# Patient Record
Sex: FEMALE | Race: WHITE | NOT HISPANIC OR LATINO | Employment: UNEMPLOYED | URBAN - NONMETROPOLITAN AREA
[De-identification: names, ages, dates, MRNs, and addresses within clinical notes are randomized per-mention and may not be internally consistent; named-entity substitution may affect disease eponyms.]

---

## 2017-01-16 ENCOUNTER — TRANSCRIBE ORDERS (OUTPATIENT)
Dept: ADMINISTRATIVE | Facility: HOSPITAL | Age: 57
End: 2017-01-16

## 2017-01-16 ENCOUNTER — LAB (OUTPATIENT)
Dept: LAB | Facility: HOSPITAL | Age: 57
End: 2017-01-16
Attending: INTERNAL MEDICINE

## 2017-01-16 DIAGNOSIS — R00.0 TACHYCARDIA, UNSPECIFIED: ICD-10-CM

## 2017-01-16 DIAGNOSIS — E11.9 DIABETES MELLITUS, STABLE (HCC): ICD-10-CM

## 2017-01-16 DIAGNOSIS — I10 ESSENTIAL HYPERTENSION, BENIGN: Primary | ICD-10-CM

## 2017-01-16 DIAGNOSIS — E78.5 HYPERLIPIDEMIA, UNSPECIFIED HYPERLIPIDEMIA TYPE: ICD-10-CM

## 2017-01-16 DIAGNOSIS — I10 ESSENTIAL HYPERTENSION, BENIGN: ICD-10-CM

## 2017-01-16 LAB
ALBUMIN SERPL-MCNC: 4.7 G/DL (ref 3.5–5)
ALBUMIN/GLOB SERPL: 1.3 G/DL (ref 1.5–2.5)
ALP SERPL-CCNC: 88 U/L (ref 46–116)
ALT SERPL W P-5'-P-CCNC: 14 U/L (ref 10–36)
ANION GAP SERPL CALCULATED.3IONS-SCNC: 7.7 MMOL/L (ref 3.6–11.2)
AST SERPL-CCNC: 23 U/L (ref 10–30)
BASOPHILS # BLD AUTO: 0.05 10*3/MM3 (ref 0–0.3)
BASOPHILS NFR BLD AUTO: 0.9 % (ref 0–2)
BILIRUB SERPL-MCNC: 1.2 MG/DL (ref 0.2–1.8)
BUN BLD-MCNC: 13 MG/DL (ref 7–21)
BUN/CREAT SERPL: 16.7 (ref 7–25)
CALCIUM SPEC-SCNC: 10.3 MG/DL (ref 7.7–10)
CHLORIDE SERPL-SCNC: 107 MMOL/L (ref 99–112)
CHOLEST SERPL-MCNC: 131 MG/DL (ref 0–200)
CO2 SERPL-SCNC: 29.3 MMOL/L (ref 24.3–31.9)
CREAT BLD-MCNC: 0.78 MG/DL (ref 0.43–1.29)
DEPRECATED RDW RBC AUTO: 45.5 FL (ref 37–54)
EOSINOPHIL # BLD AUTO: 0.18 10*3/MM3 (ref 0–0.7)
EOSINOPHIL NFR BLD AUTO: 3.1 % (ref 0–5)
ERYTHROCYTE [DISTWIDTH] IN BLOOD BY AUTOMATED COUNT: 14.3 % (ref 11.5–14.5)
GFR SERPL CREATININE-BSD FRML MDRD: 76 ML/MIN/1.73
GLOBULIN UR ELPH-MCNC: 3.6 GM/DL
GLUCOSE BLD-MCNC: 108 MG/DL (ref 70–110)
HBA1C MFR BLD: 6.2 % (ref 4.5–5.7)
HCT VFR BLD AUTO: 42.7 % (ref 37–47)
HDLC SERPL-MCNC: 46 MG/DL (ref 60–100)
HGB BLD-MCNC: 13.9 G/DL (ref 12–16)
IMM GRANULOCYTES # BLD: 0 10*3/MM3 (ref 0–0.03)
IMM GRANULOCYTES NFR BLD: 0 % (ref 0–0.5)
LDLC SERPL CALC-MCNC: 68 MG/DL (ref 0–100)
LDLC/HDLC SERPL: 1.48 {RATIO}
LYMPHOCYTES # BLD AUTO: 1.29 10*3/MM3 (ref 1–3)
LYMPHOCYTES NFR BLD AUTO: 22.3 % (ref 21–51)
MCH RBC QN AUTO: 28.5 PG (ref 27–33)
MCHC RBC AUTO-ENTMCNC: 32.6 G/DL (ref 33–37)
MCV RBC AUTO: 87.7 FL (ref 80–94)
MONOCYTES # BLD AUTO: 0.42 10*3/MM3 (ref 0.1–0.9)
MONOCYTES NFR BLD AUTO: 7.3 % (ref 0–10)
NEUTROPHILS # BLD AUTO: 3.85 10*3/MM3 (ref 1.4–6.5)
NEUTROPHILS NFR BLD AUTO: 66.4 % (ref 30–70)
OSMOLALITY SERPL CALC.SUM OF ELEC: 287.5 MOSM/KG (ref 273–305)
PLATELET # BLD AUTO: 182 10*3/MM3 (ref 130–400)
PMV BLD AUTO: 10.8 FL (ref 6–10)
POTASSIUM BLD-SCNC: 3.6 MMOL/L (ref 3.5–5.3)
PROT SERPL-MCNC: 8.3 G/DL (ref 6–8)
RBC # BLD AUTO: 4.87 10*6/MM3 (ref 4.2–5.4)
SODIUM BLD-SCNC: 144 MMOL/L (ref 135–153)
TRIGL SERPL-MCNC: 85 MG/DL (ref 0–150)
TSH SERPL DL<=0.05 MIU/L-ACNC: 2.31 MIU/ML (ref 0.55–4.78)
VLDLC SERPL-MCNC: 17 MG/DL
WBC NRBC COR # BLD: 5.79 10*3/MM3 (ref 4.5–12.5)

## 2017-01-16 PROCEDURE — 83036 HEMOGLOBIN GLYCOSYLATED A1C: CPT | Performed by: INTERNAL MEDICINE

## 2017-01-16 PROCEDURE — 85025 COMPLETE CBC W/AUTO DIFF WBC: CPT | Performed by: INTERNAL MEDICINE

## 2017-01-16 PROCEDURE — 36415 COLL VENOUS BLD VENIPUNCTURE: CPT

## 2017-01-16 PROCEDURE — 80061 LIPID PANEL: CPT | Performed by: INTERNAL MEDICINE

## 2017-01-16 PROCEDURE — 80053 COMPREHEN METABOLIC PANEL: CPT | Performed by: INTERNAL MEDICINE

## 2017-01-16 PROCEDURE — 84443 ASSAY THYROID STIM HORMONE: CPT | Performed by: INTERNAL MEDICINE

## 2022-08-04 ENCOUNTER — APPOINTMENT (OUTPATIENT)
Dept: CT IMAGING | Facility: HOSPITAL | Age: 62
End: 2022-08-04

## 2022-08-04 ENCOUNTER — HOSPITAL ENCOUNTER (INPATIENT)
Facility: HOSPITAL | Age: 62
LOS: 5 days | Discharge: HOME | End: 2022-08-09
Attending: EMERGENCY MEDICINE | Admitting: HOSPITALIST

## 2022-08-04 ENCOUNTER — APPOINTMENT (OUTPATIENT)
Dept: GENERAL RADIOLOGY | Facility: HOSPITAL | Age: 62
End: 2022-08-04

## 2022-08-04 ENCOUNTER — APPOINTMENT (OUTPATIENT)
Dept: ULTRASOUND IMAGING | Facility: HOSPITAL | Age: 62
End: 2022-08-04

## 2022-08-04 DIAGNOSIS — D64.9 ANEMIA, UNSPECIFIED TYPE: ICD-10-CM

## 2022-08-04 DIAGNOSIS — R60.1 ANASARCA: ICD-10-CM

## 2022-08-04 DIAGNOSIS — I50.23 ACUTE ON CHRONIC SYSTOLIC HEART FAILURE: ICD-10-CM

## 2022-08-04 DIAGNOSIS — I50.9 ACUTE ON CHRONIC CONGESTIVE HEART FAILURE, UNSPECIFIED HEART FAILURE TYPE: Primary | ICD-10-CM

## 2022-08-04 LAB
A-A DO2: 139.9 MMHG (ref 0–300)
A-A DO2: 69.3 MMHG (ref 0–300)
ALBUMIN SERPL-MCNC: 3.34 G/DL (ref 3.5–5.2)
ALBUMIN/GLOB SERPL: 0.9 G/DL
ALP SERPL-CCNC: 92 U/L (ref 39–117)
ALT SERPL W P-5'-P-CCNC: 7 U/L (ref 1–33)
ANION GAP SERPL CALCULATED.3IONS-SCNC: 13.9 MMOL/L (ref 5–15)
APPEARANCE FLD: ABNORMAL
APTT PPP: 41.4 SECONDS (ref 26.5–34.5)
ARTERIAL PATENCY WRIST A: ABNORMAL
ARTERIAL PATENCY WRIST A: POSITIVE
AST SERPL-CCNC: 30 U/L (ref 1–32)
ATMOSPHERIC PRESS: 730 MMHG
ATMOSPHERIC PRESS: 730 MMHG
BASE EXCESS BLDA CALC-SCNC: 2.3 MMOL/L (ref 0–2)
BASE EXCESS BLDA CALC-SCNC: 3.3 MMOL/L (ref 0–2)
BASOPHILS # BLD AUTO: 0.06 10*3/MM3 (ref 0–0.2)
BASOPHILS NFR BLD AUTO: 1.2 % (ref 0–1.5)
BDY SITE: ABNORMAL
BDY SITE: ABNORMAL
BILIRUB SERPL-MCNC: 1.3 MG/DL (ref 0–1.2)
BODY TEMPERATURE: 0 C
BODY TEMPERATURE: 0 C
BUN SERPL-MCNC: 19 MG/DL (ref 8–23)
BUN/CREAT SERPL: 12.7 (ref 7–25)
CALCIUM SPEC-SCNC: 9.5 MG/DL (ref 8.6–10.5)
CHLORIDE SERPL-SCNC: 98 MMOL/L (ref 98–107)
CO2 BLDA-SCNC: 27.9 MMOL/L (ref 22–33)
CO2 BLDA-SCNC: 29.9 MMOL/L (ref 22–33)
CO2 SERPL-SCNC: 21.1 MMOL/L (ref 22–29)
COHGB MFR BLD: 1.3 % (ref 0–5)
COHGB MFR BLD: 1.6 % (ref 0–5)
COLOR FLD: YELLOW
CREAT SERPL-MCNC: 1.5 MG/DL (ref 0.57–1)
DEPRECATED RDW RBC AUTO: 56.8 FL (ref 37–54)
EGFRCR SERPLBLD CKD-EPI 2021: 39.5 ML/MIN/1.73
EOSINOPHIL # BLD AUTO: 0.12 10*3/MM3 (ref 0–0.4)
EOSINOPHIL NFR BLD AUTO: 2.4 % (ref 0.3–6.2)
ERYTHROCYTE [DISTWIDTH] IN BLOOD BY AUTOMATED COUNT: 16.1 % (ref 12.3–15.4)
FLUAV RNA RESP QL NAA+PROBE: NOT DETECTED
FLUBV RNA RESP QL NAA+PROBE: NOT DETECTED
GAS FLOW AIRWAY: 4 LPM
GLOBULIN UR ELPH-MCNC: 3.8 GM/DL
GLUCOSE SERPL-MCNC: 101 MG/DL (ref 65–99)
HBA1C MFR BLD: 4.8 % (ref 4.8–5.6)
HCO3 BLDA-SCNC: 26.6 MMOL/L (ref 20–26)
HCO3 BLDA-SCNC: 28.5 MMOL/L (ref 20–26)
HCT VFR BLD AUTO: 34.1 % (ref 34–46.6)
HCT VFR BLD CALC: 33.9 % (ref 38–51)
HCT VFR BLD CALC: 34.8 % (ref 38–51)
HGB BLD-MCNC: 10.9 G/DL (ref 12–15.9)
HGB BLDA-MCNC: 11.1 G/DL (ref 13.5–17.5)
HGB BLDA-MCNC: 11.4 G/DL (ref 13.5–17.5)
HOLD SPECIMEN: NORMAL
HOLD SPECIMEN: NORMAL
IMM GRANULOCYTES # BLD AUTO: 0.01 10*3/MM3 (ref 0–0.05)
IMM GRANULOCYTES NFR BLD AUTO: 0.2 % (ref 0–0.5)
INHALED O2 CONCENTRATION: 21 %
INHALED O2 CONCENTRATION: 36 %
INR PPP: 1.41 (ref 0.9–1.1)
LIPASE SERPL-CCNC: 26 U/L (ref 13–60)
LYMPHOCYTES # BLD AUTO: 0.6 10*3/MM3 (ref 0.7–3.1)
LYMPHOCYTES NFR BLD AUTO: 12 % (ref 19.6–45.3)
LYMPHOCYTES NFR FLD MANUAL: 6 %
Lab: ABNORMAL
MAGNESIUM SERPL-MCNC: 2.1 MG/DL (ref 1.6–2.4)
MCH RBC QN AUTO: 31.3 PG (ref 26.6–33)
MCHC RBC AUTO-ENTMCNC: 32 G/DL (ref 31.5–35.7)
MCV RBC AUTO: 98 FL (ref 79–97)
METHGB BLD QL: 0.3 % (ref 0–3)
METHGB BLD QL: <-0.1 % (ref 0–3)
MODALITY: ABNORMAL
MODALITY: ABNORMAL
MONOCYTES # BLD AUTO: 0.48 10*3/MM3 (ref 0.1–0.9)
MONOCYTES NFR BLD AUTO: 9.6 % (ref 5–12)
MONOS+MACROS NFR FLD: 77 %
NEUTROPHILS NFR BLD AUTO: 3.72 10*3/MM3 (ref 1.7–7)
NEUTROPHILS NFR BLD AUTO: 74.6 % (ref 42.7–76)
NEUTROPHILS NFR FLD MANUAL: 17 %
NOTE: ABNORMAL
NOTE: ABNORMAL
NOTIFIED BY: ABNORMAL
NOTIFIED WHO: ABNORMAL
NRBC BLD AUTO-RTO: 0 /100 WBC (ref 0–0.2)
NT-PROBNP SERPL-MCNC: 8224 PG/ML (ref 0–900)
NUC CELL # FLD: 63 /MM3
OXYHGB MFR BLDV: 31.3 % (ref 94–99)
OXYHGB MFR BLDV: 90.2 % (ref 94–99)
PCO2 BLDA: 39.6 MM HG (ref 35–45)
PCO2 BLDA: 45.1 MM HG (ref 35–45)
PCO2 TEMP ADJ BLD: ABNORMAL MM[HG]
PCO2 TEMP ADJ BLD: ABNORMAL MM[HG]
PH BLDA: 7.41 PH UNITS (ref 7.35–7.45)
PH BLDA: 7.44 PH UNITS (ref 7.35–7.45)
PH, TEMP CORRECTED: ABNORMAL
PH, TEMP CORRECTED: ABNORMAL
PLATELET # BLD AUTO: 115 10*3/MM3 (ref 140–450)
PMV BLD AUTO: 9.5 FL (ref 6–12)
PO2 BLDA: 23.3 MM HG (ref 83–108)
PO2 BLDA: 62.3 MM HG (ref 83–108)
PO2 TEMP ADJ BLD: ABNORMAL MM[HG]
PO2 TEMP ADJ BLD: ABNORMAL MM[HG]
POTASSIUM SERPL-SCNC: 4.1 MMOL/L (ref 3.5–5.2)
PROT SERPL-MCNC: 7.1 G/DL (ref 6–8.5)
PROTHROMBIN TIME: 17.6 SECONDS (ref 12.1–14.7)
QT INTERVAL: 122 MS
QTC INTERVAL: 131 MS
RBC # BLD AUTO: 3.48 10*6/MM3 (ref 3.77–5.28)
RBC # FLD AUTO: ABNORMAL 10*3/UL
SAO2 % BLDCOA: 31.8 % (ref 94–99)
SAO2 % BLDCOA: 91.5 % (ref 94–99)
SARS-COV-2 RNA RESP QL NAA+PROBE: NOT DETECTED
SODIUM SERPL-SCNC: 133 MMOL/L (ref 136–145)
TROPONIN T SERPL-MCNC: <0.01 NG/ML (ref 0–0.03)
VENTILATOR MODE: ABNORMAL
VENTILATOR MODE: ABNORMAL
WBC NRBC COR # BLD: 4.99 10*3/MM3 (ref 3.4–10.8)
WHOLE BLOOD HOLD COAG: NORMAL
WHOLE BLOOD HOLD SPECIMEN: NORMAL

## 2022-08-04 PROCEDURE — 74176 CT ABD & PELVIS W/O CONTRAST: CPT

## 2022-08-04 PROCEDURE — 99285 EMERGENCY DEPT VISIT HI MDM: CPT

## 2022-08-04 PROCEDURE — 81001 URINALYSIS AUTO W/SCOPE: CPT | Performed by: EMERGENCY MEDICINE

## 2022-08-04 PROCEDURE — 83735 ASSAY OF MAGNESIUM: CPT | Performed by: EMERGENCY MEDICINE

## 2022-08-04 PROCEDURE — 85025 COMPLETE CBC W/AUTO DIFF WBC: CPT | Performed by: EMERGENCY MEDICINE

## 2022-08-04 PROCEDURE — 82375 ASSAY CARBOXYHB QUANT: CPT

## 2022-08-04 PROCEDURE — 36415 COLL VENOUS BLD VENIPUNCTURE: CPT

## 2022-08-04 PROCEDURE — 87070 CULTURE OTHR SPECIMN AEROBIC: CPT | Performed by: EMERGENCY MEDICINE

## 2022-08-04 PROCEDURE — 83880 ASSAY OF NATRIURETIC PEPTIDE: CPT | Performed by: EMERGENCY MEDICINE

## 2022-08-04 PROCEDURE — 83050 HGB METHEMOGLOBIN QUAN: CPT

## 2022-08-04 PROCEDURE — 82945 GLUCOSE OTHER FLUID: CPT | Performed by: EMERGENCY MEDICINE

## 2022-08-04 PROCEDURE — 85730 THROMBOPLASTIN TIME PARTIAL: CPT | Performed by: EMERGENCY MEDICINE

## 2022-08-04 PROCEDURE — 82805 BLOOD GASES W/O2 SATURATION: CPT

## 2022-08-04 PROCEDURE — 89051 BODY FLUID CELL COUNT: CPT | Performed by: EMERGENCY MEDICINE

## 2022-08-04 PROCEDURE — 99284 EMERGENCY DEPT VISIT MOD MDM: CPT

## 2022-08-04 PROCEDURE — 87636 SARSCOV2 & INF A&B AMP PRB: CPT | Performed by: STUDENT IN AN ORGANIZED HEALTH CARE EDUCATION/TRAINING PROGRAM

## 2022-08-04 PROCEDURE — 84484 ASSAY OF TROPONIN QUANT: CPT | Performed by: EMERGENCY MEDICINE

## 2022-08-04 PROCEDURE — 25010000002 ALBUMIN HUMAN 25% PER 50 ML: Performed by: EMERGENCY MEDICINE

## 2022-08-04 PROCEDURE — 76942 ECHO GUIDE FOR BIOPSY: CPT

## 2022-08-04 PROCEDURE — 84157 ASSAY OF PROTEIN OTHER: CPT | Performed by: EMERGENCY MEDICINE

## 2022-08-04 PROCEDURE — 87015 SPECIMEN INFECT AGNT CONCNTJ: CPT | Performed by: EMERGENCY MEDICINE

## 2022-08-04 PROCEDURE — 49083 ABD PARACENTESIS W/IMAGING: CPT | Performed by: RADIOLOGY

## 2022-08-04 PROCEDURE — 94799 UNLISTED PULMONARY SVC/PX: CPT

## 2022-08-04 PROCEDURE — 83690 ASSAY OF LIPASE: CPT | Performed by: HOSPITALIST

## 2022-08-04 PROCEDURE — 83036 HEMOGLOBIN GLYCOSYLATED A1C: CPT | Performed by: HOSPITALIST

## 2022-08-04 PROCEDURE — 85610 PROTHROMBIN TIME: CPT | Performed by: EMERGENCY MEDICINE

## 2022-08-04 PROCEDURE — 93005 ELECTROCARDIOGRAM TRACING: CPT | Performed by: EMERGENCY MEDICINE

## 2022-08-04 PROCEDURE — 80053 COMPREHEN METABOLIC PANEL: CPT | Performed by: EMERGENCY MEDICINE

## 2022-08-04 PROCEDURE — 71250 CT THORAX DX C-: CPT

## 2022-08-04 PROCEDURE — 71045 X-RAY EXAM CHEST 1 VIEW: CPT

## 2022-08-04 PROCEDURE — 0W9G3ZZ DRAINAGE OF PERITONEAL CAVITY, PERCUTANEOUS APPROACH: ICD-10-PCS | Performed by: RADIOLOGY

## 2022-08-04 PROCEDURE — 36600 WITHDRAWAL OF ARTERIAL BLOOD: CPT

## 2022-08-04 PROCEDURE — P9047 ALBUMIN (HUMAN), 25%, 50ML: HCPCS | Performed by: EMERGENCY MEDICINE

## 2022-08-04 PROCEDURE — 87205 SMEAR GRAM STAIN: CPT | Performed by: EMERGENCY MEDICINE

## 2022-08-04 PROCEDURE — 82042 OTHER SOURCE ALBUMIN QUAN EA: CPT | Performed by: EMERGENCY MEDICINE

## 2022-08-04 RX ORDER — ONDANSETRON 4 MG/1
4 TABLET, FILM COATED ORAL EVERY 8 HOURS PRN
COMMUNITY

## 2022-08-04 RX ORDER — ALBUMIN (HUMAN) 12.5 G/50ML
25 SOLUTION INTRAVENOUS
Status: COMPLETED | OUTPATIENT
Start: 2022-08-04 | End: 2022-08-04

## 2022-08-04 RX ORDER — ASPIRIN 81 MG/1
81 TABLET ORAL DAILY
COMMUNITY
End: 2023-03-06 | Stop reason: SDUPTHER

## 2022-08-04 RX ORDER — ALPRAZOLAM 0.25 MG/1
0.25 TABLET ORAL NIGHTLY PRN
COMMUNITY

## 2022-08-04 RX ORDER — CARVEDILOL 12.5 MG/1
6.25 TABLET ORAL 2 TIMES DAILY WITH MEALS
COMMUNITY
End: 2022-09-15 | Stop reason: HOSPADM

## 2022-08-04 RX ORDER — BUMETANIDE 0.25 MG/ML
2 INJECTION INTRAMUSCULAR; INTRAVENOUS ONCE
Status: COMPLETED | OUTPATIENT
Start: 2022-08-04 | End: 2022-08-04

## 2022-08-04 RX ORDER — SODIUM CHLORIDE 0.9 % (FLUSH) 0.9 %
10 SYRINGE (ML) INJECTION AS NEEDED
Status: DISCONTINUED | OUTPATIENT
Start: 2022-08-04 | End: 2022-08-09 | Stop reason: HOSPADM

## 2022-08-04 RX ORDER — BUMETANIDE 1 MG/1
1 TABLET ORAL DAILY
COMMUNITY
End: 2022-09-26 | Stop reason: SDUPTHER

## 2022-08-04 RX ORDER — TRAMADOL HYDROCHLORIDE 50 MG/1
50 TABLET ORAL EVERY 8 HOURS PRN
COMMUNITY

## 2022-08-04 RX ADMIN — BUMETANIDE 2 MG: 0.25 INJECTION, SOLUTION INTRAMUSCULAR; INTRAVENOUS at 19:06

## 2022-08-04 RX ADMIN — ALBUMIN HUMAN 25 G: 0.25 SOLUTION INTRAVENOUS at 19:30

## 2022-08-04 RX ADMIN — ALBUMIN HUMAN 25 G: 0.25 SOLUTION INTRAVENOUS at 17:30

## 2022-08-04 RX ADMIN — ALBUMIN HUMAN 25 G: 0.25 SOLUTION INTRAVENOUS at 19:00

## 2022-08-04 NOTE — ED PROVIDER NOTES
"Subjective   61-year-old white female complains of \"swelling \".  She states that she has had gradually increasing edema over the past 2 months.  She has a previous history of paracentesis for ascites number of months ago.  She is currently taking Bumex, but does not feel this is helping her symptoms.  She feels that her abdomen is distended from the swelling and she cannot take a deep breath.  She does not really have dyspnea at rest.  She complains of swelling throughout her legs, arms, and abdomen.  She denied any chest pain, fever, chills, cough.  She denied any previous history of cirrhosis and is unsure if she was diagnosed in the past with congestive heart failure.          Review of Systems   All other systems reviewed and are negative.      No past medical history on file.    Allergies   Allergen Reactions   • Codeine Hallucinations       No past surgical history on file.    No family history on file.    Social History     Socioeconomic History   • Marital status:            Objective   Physical Exam  Vitals and nursing note reviewed. Exam conducted with a chaperone present.   Constitutional:       Appearance: Normal appearance. She is normal weight.   HENT:      Head: Normocephalic and atraumatic.      Mouth/Throat:      Mouth: Mucous membranes are moist.      Pharynx: Oropharynx is clear.   Neck:      Vascular: No JVD.   Cardiovascular:      Rate and Rhythm: Normal rate and regular rhythm.      Heart sounds: Normal heart sounds. No murmur heard.    No friction rub. No gallop.   Pulmonary:      Breath sounds: Examination of the right-lower field reveals rales. Rales present. No wheezing or rhonchi.   Abdominal:      General: Bowel sounds are decreased. There is distension.      Comments: Unable to appreciate fluid wave.  Edema of the entire abdominal wall.   Musculoskeletal:      Right lower leg: Edema present.      Left lower leg: Edema present.      Comments: Anasarca with 4+ edema bilateral lower " extremities extending proximally to the abdomen and in the back to the tips of the scapulae.  She also has 4+ edema in the bilateral upper extremities.   Skin:     General: Skin is warm and dry.   Neurological:      General: No focal deficit present.      Mental Status: She is alert and oriented to person, place, and time.   Psychiatric:         Mood and Affect: Mood normal.         Behavior: Behavior normal.         Procedures           ED Course  ED Course as of 08/04/22 2328   u Aug 04, 2022   1638 Patient in radiology for her paracentesis at this time. [BC]   1843 Discussed with Dr. Draper.  Further testing ordered as requested. [BC]   1905 Patient care transferred to Dr. Cordova at change of shift.    Darryl Saavedra MD  7:06 PM EDT   [BC]   1931 EKG notes what appears to be sinus rhythm.  70 bpm.  .  QRS 64.  QTc 131.  No acute ST elevation. [SF]   2024 I assumed care of this patient at shift change.  CTs of the chest and abdomen were requested by the hospitalist on call prior to the admission of this patient.  Please note, it has been 2 hours since these orders were placed.   [SF]   2327 Imaging returned with CT imaging of the chest and abdomen with findings consistent with cirrhosis.  Vitals remained stable.  CBC unremarkable.  CMP notes slight acute kidney injury.  ABG noted improvement in oxygenation after the application of oxygen.  COVID testing negative.  Paracentesis completed during the day shift.  Recommend admission for further work up and treatment.  Hospitalist team consulted and made aware of the patient.  Consults and orders placed per hospitalist request.  Patient was agreeable to admission plan.  Vitals stable on admission. [SF]      ED Course User Index  [BC] Darryl Saavedra MD  [SF] Benoit Cordova, DO                                           [facility]    Final diagnoses:   Acute on chronic congestive heart failure, unspecified heart failure type (HCC)   Anasarca   Anemia, unspecified type        ED Disposition  ED Disposition     ED Disposition   Decision to Admit    Condition   --    Comment   --             No follow-up provider specified.       Medication List      No changes were made to your prescriptions during this visit.          Benoit Cordova,   08/04/22 0198

## 2022-08-04 NOTE — NURSING NOTE
Procedure completed, patient tolerated well, denies needs or complaints at this time. Will give bedside report to primary RN, approx 10,300ml drained.

## 2022-08-05 ENCOUNTER — APPOINTMENT (OUTPATIENT)
Dept: GENERAL RADIOLOGY | Facility: HOSPITAL | Age: 62
End: 2022-08-05

## 2022-08-05 ENCOUNTER — APPOINTMENT (OUTPATIENT)
Dept: CARDIOLOGY | Facility: HOSPITAL | Age: 62
End: 2022-08-05

## 2022-08-05 LAB
ABSOLUTE LUNG FLUID CONTENT: 39 % (ref 20–35)
ALBUMIN FLD-MCNC: 2.1 G/DL
ALBUMIN SERPL-MCNC: 3.85 G/DL (ref 3.5–5.2)
ALBUMIN/GLOB SERPL: 1.6 G/DL
ALP SERPL-CCNC: 71 U/L (ref 39–117)
ALT SERPL W P-5'-P-CCNC: <5 U/L (ref 1–33)
ANION GAP SERPL CALCULATED.3IONS-SCNC: 13.8 MMOL/L (ref 5–15)
AST SERPL-CCNC: 20 U/L (ref 1–32)
BACTERIA UR QL AUTO: ABNORMAL /HPF
BASOPHILS # BLD AUTO: 0.06 10*3/MM3 (ref 0–0.2)
BASOPHILS NFR BLD AUTO: 1 % (ref 0–1.5)
BH CV ECHO MEAS - AO ROOT DIAM: 2.5 CM
BH CV ECHO MEAS - EDV(CUBED): 91.1 ML
BH CV ECHO MEAS - EDV(MOD-SP4): 63.1 ML
BH CV ECHO MEAS - EF(MOD-SP4): 48 %
BH CV ECHO MEAS - ESV(CUBED): 68.9 ML
BH CV ECHO MEAS - ESV(MOD-SP4): 32.8 ML
BH CV ECHO MEAS - FS: 8.9 %
BH CV ECHO MEAS - IVS/LVPW: 0.9 CM
BH CV ECHO MEAS - IVSD: 0.9 CM
BH CV ECHO MEAS - LA DIMENSION: 5.6 CM
BH CV ECHO MEAS - LAT PEAK E' VEL: 12.6 CM/SEC
BH CV ECHO MEAS - LV DIASTOLIC VOL/BSA (35-75): 38 CM2
BH CV ECHO MEAS - LV MASS(C)D: 142.9 GRAMS
BH CV ECHO MEAS - LV SYSTOLIC VOL/BSA (12-30): 19.8 CM2
BH CV ECHO MEAS - LVIDD: 4.5 CM
BH CV ECHO MEAS - LVIDS: 4.1 CM
BH CV ECHO MEAS - LVOT AREA: 2.5 CM2
BH CV ECHO MEAS - LVOT DIAM: 1.8 CM
BH CV ECHO MEAS - LVPWD: 1 CM
BH CV ECHO MEAS - MED PEAK E' VEL: 4.2 CM/SEC
BH CV ECHO MEAS - MV A MAX VEL: 71 CM/SEC
BH CV ECHO MEAS - MV E MAX VEL: 110 CM/SEC
BH CV ECHO MEAS - MV E/A: 1.55
BH CV ECHO MEAS - PA ACC TIME: 0.1 SEC
BH CV ECHO MEAS - PA PR(ACCEL): 36.3 MMHG
BH CV ECHO MEAS - RAP SYSTOLE: 10 MMHG
BH CV ECHO MEAS - RVSP: 33 MMHG
BH CV ECHO MEAS - SI(MOD-SP4): 18.3 ML/M2
BH CV ECHO MEAS - SV(MOD-SP4): 30.3 ML
BH CV ECHO MEAS - TAPSE (>1.6): 0.79 CM
BH CV ECHO MEAS - TR MAX PG: 23 MMHG
BH CV ECHO MEAS - TR MAX VEL: 240 CM/SEC
BH CV ECHO MEASUREMENTS AVERAGE E/E' RATIO: 13.1
BILIRUB SERPL-MCNC: 2 MG/DL (ref 0–1.2)
BILIRUB UR QL STRIP: NEGATIVE
BUN SERPL-MCNC: 17 MG/DL (ref 8–23)
BUN/CREAT SERPL: 14.2 (ref 7–25)
CALCIUM SPEC-SCNC: 9.3 MG/DL (ref 8.6–10.5)
CHLORIDE SERPL-SCNC: 99 MMOL/L (ref 98–107)
CHOLEST SERPL-MCNC: 60 MG/DL (ref 0–200)
CLARITY UR: ABNORMAL
CO2 SERPL-SCNC: 23.2 MMOL/L (ref 22–29)
COLOR UR: YELLOW
CREAT SERPL-MCNC: 1.2 MG/DL (ref 0.57–1)
DEPRECATED RDW RBC AUTO: 55.7 FL (ref 37–54)
EGFRCR SERPLBLD CKD-EPI 2021: 51.6 ML/MIN/1.73
EOSINOPHIL # BLD AUTO: 0.1 10*3/MM3 (ref 0–0.4)
EOSINOPHIL NFR BLD AUTO: 1.6 % (ref 0.3–6.2)
ERYTHROCYTE [DISTWIDTH] IN BLOOD BY AUTOMATED COUNT: 15.9 % (ref 12.3–15.4)
GLOBULIN UR ELPH-MCNC: 2.5 GM/DL
GLUCOSE FLD-MCNC: 103 MG/DL
GLUCOSE SERPL-MCNC: 81 MG/DL (ref 65–99)
GLUCOSE UR STRIP-MCNC: NEGATIVE MG/DL
HCT VFR BLD AUTO: 33.6 % (ref 34–46.6)
HDLC SERPL-MCNC: 27 MG/DL (ref 40–60)
HGB BLD-MCNC: 10.8 G/DL (ref 12–15.9)
HGB UR QL STRIP.AUTO: ABNORMAL
HYALINE CASTS UR QL AUTO: ABNORMAL /LPF
IMM GRANULOCYTES # BLD AUTO: 0.03 10*3/MM3 (ref 0–0.05)
IMM GRANULOCYTES NFR BLD AUTO: 0.5 % (ref 0–0.5)
KETONES UR QL STRIP: NEGATIVE
LDLC SERPL CALC-MCNC: 19 MG/DL (ref 0–100)
LDLC/HDLC SERPL: 0.84 {RATIO}
LEFT ATRIUM VOLUME INDEX: 46.8 ML/M2
LEUKOCYTE ESTERASE UR QL STRIP.AUTO: NEGATIVE
LYMPHOCYTES # BLD AUTO: 0.61 10*3/MM3 (ref 0.7–3.1)
LYMPHOCYTES NFR BLD AUTO: 10 % (ref 19.6–45.3)
MAGNESIUM SERPL-MCNC: 2 MG/DL (ref 1.6–2.4)
MAXIMAL PREDICTED HEART RATE: 159 BPM
MCH RBC QN AUTO: 30.9 PG (ref 26.6–33)
MCHC RBC AUTO-ENTMCNC: 32.1 G/DL (ref 31.5–35.7)
MCV RBC AUTO: 96.3 FL (ref 79–97)
MONOCYTES # BLD AUTO: 0.5 10*3/MM3 (ref 0.1–0.9)
MONOCYTES NFR BLD AUTO: 8.2 % (ref 5–12)
NEUTROPHILS NFR BLD AUTO: 4.78 10*3/MM3 (ref 1.7–7)
NEUTROPHILS NFR BLD AUTO: 78.7 % (ref 42.7–76)
NITRITE UR QL STRIP: NEGATIVE
NRBC BLD AUTO-RTO: 0 /100 WBC (ref 0–0.2)
PH UR STRIP.AUTO: 5.5 [PH] (ref 5–8)
PLATELET # BLD AUTO: 107 10*3/MM3 (ref 140–450)
PMV BLD AUTO: 9.6 FL (ref 6–12)
POTASSIUM SERPL-SCNC: 3.6 MMOL/L (ref 3.5–5.2)
PROT FLD-MCNC: 3.7 G/DL
PROT SERPL-MCNC: 6.3 G/DL (ref 6–8.5)
PROT UR QL STRIP: NEGATIVE
QT INTERVAL: 118 MS
QT INTERVAL: 132 MS
QTC INTERVAL: 125 MS
QTC INTERVAL: 143 MS
RBC # BLD AUTO: 3.49 10*6/MM3 (ref 3.77–5.28)
RBC # UR STRIP: ABNORMAL /HPF
REF LAB TEST METHOD: ABNORMAL
SODIUM SERPL-SCNC: 136 MMOL/L (ref 136–145)
SP GR UR STRIP: 1.01 (ref 1–1.03)
SQUAMOUS #/AREA URNS HPF: ABNORMAL /HPF
STRESS TARGET HR: 135 BPM
T4 FREE SERPL-MCNC: 1.29 NG/DL (ref 0.93–1.7)
TRIGL SERPL-MCNC: 52 MG/DL (ref 0–150)
TROPONIN T SERPL-MCNC: 0.01 NG/ML (ref 0–0.03)
TROPONIN T SERPL-MCNC: <0.01 NG/ML (ref 0–0.03)
TSH SERPL DL<=0.05 MIU/L-ACNC: 6.25 UIU/ML (ref 0.27–4.2)
UROBILINOGEN UR QL STRIP: ABNORMAL
VLDLC SERPL-MCNC: 14 MG/DL (ref 5–40)
WBC # UR STRIP: ABNORMAL /HPF
WBC NRBC COR # BLD: 6.08 10*3/MM3 (ref 3.4–10.8)

## 2022-08-05 PROCEDURE — 84439 ASSAY OF FREE THYROXINE: CPT | Performed by: INTERNAL MEDICINE

## 2022-08-05 PROCEDURE — 84484 ASSAY OF TROPONIN QUANT: CPT | Performed by: EMERGENCY MEDICINE

## 2022-08-05 PROCEDURE — 94761 N-INVAS EAR/PLS OXIMETRY MLT: CPT

## 2022-08-05 PROCEDURE — 99223 1ST HOSP IP/OBS HIGH 75: CPT | Performed by: HOSPITALIST

## 2022-08-05 PROCEDURE — 71045 X-RAY EXAM CHEST 1 VIEW: CPT

## 2022-08-05 PROCEDURE — 99222 1ST HOSP IP/OBS MODERATE 55: CPT | Performed by: SPECIALIST

## 2022-08-05 PROCEDURE — 94799 UNLISTED PULMONARY SVC/PX: CPT

## 2022-08-05 PROCEDURE — 93306 TTE W/DOPPLER COMPLETE: CPT | Performed by: SPECIALIST

## 2022-08-05 PROCEDURE — 84443 ASSAY THYROID STIM HORMONE: CPT | Performed by: INTERNAL MEDICINE

## 2022-08-05 PROCEDURE — 63710000001 ONDANSETRON PER 8 MG: Performed by: HOSPITALIST

## 2022-08-05 PROCEDURE — 93005 ELECTROCARDIOGRAM TRACING: CPT | Performed by: EMERGENCY MEDICINE

## 2022-08-05 PROCEDURE — 85025 COMPLETE CBC W/AUTO DIFF WBC: CPT | Performed by: EMERGENCY MEDICINE

## 2022-08-05 PROCEDURE — 25010000002 HEPARIN (PORCINE) PER 1000 UNITS: Performed by: HOSPITALIST

## 2022-08-05 PROCEDURE — 93306 TTE W/DOPPLER COMPLETE: CPT

## 2022-08-05 PROCEDURE — 80061 LIPID PANEL: CPT | Performed by: HOSPITALIST

## 2022-08-05 PROCEDURE — 94726 PLETHYSMOGRAPHY LUNG VOLUMES: CPT

## 2022-08-05 PROCEDURE — 93005 ELECTROCARDIOGRAM TRACING: CPT | Performed by: HOSPITALIST

## 2022-08-05 PROCEDURE — 80053 COMPREHEN METABOLIC PANEL: CPT | Performed by: EMERGENCY MEDICINE

## 2022-08-05 PROCEDURE — 83735 ASSAY OF MAGNESIUM: CPT | Performed by: EMERGENCY MEDICINE

## 2022-08-05 RX ORDER — ASPIRIN 81 MG/1
81 TABLET ORAL DAILY
Status: DISCONTINUED | OUTPATIENT
Start: 2022-08-05 | End: 2022-08-09 | Stop reason: HOSPADM

## 2022-08-05 RX ORDER — HEPARIN SODIUM 5000 [USP'U]/ML
5000 INJECTION, SOLUTION INTRAVENOUS; SUBCUTANEOUS EVERY 12 HOURS SCHEDULED
Status: DISCONTINUED | OUTPATIENT
Start: 2022-08-05 | End: 2022-08-09 | Stop reason: HOSPADM

## 2022-08-05 RX ORDER — BUMETANIDE 0.25 MG/ML
1 INJECTION INTRAMUSCULAR; INTRAVENOUS EVERY 12 HOURS
Status: DISCONTINUED | OUTPATIENT
Start: 2022-08-05 | End: 2022-08-05

## 2022-08-05 RX ORDER — TRAMADOL HYDROCHLORIDE 50 MG/1
25 TABLET ORAL EVERY 8 HOURS PRN
Status: DISCONTINUED | OUTPATIENT
Start: 2022-08-05 | End: 2022-08-09 | Stop reason: HOSPADM

## 2022-08-05 RX ORDER — BUMETANIDE 0.25 MG/ML
1 INJECTION INTRAMUSCULAR; INTRAVENOUS EVERY 12 HOURS
Status: COMPLETED | OUTPATIENT
Start: 2022-08-05 | End: 2022-08-06

## 2022-08-05 RX ORDER — BUMETANIDE 1 MG/1
1 TABLET ORAL DAILY
Status: CANCELLED | OUTPATIENT
Start: 2022-08-05

## 2022-08-05 RX ORDER — NITROGLYCERIN 0.4 MG/1
0.4 TABLET SUBLINGUAL
Status: DISCONTINUED | OUTPATIENT
Start: 2022-08-05 | End: 2022-08-09 | Stop reason: HOSPADM

## 2022-08-05 RX ORDER — SODIUM CHLORIDE 0.9 % (FLUSH) 0.9 %
10 SYRINGE (ML) INJECTION AS NEEDED
Status: DISCONTINUED | OUTPATIENT
Start: 2022-08-05 | End: 2022-08-09 | Stop reason: HOSPADM

## 2022-08-05 RX ORDER — CARVEDILOL 6.25 MG/1
12.5 TABLET ORAL 2 TIMES DAILY WITH MEALS
Status: DISCONTINUED | OUTPATIENT
Start: 2022-08-05 | End: 2022-08-09 | Stop reason: HOSPADM

## 2022-08-05 RX ORDER — ONDANSETRON 4 MG/1
4 TABLET, FILM COATED ORAL EVERY 8 HOURS PRN
Status: DISCONTINUED | OUTPATIENT
Start: 2022-08-05 | End: 2022-08-09 | Stop reason: HOSPADM

## 2022-08-05 RX ORDER — ALPRAZOLAM 0.25 MG/1
0.25 TABLET ORAL NIGHTLY PRN
Status: DISCONTINUED | OUTPATIENT
Start: 2022-08-05 | End: 2022-08-09 | Stop reason: HOSPADM

## 2022-08-05 RX ORDER — BUMETANIDE 0.25 MG/ML
2 INJECTION INTRAMUSCULAR; INTRAVENOUS ONCE
Status: COMPLETED | OUTPATIENT
Start: 2022-08-05 | End: 2022-08-05

## 2022-08-05 RX ORDER — SPIRONOLACTONE 25 MG/1
25 TABLET ORAL DAILY
Status: DISCONTINUED | OUTPATIENT
Start: 2022-08-05 | End: 2022-08-09 | Stop reason: HOSPADM

## 2022-08-05 RX ORDER — SODIUM CHLORIDE 0.9 % (FLUSH) 0.9 %
10 SYRINGE (ML) INJECTION EVERY 12 HOURS SCHEDULED
Status: DISCONTINUED | OUTPATIENT
Start: 2022-08-05 | End: 2022-08-09 | Stop reason: HOSPADM

## 2022-08-05 RX ADMIN — HEPARIN SODIUM 5000 UNITS: 5000 INJECTION INTRAVENOUS; SUBCUTANEOUS at 20:05

## 2022-08-05 RX ADMIN — BUMETANIDE 1 MG: 0.25 INJECTION INTRAMUSCULAR; INTRAVENOUS at 13:23

## 2022-08-05 RX ADMIN — Medication 10 ML: at 20:05

## 2022-08-05 RX ADMIN — TRAMADOL HYDROCHLORIDE 25 MG: 50 TABLET, COATED ORAL at 08:27

## 2022-08-05 RX ADMIN — HEPARIN SODIUM 5000 UNITS: 5000 INJECTION INTRAVENOUS; SUBCUTANEOUS at 08:28

## 2022-08-05 RX ADMIN — TRAMADOL HYDROCHLORIDE 25 MG: 50 TABLET, COATED ORAL at 20:05

## 2022-08-05 RX ADMIN — ONDANSETRON HYDROCHLORIDE 4 MG: 4 TABLET, FILM COATED ORAL at 13:23

## 2022-08-05 RX ADMIN — Medication 10 ML: at 08:31

## 2022-08-05 RX ADMIN — Medication 10 ML: at 01:40

## 2022-08-05 RX ADMIN — HEPARIN SODIUM 5000 UNITS: 5000 INJECTION INTRAVENOUS; SUBCUTANEOUS at 01:40

## 2022-08-05 RX ADMIN — BUMETANIDE 2 MG: 0.25 INJECTION INTRAMUSCULAR; INTRAVENOUS at 01:40

## 2022-08-05 RX ADMIN — ALPRAZOLAM 0.25 MG: 0.25 TABLET ORAL at 22:23

## 2022-08-05 RX ADMIN — SPIRONOLACTONE 25 MG: 25 TABLET ORAL at 13:23

## 2022-08-05 RX ADMIN — ASPIRIN 81 MG: 81 TABLET, COATED ORAL at 08:21

## 2022-08-05 NOTE — PROGRESS NOTES
"Follow-up on history and physical done by Dr. Dinh.  Patient was seen with Winston RN, patient's  also present.  Patient is lying in bed, she is sitting up but appears comfortable.  She says that she has been having recurrent episodes with edema for 2 to 3 months.  She always sleeps propped up but nothing new for her.  Echo was reviewed, also discussed with cardiology.  She does have evidence of cirrhosis, this could be KOHLI but more likely she has some element of pulmonary hypertension.  Echo results noted but I think with her severe tricuspid regurg that probably the pulmonary pressures were underestimated.  I drew a picture and tried to explain to the patient and  our findings and that I think she would benefit from a right heart cath to directly measure these pressures however per cardiology note earlier patient is still refusing any procedures at this time.  In any event whether this is all cardiac or cardiac mixed with some element of cirrhosis diuresis is recommended at this time.  Will monitor renal function closely.  Patient had paracentesis with 10 L yesterday, SAAG would suggest portal hypertension.  There was no evidence of SBP.  She still has significant anasarca, abdominal exam is difficult due to the abdominal wall edema presents.  PT consult has been placed, patient states that up until several weeks ago she was independent of ambulation.  Patient did have in the past about 12 years ago a paracentesis due to \"heart failure\" but details I do not have.  I am going to check her TSH and free T4 to certain that she has not become hypothyroid contributing to the symptoms.  Creatinine at this point is stable even with diuretics and the paracentesis.  Patient did receive albumin post paracentesis.  On heparin for DVT prophylaxis.  Currently not an ACE or ARB candidate as blood pressures are marginal.  Patient's INR is mildly elevated, bilirubin is normal, albumin is essentially normal as well.  " Platelets are decreased, most likely due to the mild splenomegaly.  CT abdomen noted, clinically patient does not have pancreatitis and lipase normal.  We will diurese on a daily basis and monitor her creatinine.  Creatinine is actually improved over yesterday despite diuresis.  She does have evidence of microscopic hematuria, UA will need to be repeated, if persist would need further evaluation.

## 2022-08-05 NOTE — PLAN OF CARE
Goal Outcome Evaluation:  Plan of Care Reviewed With: patient           Outcome Evaluation: Pt resting in bed this shift. AOx4, VSS, no complaints or s/s of acute distress noted. Pt utilizing external catheter and reports tolerating well w/ good output, see I/O flowsheets for more information. Pt tolerating diuretics. IV access and telemetry monitoring patent and maintained, will continue to follow plan of care.

## 2022-08-05 NOTE — PLAN OF CARE
Goal Outcome Evaluation:  Plan of Care Reviewed With: patient        Progress: no change  Outcome Evaluation: Pt admitted from ER during shift. Pt has rested since admission. VSS, Oxygen has been titrated down to 2L, NC, oxygen sat is ranging between 94-95%. Will continue with current plan of care

## 2022-08-05 NOTE — CASE MANAGEMENT/SOCIAL WORK
Discharge Planning Assessment  [facility]     Patient Name: Della Jorge  MRN: [id number]  Today's Date: 8/5/2022    Admit Date: 8/4/2022     Discharge Needs Assessment     Row Name 08/05/22 1127       Living Environment    People in Home spouse    Name(s) of People in Home Triston Jorge-spouse    Current Living Arrangements home    Family Caregiver if Needed spouse    Family Caregiver Names Triston-spouse    Quality of Family Relationships supportive    Able to Return to Prior Arrangements yes       Transition Planning    Patient/Family Anticipates Transition to home with family    Transportation Anticipated family or friend will provide       Discharge Needs Assessment    Readmission Within the Last 30 Days no previous admission in last 30 days    Equipment Currently Used at Home none    Concerns to be Addressed no discharge needs identified;denies needs/concerns at this time    Equipment Needed After Discharge none               Discharge Plan     Row Name 08/05/22 1128       Plan    Plan CM visited with pt & spouse at bedside, both are very pleasant to speak with. Pt lives at home with spouse @ [facility] [place], [place]. Pt utilizes no DME or Home Health services. Pt has been indep with ADLs and spouse avail. to assist if needed. Pt follows with Dr. Dougherty -PCP. She has no current insurance coverage and pays for her meds out of pocket- Per [facility] nurse, [facility] is following. Pt has no POA/AD but reports her spouse knows her wishes. Pt plans to [place] at [place] with spouse providing transportation. CM will follow & assist as needed.    Row Name 08/05/22 0937       Plan    Plan Comments Presented to ED with c/o worsening abdominal & lower ext. Edema. Hypoxic-placed on 4lnc, underwent paracentesis with 9-10L of fluid removed with improved breathing. Pt admitted with Ac/ chr CHF, IV bumex, good uop, IV Albumin, mon. Renlas, obtain TTE, Cirrhosis-mon for fluid re-accumulation. Ac. Hypoxic Rsp fl  -titrate O2 down as matilda., ELEUTERIO-mon.response to diuresis & large vol. Paracentesis.  2000ml uop, sats currently 94% on 1lnc, bun 17/cr. 1.20(1.50),  pBNP 8224, wbc 4.9, plts 107(115)              Continued Care and Services - Admitted Since 8/4/2022    Coordination has not been started for this encounter.          Demographic Summary     Row Name 08/05/22 1126       General Information    Admission Type inpatient    General Information Comments Dr. Dougherty-PCP               Functional Status     Row Name 08/05/22 1126       Functional Status    Functional Status Comments indep at baseline- spouse assists if needed       Functional Status, IADL    IADL Comments indep at baseline, spouse assist if needed.               Psychosocial    No documentation.                Abuse/Neglect    No documentation.                Legal    No documentation.                Substance Abuse    No documentation.                Patient Forms    No documentation.                   Cari Ellison RN

## 2022-08-05 NOTE — CONSULTS
Date of Admit: 8/4/2022  Date of Consult: 08/05/22  Provider, No Known        Acute on chronic congestive heart failure, unspecified heart failure type ([facility])      Assessment    [Covering for Dr. Ventura]  Anasarca with heart failure mostly right heart failure  Mild LV systolic dysfunction per echo  Status post paracentesis of 9 to 10 L  Essential hypertension        Recommendations     Patient with mild LV systolic dysfunction and significantly enlarged right ventricle we will continue diuresing for now we will try to keep her at least 1 L negative per day  Subsequently patient will need work-up including ischemia work-up plus or minus heart catheterization right and left, this is discussed with the patient at the moment the patient is declining that  Blood pressures controlled continue current management  At the moment the patient is not a good candidate for adding ARB or ACE because of relatively low blood pressure        Reason for consultation: Congestive heart failure    Subjective     Subjective     History of Present Illness     Della Batista is a 61-year-old female with a past medical history significant for congestive heart failure and essential hypertension. Patient presented to the ER with complaints of abdominal distention and lower extremity edema.  Patient states over the last 6 to 8 weeks she has had gradually worsening lower extremity edema and abdominal distention.  Denies any chest pain or shortness of breath.  Reports has been taking p.o. Lasix for last 10 years for her history of heart failure.  She was seen by her PCP recently and her Lasix was changed to Bumex which initially helped with her fluid retention however it continued to persist.  In the ED she underwent paracentesis with 9 to 10 L of fluid being removed.  Breathing has improved since admission.  She also had good urine output with IV diuresis.  Noted to have acute kidney injury on admission with creatinine of 1.5.  Echocardiogram  showed decreased LVEF with an EF of 46 to 50% with severe tricuspid valve regurgitation and severely dilated right atrial cavity.    Cardiac risk factors:hypertension, Sedentary life style and Obesity    Past Medical History:   Diagnosis Date   • Congestive heart failure (CHF) (HCC)    • Hypertension      Past Surgical History:   Procedure Laterality Date   • CHOLECYSTECTOMY     • HYSTERECTOMY      partial, still has ovaries     Family History   Problem Relation Age of Onset   • Heart disease Mother    • Diabetes Sister      Social History     Tobacco Use   • Smoking status: Never Smoker   • Smokeless tobacco: Never Used   Vaping Use   • Vaping Use: Never used   Substance Use Topics   • Alcohol use: Never   • Drug use: Never     Medications Prior to Admission   Medication Sig Dispense Refill Last Dose   • ALPRAZolam (XANAX) 0.25 MG tablet Take 0.25 mg by mouth At Night As Needed for Anxiety.   8/3/2022 at Unknown time   • aspirin 81 MG EC tablet Take 81 mg by mouth Daily.   8/3/2022 at Unknown time   • bumetanide (BUMEX) 1 MG tablet Take 1 mg by mouth Daily.   8/3/2022 at Unknown time   • carvedilol (COREG) 12.5 MG tablet Take 12.5 mg by mouth 2 (Two) Times a Day With Meals.   8/4/2022 at 0800   • ondansetron (ZOFRAN) 4 MG tablet Take 4 mg by mouth Every 8 (Eight) Hours As Needed for Nausea or Vomiting.   8/3/2022 at Unknown time   • traMADol (ULTRAM) 50 MG tablet Take 50 mg by mouth Every 8 (Eight) Hours As Needed for Moderate Pain .   8/4/2022 at 0800     Allergies:  Codeine    Review of Systems   Constitutional: Negative for fever.   HENT: Negative for congestion and trouble swallowing.    Eyes: Negative for photophobia and visual disturbance.   Respiratory: Negative for chest tightness and shortness of breath.    Cardiovascular: Positive for leg swelling. Negative for chest pain and palpitations.   Gastrointestinal: Positive for abdominal distention. Negative for nausea and vomiting.   Endocrine: Negative for  polyphagia.   Genitourinary: Negative for dysuria and hematuria.   Musculoskeletal: Negative for back pain and neck pain.   Skin: Negative for rash and wound.   Allergic/Immunologic: Negative for food allergies and immunocompromised state.   Neurological: Negative for dizziness, syncope and weakness.   Hematological: Negative for adenopathy. Does not bruise/bleed easily.   Psychiatric/Behavioral: Negative for confusion and suicidal ideas.       Objective       Objective      Vital Signs  Temp:  [97.7 °F (36.5 °C)-98 °F (36.7 °C)] 97.7 °F (36.5 °C)  Heart Rate:  [64-78] 78  Resp:  [16-25] 18  BP: ()/(51-82) 108/68  Vital Signs (last 72 hrs)       08/02 0700  08/03 0659 08/03 0700  08/04 0659 08/04 0700 08/05 0659 08/05 0700 08/05 0910   Most Recent      Temp (°F)     97.7 -  98       97.7 (36.5) 08/05 0619    Heart Rate     64 -  75      78     78 08/05 0800    Resp     16 - 25      18     18 08/05 0800    BP     90/56 -  132/80      108/68     108/68 08/05 0800    SpO2 (%)     85 -  100      94     94 08/05 0800        Body mass index is 43.26 kg/m².  Documented weights    08/04/22 1121 08/05/22 0004 08/05/22 0510   Weight: 108 kg (238 lb) 98.3 kg (216 lb 12.8 oz) 97.2 kg (214 lb 3.2 oz)            Intake/Output Summary (Last 24 hours) at 8/5/2022 0910  Last data filed at 8/5/2022 0510  Gross per 24 hour   Intake 100 ml   Output 2000 ml   Net -1900 ml     Physical Exam  Constitutional:       General: She is not in acute distress.     Appearance: Normal appearance. She is well-developed and normal weight.   HENT:      Head: Normocephalic and atraumatic.   Eyes:      General: Lids are normal.      Conjunctiva/sclera: Conjunctivae normal.      Pupils: Pupils are equal, round, and reactive to light.   Neck:      Vascular: JVD present. No carotid bruit.   Cardiovascular:      Rate and Rhythm: Normal rate and regular rhythm.      Pulses: Normal pulses.      Heart sounds: Normal heart sounds, S1 normal and S2  normal. No murmur heard.  Pulmonary:      Effort: Pulmonary effort is normal. No respiratory distress.      Breath sounds: Normal breath sounds. No wheezing.   Abdominal:      General: Bowel sounds are normal. There is no distension.      Palpations: Abdomen is soft. There is no hepatomegaly or splenomegaly.      Tenderness: There is no abdominal tenderness.   Musculoskeletal:         General: No swelling. Normal range of motion.      Cervical back: Normal range of motion and neck supple.      Right lower leg: Edema present.      Left lower leg: Edema present.   Skin:     General: Skin is warm and dry.      Coloration: Skin is not jaundiced.      Findings: No rash.   Neurological:      General: No focal deficit present.      Mental Status: She is alert and oriented to person, place, and time. Mental status is at baseline.   Psychiatric:         Mood and Affect: Mood normal.         Speech: Speech normal.         Behavior: Behavior normal.         Thought Content: Thought content normal.         Judgment: Judgment normal.         Results review     Results Review:    I reviewed the patient's new clinical results.  Results from last 7 days   Lab Units 08/05/22  0311 08/05/22  0042 08/04/22  1232   TROPONIN T ng/mL 0.012 <0.010 <0.010     Results from last 7 days   Lab Units 08/05/22  0042 08/04/22  1153   WBC 10*3/mm3 6.08 4.99   HEMOGLOBIN g/dL 10.8* 10.9*   PLATELETS 10*3/mm3 107* 115*     Results from last 7 days   Lab Units 08/05/22  0042 08/04/22  1232   SODIUM mmol/L 136 133*   POTASSIUM mmol/L 3.6 4.1   CHLORIDE mmol/L 99 98   CO2 mmol/L 23.2 21.1*   BUN mg/dL 17 19   CREATININE mg/dL 1.20* 1.50*   CALCIUM mg/dL 9.3 9.5   GLUCOSE mg/dL 81 101*   ALT (SGPT) U/L <5 7   AST (SGOT) U/L 20 30     Lab Results   Component Value Date    INR 1.41 (H) 08/04/2022     Lab Results   Component Value Date    MG 2.0 08/05/2022    MG 2.1 08/04/2022     Lab Results   Component Value Date    TSH 2.314 01/16/2017    TRIG 52  08/05/2022    HDL 27 (L) 08/05/2022    LDL 19 08/05/2022      Lab Results   Component Value Date    PROBNP 8,224.0 (H) 08/04/2022       ECG         ECG/EMG Results (last 24 hours)     Procedure Component Value Units Date/Time    ECG 12 Lead [[id number]] Collected: 08/04/22 1915     Updated: 08/04/22 2258     QT Interval 122 ms      QTC Interval 131 ms     Narrative:      Test Reason : sob  Blood Pressure :   */*   mmHG  Vent. Rate :  70 BPM     Atrial Rate :  78 BPM     P-R Int : 190 ms          QRS Dur :  64 ms      QT Int : 122 ms       P-R-T Axes :   *  80   0 degrees     QTc Int : 131 ms    Sinus rhythm with 1st degree AV block  Low voltage QRS  Anteroseptal infarct , age undetermined  Abnormal ECG  No previous ECGs available  Confirmed by Marlin Allen ([id number]) on 8/4/2022 10:57:59 PM    Referred By: CURD           Confirmed By: Marlin Allen    ECG 12 Lead [[id number]] Collected: 08/05/22 0014     Updated: 08/05/22 0016     QT Interval 132 ms      QTC Interval 143 ms     Narrative:      Test Reason : chf  Blood Pressure :   */*   mmHG  Vent. Rate :  71 BPM     Atrial Rate :  72 BPM     P-R Int :   * ms          QRS Dur :  68 ms      QT Int : 132 ms       P-R-T Axes :   *  71   0 degrees     QTc Int : 143 ms    Atrial fibrillation with premature ventricular or aberrantly conducted complexes  Low voltage QRS  Possible Anterolateral infarct (cited on or before 04-AUG-2022)  Abnormal ECG  When compared with ECG of 04-AUG-2022 19:15,  Previous ECG has undetermined rhythm, needs review  Questionable change in initial forces of Lateral leads    Referred By:            Confirmed By:     ECG 12 Lead [[id number]] Collected: 08/05/22 0256     Updated: 08/05/22 0259     QT Interval 118 ms      QTC Interval 125 ms     Narrative:      Test Reason : reassess rhythm, first EKG reported afib though looked like she had consistent ~  Blood Pressure :   */*   mmHG  Vent. Rate :  68 BPM     Atrial Rate :  68 BPM     P-R Int :   *  "ms          QRS Dur :  58 ms      QT Int : 118 ms       P-R-T Axes :   *  48   0 degrees     QTc Int : 125 ms    Accelerated Junctional rhythm with occasional premature ventricular complexes  Low voltage QRS  Cannot rule out Anteroseptal infarct (cited on or before 04-AUG-2022)  Abnormal ECG  When compared with ECG of 05-AUG-2022 00:14, (Unconfirmed)  Junctional rhythm has replaced Atrial fibrillation    Referred By:            Confirmed By:        Imaging Results (Last 72 Hours)     Procedure Component Value Units Date/Time    US Paracentesis [[id number]] Collected: 08/05/22 0829    Specimen: Body Fluid Updated: 08/05/22 0831    Narrative:      Ultrasound-guided paracentesis     CLINICAL INDICATION:     Large ascites     COMPARISON: None.     PROCEDURE: Written and verbal consent was obtained for ultrasound  paracentesis.  \" Time out\" was observed to verify the patient's identity  and the correct procedure. The location of the ascites confirmed  ultrasound and a anterior lateral approach was chosen. The anterior  abdomen was prepped and draped in the usual sterile fashion and 1%  lidocaine with and without epinephrine was utilized for local  anesthesia. A small skin incision was made with a scalpel and a 8 Yi  catheter with internal stylet was introduced into the ascites.  A total  of 10 Lfluid was obtained.     Upon completion of the procedure, manual compression was applied to the  paracentesis incision site until all appreciable bleeding subsided and a  sterile dressing was applied.  The patient tolerated the procedure well  and no immediate complications occurred.              SUMMARY: Ultrasound guided paracentesis.        This report was finalized on 8/5/2022 8:29 AM by Dr. Moe Jonas MD.       XR Chest 1 View [[id number]] Collected: 08/05/22 0137     Updated: 08/05/22 0139    Narrative:      CR Chest 1 Vw    INDICATION:   Short of breath. Heart failure.     COMPARISON:    8/4/2022    FINDINGS:  Portable " AP view(s) of the chest.    External support equipment artifact    The heart is enlarged. There is bronchovascular crowding. There is no pneumothorax. Probable bibasilar atelectasis. Decrease in vascular congestion.       Impression:        1. Cardiomegaly and interval improvement in volume overload. Probable bibasilar atelectasis. No pneumothorax.    Signer Name: Junior He MD   Signed: 8/5/2022 1:37 AM   Workstation Name: [email or web address]    [facility]    CT Chest Without Contrast Diagnostic [[id number]] Collected: 08/04/22 2245     Updated: 08/04/22 2247    Narrative:      CT Chest WO, CT Abdomen Pelvis WO    INDICATION:   Congestive heart failure. Anemia.    TECHNIQUE:   CT of the chest, abdomen and pelvis without IV contrast. Coronal and sagittal reconstructions were obtained.  Radiation dose reduction techniques included automated exposure control or exposure modulation based on body size. Count of known CT and cardiac  nuc med studies performed in previous 12 months: 0.     COMPARISON:   None available.    FINDINGS:  Chest: The heart is enlarged. There are trace bilateral pleural effusions. There is mild septal thickening bilaterally more confluent in the perihilar and lower lobes, most likely reflecting sequela of volume overload and pulmonary edema. There is a 6 mm  noncalcified subpleural nodular density in the left upper lobe on image 15. Fleischner recommendations follow. Central airways are patent. There is severe anasarca. Normal caliber aorta and atherosclerotic change. Coronary artery calcifications. 1.4 cm  lower pole thyroid lesion on the left likely a cyst. This can be confirmed with nonemergent thyroid ultrasound. There is no threshold adenopathy. Probable loculated pleural fluid or partially loculated pericardial fluid near the cardiac apex medially on  the right. No suspicious bone lesion. Probable sebaceous cyst in the left of midline at the mid thoracic level posteriorly  measures 1.7 cm.    Abdomen: Aorta shows advanced atherosclerotic change. The spleen is borderline enlarged and the liver is cirrhotic. Negative adrenal glands. Fatty replacement and atrophy of the pancreas. Surgical absence of the gallbladder. Evaluation of the solid  abdominal organs is severely limited by noncontrast technique and nonstandard patient positioning causing extensive beam hardening artifact. Moderate volume of perihepatic ascites. The kidneys are nonobstructed. No radiopaque stone. There is some  stranding around the head and uncinate process of the pancreas that is likely related to ascites rather than acute pancreatitis but should be correlated with laboratory data. Tiny punctate calcification in the uncinate process may reflect sequela of  prior bouts of pancreatitis. There is no threshold adenopathy.    Pelvis: Negative bladder. No drainable fluid collection. There is ascites tracking into the pelvis. The bowel is nonobstructed. There are scattered diverticula. Appendix not clearly identified and presence or absence of appendicitis cannot be  ascertained. There is no inguinal adenopathy or fluid collection. There is no suspicious bone lesion.  negative.      Impression:        1. Limited study secondary to technical factors.  2. Cardiomegaly and probable volume overload with trace pleural effusions.  3. 6 mm noncalcified nodule in the left upper lobe. Fleischner recommendations follow.  4. Cirrhosis with borderline splenomegaly and at least moderate to large volume ascites. Severe anasarca.  5. Surgical absence of the gallbladder.  6. Probable fluid tracking into the peripancreatic soft tissues simulating acute pancreatitis. Correlate with laboratory data.  7. Appendix not visualized or assessed.    Management recommendation: Current published guidelines recommend initial follow-up CT in 6-12 months, and again in 18-24 months for uncomplicated pulmonary nodules measuring 6 to 8 mm in  high-risk patients (Fleischner Society guidelines, 2017).          Signer Name: Junior He MD   Signed: 8/4/2022 10:45 PM   Workstation Name: [email or web address]    [facility]    CT Abdomen Pelvis Without Contrast [[id number]] Collected: 08/04/22 2245     Updated: 08/04/22 2247    Narrative:      CT Chest WO, CT Abdomen Pelvis WO    INDICATION:   Congestive heart failure. Anemia.    TECHNIQUE:   CT of the chest, abdomen and pelvis without IV contrast. Coronal and sagittal reconstructions were obtained.  Radiation dose reduction techniques included automated exposure control or exposure modulation based on body size. Count of known CT and cardiac  nuc med studies performed in previous 12 months: 0.     COMPARISON:   None available.    FINDINGS:  Chest: The heart is enlarged. There are trace bilateral pleural effusions. There is mild septal thickening bilaterally more confluent in the perihilar and lower lobes, most likely reflecting sequela of volume overload and pulmonary edema. There is a 6 mm  noncalcified subpleural nodular density in the left upper lobe on image 15. Fleischner recommendations follow. Central airways are patent. There is severe anasarca. Normal caliber aorta and atherosclerotic change. Coronary artery calcifications. 1.4 cm  lower pole thyroid lesion on the left likely a cyst. This can be confirmed with nonemergent thyroid ultrasound. There is no threshold adenopathy. Probable loculated pleural fluid or partially loculated pericardial fluid near the cardiac apex medially on  the right. No suspicious bone lesion. Probable sebaceous cyst in the left of midline at the mid thoracic level posteriorly measures 1.7 cm.    Abdomen: Aorta shows advanced atherosclerotic change. The spleen is borderline enlarged and the liver is cirrhotic. Negative adrenal glands. Fatty replacement and atrophy of the pancreas. Surgical absence of the gallbladder. Evaluation of the solid  abdominal organs  is severely limited by noncontrast technique and nonstandard patient positioning causing extensive beam hardening artifact. Moderate volume of perihepatic ascites. The kidneys are nonobstructed. No radiopaque stone. There is some  stranding around the head and uncinate process of the pancreas that is likely related to ascites rather than acute pancreatitis but should be correlated with laboratory data. Tiny punctate calcification in the uncinate process may reflect sequela of  prior bouts of pancreatitis. There is no threshold adenopathy.    Pelvis: Negative bladder. No drainable fluid collection. There is ascites tracking into the pelvis. The bowel is nonobstructed. There are scattered diverticula. Appendix not clearly identified and presence or absence of appendicitis cannot be  ascertained. There is no inguinal adenopathy or fluid collection. There is no suspicious bone lesion.  negative.      Impression:        1. Limited study secondary to technical factors.  2. Cardiomegaly and probable volume overload with trace pleural effusions.  3. 6 mm noncalcified nodule in the left upper lobe. Fleischner recommendations follow.  4. Cirrhosis with borderline splenomegaly and at least moderate to large volume ascites. Severe anasarca.  5. Surgical absence of the gallbladder.  6. Probable fluid tracking into the peripancreatic soft tissues simulating acute pancreatitis. Correlate with laboratory data.  7. Appendix not visualized or assessed.    Management recommendation: Current published guidelines recommend initial follow-up CT in 6-12 months, and again in 18-24 months for uncomplicated pulmonary nodules measuring 6 to 8 mm in high-risk patients (Fleischner Society guidelines, 2017).          Signer Name: Junior He MD   Signed: 8/4/2022 10:45 PM   Workstation Name: [email or web address]    [facility]    XR Chest 1 View [[id number]] Collected: 08/04/22 1817     Updated: 08/04/22 1819    Narrative:       PROCEDURE: CR Chest 1 Vw    COMPARISON:  No relevant comparison or correlation studies available at time of dictation.     INDICATIONS: CHF    TECHNIQUE: Single AP  view of the chest    FINDINGS: Patient positioning limits the exam and there is poor inspiratory effort. Moderate cardiac enlargement could be due to the low lung volumes or cardiomegaly. Pulmonary vascular congestion with mild left perihilar peribronchial cuffing. This  could be early interstitial edema. No focal consolidation noted with low lung volumes. Osseous structures are intact.      Impression:      Low lung volumes hinders exam. Findings may represent early congestive failure. Correlate clinically.         Signer Name: Spring Dee MD   Signed: 8/4/2022 6:17 PM   Workstation Name: [facility]    [facility] [place]          I have discussed my impression and recommendations with the patient and family.    Thank you very much for asking us to be involved in this patient's care.  We will follow along with you.    Electronically signed by CHARY Agudelo, 08/05/22, 9:10 AM EDT.  Electronically signed by Gerardo Shoemaker MD, 08/05/22, 1:27 PM EDT.    Please note that portions of this note were completed with a voice recognition program.

## 2022-08-05 NOTE — H&P
Hospitalist History and Physical        Patient Identification  Name: Della Jorge  Age/Sex: 61 y.o. female  :  1960        MRN: [id number]  Visit Number: [id number]  Admit Date: 2022   PCP: Sebastián Dougherty MD          Chief complaint abdominal swelling, leg swelling    History of Present Illness:  Patient is a 61 y.o. female with history of congestive heart failure diagnosed 10 years ago at which time she had significant abdominal ascites that required paracentesis resulting in 4-5 L of fluid being removed. She also has a history of HTN. She takes coreg for HTN and had been on lasix for the last 10 years for her heart failure. However, 2 months ago she started to retain more fluid, so her PCP changed her from lasix to bumex. This initially helped with her fluid retention, but ultimately she started to swell more once again. She reports significant swelling in her abdomen and lower extremities. She does not necessarily report shortness of breath, but does comment that it feels harder for her to get a full breath because of her abdominal distention. She denies chest pain. She denies significant change in her urine output until the last week or so, during which time it has been decreased. She presented to the ED on  with complaints of worsening abdominal and lower extremity edema. In the ED, she was found to be hypoxic and placed on 4L NC. Paracentesis was performed, resulting in around 9-10 L of fluid being removed. She reports it is much easier to take a breath now. Labs showed elevated BNP, elevated creatinine 1.50 (0.78 in 2017); total bilirubin elevated 1.3, albumin low 3.34, INR 1.41, PTT 41, mildly low hemoglobin 10.9 and low platelets of 115K. Patient has been admitted to the telemetry unit for further management.      Review of Systems  Review of Systems   Constitutional: Positive for activity change, fatigue and unexpected weight change. Negative for chills and diaphoresis.   HENT:  Negative for congestion, postnasal drip, rhinorrhea, sinus pressure, sinus pain and sore throat.    Eyes: Negative for photophobia, pain, discharge, redness, itching and visual disturbance.   Respiratory: Negative for cough, choking, shortness of breath and wheezing.    Cardiovascular: Positive for leg swelling. Negative for chest pain and palpitations.   Gastrointestinal: Positive for abdominal distention. Negative for abdominal pain, constipation, diarrhea, nausea and vomiting.   Endocrine: Negative for cold intolerance, heat intolerance, polydipsia, polyphagia and polyuria.   Genitourinary: Positive for decreased urine volume. Negative for difficulty urinating, dysuria, flank pain and frequency.   Musculoskeletal: Negative for arthralgias, back pain, joint swelling, myalgias, neck pain and neck stiffness.   Skin: Negative for color change, pallor, rash and wound.   Neurological: Positive for weakness (generalized). Negative for dizziness, tremors, seizures, syncope, light-headedness, numbness and headaches.   Hematological: Negative for adenopathy. Does not bruise/bleed easily.   Psychiatric/Behavioral: Negative for agitation, behavioral problems and confusion.       History  Past Medical History:   Diagnosis Date   • Congestive heart failure (CHF) (HCC)    • Hypertension      Past Surgical History:   Procedure Laterality Date   • CHOLECYSTECTOMY     • HYSTERECTOMY      partial, still has ovaries     Family History   Problem Relation Age of Onset   • Heart disease Mother    • Diabetes Sister      Social History     Tobacco Use   • Smoking status: Never Smoker   • Smokeless tobacco: Never Used   Vaping Use   • Vaping Use: Never used   Substance Use Topics   • Alcohol use: Never   • Drug use: Never     Medications Prior to Admission   Medication Sig Dispense Refill Last Dose   • ALPRAZolam (XANAX) 0.25 MG tablet Take 0.25 mg by mouth At Night As Needed for Anxiety.   8/3/2022 at Unknown time   • aspirin 81 MG EC  tablet Take 81 mg by mouth Daily.   8/3/2022 at Unknown time   • bumetanide (BUMEX) 1 MG tablet Take 1 mg by mouth Daily.   8/3/2022 at Unknown time   • carvedilol (COREG) 12.5 MG tablet Take 12.5 mg by mouth 2 (Two) Times a Day With Meals.   8/4/2022 at 0800   • ondansetron (ZOFRAN) 4 MG tablet Take 4 mg by mouth Every 8 (Eight) Hours As Needed for Nausea or Vomiting.   8/3/2022 at Unknown time   • traMADol (ULTRAM) 50 MG tablet Take 50 mg by mouth Every 8 (Eight) Hours As Needed for Moderate Pain .   8/4/2022 at 0800     Allergies:  Codeine    Objective     Vital Signs  Temp:  [97.7 °F (36.5 °C)-98 °F (36.7 °C)] 98 °F (36.7 °C)  Heart Rate:  [64-74] 74  Resp:  [16-25] 18  BP: ()/(51-82) 119/51  Body mass index is 43.79 kg/m².    Physical Exam:  Physical Exam  Constitutional:       General: She is not in acute distress.     Appearance: She is ill-appearing (chronically so).   HENT:      Head: Normocephalic and atraumatic.      Right Ear: External ear normal.      Left Ear: External ear normal.      Mouth/Throat:      Mouth: Mucous membranes are moist.      Pharynx: Oropharynx is clear.   Eyes:      Extraocular Movements: Extraocular movements intact.      Conjunctiva/sclera: Conjunctivae normal.      Pupils: Pupils are equal, round, and reactive to light.   Cardiovascular:      Rate and Rhythm: Normal rate and regular rhythm.      Pulses: Normal pulses.      Heart sounds: Normal heart sounds. No murmur heard.  Pulmonary:      Effort: Pulmonary effort is normal. No respiratory distress.      Breath sounds: Normal breath sounds. No wheezing or rales.   Abdominal:      General: There is distension (still distended following large volume paracentesis, but patient reports much improved).      Tenderness: There is no abdominal tenderness. There is no guarding.   Musculoskeletal:         General: Normal range of motion.      Cervical back: Normal range of motion and neck supple. No tenderness.      Right lower leg:  Edema (2+ pitting) present.      Left lower leg: Edema (2+ pitting) present.   Lymphadenopathy:      Cervical: No cervical adenopathy.   Skin:     General: Skin is warm and dry.      Capillary Refill: Capillary refill takes less than 2 seconds.      Coloration: Skin is not jaundiced.      Findings: No bruising or lesion.   Neurological:      General: No focal deficit present.      Mental Status: She is alert and oriented to person, place, and time.   Psychiatric:         Mood and Affect: Mood normal.         Behavior: Behavior normal.         Thought Content: Thought content normal.         Judgment: Judgment normal.           Results Review:       Lab Results:  Results from last 7 days   Lab Units 08/05/22  0042 08/04/22  1153   WBC 10*3/mm3 6.08 4.99   HEMOGLOBIN g/dL 10.8* 10.9*   PLATELETS 10*3/mm3 107* 115*         Results from last 7 days   Lab Units 08/04/22  1232   SODIUM mmol/L 133*   POTASSIUM mmol/L 4.1   CHLORIDE mmol/L 98   CO2 mmol/L 21.1*   BUN mg/dL 19   CREATININE mg/dL 1.50*   CALCIUM mg/dL 9.5   GLUCOSE mg/dL 101*     Results from last 7 days   Lab Units 08/04/22  1232   MAGNESIUM mg/dL 2.1     Hemoglobin A1C   Date Value Ref Range Status   08/04/2022 4.80 4.80 - 5.60 % Final     Results from last 7 days   Lab Units 08/04/22  1232   BILIRUBIN mg/dL 1.3*   ALK PHOS U/L 92   AST (SGOT) U/L 30   ALT (SGPT) U/L 7     Results from last 7 days   Lab Units 08/04/22  1232   TROPONIN T ng/mL <0.010         Results from last 7 days   Lab Units 08/04/22  1153   INR  1.41*     Results from last 7 days   Lab Units 08/04/22  1306   PH, ARTERIAL pH units 7.436   PO2 ART mm Hg 62.3*   PCO2, ARTERIAL mm Hg 39.6   HCO3 ART mmol/L 26.6*       I have reviewed the patient's laboratory results.    Imaging:  Imaging Results (Last 72 Hours)     Procedure Component Value Units Date/Time    XR Chest 1 View [[id number]] Collected: 08/05/22 0137     Updated: 08/05/22 0139    Narrative:      CR Chest 1 Vw    INDICATION:    Short of breath. Heart failure.     COMPARISON:    8/4/2022    FINDINGS:  Portable AP view(s) of the chest.    External support equipment artifact    The heart is enlarged. There is bronchovascular crowding. There is no pneumothorax. Probable bibasilar atelectasis. Decrease in vascular congestion.       Impression:        1. Cardiomegaly and interval improvement in volume overload. Probable bibasilar atelectasis. No pneumothorax.    Signer Name: Junior He MD   Signed: 8/5/2022 1:37 AM   Workstation Name: [email or web address]    [facility]    CT Chest Without Contrast Diagnostic [[id number]] Collected: 08/04/22 2245     Updated: 08/04/22 2247    Narrative:      CT Chest WO, CT Abdomen Pelvis WO    INDICATION:   Congestive heart failure. Anemia.    TECHNIQUE:   CT of the chest, abdomen and pelvis without IV contrast. Coronal and sagittal reconstructions were obtained.  Radiation dose reduction techniques included automated exposure control or exposure modulation based on body size. Count of known CT and cardiac  nuc med studies performed in previous 12 months: 0.     COMPARISON:   None available.    FINDINGS:  Chest: The heart is enlarged. There are trace bilateral pleural effusions. There is mild septal thickening bilaterally more confluent in the perihilar and lower lobes, most likely reflecting sequela of volume overload and pulmonary edema. There is a 6 mm  noncalcified subpleural nodular density in the left upper lobe on image 15. Fleischner recommendations follow. Central airways are patent. There is severe anasarca. Normal caliber aorta and atherosclerotic change. Coronary artery calcifications. 1.4 cm  lower pole thyroid lesion on the left likely a cyst. This can be confirmed with nonemergent thyroid ultrasound. There is no threshold adenopathy. Probable loculated pleural fluid or partially loculated pericardial fluid near the cardiac apex medially on  the right. No suspicious bone lesion.  Probable sebaceous cyst in the left of midline at the mid thoracic level posteriorly measures 1.7 cm.    Abdomen: Aorta shows advanced atherosclerotic change. The spleen is borderline enlarged and the liver is cirrhotic. Negative adrenal glands. Fatty replacement and atrophy of the pancreas. Surgical absence of the gallbladder. Evaluation of the solid  abdominal organs is severely limited by noncontrast technique and nonstandard patient positioning causing extensive beam hardening artifact. Moderate volume of perihepatic ascites. The kidneys are nonobstructed. No radiopaque stone. There is some  stranding around the head and uncinate process of the pancreas that is likely related to ascites rather than acute pancreatitis but should be correlated with laboratory data. Tiny punctate calcification in the uncinate process may reflect sequela of  prior bouts of pancreatitis. There is no threshold adenopathy.    Pelvis: Negative bladder. No drainable fluid collection. There is ascites tracking into the pelvis. The bowel is nonobstructed. There are scattered diverticula. Appendix not clearly identified and presence or absence of appendicitis cannot be  ascertained. There is no inguinal adenopathy or fluid collection. There is no suspicious bone lesion.  negative.      Impression:        1. Limited study secondary to technical factors.  2. Cardiomegaly and probable volume overload with trace pleural effusions.  3. 6 mm noncalcified nodule in the left upper lobe. Fleischner recommendations follow.  4. Cirrhosis with borderline splenomegaly and at least moderate to large volume ascites. Severe anasarca.  5. Surgical absence of the gallbladder.  6. Probable fluid tracking into the peripancreatic soft tissues simulating acute pancreatitis. Correlate with laboratory data.  7. Appendix not visualized or assessed.    Management recommendation: Current published guidelines recommend initial follow-up CT in 6-12 months, and  again in 18-24 months for uncomplicated pulmonary nodules measuring 6 to 8 mm in high-risk patients (Fleischner Society guidelines, 2017).          Signer Name: Junior He MD   Signed: 8/4/2022 10:45 PM   Workstation Name: [email or web address]    [facility] [place]    CT Abdomen Pelvis Without Contrast [[id number]] Collected: 08/04/22 2245     Updated: 08/04/22 2247    Narrative:      CT Chest WO, CT Abdomen Pelvis WO    INDICATION:   Congestive heart failure. Anemia.    TECHNIQUE:   CT of the chest, abdomen and pelvis without IV contrast. Coronal and sagittal reconstructions were obtained.  Radiation dose reduction techniques included automated exposure control or exposure modulation based on body size. Count of known CT and cardiac  nuc med studies performed in previous 12 months: 0.     COMPARISON:   None available.    FINDINGS:  Chest: The heart is enlarged. There are trace bilateral pleural effusions. There is mild septal thickening bilaterally more confluent in the perihilar and lower lobes, most likely reflecting sequela of volume overload and pulmonary edema. There is a 6 mm  noncalcified subpleural nodular density in the left upper lobe on image 15. Fleischner recommendations follow. Central airways are patent. There is severe anasarca. Normal caliber aorta and atherosclerotic change. Coronary artery calcifications. 1.4 cm  lower pole thyroid lesion on the left likely a cyst. This can be confirmed with nonemergent thyroid ultrasound. There is no threshold adenopathy. Probable loculated pleural fluid or partially loculated pericardial fluid near the cardiac apex medially on  the right. No suspicious bone lesion. Probable sebaceous cyst in the left of midline at the mid thoracic level posteriorly measures 1.7 cm.    Abdomen: Aorta shows advanced atherosclerotic change. The spleen is borderline enlarged and the liver is cirrhotic. Negative adrenal glands. Fatty replacement and atrophy of the pancreas.  Surgical absence of the gallbladder. Evaluation of the solid  abdominal organs is severely limited by noncontrast technique and nonstandard patient positioning causing extensive beam hardening artifact. Moderate volume of perihepatic ascites. The kidneys are nonobstructed. No radiopaque stone. There is some  stranding around the head and uncinate process of the pancreas that is likely related to ascites rather than acute pancreatitis but should be correlated with laboratory data. Tiny punctate calcification in the uncinate process may reflect sequela of  prior bouts of pancreatitis. There is no threshold adenopathy.    Pelvis: Negative bladder. No drainable fluid collection. There is ascites tracking into the pelvis. The bowel is nonobstructed. There are scattered diverticula. Appendix not clearly identified and presence or absence of appendicitis cannot be  ascertained. There is no inguinal adenopathy or fluid collection. There is no suspicious bone lesion.  negative.      Impression:        1. Limited study secondary to technical factors.  2. Cardiomegaly and probable volume overload with trace pleural effusions.  3. 6 mm noncalcified nodule in the left upper lobe. Fleischner recommendations follow.  4. Cirrhosis with borderline splenomegaly and at least moderate to large volume ascites. Severe anasarca.  5. Surgical absence of the gallbladder.  6. Probable fluid tracking into the peripancreatic soft tissues simulating acute pancreatitis. Correlate with laboratory data.  7. Appendix not visualized or assessed.    Management recommendation: Current published guidelines recommend initial follow-up CT in 6-12 months, and again in 18-24 months for uncomplicated pulmonary nodules measuring 6 to 8 mm in high-risk patients (Fleischner Society guidelines, 2017).          Signer Name: Junior He MD   Signed: 8/4/2022 10:45 PM   Workstation Name: [email or web address]    [facility] [place]    XR Chest 1 View  [[id number]] Collected: 08/04/22 1817     Updated: 08/04/22 1819    Narrative:      PROCEDURE: CR Chest 1 Vw    COMPARISON:  No relevant comparison or correlation studies available at time of dictation.     INDICATIONS: CHF    TECHNIQUE: Single AP  view of the chest    FINDINGS: Patient positioning limits the exam and there is poor inspiratory effort. Moderate cardiac enlargement could be due to the low lung volumes or cardiomegaly. Pulmonary vascular congestion with mild left perihilar peribronchial cuffing. This  could be early interstitial edema. No focal consolidation noted with low lung volumes. Osseous structures are intact.      Impression:      Low lung volumes hinders exam. Findings may represent early congestive failure. Correlate clinically.         Signer Name: Spring Dee MD   Signed: 8/4/2022 6:17 PM   Workstation Name: [email or web address]-    [facility] Paracentesis [[id number]] Resulted: 08/04/22 1602    Specimen: Body Fluid Updated: 08/04/22 1708          I have personally reviewed the patient's radiologic imaging.        EKG:   Atrial fibrillation with premature ventricular or aberrantly conducted complexes, HR 71, QTc 143  Low voltage QRS  Possible Anterolateral infarct (cited on or before 04-AUG-2022)  Abnormal ECG  When compared with ECG of 04-AUG-2022 19:15,  Previous ECG has undetermined rhythm, needs review  Questionable change in initial forces of Lateral leads    I have personally reviewed the patient's EKG. I appreciate consistent P waves in most leads, however, with occasional PVCs, so disagree with afib interpretation.         Assessment & Plan     - Acute on chronic congestive heart failure, unspecified heart failure type (HCC): treated with IV bumex in the ED. Patient reports good urine output since that time. Closely monitor renal response as creatinine already up from baseline, though could be cardiorenal in nature and if so, would except renal function to  improve now that significant amounts of fluid were removed (via diuresis and paracentesis). Evaluate further with ECHO later this morning.   - Cirrhosis, possibly secondary to longstanding hepatic congestion from CHF above, combined with possible KOHLI though no prior diagnosis of such. Imaging commented on cirrhosis with borderline splenomegaly. Labs including mild bilirubin elevation, low albumin, elevated INR and PTT, and low platelets would be consistent with underlying cirrhosis as well. Marked ascites at time of presentation, s/p 10 L removed during paracentesis. Albumin given accordingly for amount of fluid removed. Abdomen still fairly distended on my exam. Monitor for re-accumulation.   - Acute hypoxic respiratory failure requiring 4L NC: previously on room air at home. Likely secondary to CHF and ascites from cirrhosis noted above. O2 saturation during my time with patient was upper 90s on 4L; turned down to 3L and instructed RN to titrate down as patient tolerates moving forward, anticipating oxygen requirements will go down now that she has undergone large volume paracentesis and is being diuresed.   - ELEUTERIO: creatinine 1.50, up from 0.78 when last checked in 2017. Suspect possibly cardiorenal (though hepatorenal syndrome also in differential if truly has cirrhosis). Again, monitor response to diuresis and large volume paracentesis. If renal function worsens, will consider consulting nephrology for further assistance.  - Hypertension: resume home coreg with hold parameters, which will help with CHF as well.  - Chronic anxiety/pain: resume home xanax with hold parameters. Decrease home tramadol in light of ELEUTERIO and hypoxia.     DVT Prophylaxis: SQ heparin    Estimated Length of Stay >2 midnights    I discussed the patient's findings, assessment and plan with the patient and nursing staff on [facility].    * patient is high risk due to acute on chronic CHF, cirrhosis with marked ascites, acute hypoxic respiratory  failure, ELEUTERIO    Fady Dinh MD  08/05/22  02:36 EDT

## 2022-08-06 LAB
ALBUMIN SERPL-MCNC: 3.39 G/DL (ref 3.5–5.2)
ALBUMIN/GLOB SERPL: 1.3 G/DL
ALP SERPL-CCNC: 70 U/L (ref 39–117)
ALT SERPL W P-5'-P-CCNC: 5 U/L (ref 1–33)
ANION GAP SERPL CALCULATED.3IONS-SCNC: 13.6 MMOL/L (ref 5–15)
AST SERPL-CCNC: 26 U/L (ref 1–32)
BASOPHILS # BLD AUTO: 0.05 10*3/MM3 (ref 0–0.2)
BASOPHILS NFR BLD AUTO: 0.8 % (ref 0–1.5)
BILIRUB SERPL-MCNC: 1.5 MG/DL (ref 0–1.2)
BUN SERPL-MCNC: 16 MG/DL (ref 8–23)
BUN/CREAT SERPL: 13.1 (ref 7–25)
CALCIUM SPEC-SCNC: 9.1 MG/DL (ref 8.6–10.5)
CHLORIDE SERPL-SCNC: 99 MMOL/L (ref 98–107)
CO2 SERPL-SCNC: 24.4 MMOL/L (ref 22–29)
CREAT SERPL-MCNC: 1.22 MG/DL (ref 0.57–1)
DEPRECATED RDW RBC AUTO: 55.8 FL (ref 37–54)
EGFRCR SERPLBLD CKD-EPI 2021: 50.6 ML/MIN/1.73
EOSINOPHIL # BLD AUTO: 0.13 10*3/MM3 (ref 0–0.4)
EOSINOPHIL NFR BLD AUTO: 2 % (ref 0.3–6.2)
ERYTHROCYTE [DISTWIDTH] IN BLOOD BY AUTOMATED COUNT: 15.9 % (ref 12.3–15.4)
GLOBULIN UR ELPH-MCNC: 2.7 GM/DL
GLUCOSE SERPL-MCNC: 115 MG/DL (ref 65–99)
HCT VFR BLD AUTO: 31.9 % (ref 34–46.6)
HGB BLD-MCNC: 10.4 G/DL (ref 12–15.9)
IMM GRANULOCYTES # BLD AUTO: 0.02 10*3/MM3 (ref 0–0.05)
IMM GRANULOCYTES NFR BLD AUTO: 0.3 % (ref 0–0.5)
LYMPHOCYTES # BLD AUTO: 0.89 10*3/MM3 (ref 0.7–3.1)
LYMPHOCYTES NFR BLD AUTO: 13.4 % (ref 19.6–45.3)
MCH RBC QN AUTO: 31.4 PG (ref 26.6–33)
MCHC RBC AUTO-ENTMCNC: 32.6 G/DL (ref 31.5–35.7)
MCV RBC AUTO: 96.4 FL (ref 79–97)
MONOCYTES # BLD AUTO: 0.67 10*3/MM3 (ref 0.1–0.9)
MONOCYTES NFR BLD AUTO: 10.1 % (ref 5–12)
NEUTROPHILS NFR BLD AUTO: 4.87 10*3/MM3 (ref 1.7–7)
NEUTROPHILS NFR BLD AUTO: 73.4 % (ref 42.7–76)
NRBC BLD AUTO-RTO: 0 /100 WBC (ref 0–0.2)
PLATELET # BLD AUTO: 124 10*3/MM3 (ref 140–450)
PMV BLD AUTO: 9.4 FL (ref 6–12)
POTASSIUM SERPL-SCNC: 3.2 MMOL/L (ref 3.5–5.2)
POTASSIUM SERPL-SCNC: 3.8 MMOL/L (ref 3.5–5.2)
PROT SERPL-MCNC: 6.1 G/DL (ref 6–8.5)
RBC # BLD AUTO: 3.31 10*6/MM3 (ref 3.77–5.28)
SODIUM SERPL-SCNC: 137 MMOL/L (ref 136–145)
WBC NRBC COR # BLD: 6.63 10*3/MM3 (ref 3.4–10.8)

## 2022-08-06 PROCEDURE — 84132 ASSAY OF SERUM POTASSIUM: CPT | Performed by: INTERNAL MEDICINE

## 2022-08-06 PROCEDURE — 80053 COMPREHEN METABOLIC PANEL: CPT | Performed by: INTERNAL MEDICINE

## 2022-08-06 PROCEDURE — 99232 SBSQ HOSP IP/OBS MODERATE 35: CPT | Performed by: NURSE PRACTITIONER

## 2022-08-06 PROCEDURE — 85025 COMPLETE CBC W/AUTO DIFF WBC: CPT | Performed by: INTERNAL MEDICINE

## 2022-08-06 PROCEDURE — 25010000002 HEPARIN (PORCINE) PER 1000 UNITS: Performed by: HOSPITALIST

## 2022-08-06 PROCEDURE — 99233 SBSQ HOSP IP/OBS HIGH 50: CPT | Performed by: INTERNAL MEDICINE

## 2022-08-06 RX ORDER — MAGNESIUM SULFATE HEPTAHYDRATE 40 MG/ML
2 INJECTION, SOLUTION INTRAVENOUS AS NEEDED
Status: DISCONTINUED | OUTPATIENT
Start: 2022-08-06 | End: 2022-08-09 | Stop reason: HOSPADM

## 2022-08-06 RX ORDER — POTASSIUM CHLORIDE 750 MG/1
40 CAPSULE, EXTENDED RELEASE ORAL AS NEEDED
Status: DISCONTINUED | OUTPATIENT
Start: 2022-08-06 | End: 2022-08-09 | Stop reason: HOSPADM

## 2022-08-06 RX ORDER — MAGNESIUM SULFATE HEPTAHYDRATE 40 MG/ML
4 INJECTION, SOLUTION INTRAVENOUS AS NEEDED
Status: DISCONTINUED | OUTPATIENT
Start: 2022-08-06 | End: 2022-08-09 | Stop reason: HOSPADM

## 2022-08-06 RX ORDER — POTASSIUM CHLORIDE 1.5 G/1.77G
40 POWDER, FOR SOLUTION ORAL AS NEEDED
Status: DISCONTINUED | OUTPATIENT
Start: 2022-08-06 | End: 2022-08-09 | Stop reason: HOSPADM

## 2022-08-06 RX ORDER — POTASSIUM CHLORIDE 7.45 MG/ML
10 INJECTION INTRAVENOUS
Status: DISCONTINUED | OUTPATIENT
Start: 2022-08-06 | End: 2022-08-09 | Stop reason: HOSPADM

## 2022-08-06 RX ORDER — POTASSIUM CHLORIDE 20 MEQ/1
40 TABLET, EXTENDED RELEASE ORAL EVERY 4 HOURS
Status: COMPLETED | OUTPATIENT
Start: 2022-08-06 | End: 2022-08-06

## 2022-08-06 RX ADMIN — BUMETANIDE 1 MG: 0.25 INJECTION INTRAMUSCULAR; INTRAVENOUS at 00:28

## 2022-08-06 RX ADMIN — POTASSIUM CHLORIDE 40 MEQ: 20 TABLET, EXTENDED RELEASE ORAL at 11:06

## 2022-08-06 RX ADMIN — TRAMADOL HYDROCHLORIDE 25 MG: 50 TABLET, COATED ORAL at 20:27

## 2022-08-06 RX ADMIN — ASPIRIN 81 MG: 81 TABLET, COATED ORAL at 09:16

## 2022-08-06 RX ADMIN — SPIRONOLACTONE 25 MG: 25 TABLET ORAL at 09:16

## 2022-08-06 RX ADMIN — HEPARIN SODIUM 5000 UNITS: 5000 INJECTION INTRAVENOUS; SUBCUTANEOUS at 09:16

## 2022-08-06 RX ADMIN — Medication 10 ML: at 09:21

## 2022-08-06 RX ADMIN — TRAMADOL HYDROCHLORIDE 25 MG: 50 TABLET, COATED ORAL at 11:07

## 2022-08-06 RX ADMIN — HEPARIN SODIUM 5000 UNITS: 5000 INJECTION INTRAVENOUS; SUBCUTANEOUS at 20:27

## 2022-08-06 RX ADMIN — ALPRAZOLAM 0.25 MG: 0.25 TABLET ORAL at 20:27

## 2022-08-06 RX ADMIN — POTASSIUM CHLORIDE 40 MEQ: 20 TABLET, EXTENDED RELEASE ORAL at 16:38

## 2022-08-06 RX ADMIN — BUMETANIDE 1 MG: 0.25 INJECTION INTRAMUSCULAR; INTRAVENOUS at 14:07

## 2022-08-06 NOTE — PROGRESS NOTES
"    [facility] HOSPITALIST PROGRESS NOTE    S: Patient states she feels significantly improved from yesterday.  No CP or abd pain    O: BP 94/44 (BP Location: Left arm, Patient Position: Lying)   Pulse 70   Temp 98.5 °F (36.9 °C) (Oral)   Resp 20   Ht 149.9 cm (59\")   Wt 96 kg (211 lb 11.2 oz)   SpO2 96%   BMI 42.76 kg/m²     GENERAL:  age appropriate, NAD  EARS,NOSE, MOUTH, THROAT:  MMM, hearing intact  EYES: PERRL, EOMI  CV:  NL S1/S2  RESPIRATORY:  CTAB no w/c/r, but diminished  GI:  distended, moderately firm, nontender  SKIN: No rash or lesions. No nodules.  INT: +2BLLE edema. No joint deformity.  PSYCH:  Normal affect, A+O x 3  NEURO: Alert and oriented, grossly nonfocal.      Scheduled Meds:aspirin, 81 mg, Oral, Daily  bumetanide, 1 mg, Intravenous, Q12H  carvedilol, 12.5 mg, Oral, BID With Meals  heparin (porcine), 5,000 Units, Subcutaneous, Q12H  sodium chloride, 10 mL, Intravenous, Q12H  spironolactone, 25 mg, Oral, Daily      Continuous Infusions:   PRN Meds:.•  ALPRAZolam  •  magnesium sulfate **OR** magnesium sulfate **OR** magnesium sulfate  •  nitroglycerin  •  ondansetron  •  potassium chloride **OR** potassium chloride **OR** potassium chloride  •  sodium chloride  •  [COMPLETED] Insert peripheral IV **AND** sodium chloride  •  sodium chloride  •  traMADol    Labs: Laboratory information reviewed and reconciled.  Results from last 7 days   Lab Units 08/06/22  0220   WBC 10*3/mm3 6.63       Results from last 7 days   Lab Units 08/06/22  0220   CO2 mmol/L 24.4   BUN mg/dL 16   CREATININE mg/dL 1.22*   GLUCOSE mg/dL 115*       Results from last 7 days   Lab Units 08/04/22  1153   INR  1.41*         Imaging (last 24 hours):    CT Abdomen Pelvis Without Contrast    Result Date: 8/4/2022  1. Limited study secondary to technical factors. 2. Cardiomegaly and probable volume overload with trace pleural effusions. 3. 6 mm noncalcified nodule in the left upper lobe. Fleischner recommendations " follow. 4. Cirrhosis with borderline splenomegaly and at least moderate to large volume ascites. Severe anasarca. 5. Surgical absence of the gallbladder. 6. Probable fluid tracking into the peripancreatic soft tissues simulating acute pancreatitis. Correlate with laboratory data. 7. Appendix not visualized or assessed. Management recommendation: Current published guidelines recommend initial follow-up CT in 6-12 months, and again in 18-24 months for uncomplicated pulmonary nodules measuring 6 to 8 mm in high-risk patients (Fleischner Society guidelines, 2017). Signer Name: Junior He MD  Signed: 8/4/2022 10:45 PM  Workstation Name: [facility] [employer]  [facility]    CT Chest Without Contrast Diagnostic    Result Date: 8/4/2022  1. Limited study secondary to technical factors. 2. Cardiomegaly and probable volume overload with trace pleural effusions. 3. 6 mm noncalcified nodule in the left upper lobe. Fleischner recommendations follow. 4. Cirrhosis with borderline splenomegaly and at least moderate to large volume ascites. Severe anasarca. 5. Surgical absence of the gallbladder. 6. Probable fluid tracking into the peripancreatic soft tissues simulating acute pancreatitis. Correlate with laboratory data. 7. Appendix not visualized or assessed. Management recommendation: Current published guidelines recommend initial follow-up CT in 6-12 months, and again in 18-24 months for uncomplicated pulmonary nodules measuring 6 to 8 mm in high-risk patients (Fleischner Society guidelines, 2017). Signer Name: Junior He MD  Signed: 8/4/2022 10:45 PM  Workstation Name: [email or web address]  [facility]    XR Chest 1 View    Result Date: 8/5/2022  1. Cardiomegaly and interval improvement in volume overload. Probable bibasilar atelectasis. No pneumothorax. Signer Name: Junior He MD  Signed: 8/5/2022 1:37 AM  Workstation Name: [employer] [employer]  [facility]    XR Chest 1  View    Result Date: 8/4/2022  Low lung volumes hinders exam. Findings may represent early congestive failure. Correlate clinically.  Signer Name: Spring Dee MD  Signed: 8/4/2022 6:17 PM  Workstation Name: [email or web address]  [facility] [place]     Assessment/Plan:  # Acute on chronic CHF unknown Ef  - TTE pending  - Cardio following  - continue to diurese  - monitor Is and Os  - supportive care  - monitor on tele    # Cirrhosis  - continue diuresis  - s/p paracentesis -10L  - serial labs  - serial labd exams    # Acute hypoxic resp failure 2/2 CHF  - treatment as above    # ELEUTERIO  - monitor   - serial labs    # HTN  - continue home regimen      DVT Prophylaxis:heparin  Disposition: continue to monitor  Discharge disposition: TBD  Prognosis: guarded    Migue Leavitt Jr, MD  [facility] Hospitalist  08/06/22  09:11 EDT

## 2022-08-06 NOTE — PLAN OF CARE
Goal Outcome Evaluation:  Plan of Care Reviewed With: patient           Outcome Evaluation: Pt resting in bed this shift. AOx4, VSS, no complaints or s/s of acute distress noted. IV access and telemetry monitoring patent and maintained, will continue to follow plan of care.

## 2022-08-06 NOTE — PLAN OF CARE
Goal Outcome Evaluation:  Pt resting in bed. Remains on 1 L/NC, sats above 90%. Pt c/o pain/anxiety/restlessness, PRN meds given, see MAR. VSS. No s/s of acute distress noted. Plan of care discussed with pt, no questions or concerns at this time.

## 2022-08-06 NOTE — PROGRESS NOTES
LOS: 2 days     Name: Della Jorge  Age/Sex: 61 y.o. female  :  1960        PCP: Sebastián Dougherty MD  REF: No Known Provider    Active Problems:    Acute on chronic congestive heart failure, unspecified heart failure type (HCC)      Reason for follow-up: HFrEF    Subjective       Subjective     Della Batista is a 61-year-old female with a past medical history significant for congestive heart failure and essential hypertension. Patient presented to the ER with complaints of abdominal distention and lower extremity edema.  Patient states over the last 6 to 8 weeks she has had gradually worsening lower extremity edema and abdominal distention.    Interval History: Patient reports her breathing is better today.  Kidney function stable.  Noted to be hypokalemic.  Reports good urine output with IV diuresis.    Vital Signs  Temp:  [97.5 °F (36.4 °C)-98.5 °F (36.9 °C)] 98.5 °F (36.9 °C)  Heart Rate:  [65-83] 70  Resp:  [18-20] 20  BP: ()/(44-59) 94/44     Vital Signs (last 72 hrs)       08/03 0700  08/04 0659 08/04 0700  08/05 0659 08/05 0700  /06 0659 08/ 0700  / 0854   Most Recent      Temp (°F)   97.7 -  98    97.5 -  98.5       98.5 (36.9) / 0634    Heart Rate   64 -  75    65 -  83       70 08/06 0634    Resp   16 -  25    18 -  20       20 / 0634    BP   90/56 -  132/80    94/44 -  119/59       94/44 / 0634    SpO2 (%)   85 -  100    92 -  96       96 / 0634        Documented weights    22 1121 22 0004 22 0510 22 0500   Weight: 108 kg (238 lb) 98.3 kg (216 lb 12.8 oz) 97.2 kg (214 lb 3.2 oz) 96 kg (211 lb 11.2 oz)      Body mass index is 42.76 kg/m².    Intake/Output Summary (Last 24 hours) at 2022 0854  Last data filed at 2022 0400  Gross per 24 hour   Intake 930 ml   Output 950 ml   Net -20 ml     Objective    Objective       Physical Exam:     General Appearance:    Alert, cooperative, in no acute distress   Head:    Normocephalic,  without obvious abnormality, atraumatic   Eyes:            Conjunctivae and sclerae normal, no   icterus, no pallor, corneas clear.   Neck:   No adenopathy, supple, trachea midline, no thyromegaly, no   carotid bruit, no JVD   Lungs:     Clear to auscultation,respirations regular, even and                  unlabored    Heart:    Regular rhythm and normal rate, normal S1 and S2, no            murmur, no gallop, no rub, no click   Chest Wall:    No abnormalities observed   Abdomen:     Normal bowel sounds, no masses, no organomegaly, soft        nontender, nondistended, no guarding, no rebound                tenderness   Extremities:   Moves all extremities well, 2+ BLE edema, no cyanosis, no             redness   Pulses:   Pulses palpable and equal bilaterally   Skin:   No bleeding, bruising or rash       Neurologic:  Alert and oriented     Results review       Results Review:   Results from last 7 days   Lab Units 08/06/22  0220 08/05/22  0042 08/04/22  1153   WBC 10*3/mm3 6.63 6.08 4.99   HEMOGLOBIN g/dL 10.4* 10.8* 10.9*   PLATELETS 10*3/mm3 124* 107* 115*     Results from last 7 days   Lab Units 08/06/22  0220 08/05/22  0042 08/04/22  1232   SODIUM mmol/L 137 136 133*   POTASSIUM mmol/L 3.2* 3.6 4.1   CHLORIDE mmol/L 99 99 98   CO2 mmol/L 24.4 23.2 21.1*   BUN mg/dL 16 17 19   CREATININE mg/dL 1.22* 1.20* 1.50*   CALCIUM mg/dL 9.1 9.3 9.5   GLUCOSE mg/dL 115* 81 101*   ALT (SGPT) U/L 5 <5 7   AST (SGOT) U/L 26 20 30     Results from last 7 days   Lab Units 08/05/22  0311 08/05/22  0042 08/04/22  1232   TROPONIN T ng/mL 0.012 <0.010 <0.010     Lab Results   Component Value Date    INR 1.41 (H) 08/04/2022     Lab Results   Component Value Date    MG 2.0 08/05/2022    MG 2.1 08/04/2022     Lab Results   Component Value Date    TSH 6.250 (H) 08/05/2022    TRIG 52 08/05/2022    HDL 27 (L) 08/05/2022    LDL 19 08/05/2022      Imaging Results (Last 48 Hours)     Procedure Component Value Units Date/Time    US  "Paracentesis [[id number]] Collected: 08/05/22 0829    Specimen: Body Fluid Updated: 08/05/22 0831    Narrative:      Ultrasound-guided paracentesis     CLINICAL INDICATION:     Large ascites     COMPARISON: None.     PROCEDURE: Written and verbal consent was obtained for ultrasound  paracentesis.  \" Time out\" was observed to verify the patient's identity  and the correct procedure. The location of the ascites confirmed  ultrasound and a anterior lateral approach was chosen. The anterior  abdomen was prepped and draped in the usual sterile fashion and 1%  lidocaine with and without epinephrine was utilized for local  anesthesia. A small skin incision was made with a scalpel and a 8 [place]inese  catheter with internal stylet was introduced into the ascites.  A total  of 10 Lfluid was obtained.     Upon completion of the procedure, manual compression was applied to the  paracentesis incision site until all appreciable bleeding subsided and a  sterile dressing was applied.  The patient tolerated the procedure well  and no immediate complications occurred.              SUMMARY: Ultrasound guided paracentesis.        This report was finalized on 8/5/2022 8:29 AM by Dr. Moe Jonas MD.       XR Chest 1 View [[id number]] Collected: 08/05/22 0137     Updated: 08/05/22 0139    Narrative:      CR Chest 1 Vw    INDICATION:   Short of breath. Heart failure.     COMPARISON:    8/4/2022    FINDINGS:  Portable AP view(s) of the chest.    External support equipment artifact    The heart is enlarged. There is bronchovascular crowding. There is no pneumothorax. Probable bibasilar atelectasis. Decrease in vascular congestion.       Impression:        1. Cardiomegaly and interval improvement in volume overload. Probable bibasilar atelectasis. No pneumothorax.    Signer Name: Junior He MD   Signed: 8/5/2022 1:37 AM   Workstation Name: [email or web address]    [facility]    CT Chest Without Contrast Diagnostic [[id number]] " Collected: 08/04/22 2245     Updated: 08/04/22 2247    Narrative:      CT Chest WO, CT Abdomen Pelvis WO    INDICATION:   Congestive heart failure. Anemia.    TECHNIQUE:   CT of the chest, abdomen and pelvis without IV contrast. Coronal and sagittal reconstructions were obtained.  Radiation dose reduction techniques included automated exposure control or exposure modulation based on body size. Count of known CT and cardiac  nuc med studies performed in previous 12 months: 0.     COMPARISON:   None available.    FINDINGS:  Chest: The heart is enlarged. There are trace bilateral pleural effusions. There is mild septal thickening bilaterally more confluent in the perihilar and lower lobes, most likely reflecting sequela of volume overload and pulmonary edema. There is a 6 mm  noncalcified subpleural nodular density in the left upper lobe on image 15. Fleischner recommendations follow. Central airways are patent. There is severe anasarca. Normal caliber aorta and atherosclerotic change. Coronary artery calcifications. 1.4 cm  lower pole thyroid lesion on the left likely a cyst. This can be confirmed with nonemergent thyroid ultrasound. There is no threshold adenopathy. Probable loculated pleural fluid or partially loculated pericardial fluid near the cardiac apex medially on  the right. No suspicious bone lesion. Probable sebaceous cyst in the left of midline at the mid thoracic level posteriorly measures 1.7 cm.    Abdomen: Aorta shows advanced atherosclerotic change. The spleen is borderline enlarged and the liver is cirrhotic. Negative adrenal glands. Fatty replacement and atrophy of the pancreas. Surgical absence of the gallbladder. Evaluation of the solid  abdominal organs is severely limited by noncontrast technique and nonstandard patient positioning causing extensive beam hardening artifact. Moderate volume of perihepatic ascites. The kidneys are nonobstructed. No radiopaque stone. There is some  stranding  around the head and uncinate process of the pancreas that is likely related to ascites rather than acute pancreatitis but should be correlated with laboratory data. Tiny punctate calcification in the uncinate process may reflect sequela of  prior bouts of pancreatitis. There is no threshold adenopathy.    Pelvis: Negative bladder. No drainable fluid collection. There is ascites tracking into the pelvis. The bowel is nonobstructed. There are scattered diverticula. Appendix not clearly identified and presence or absence of appendicitis cannot be  ascertained. There is no inguinal adenopathy or fluid collection. There is no suspicious bone lesion.  negative.      Impression:        1. Limited study secondary to technical factors.  2. Cardiomegaly and probable volume overload with trace pleural effusions.  3. 6 mm noncalcified nodule in the left upper lobe. Fleischner recommendations follow.  4. Cirrhosis with borderline splenomegaly and at least moderate to large volume ascites. Severe anasarca.  5. Surgical absence of the gallbladder.  6. Probable fluid tracking into the peripancreatic soft tissues simulating acute pancreatitis. Correlate with laboratory data.  7. Appendix not visualized or assessed.    Management recommendation: Current published guidelines recommend initial follow-up CT in 6-12 months, and again in 18-24 months for uncomplicated pulmonary nodules measuring 6 to 8 mm in high-risk patients (Fleischner Society guidelines, 2017).          Signer Name: Junior He MD   Signed: 8/4/2022 10:45 PM   Workstation Name: [email or web address]    [facility] [place]    CT Abdomen Pelvis Without Contrast [[id number]] Collected: 08/04/22 2245     Updated: 08/04/22 2247    Narrative:      CT Chest WO, CT Abdomen Pelvis WO    INDICATION:   Congestive heart failure. Anemia.    TECHNIQUE:   CT of the chest, abdomen and pelvis without IV contrast. Coronal and sagittal reconstructions were obtained.   Radiation dose reduction techniques included automated exposure control or exposure modulation based on body size. Count of known CT and cardiac  nuc med studies performed in previous 12 months: 0.     COMPARISON:   None available.    FINDINGS:  Chest: The heart is enlarged. There are trace bilateral pleural effusions. There is mild septal thickening bilaterally more confluent in the perihilar and lower lobes, most likely reflecting sequela of volume overload and pulmonary edema. There is a 6 mm  noncalcified subpleural nodular density in the left upper lobe on image 15. Fleischner recommendations follow. Central airways are patent. There is severe anasarca. Normal caliber aorta and atherosclerotic change. Coronary artery calcifications. 1.4 cm  lower pole thyroid lesion on the left likely a cyst. This can be confirmed with nonemergent thyroid ultrasound. There is no threshold adenopathy. Probable loculated pleural fluid or partially loculated pericardial fluid near the cardiac apex medially on  the right. No suspicious bone lesion. Probable sebaceous cyst in the left of midline at the mid thoracic level posteriorly measures 1.7 cm.    Abdomen: Aorta shows advanced atherosclerotic change. The spleen is borderline enlarged and the liver is cirrhotic. Negative adrenal glands. Fatty replacement and atrophy of the pancreas. Surgical absence of the gallbladder. Evaluation of the solid  abdominal organs is severely limited by noncontrast technique and nonstandard patient positioning causing extensive beam hardening artifact. Moderate volume of perihepatic ascites. The kidneys are nonobstructed. No radiopaque stone. There is some  stranding around the head and uncinate process of the pancreas that is likely related to ascites rather than acute pancreatitis but should be correlated with laboratory data. Tiny punctate calcification in the uncinate process may reflect sequela of  prior bouts of pancreatitis. There is no  threshold adenopathy.    Pelvis: Negative bladder. No drainable fluid collection. There is ascites tracking into the pelvis. The bowel is nonobstructed. There are scattered diverticula. Appendix not clearly identified and presence or absence of appendicitis cannot be  ascertained. There is no inguinal adenopathy or fluid collection. There is no suspicious bone lesion.  negative.      Impression:        1. Limited study secondary to technical factors.  2. Cardiomegaly and probable volume overload with trace pleural effusions.  3. 6 mm noncalcified nodule in the left upper lobe. Fleischner recommendations follow.  4. Cirrhosis with borderline splenomegaly and at least moderate to large volume ascites. Severe anasarca.  5. Surgical absence of the gallbladder.  6. Probable fluid tracking into the peripancreatic soft tissues simulating acute pancreatitis. Correlate with laboratory data.  7. Appendix not visualized or assessed.    Management recommendation: Current published guidelines recommend initial follow-up CT in 6-12 months, and again in 18-24 months for uncomplicated pulmonary nodules measuring 6 to 8 mm in high-risk patients (Fleischner Society guidelines, 2017).          Signer Name: Junior He MD   Signed: 8/4/2022 10:45 PM   Workstation Name: [email or web address]    [facility] [place]    XR Chest 1 View [[id number]] Collected: 08/04/22 1817     Updated: 08/04/22 1819    Narrative:      PROCEDURE: CR Chest 1 Vw    COMPARISON:  No relevant comparison or correlation studies available at time of dictation.     INDICATIONS: CHF    TECHNIQUE: Single AP  view of the chest    FINDINGS: Patient positioning limits the exam and there is poor inspiratory effort. Moderate cardiac enlargement could be due to the low lung volumes or cardiomegaly. Pulmonary vascular congestion with mild left perihilar peribronchial cuffing. This  could be early interstitial edema. No focal consolidation noted with low lung  volumes. Osseous structures are intact.      Impression:      Low lung volumes hinders exam. Findings may represent early congestive failure. Correlate clinically.         Signer Name: Spring Dee MD   Signed: 8/4/2022 6:17 PM   Workstation Name: [email or web address]-    [facility] [place]        No results found for: BNP    Lab Results   Component Value Date    ABSOLUTELUNG 39 (A) 08/05/2022       Echo   Results for orders placed during the hospital encounter of 08/04/22    Adult Transthoracic Echo Complete w/ Color, Spectral and Contrast if necessary per protocol    Interpretation Summary  · Left ventricular ejection fraction appears to be 46 - 50%. Left ventricular systolic function is mildly decreased.  · Left ventricular diastolic function is consistent with (grade Ia w/high LAP) impaired relaxation.  · The right ventricular cavity is moderately dilated.  · Mildly reduced right ventricular systolic function noted.  · The right atrial cavity is severely dilated.  · Severe tricuspid valve regurgitation is present.  · Estimated right ventricular systolic pressure from tricuspid regurgitation is normal (<35 mmHg).     I reviewed the patient's new clinical results.    Telemetry: Normal sinus rhythm 60 to 70 bpm     Medication Review:   aspirin, 81 mg, Oral, Daily  bumetanide, 1 mg, Intravenous, Q12H  carvedilol, 12.5 mg, Oral, BID With Meals  heparin (porcine), 5,000 Units, Subcutaneous, Q12H  sodium chloride, 10 mL, Intravenous, Q12H  spironolactone, 25 mg, Oral, Daily             Assessment      Assessment:  Acute HFrEF with right-sided heart failure  Cardiomyopathy, EF 46 to 50%  Cirrhosis status post paracentesis  Acute kidney injury, creatinine 1.22 today, stable  Hypokalemia, potassium 3.2  Severe tricuspid valve regurgitation  Suspected pulmonary hypertension     Plan     Recommendations:  HFrEF  Cardiomyopathy  Suspected pulmonary hypertension  Continue with IV diuresis with Bumex 1 mg.  Kidney  function stable.  Echocardiogram showed mildly decreased LV function at 46 to 50% with severe tricuspid valve regurgitation and severely dilated right atrial cavity.  Patient would likely benefit from left heart catheterization in the future given her drop in LV function along with right heart catheterization to evaluate pressures however patient does not want to proceed with any invasive procedures at this time. She states she would like to think about it for now.  Had a long discussion regarding her multiple cardiac issues.  Can consider adding ACE/ARB/Arni if kidney function remains stable and blood pressure tolerates.    I discussed the patient's findings and my recommendations with patient and family    Discussed case with Dr. Ventura    Electronically signed by CHARY Agudelo, 08/06/22, 8:54 AM EDT.    Please note that portions of this note were completed with a voice recognition program.

## 2022-08-07 PROCEDURE — 99232 SBSQ HOSP IP/OBS MODERATE 35: CPT | Performed by: NURSE PRACTITIONER

## 2022-08-07 PROCEDURE — 25010000002 HEPARIN (PORCINE) PER 1000 UNITS: Performed by: HOSPITALIST

## 2022-08-07 PROCEDURE — 99232 SBSQ HOSP IP/OBS MODERATE 35: CPT | Performed by: INTERNAL MEDICINE

## 2022-08-07 PROCEDURE — 63710000001 ONDANSETRON PER 8 MG: Performed by: HOSPITALIST

## 2022-08-07 RX ORDER — BUMETANIDE 0.25 MG/ML
1 INJECTION INTRAMUSCULAR; INTRAVENOUS EVERY 12 HOURS
Status: COMPLETED | OUTPATIENT
Start: 2022-08-07 | End: 2022-08-08

## 2022-08-07 RX ADMIN — TRAMADOL HYDROCHLORIDE 25 MG: 50 TABLET, COATED ORAL at 21:09

## 2022-08-07 RX ADMIN — ALPRAZOLAM 0.25 MG: 0.25 TABLET ORAL at 21:09

## 2022-08-07 RX ADMIN — ONDANSETRON HYDROCHLORIDE 4 MG: 4 TABLET, FILM COATED ORAL at 08:51

## 2022-08-07 RX ADMIN — CARVEDILOL 12.5 MG: 6.25 TABLET, FILM COATED ORAL at 17:56

## 2022-08-07 RX ADMIN — HEPARIN SODIUM 5000 UNITS: 5000 INJECTION INTRAVENOUS; SUBCUTANEOUS at 20:01

## 2022-08-07 RX ADMIN — CARVEDILOL 12.5 MG: 6.25 TABLET, FILM COATED ORAL at 08:44

## 2022-08-07 RX ADMIN — HEPARIN SODIUM 5000 UNITS: 5000 INJECTION INTRAVENOUS; SUBCUTANEOUS at 08:44

## 2022-08-07 RX ADMIN — BUMETANIDE 1 MG: 0.25 INJECTION INTRAMUSCULAR; INTRAVENOUS at 15:00

## 2022-08-07 RX ADMIN — SPIRONOLACTONE 25 MG: 25 TABLET ORAL at 08:44

## 2022-08-07 RX ADMIN — ASPIRIN 81 MG: 81 TABLET, COATED ORAL at 08:44

## 2022-08-07 RX ADMIN — Medication 10 ML: at 08:45

## 2022-08-07 NOTE — PLAN OF CARE
Pt rested in bed during shift.  Pt complained of pain tonight. PRN medication given. See MAR. No s/s of acute distress. VSS.

## 2022-08-07 NOTE — PROGRESS NOTES
"    [facility]    S: Patient continues to improve.  No CP or increased SOB    O: /64 (BP Location: Left arm, Patient Position: Lying)   Pulse 88   Temp 98.7 °F (37.1 °C) (Oral)   Resp 20   Ht 149.9 cm (59\")   Wt 96.1 kg (211 lb 12.8 oz)   SpO2 94%   BMI 42.78 kg/m²     GENERAL:  age appropriate, NAD  EARS,NOSE, MOUTH, THROAT:  MMM, hearing intact  EYES: PERRL, EOMI  CV:  NL S1/S2  RESPIRATORY:  CTAB no w/c/r, but diminished  GI:  distended, moderately firm, nontender  SKIN: No rash or lesions. No nodules.  INT: +2BLLE edema. No joint deformity.  PSYCH:  Normal affect, A+O x 3  NEURO: Alert and oriented, grossly nonfocal.      Scheduled Meds:aspirin, 81 mg, Oral, Daily  carvedilol, 12.5 mg, Oral, BID With Meals  heparin (porcine), 5,000 Units, Subcutaneous, Q12H  sodium chloride, 10 mL, Intravenous, Q12H  spironolactone, 25 mg, Oral, Daily      Continuous Infusions:   PRN Meds:.•  ALPRAZolam  •  magnesium sulfate **OR** magnesium sulfate **OR** magnesium sulfate  •  nitroglycerin  •  ondansetron  •  potassium chloride **OR** potassium chloride **OR** potassium chloride  •  sodium chloride  •  [COMPLETED] Insert peripheral IV **AND** sodium chloride  •  sodium chloride  •  traMADol    Labs: Laboratory information reviewed and reconciled.    Results from last 7 days   Lab Units 08/06/22  0220   WBC 10*3/mm3 6.63         Results from last 7 days   Lab Units 08/06/22  0220   CO2 mmol/L 24.4   BUN mg/dL 16   CREATININE mg/dL 1.22*   GLUCOSE mg/dL 115*         Results from last 7 days   Lab Units 08/04/22  1153   INR  1.41*         Imaging (last 24 hours):    No radiology results for the last day   Assessment/Plan:  # Acute on chronic CHF unknown Ef  - TTE EF 46-50%  - Cardio following  - continue to diurese  - monitor Is and Os  - supportive care  - monitor on tele  - cardio recommends LHC and RHC but patient undecided if she will proceed with further intervention    # " Cirrhosis  - continue diuresis  - s/p paracentesis -10L  - serial labs  - serial labd exams    # Acute hypoxic resp failure 2/2 CHF  - treatment as above    # ELEUTERIO  - monitor   - serial labs    # HTN  - continue home regimen      DVT Prophylaxis:heparin  Disposition: continue to monitor  Discharge disposition: TBD  Prognosis: guarded    Migue Leavitt Jr, MD  [facility]  08/07/22  09:52 EDT

## 2022-08-07 NOTE — PROGRESS NOTES
LOS: 3 days     Name: Della Jorge  Age/Sex: 61 y.o. female  :  1960        PCP: Sebastián Dougherty MD  REF: No Known Provider    Active Problems:    Acute on chronic congestive heart failure, unspecified heart failure type (HCC)      Reason for follow-up: HFrEF    Subjective       Subjective     Della Batista is a 61-year-old female with a past medical history significant for congestive heart failure and essential hypertension. Patient presented to the ER with complaints of abdominal distention and lower extremity edema.  Patient states over the last 6 to 8 weeks she has had gradually worsening lower extremity edema and abdominal distention.    Interval History: Patient reports her breathing is better today.  Kidney function stable.  Reports good urine output.    Vital Signs  Temp:  [97.9 °F (36.6 °C)-98.7 °F (37.1 °C)] 98.7 °F (37.1 °C)  Heart Rate:  [73-88] 88  Resp:  [18-20] 20  BP: (102-112)/(55-64) 112/64     Vital Signs (last 72 hrs)        0700  08/05 0659 08/05 0700  08/ 0659  0700  08/ 0659 08/ 0700  / 0824   Most Recent      Temp (°F) 97.7 -  98    97.5 -  98.5    97.9 -  98.6      98.7     98.7 (37.1) 08/07 0700    Heart Rate 64 -  75    65 -  83    73 -  84      88     88 08/07 0700    Resp 16 -  25    18 -  20    18 -  20      20     20 08/07 0700    BP 90/56 -  132/80    94/44 -  119/59    102/58 -  112/60      112/64     112/64 /07 0700    SpO2 (%) 85 -  100    92 -  96    92 -  98      94     94 08/ 0700        Documented weights    22 1121 22 0004 22 0510 22 0500   Weight: 108 kg (238 lb) 98.3 kg (216 lb 12.8 oz) 97.2 kg (214 lb 3.2 oz) 96 kg (211 lb 11.2 oz)    22 0500   Weight: 96.1 kg (211 lb 12.8 oz)      Body mass index is 42.78 kg/m².    Intake/Output Summary (Last 24 hours) at 2022 0824  Last data filed at 2022 0400  Gross per 24 hour   Intake 1380 ml   Output 650 ml   Net 730 ml     Objective    Objective        Physical Exam:     General Appearance:    Alert, cooperative, in no acute distress   Head:    Normocephalic, without obvious abnormality, atraumatic   Eyes:            Conjunctivae and sclerae normal, no   icterus, no pallor, corneas clear.   Neck:   No adenopathy, supple, trachea midline, no thyromegaly, no   carotid bruit, no JVD   Lungs:     Clear to auscultation,respirations regular, even and                  unlabored    Heart:    Regular rhythm and normal rate, normal S1 and S2, no            murmur, no gallop, no rub, no click   Chest Wall:    No abnormalities observed   Abdomen:     Normal bowel sounds, no masses, no organomegaly, soft        nontender, nondistended, no guarding, no rebound                tenderness   Extremities:   Moves all extremities well, no edema, no cyanosis, no             redness   Pulses:   Pulses palpable and equal bilaterally   Skin:   No bleeding, bruising or rash       Neurologic:  Alert and oriented     Results review       Results Review:   Results from last 7 days   Lab Units 08/06/22 0220 08/05/22  0042 08/04/22  1153   WBC 10*3/mm3 6.63 6.08 4.99   HEMOGLOBIN g/dL 10.4* 10.8* 10.9*   PLATELETS 10*3/mm3 124* 107* 115*     Results from last 7 days   Lab Units 08/06/22  2045 08/06/22 0220 08/05/22  0042 08/04/22  1232   SODIUM mmol/L  --  137 136 133*   POTASSIUM mmol/L 3.8 3.2* 3.6 4.1   CHLORIDE mmol/L  --  99 99 98   CO2 mmol/L  --  24.4 23.2 21.1*   BUN mg/dL  --  16 17 19   CREATININE mg/dL  --  1.22* 1.20* 1.50*   CALCIUM mg/dL  --  9.1 9.3 9.5   GLUCOSE mg/dL  --  115* 81 101*   ALT (SGPT) U/L  --  5 <5 7   AST (SGOT) U/L  --  26 20 30     Results from last 7 days   Lab Units 08/05/22  0311 08/05/22  0042 08/04/22  1232   TROPONIN T ng/mL 0.012 <0.010 <0.010     Lab Results   Component Value Date    INR 1.41 (H) 08/04/2022     Lab Results   Component Value Date    MG 2.0 08/05/2022    MG 2.1 08/04/2022     Lab Results   Component Value Date    TSH 6.250 (H)  "08/05/2022    TRIG 52 08/05/2022    HDL 27 (L) 08/05/2022    LDL 19 08/05/2022      Imaging Results (Last 48 Hours)     Procedure Component Value Units Date/Time    US Paracentesis [[id number]] Collected: 08/05/22 0829    Specimen: Body Fluid Updated: 08/05/22 0831    Narrative:      Ultrasound-guided paracentesis     CLINICAL INDICATION:     Large ascites     COMPARISON: None.     PROCEDURE: Written and verbal consent was obtained for ultrasound  paracentesis.  \" Time out\" was observed to verify the patient's identity  and the correct procedure. The location of the ascites confirmed  ultrasound and a anterior lateral approach was chosen. The anterior  abdomen was prepped and draped in the usual sterile fashion and 1%  lidocaine with and without epinephrine was utilized for local  anesthesia. A small skin incision was made with a scalpel and a 8 [place]  catheter with internal stylet was introduced into the ascites.  A total  of 10 Lfluid was obtained.     Upon completion of the procedure, manual compression was applied to the  paracentesis incision site until all appreciable bleeding subsided and a  sterile dressing was applied.  The patient tolerated the procedure well  and no immediate complications occurred.              SUMMARY: Ultrasound guided paracentesis.        This report was finalized on 8/5/2022 8:29 AM by Dr. Moe Jonas MD.           No results found for: BNP    Lab Results   Component Value Date    ABSOLUTELUNG 39 (A) 08/05/2022         Echo   Results for orders placed during the hospital encounter of 08/04/22    Adult Transthoracic Echo Complete w/ Color, Spectral and Contrast if necessary per protocol    Interpretation Summary  · Left ventricular ejection fraction appears to be 46 - 50%. Left ventricular systolic function is mildly decreased.  · Left ventricular diastolic function is consistent with (grade Ia w/high LAP) impaired relaxation.  · The right ventricular cavity is moderately " dilated.  · Mildly reduced right ventricular systolic function noted.  · The right atrial cavity is severely dilated.  · Severe tricuspid valve regurgitation is present.  · Estimated right ventricular systolic pressure from tricuspid regurgitation is normal (<35 mmHg).     I reviewed the patient's new clinical results.    Telemetry: NSR 70-80 BPM      Medication Review:   aspirin, 81 mg, Oral, Daily  carvedilol, 12.5 mg, Oral, BID With Meals  heparin (porcine), 5,000 Units, Subcutaneous, Q12H  sodium chloride, 10 mL, Intravenous, Q12H  spironolactone, 25 mg, Oral, Daily             Assessment      Assessment:  Acute HFrEF with right-sided heart failure  Cardiomyopathy, EF 46 to 50%  Cirrhosis status post paracentesis  Acute kidney injury, creatinine   Hypokalemia, resolved  Severe tricuspid valve regurgitation  Suspected pulmonary hypertension     Plan     Recommendations:  HFrEF  Cardiomyopathy  Continue with IV diuresis.  Kidney function stable.  Echocardiogram showed mildly decreased LV function of 46 to 50% with severe tricuspid valve regurgitation and severely dilated right atrial cavity.  Patient would benefit from left heart catheterization in the future given her drop in LV function along with right heart catheterization to evaluate pressures however patient does not want to proceed with any invasive procedures at this time.  I have also recommended repeat limited echocardiogram to evaluate pulmonary pressures and severe TR however she also refuses this at this time.  Had a long discussion with the patient however she would not like to proceed with any further interventions therefore we will continue with medical management with follow-up in the cardiology clinic on discharge.    I discussed the patient's findings and my recommendations with patient and family    Electronically signed by CHARY Agudelo, 08/07/22, 8:24 AM EDT.    Please note that portions of this note were completed with a voice  recognition program.

## 2022-08-08 LAB
ANION GAP SERPL CALCULATED.3IONS-SCNC: 11.2 MMOL/L (ref 5–15)
BASOPHILS # BLD AUTO: 0.05 10*3/MM3 (ref 0–0.2)
BASOPHILS NFR BLD AUTO: 1.1 % (ref 0–1.5)
BUN SERPL-MCNC: 15 MG/DL (ref 8–23)
BUN/CREAT SERPL: 12.9 (ref 7–25)
CALCIUM SPEC-SCNC: 8.9 MG/DL (ref 8.6–10.5)
CHLORIDE SERPL-SCNC: 102 MMOL/L (ref 98–107)
CO2 SERPL-SCNC: 25.8 MMOL/L (ref 22–29)
CREAT SERPL-MCNC: 1.16 MG/DL (ref 0.57–1)
DEPRECATED RDW RBC AUTO: 58.5 FL (ref 37–54)
EGFRCR SERPLBLD CKD-EPI 2021: 53.7 ML/MIN/1.73
EOSINOPHIL # BLD AUTO: 0.22 10*3/MM3 (ref 0–0.4)
EOSINOPHIL NFR BLD AUTO: 4.9 % (ref 0.3–6.2)
ERYTHROCYTE [DISTWIDTH] IN BLOOD BY AUTOMATED COUNT: 16.2 % (ref 12.3–15.4)
GLUCOSE SERPL-MCNC: 96 MG/DL (ref 65–99)
HCT VFR BLD AUTO: 31.7 % (ref 34–46.6)
HGB BLD-MCNC: 10 G/DL (ref 12–15.9)
IMM GRANULOCYTES # BLD AUTO: 0.01 10*3/MM3 (ref 0–0.05)
IMM GRANULOCYTES NFR BLD AUTO: 0.2 % (ref 0–0.5)
LYMPHOCYTES # BLD AUTO: 0.74 10*3/MM3 (ref 0.7–3.1)
LYMPHOCYTES NFR BLD AUTO: 16.5 % (ref 19.6–45.3)
MCH RBC QN AUTO: 31.3 PG (ref 26.6–33)
MCHC RBC AUTO-ENTMCNC: 31.5 G/DL (ref 31.5–35.7)
MCV RBC AUTO: 99.1 FL (ref 79–97)
MONOCYTES # BLD AUTO: 0.45 10*3/MM3 (ref 0.1–0.9)
MONOCYTES NFR BLD AUTO: 10 % (ref 5–12)
NEUTROPHILS NFR BLD AUTO: 3.01 10*3/MM3 (ref 1.7–7)
NEUTROPHILS NFR BLD AUTO: 67.3 % (ref 42.7–76)
NRBC BLD AUTO-RTO: 0 /100 WBC (ref 0–0.2)
PLATELET # BLD AUTO: 110 10*3/MM3 (ref 140–450)
PMV BLD AUTO: 9.5 FL (ref 6–12)
POTASSIUM SERPL-SCNC: 3.9 MMOL/L (ref 3.5–5.2)
RBC # BLD AUTO: 3.2 10*6/MM3 (ref 3.77–5.28)
SODIUM SERPL-SCNC: 139 MMOL/L (ref 136–145)
WBC NRBC COR # BLD: 4.48 10*3/MM3 (ref 3.4–10.8)

## 2022-08-08 PROCEDURE — 80048 BASIC METABOLIC PNL TOTAL CA: CPT | Performed by: INTERNAL MEDICINE

## 2022-08-08 PROCEDURE — 25010000002 HEPARIN (PORCINE) PER 1000 UNITS: Performed by: HOSPITALIST

## 2022-08-08 PROCEDURE — 97166 OT EVAL MOD COMPLEX 45 MIN: CPT

## 2022-08-08 PROCEDURE — 85025 COMPLETE CBC W/AUTO DIFF WBC: CPT | Performed by: INTERNAL MEDICINE

## 2022-08-08 PROCEDURE — 97162 PT EVAL MOD COMPLEX 30 MIN: CPT

## 2022-08-08 PROCEDURE — 99231 SBSQ HOSP IP/OBS SF/LOW 25: CPT | Performed by: INTERNAL MEDICINE

## 2022-08-08 RX ORDER — BUMETANIDE 0.25 MG/ML
1 INJECTION INTRAMUSCULAR; INTRAVENOUS EVERY 6 HOURS
Status: COMPLETED | OUTPATIENT
Start: 2022-08-08 | End: 2022-08-08

## 2022-08-08 RX ADMIN — SPIRONOLACTONE 25 MG: 25 TABLET ORAL at 09:03

## 2022-08-08 RX ADMIN — HEPARIN SODIUM 5000 UNITS: 5000 INJECTION INTRAVENOUS; SUBCUTANEOUS at 20:10

## 2022-08-08 RX ADMIN — CARVEDILOL 12.5 MG: 6.25 TABLET, FILM COATED ORAL at 09:03

## 2022-08-08 RX ADMIN — TRAMADOL HYDROCHLORIDE 25 MG: 50 TABLET, COATED ORAL at 23:59

## 2022-08-08 RX ADMIN — ALPRAZOLAM 0.25 MG: 0.25 TABLET ORAL at 23:59

## 2022-08-08 RX ADMIN — BUMETANIDE 1 MG: 0.25 INJECTION INTRAMUSCULAR; INTRAVENOUS at 17:58

## 2022-08-08 RX ADMIN — BUMETANIDE 1 MG: 0.25 INJECTION INTRAMUSCULAR; INTRAVENOUS at 12:59

## 2022-08-08 RX ADMIN — Medication 10 ML: at 09:03

## 2022-08-08 RX ADMIN — CARVEDILOL 12.5 MG: 6.25 TABLET, FILM COATED ORAL at 17:58

## 2022-08-08 RX ADMIN — BUMETANIDE 1 MG: 0.25 INJECTION INTRAMUSCULAR; INTRAVENOUS at 01:28

## 2022-08-08 RX ADMIN — ASPIRIN 81 MG: 81 TABLET, COATED ORAL at 09:03

## 2022-08-08 RX ADMIN — HEPARIN SODIUM 5000 UNITS: 5000 INJECTION INTRAVENOUS; SUBCUTANEOUS at 09:03

## 2022-08-08 RX ADMIN — TRAMADOL HYDROCHLORIDE 25 MG: 50 TABLET, COATED ORAL at 13:03

## 2022-08-08 RX ADMIN — Medication 10 ML: at 20:10

## 2022-08-08 NOTE — CONSULTS
"Heart Failure Education Consult    Patient wearing mask: No -  at bedside, not wearing mask    61 y.o. female PMHx: HTN, HFrecEF    Type of Heart Failure: Systolic     Length of diagnosis: Previous Diagnosis. Stated had EF of 20 - 25% 12 years ago     Current HF knowledge: fair     Have you had HF education/teaching in the past? Yes  Do you check your weight daily? Yes - states used to weight daily but quit recently    Current weight        08/07/22  0500 08/08/22  0500   Weight: 96.1 kg (211 lb 12.8 oz) 94.3 kg (207 lb 12.8 oz)          Most recent EF 46-50% Date 8/5/22       Most recent ProBNP 8224pg/ml Date 8/4/22  Most recent ReDS 39% Date 8/5/22      Edema Yes and Improving        Shortness of Breath: Improving     Number of pillows used to sleep at night: typically sleeps with 2 pillows and a \"my pillow neck pillow\" but over the past several weeks has been sleeping in a lift chair due to SOA and edema  Barriers to learning: Other - none identified at this time.      Materials Provided:Daily Weight Monitoring  and 2 Gm Na+ diet             Referral candidate for HF Clinic:Yes  - very interested in HF clinic. Referral placed     Thank you for this consult. Please let me know if I can be of any assistance with HF education for this patient.    >30 minutes was spent on this visit    Abimbola Baig, RN ,MSN, CHFN  08/08/22 12:13 EDT     "

## 2022-08-08 NOTE — PROGRESS NOTES
[facility] HOSPITALIST PROGRESS NOTE     Patient Identification:  Name:  Della Jorge  Age:  61 y.o.  Sex:  female  :  1960  MRN:  [id number]  Visit Number:  [id number]  ROOM: [address] [place] [address]     Primary Care Provider:  Sebastián Dougherty MD    Length of stay in inpatient status:  4    Subjective     Chief Compliant:    Chief Complaint   Patient presents with   • Edema       History of Presenting Illness:    Patient denies any new complaints. She is hopeful to go home soon. She still notes swelling worse than baseline. Breathing much improved.     ROS:  Otherwise 10 point ROS negative other than documented above in HPI.     Objective     Current Hospital Meds:aspirin, 81 mg, Oral, Daily  carvedilol, 12.5 mg, Oral, BID With Meals  heparin (porcine), 5,000 Units, Subcutaneous, Q12H  sodium chloride, 10 mL, Intravenous, Q12H  spironolactone, 25 mg, Oral, Daily         Current Antimicrobial Therapy:  Anti-Infectives (From admission, onward)    None        Current Diuretic Therapy:  Diuretics (From admission, onward)    Ordered     Dose/Rate Route Frequency Start Stop    22 1203  bumetanide (BUMEX) injection 1 mg        Ordering Provider: Jemal Restrepo MD    1 mg Intravenous Every 6 Hours 22 1300 22 1758    22 1121  bumetanide (BUMEX) injection 1 mg        Ordering Provider: Adri Marcano APRN    1 mg Intravenous Every 12 Hours 22 1230 22 0128    22 1127  spironolactone (ALDACTONE) tablet 25 mg        Ordering Provider: Adri Marcano APRN    25 mg Oral Daily 22 1300      22 1128  bumetanide (BUMEX) injection 1 mg        Ordering Provider: Adri Marcano APRN    1 mg Intravenous Every 12 Hours 22 1300 22 1407    22 0003  bumetanide (BUMEX) injection 2 mg        Ordering Provider: Darryl Saavedra MD    2 mg Intravenous Once 22 0100 22 0140    22 1836  bumetanide (BUMEX) injection  2 mg        Ordering Provider: Darryl Saavedra MD    2 mg Intravenous Once 08/04/22 1838 08/04/22 1906        ----------------------------------------------------------------------------------------------------------------------  Vital Signs:  Temp:  [97.8 °F (36.6 °C)-98.2 °F (36.8 °C)] 97.9 °F (36.6 °C)  Heart Rate:  [62-71] 71  Resp:  [18] 18  BP: (106-110)/(53-61) 108/61  SpO2:  [91 %-92 %] 92 %  on  Flow (L/min):  [1] 1;   Device (Oxygen Therapy): nasal cannula  Body mass index is 41.97 kg/m².    Wt Readings from Last 3 Encounters:   08/08/22 94.3 kg (207 lb 12.8 oz)     Intake & Output (last 3 days)       08/05 0701 08/06 0700 08/06 0701 08/07 0700 08/07 0701 08/08 0700 08/08 0701  08/09 0700    P.O. 930 1380 794 120    IV Piggyback        Total Intake(mL/kg) 930 (9.7) 1380 (14.4) 794 (8.4) 120 (1.3)    Urine (mL/kg/hr) 950 (0.4) 650 (0.3) 1600 (0.7) 50 (0)    Total Output  50    Net -20 +730 -806 +70            Urine Unmeasured Occurrence 1 x           Diet Regular; Cardiac, Consistent Carbohydrate, Renal  ----------------------------------------------------------------------------------------------------------------------  Physical exam:  Constitutional:  Well-developed and well-nourished.  No respiratory distress.      HENT:  Head:  Normocephalic and atraumatic.  Mouth:  Moist mucous membranes.    Eyes:  Conjunctivae and EOM are normal. No scleral icterus.    Neck:  Neck supple.  No JVD present.    Cardiovascular:  Normal rate, regular rhythm and normal heart sounds with no murmur.  Pulmonary/Chest:  No respiratory distress, no wheezes, no crackles, with normal breath sounds and good air movement.  Abdominal:  Soft.  Bowel sounds are normal.  No distension and no tenderness.   Musculoskeletal:  no tenderness, and no deformity.  No red or swollen joints anywhere.    Neurological:  Alert and oriented to person, place, and time.  No cranial nerve deficit.  No tongue deviation.  No facial droop.   No slurred speech.   Skin:  Skin is warm and dry. No rash noted. No pallor.   Peripheral vascular:  Pulses in all 4 extremities with no clubbing, no cyanosis, nonpitting edema   ----------------------------------------------------------------------------------------------------------------------  Tele:    ----------------------------------------------------------------------------------------------------------------------  Results from last 7 days   Lab Units 08/08/22 0408 08/06/22 0220 08/05/22 0042 08/04/22  1153   WBC 10*3/mm3 4.48 6.63 6.08 4.99   HEMOGLOBIN g/dL 10.0* 10.4* 10.8* 10.9*   HEMATOCRIT % 31.7* 31.9* 33.6* 34.1   MCV fL 99.1* 96.4 96.3 98.0*   MCHC g/dL 31.5 32.6 32.1 32.0   PLATELETS 10*3/mm3 110* 124* 107* 115*   INR   --   --   --  1.41*     Results from last 7 days   Lab Units 08/04/22  1306   PH, ARTERIAL pH units 7.436   PO2 ART mm Hg 62.3*   PCO2, ARTERIAL mm Hg 39.6   HCO3 ART mmol/L 26.6*     Results from last 7 days   Lab Units 08/08/22 0408 08/06/22 2045 08/06/22 0220 08/05/22 0042 08/04/22  1232   SODIUM mmol/L 139  --  137 136 133*   POTASSIUM mmol/L 3.9 3.8 3.2* 3.6 4.1   MAGNESIUM mg/dL  --   --   --  2.0 2.1   CHLORIDE mmol/L 102  --  99 99 98   CO2 mmol/L 25.8  --  24.4 23.2 21.1*   BUN mg/dL 15  --  16 17 19   CREATININE mg/dL 1.16*  --  1.22* 1.20* 1.50*   CALCIUM mg/dL 8.9  --  9.1 9.3 9.5   GLUCOSE mg/dL 96  --  115* 81 101*   ALBUMIN g/dL  --   --  3.39* 3.85 3.34*   BILIRUBIN mg/dL  --   --  1.5* 2.0* 1.3*   ALK PHOS U/L  --   --  70 71 92   AST (SGOT) U/L  --   --  26 20 30   ALT (SGPT) U/L  --   --  5 <5 7   Estimated Creatinine Clearance: 53.9 mL/min (A) (by C-G formula based on SCr of 1.16 mg/dL (H)).  No results found for: AMMONIA  Results from last 7 days   Lab Units 08/05/22 0311 08/05/22 0042 08/04/22  1232   TROPONIN T ng/mL 0.012 <0.010 <0.010     Results from last 7 days   Lab Units 08/04/22  1232   PROBNP pg/mL 8,224.0*     Results from last 7 days   Lab  Units 08/05/22  0311   CHOLESTEROL mg/dL 60   TRIGLYCERIDES mg/dL 52   HDL CHOL mg/dL 27*   LDL CHOL mg/dL 19     No results found for: HGBA1C, POCGLU  Lab Results   Component Value Date    TSH 6.250 (H) 08/05/2022    FREET4 1.29 08/05/2022     No results found for: PREGTESTUR, PREGSERUM, HCG, HCGQUANT  Pain Management Panel    There is no flowsheet data to display.       Brief Urine Lab Results  (Last result in the past 365 days)      Color   Clarity   Blood   Leuk Est   Nitrite   Protein   CREAT   Urine HCG        08/04/22 2348 Yellow   Cloudy   Large (3+)   Negative   Negative   Negative               No results found for: BLOODCX  No results found for: URINECX  No results found for: WOUNDCX  No results found for: STOOLCX  No results found for: RESPCX  No results found for: AFBCX        I have personally looked at the labs and they are summarized above.  ----------------------------------------------------------------------------------------------------------------------  Detailed radiology reports for the last 24 hours:    Imaging Results (Last 24 Hours)     ** No results found for the last 24 hours. **        Assessment & Plan      # Acute on chronic CHF unknown Ef  - TTE EF 46-50%  - Cardio following  - continue to diurese with BID bumex today  - monitor Is and Os  - supportive care  - monitor on tele  - Monitor renal function and electrolytes   - cardio recommends LHC and RHC but patient currently deferring.      # Cirrhosis  - continue diuresis  - s/p paracentesis -10L  - serial labs  - serial labd exams  - Outpatient hepatology. Possibly from hepatic congestion.      # Acute hypoxic resp failure 2/2 CHF  - treatment as above     # ELEUTERIO  - monitor   - serial labs    # HTN  - continue home regimen      DVT Prophylaxis:heparin  Disposition: continue to monitor  Discharge disposition: Likely home tomorrow w/ assist     VTE Prophylaxis:   Mechanical Order History:     None      Pharmalogical Order History:       Ordered     Dose Route Frequency Stop    08/05/22 0012  heparin (porcine) 5000 UNIT/ML injection 5,000 Units         5,000 Units SC Every 12 Hours Scheduled --                  Jemal Restrepo MD  [facility]  08/08/22  18:26 EDT

## 2022-08-08 NOTE — THERAPY EVALUATION
Patient Name: Della Jorge  : 1960    MRN: [id number]                              Today's Date: 2022       Admit Date: 2022    Visit Dx:     ICD-10-CM ICD-9-CM   1. Acute on chronic congestive heart failure, unspecified heart failure type (HCC)  I50.9 428.0   2. Anasarca  R60.1 782.3   3. Anemia, unspecified type  D64.9 285.9   4. Acute on chronic systolic heart failure (HCC)  I50.23 428.23     Patient Active Problem List   Diagnosis   • Acute on chronic congestive heart failure, unspecified heart failure type (HCC)     Past Medical History:   Diagnosis Date   • Congestive heart failure (CHF) (HCC)    • Hypertension      Past Surgical History:   Procedure Laterality Date   • CHOLECYSTECTOMY     • HYSTERECTOMY      partial, still has ovaries      General Information     Row Name 22 1511          OT Time and Intention    Document Type evaluation  -     Mode of Treatment individual therapy;occupational therapy  -     Row Name 22 1511          General Information    Patient Profile Reviewed yes  -     Prior Level of Function independent:;all household mobility;ADL's  recently required increased assistance with ADLs and sit to stands; no DME in place, lift chair  -     Barriers to Rehab none identified  -     Row Name 22 1511          Occupational Profile    Reason for Services/Referral (Occupational Profile) Patient admitted to [facility] on 2022. She was referred for OT evaluation due to change in functional performance with ADLs, functional mobility, and/or transfers.  -     Row Name 22 1511          Living Environment    People in Home spouse  -     Row Name 22 1511          Cognition    Orientation Status (Cognition) oriented x 4  -     Row Name 22 1511          Safety Issues, Functional Mobility    Impairments Affecting Function (Mobility) endurance/activity tolerance  -           User Key  (r) = Recorded By, (t) = Taken By,  (c) = Cosigned By    Initials Name Provider Type     Wendy Mack, OT Occupational Therapist                 Mobility/ADL's     Row Name 08/08/22 1515          Bed Mobility    Bed Mobility bed mobility (all) activities  -     All Activities, Cornell (Bed Mobility) minimum assist (75% patient effort);verbal cues;nonverbal cues (demo/gesture)  -     Assistive Device (Bed Mobility) bed rails;head of bed elevated  -[facility] Name 08/08/22 1515          Transfers    Transfers sit-stand transfer;stand-sit transfer  -     Sit-Stand Cornell (Transfers) minimum assist (75% patient effort)  -     Stand-Sit Cornell (Transfers) minimum assist (75% patient effort)  -[facility] Name 08/08/22 1515          Activities of Daily Living    BADL Assessment/Intervention bathing;upper body dressing;lower body dressing;grooming;feeding;toileting  -[facility] Name 08/08/22 1515          Bathing Assessment/Intervention    Cornell Level (Bathing) bathing skills;moderate assist (50% patient effort)  -KP     Row Name 08/08/22 1515          Upper Body Dressing Assessment/Training    Cornell Level (Upper Body Dressing) upper body dressing skills;minimum assist (75% patient effort)  -[facility] Name 08/08/22 1515          Lower Body Dressing Assessment/Training    Cornell Level (Lower Body Dressing) lower body dressing skills;maximum assist (25% patient effort);moderate assist (50% patient effort)  -[facility] Name 08/08/22 1515          Grooming Assessment/Training    Cornell Level (Grooming) grooming skills;minimum assist (75% patient effort)  -KP     Row Name 08/08/22 1515          Self-Feeding Assessment/Training    Cornell Level (Feeding) feeding skills;set up  -[facility] Name 08/08/22 1515          Toileting Assessment/Training    Cornell Level (Toileting) toileting skills;maximum assist (25% patient effort)  -           User Key  (r) = Recorded By, (t) = Taken By, (c) = Cosigned  By    Initials Name Provider Type     Wendy Mack OT Occupational Therapist               Obj/Interventions     Row Name 08/08/22 1516          Sensory Assessment (Somatosensory)    Sensory Assessment (Somatosensory) UE sensation intact  -     Row Name 08/08/22 1516          Vision Assessment/Intervention    Visual Impairment/Limitations WFL;corrective lenses for reading  -     Row Name 08/08/22 1516          Range of Motion Comprehensive    General Range of Motion bilateral upper extremity ROM WFL  -[facility] Name 08/08/22 1516          Strength Comprehensive (MMT)    Comment, General Manual Muscle Testing (MMT) Assessment 4/5 MMT in BUEs  -     Row Name 08/08/22 1516          Motor Skills    Motor Skills coordination;functional endurance  -     Coordination WFL;bilateral;upper extremity  -     Functional Endurance fair minus  -[facility] Name 08/08/22 1516          Balance    Balance Assessment sitting static balance;standing static balance  -     Static Sitting Balance standby assist  -     Position, Sitting Balance sitting edge of bed  -     Static Standing Balance contact guard  -           User Key  (r) = Recorded By, (t) = Taken By, (c) = Cosigned By    Initials Name Provider Type     Wendy Mack OT Occupational Therapist               Goals/Plan     Row Name 08/08/22 1519          Transfer Goal 1 (OT)    Activity/Assistive Device (Transfer Goal 1, OT) transfers, all  -KP     Edwards Level/Cues Needed (Transfer Goal 1, OT) standby assist  -KP     Time Frame (Transfer Goal 1, OT) by discharge  -[facility] Name 08/08/22 1519          Bathing Goal 1 (OT)    Activity/Device (Bathing Goal 1, OT) bathing skills, all  -KP     Edwards Level/Cues Needed (Bathing Goal 1, OT) standby assist  -     Time Frame (Bathing Goal 1, OT) by discharge  -[facility] Name 08/08/22 1519          Problem Specific Goal 1 (OT)    Problem Specific Goal 1 (OT) Patient will perform sustained  activity X12 minutes to promote functional endurance/activity tolerance needed for daily routine.  -     Time Frame (Problem Specific Goal 1, OT) by discharge  -     Row Name 08/08/22 1519          Therapy Assessment/Plan (OT)    Planned Therapy Interventions (OT) activity tolerance training;occupation/activity based interventions;patient/caregiver education/training;transfer/mobility retraining;strengthening exercise;ROM/therapeutic exercise  -           User Key  (r) = Recorded By, (t) = Taken By, (c) = Cosigned By    Initials Name Provider Type    Wendy Latif, OT Occupational Therapist               Clinical Impression     Row Name 08/08/22 1517          Pain Assessment    Pretreatment Pain Rating 0/10 - no pain  -     Posttreatment Pain Rating 0/10 - no pain  -     Row Name 08/08/22 1517          Plan of Care Review    Plan of Care Reviewed With patient;spouse  -     Progress no change  -     Outcome Evaluation Patient seen for OT evaluation. She presents with decreased functional endurance/activity tolerance impacting performance with ADLs and functional transfers. She would benefit from skilled OT services to return to [facility]. Recommend home with assist at time of discharge.  -     Row Name 08/08/22 1517          Therapy Assessment/Plan (OT)    Patient/Family Therapy Goal Statement (OT) to return home  -     Rehab Potential (OT) good, to achieve stated therapy goals  -     Criteria for Skilled Therapeutic Interventions Met (OT) yes;meets criteria;skilled treatment is necessary  -     Therapy Frequency (OT) 5 times/wk  3-5x/week as able or available for functional progress  -     Predicted Duration of Therapy Intervention (OT) discharge  -     Row Name 08/08/22 1517          Therapy Plan Review/Discharge Plan (OT)    Equipment Needs Upon Discharge (OT) other (see comments)  3-n-1 BSC, tub bench  -     Anticipated Discharge Disposition (OT) home with assist  -     Row Name  08/08/22 1517          Positioning and Restraints    Pre-Treatment Position in bed  -KP     Post Treatment Position bed  -KP     In Bed call light within reach;with family/caregiver;with PT  -KP           User Key  (r) = Recorded By, (t) = Taken By, (c) = Cosigned By    Initials Name Provider Type    Wendy Latif OT Occupational Therapist               Outcome Measures    No documentation.                 Occupational Therapy Education                 Title: PT OT SLP Therapies (In Progress)     Topic: Occupational Therapy (In Progress)     Point: ADL training (Done)     Description:   Instruct learner(s) on proper safety adaptation and remediation techniques during self care or transfers.   Instruct in proper use of assistive devices.              Learning Progress Summary           Patient Acceptance, E, VU by  at 8/8/2022 1520    Comment: OT role, plan of care, goals                   Point: Home exercise program (Not Started)     Description:   Instruct learner(s) on appropriate technique for monitoring, assisting and/or progressing therapeutic exercises/activities.              Learner Progress:  Not documented in this visit.          Point: Precautions (Not Started)     Description:   Instruct learner(s) on prescribed precautions during self-care and functional transfers.              Learner Progress:  Not documented in this visit.          Point: Body mechanics (Not Started)     Description:   Instruct learner(s) on proper positioning and spine alignment during self-care, functional mobility activities and/or exercises.              Learner Progress:  Not documented in this visit.                      User Key     Initials Effective Dates Name Provider Type [facility] 06/16/21 -  Wendy Mack OT Occupational Therapist OT              OT Recommendation and Plan  Planned Therapy Interventions (OT): activity tolerance training, occupation/activity based interventions, patient/caregiver  education/training, transfer/mobility retraining, strengthening exercise, ROM/therapeutic exercise  Therapy Frequency (OT): 5 times/wk (3-5x/week as able or available for functional progress)  Plan of Care Review  Plan of Care Reviewed With: patient, spouse  Progress: no change  Outcome Evaluation: Patient seen for OT evaluation. She presents with decreased functional endurance/activity tolerance impacting performance with ADLs and functional transfers. She would benefit from skilled OT services to return to [facility]. Recommend home with assist at time of discharge.     Time Calculation:    Time Calculation- OT     Row Name 08/08/22 1520             Time Calculation- OT    OT Received On 08/08/22  -            User Key  (r) = Recorded By, (t) = Taken By, (c) = Cosigned By    Initials Name Provider Type     Wendy Mack OT Occupational Therapist              Therapy Charges for Today     Code Description Service Date Service Provider Modifiers Qty    [id number]  OT EVAL MOD COMPLEXITY 4 8/8/2022 Wendy Mack OT GO 1               Wendy Mack OT  8/8/2022

## 2022-08-08 NOTE — THERAPY EVALUATION
Acute Care - Physical Therapy Initial Evaluation   [facility]     Patient Name: Della Jorge  : 1960  MRN: [id number]  Today's Date: 2022      Visit Dx:     ICD-10-CM ICD-9-CM   1. Acute on chronic congestive heart failure, unspecified heart failure type (HCC)  I50.9 428.0   2. Anasarca  R60.1 782.3   3. Anemia, unspecified type  D64.9 285.9   4. Acute on chronic systolic heart failure (HCC)  I50.23 428.23     Patient Active Problem List   Diagnosis   • Acute on chronic congestive heart failure, unspecified heart failure type (HCC)     Past Medical History:   Diagnosis Date   • Congestive heart failure (CHF) (HCC)    • Hypertension      Past Surgical History:   Procedure Laterality Date   • CHOLECYSTECTOMY     • HYSTERECTOMY      partial, still has ovaries     PT Assessment (last 12 hours)     PT Evaluation and Treatment     Row Name 22 1300          Physical Therapy Time and Intention    Subjective Information complains of;weakness;swelling  -KM     Document Type evaluation  -KM     Mode of Treatment individual therapy;physical therapy  -KM     Patient Effort good  -KM     Row Name 22 1300          General Information    Patient Profile Reviewed yes  -KM     Patient Observations alert;cooperative;agree to therapy  -KM     Prior Level of Function independent:;all household mobility;ADL's  -KM     Existing Precautions/Restrictions fall  -KM     Risks Reviewed patient:;spouse/S.O.:;LOB;nausea/vomiting;dizziness;increased discomfort  -KM     Benefits Reviewed patient:;spouse/S.O.:;improve function;increase independence;increase strength;increase balance;decrease pain  -KM     Row Name 22 1300          Living Environment    Current Living Arrangements home  -KM     People in Home spouse  -KM     Primary Care Provided by self  -KM     Row Name 22 1300          Home Use of Assistive/Adaptive Equipment    Equipment Currently Used at Home none  -KM     Row Name 22 1300           Cognition    Affect/Mental Status (Cognition) [facility]  -     Orientation Status (Cognition) oriented x 4  -KM     Follows Commands (Cognition) [facility]  -     Row Name 08/08/22 1300          Range of Motion (ROM)    Range of Motion bilateral lower extremities;ROM is [facility] Name 08/08/22 1300          Strength (Manual Muscle Testing)    Strength (Manual Muscle Testing) bilateral lower extremities;strength is [facility]     Row Name 08/08/22 1300          Bed Mobility    Bed Mobility bed mobility (all) activities  -KM     All Activities, Bruner (Bed Mobility) minimum assist (75% patient effort);verbal cues;nonverbal cues (demo/gesture)  -     Assistive Device (Bed Mobility) bed rails;head of bed elevated  -     Row Name 08/08/22 1300          Transfers    Transfers sit-stand transfer;stand-sit transfer  -     Sit-Stand Bruner (Transfers) contact guard  -     Stand-Sit Bruner (Transfers) contact guard  -[facility] Name 08/08/22 1300          Sit-Stand Transfer    Assistive Device (Sit-Stand Transfers) walker, front-wheeled  -KM     Row Name 08/08/22 1300          Stand-Sit Transfer    Assistive Device (Stand-Sit Transfers) walker, front-wheeled  -KM     Row Name 08/08/22 1300          Gait/Stairs (Locomotion)    Gait/Stairs Locomotion gait/ambulation independence;gait/ambulation assistive device;distance ambulated  -KM     Bruner Level (Gait) contact guard  -     Assistive Device (Gait) walker, front-wheeled  -KM     Distance in Feet (Gait) 120  w/ standing rest break to recover from fatigue  -KM     Pattern (Gait) step-through  -KM     Deviations/Abnormal Patterns (Gait) gait speed decreased;stride length decreased  -KM     Row Name 08/08/22 1300          Safety Issues, Functional Mobility    Impairments Affecting Function (Mobility) endurance/activity tolerance  -KM     Row Name 08/08/22 1300          Balance    Balance Assessment sitting static balance;standing dynamic balance   -KM     Static Sitting Balance standby assist  -KM     Position, Sitting Balance sitting edge of bed  -KM     Dynamic Standing Balance contact guard  -KM     Position/Device Used, Standing Balance walker, front-wheeled  -KM     Row Name 08/08/22 1300          Plan of Care Review    Plan of Care Reviewed With patient;spouse  -     Outcome Evaluation Pt. evaluation completed during session. Pt. demonstrates bed mobility w/ Sana and cueing. Pt. performs transfers and ambulation w/ CGA. Pt. would benefit from skilled PT services. Anticipated discharge home w/ assist.  -KM     Row Name 08/08/22 1300          Therapy Assessment/Plan (PT)    Patient/Family Therapy Goals Statement (PT) Return to PLOF  -KM     Functional Level at Time of Evaluation (PT) Sana  -KM     PT Diagnosis (PT) lower extremity weakness  -KM     Rehab Potential (PT) good, to achieve stated therapy goals  -     Criteria for Skilled Interventions Met (PT) yes;skilled treatment is necessary  -     Therapy Frequency (PT) 3 times/wk  -KM     Predicted Duration of Therapy Intervention (PT) until discharge  -KM     Problem List (PT) problems related to;balance;mobility  -KM     Activity Limitations Related to Problem List (PT) unable to transfer safely  -KM     Row Name 08/08/22 1300          Therapy Plan Review/Discharge Plan (PT)    Therapy Plan Review (PT) evaluation/treatment results reviewed;care plan/treatment goals reviewed;risks/benefits reviewed;patient;spouse/significant other  -     Row Name 08/08/22 1300          Physical Therapy Goals    Bed Mobility Goal Selection (PT) bed mobility, PT goal 1  -KM     Row Name 08/08/22 1300          Bed Mobility Goal 1 (PT)    Activity/Assistive Device (Bed Mobility Goal 1, PT) bed mobility activities, all  -KM     H[place]ricks Level/Cues Needed (Bed Mobility Goal 1, PT) independent  -KM           User Key  (r) = Recorded By, (t) = Taken By, (c) = Cosigned By    Initials Name Provider Type    CONCEPCION Espinoza  BROOK Otto Physical Therapist                Physical Therapy Education                 Title: PT OT SLP Therapies (Done)     Topic: Physical Therapy (Done)     Point: Mobility training (Done)     Learning Progress Summary           Patient Acceptance, E,TB, VU by  at 8/8/2022 1328                   Point: Home exercise program (Done)     Learning Progress Summary           Patient Acceptance, E,TB, VU by  at 8/8/2022 1328                   Point: Body mechanics (Done)     Learning Progress Summary           Patient Acceptance, E,TB, VU by  at 8/8/2022 1328                   Point: Precautions (Done)     Learning Progress Summary           Patient Acceptance, E,TB, VU by  at 8/8/2022 1328                               User Key     Initials Effective Dates Name Provider Type Discipline     05/24/22 -  Fam Espinoza PT Physical Therapist PT              PT Recommendation and Plan  Anticipated Discharge Disposition (PT): home with assist  Planned Therapy Interventions (PT): balance training, bed mobility training, gait training, home exercise program, patient/family education, strengthening, stretching, transfer training  Therapy Frequency (PT): 3 times/wk  Plan of Care Reviewed With: patient, spouse  Outcome Evaluation: Pt. evaluation completed during session. Pt. demonstrates bed mobility w/ Sana and cueing. Pt. performs transfers and ambulation w/ CGA. Pt. would benefit from skilled PT services. Anticipated discharge home w/ assist.       Time Calculation:    PT Charges     Row Name 08/08/22 1320             Time Calculation    PT Received On 08/08/22  -CONCEPCION      PT Goal Re-Cert Due Date 08/22/22  -            User Key  (r) = Recorded By, (t) = Taken By, (c) = Cosigned By    Initials Name Provider Type     Fam Espinoza PT Physical Therapist              Therapy Charges for Today     Code Description Service Date Service Provider Modifiers Qty    [id number]  PT EVAL MOD COMPLEXITY 4 8/8/2022 Olga  Fam, PT GP 1               Fam Espinoza, PT  8/8/2022

## 2022-08-08 NOTE — CASE MANAGEMENT/SOCIAL WORK
Discharge Planning Assessment  [facility]     Patient Name: Della Jorge  MRN: [id number]  Today's Date: 8/8/2022    Admit Date: 8/4/2022     Discharge Needs Assessment    No documentation.                Discharge Plan     Row Name 08/08/22 1227       Plan    Plan CM spoke with pt via phone, pt reports feeling improved since admission. She lives at home with spouse and plans to return home at dc. Spouse to provide transportation at dc. Pt currently denies any anticipated dc needs. CM will follow & assist as needed.    Row Name 08/08/22 1014       Plan    Plan Comments Cards on board, cont. Diuresis-bumex IV,  EF 46-50 % Cards recs LHC/RHC- pt undecided if  she would proceed w/intervention. Mon. ELEUTERIO. Cirrhosis-s/p paracentesis. Mon. Renals. 1600ml uop,  sats 92% on 1lnc, bun 15/cr. 1.16.              Continued Care and Services - Admitted Since 8/4/2022    Coordination has not been started for this encounter.          Demographic Summary    No documentation.                Functional Status    No documentation.                Psychosocial    No documentation.                Abuse/Neglect    No documentation.                Legal    No documentation.                Substance Abuse    No documentation.                Patient Forms    No documentation.                   Cari Ellison RN

## 2022-08-08 NOTE — PLAN OF CARE
Goal Outcome Evaluation: Pt has rested well throughout shift with minimal complaints. Pt does have some intermittent pain in her feet & lower extremities which she required 1 dose of PRN pain medication. Continue treatment of diuresing patient. Will continue to monitor.

## 2022-08-08 NOTE — PLAN OF CARE
Pt rested in bed this shift. Pt complained of pain and anxiety, see MAR. No s/s of acute distress. VSS.

## 2022-08-09 ENCOUNTER — READMISSION MANAGEMENT (OUTPATIENT)
Dept: CALL CENTER | Facility: HOSPITAL | Age: 62
End: 2022-08-09

## 2022-08-09 VITALS
TEMPERATURE: 98 F | OXYGEN SATURATION: 98 % | SYSTOLIC BLOOD PRESSURE: 105 MMHG | RESPIRATION RATE: 20 BRPM | WEIGHT: 211.7 LBS | BODY MASS INDEX: 42.68 KG/M2 | HEART RATE: 71 BPM | HEIGHT: 59 IN | DIASTOLIC BLOOD PRESSURE: 57 MMHG

## 2022-08-09 LAB
ANION GAP SERPL CALCULATED.3IONS-SCNC: 9.1 MMOL/L (ref 5–15)
BACTERIA FLD CULT: NORMAL
BASOPHILS # BLD AUTO: 0.04 10*3/MM3 (ref 0–0.2)
BASOPHILS NFR BLD AUTO: 0.8 % (ref 0–1.5)
BUN SERPL-MCNC: 14 MG/DL (ref 8–23)
BUN/CREAT SERPL: 10.9 (ref 7–25)
CALCIUM SPEC-SCNC: 8.9 MG/DL (ref 8.6–10.5)
CHLORIDE SERPL-SCNC: 99 MMOL/L (ref 98–107)
CO2 SERPL-SCNC: 26.9 MMOL/L (ref 22–29)
CREAT SERPL-MCNC: 1.28 MG/DL (ref 0.57–1)
DEPRECATED RDW RBC AUTO: 58.4 FL (ref 37–54)
EGFRCR SERPLBLD CKD-EPI 2021: 47.8 ML/MIN/1.73
EOSINOPHIL # BLD AUTO: 0.18 10*3/MM3 (ref 0–0.4)
EOSINOPHIL NFR BLD AUTO: 3.6 % (ref 0.3–6.2)
ERYTHROCYTE [DISTWIDTH] IN BLOOD BY AUTOMATED COUNT: 16.1 % (ref 12.3–15.4)
GLUCOSE SERPL-MCNC: 92 MG/DL (ref 65–99)
GRAM STN SPEC: NORMAL
GRAM STN SPEC: NORMAL
HCT VFR BLD AUTO: 32.3 % (ref 34–46.6)
HGB BLD-MCNC: 10.3 G/DL (ref 12–15.9)
IMM GRANULOCYTES # BLD AUTO: 0.02 10*3/MM3 (ref 0–0.05)
IMM GRANULOCYTES NFR BLD AUTO: 0.4 % (ref 0–0.5)
LYMPHOCYTES # BLD AUTO: 0.66 10*3/MM3 (ref 0.7–3.1)
LYMPHOCYTES NFR BLD AUTO: 13.4 % (ref 19.6–45.3)
MAGNESIUM SERPL-MCNC: 2.1 MG/DL (ref 1.6–2.4)
MCH RBC QN AUTO: 31.6 PG (ref 26.6–33)
MCHC RBC AUTO-ENTMCNC: 31.9 G/DL (ref 31.5–35.7)
MCV RBC AUTO: 99.1 FL (ref 79–97)
MONOCYTES # BLD AUTO: 0.54 10*3/MM3 (ref 0.1–0.9)
MONOCYTES NFR BLD AUTO: 10.9 % (ref 5–12)
NEUTROPHILS NFR BLD AUTO: 3.5 10*3/MM3 (ref 1.7–7)
NEUTROPHILS NFR BLD AUTO: 70.9 % (ref 42.7–76)
NRBC BLD AUTO-RTO: 0 /100 WBC (ref 0–0.2)
PLATELET # BLD AUTO: 104 10*3/MM3 (ref 140–450)
PMV BLD AUTO: 9.3 FL (ref 6–12)
POTASSIUM SERPL-SCNC: 3.8 MMOL/L (ref 3.5–5.2)
RBC # BLD AUTO: 3.26 10*6/MM3 (ref 3.77–5.28)
SODIUM SERPL-SCNC: 135 MMOL/L (ref 136–145)
WBC NRBC COR # BLD: 4.94 10*3/MM3 (ref 3.4–10.8)

## 2022-08-09 PROCEDURE — 85025 COMPLETE CBC W/AUTO DIFF WBC: CPT | Performed by: INTERNAL MEDICINE

## 2022-08-09 PROCEDURE — 83735 ASSAY OF MAGNESIUM: CPT | Performed by: INTERNAL MEDICINE

## 2022-08-09 PROCEDURE — 80048 BASIC METABOLIC PNL TOTAL CA: CPT | Performed by: INTERNAL MEDICINE

## 2022-08-09 PROCEDURE — 99239 HOSP IP/OBS DSCHRG MGMT >30: CPT | Performed by: INTERNAL MEDICINE

## 2022-08-09 PROCEDURE — 25010000002 HEPARIN (PORCINE) PER 1000 UNITS: Performed by: HOSPITALIST

## 2022-08-09 RX ORDER — SPIRONOLACTONE 25 MG/1
25 TABLET ORAL DAILY
Qty: 30 TABLET | Refills: 0 | Status: SHIPPED | OUTPATIENT
Start: 2022-08-10 | End: 2022-09-15

## 2022-08-09 RX ORDER — LISINOPRIL 2.5 MG/1
2.5 TABLET ORAL DAILY
Qty: 30 TABLET | Refills: 0 | Status: SHIPPED | OUTPATIENT
Start: 2022-08-09 | End: 2022-08-15

## 2022-08-09 RX ADMIN — Medication 10 ML: at 08:09

## 2022-08-09 RX ADMIN — HEPARIN SODIUM 5000 UNITS: 5000 INJECTION INTRAVENOUS; SUBCUTANEOUS at 08:09

## 2022-08-09 RX ADMIN — ASPIRIN 81 MG: 81 TABLET, COATED ORAL at 08:10

## 2022-08-09 RX ADMIN — SPIRONOLACTONE 25 MG: 25 TABLET ORAL at 08:10

## 2022-08-09 NOTE — PROGRESS NOTES
Received HF clinic referral on patient. Introduced myself at bedside and discussed goals of HF clinic. Patient reports she would like to establish care but is currently facing hurdles being addressed by staff; contacted SS & case management staff who are currently working to assist patient. Encouraged patient to attend appointment for further evaluation.      Hilary Smith PA-C  08/09/22  09:49 EDT

## 2022-08-09 NOTE — DISCHARGE SUMMARY
[facility] HOSPITALISTS DISCHARGE SUMMARY    Patient Identification:  Name:  Della Jorge  Age:  61 y.o.  Sex:  female  :  1960  MRN:  [id number]  Visit Number:  [id number]    Date of Admission: 2022  Date of Discharge:  2022    PCP: Sebastián Dougherty MD    DISCHARGE DIAGNOSIS  #Acute on chronic combined diastolic and systolic heart failure   #Severe TVR  #Acute hypoxemic respiratory failure   #Cirrhosis, suspect cardiac cirrhosis   #ELEUTERIO  #HTN    CONSULTS   Cardiology   Heart failure education     PROCEDURES PERFORMED  Echocardiogram :  · Left ventricular ejection fraction appears to be 46 - 50%. Left ventricular systolic function is mildly decreased.  · Left ventricular diastolic function is consistent with (grade Ia w/high LAP) impaired relaxation.  · The right ventricular cavity is moderately dilated.  · Mildly reduced right ventricular systolic function noted.  · The right atrial cavity is severely dilated.  · Severe tricuspid valve regurgitation is present.  · Estimated right ventricular systolic pressure from tricuspid regurgitation is normal (<35 mmHg).         HOSPITAL COURSE  Patient is a 61 y.o. female presented on  to [facility] complaining of abdominal and leg swelling.  Please see the admitting history and physical for further details.      Ms. Jorge is our 62 yo F with hx HTN, CHF who presented with anasarca and ascites. Patient has had required paracentesis in distant past in setting of CHF. Patient initially required 4L NC, not on home O2. Patient evaluated to be in decompensated heart failure. Suspect patient is developing cirrhosis, likely from congestive hepatopathy. Patient with 10L ascites removed that was consistent with portal hypertension. Patient has diuresed well with great improvement in her symptoms. Cardiology recommended RHC to further evaluate TVR and pulmonary pressures but patient has refused for the time being. Patient had ELEUTERIO  on presentation and Cr bumped up slightly today after initially improving with diuretics. Suspect she has developed some component of CKD. Patient reports feeling well and reports being ready to go home. PT evaluated patient and recommended home with assist. Patient noted she did not feel like she needed home health and that she had enough support at home. Patient to f/u with heart failure clinic, PCP, and hepatology.   Have maximized heart failure therapy by starting lisinopril and spironolactone in addition to home coreg and bumex. Patient has been on hypokalemic side, suspect she will tolerate the K sparing diuretic and low dose lisinopril. Recommend BMP in 1 week. Patient dropped to 83% on exertion on room air but quickly came up to 93% on 1L NC. Will order home O2 to use on exertion.     VITAL SIGNS:  Temp:  [97.8 °F (36.6 °C)-98.2 °F (36.8 °C)] 98.1 °F (36.7 °C)  Heart Rate:  [63-79] 79  Resp:  [18-20] 20  BP: ()/(47-61) 106/55  SpO2:  [92 %-98 %] 98 %  on  Flow (L/min):  [1] 1;   Device (Oxygen Therapy): nasal cannula    Body mass index is 42.76 kg/m².  Wt Readings from Last 3 Encounters:   08/09/22 96 kg (211 lb 11.2 oz)       PHYSICAL EXAM:  Constitutional:  Well-developed and well-nourished.  No respiratory distress.      HENT:  Head:  Normocephalic and atraumatic.  Mouth:  Moist mucous membranes.    Eyes:  Conjunctivae and EOM are normal.  Pupils are equal, round, and reactive to light.  No scleral icterus.    Cardiovascular:  Normal rate, regular rhythm and normal heart sounds with no murmur.  Pulmonary/Chest:  No respiratory distress, no wheezes, no crackles, with normal breath sounds and good air movement.  Abdominal:  Soft.  Bowel sounds are normal.  No distension and no tenderness.   Musculoskeletal:  No edema, no tenderness, and no deformity.  No red or swollen joints anywhere.    Neurological:  Alert and oriented to person, place, and time.  No gross neurological deficit.   Skin:  Skin is  warm and dry. No rash noted. No pallor.   Peripheral vascular:  Strong pulses in all 4 extremities with no clubbing, no cyanosis, no edema.    DISCHARGE DISPOSITION   Stable    DISCHARGE MEDICATIONS:     Discharge Medications      New Medications      Instructions Start Date   lisinopril 2.5 MG tablet  Commonly known as: PRINIVIL,ZESTRIL   2.5 mg, Oral, Daily      spironolactone 25 MG tablet  Commonly known as: ALDACTONE   25 mg, Oral, Daily   Start Date: August 10, 2022        Continue These Medications      Instructions Start Date   ALPRAZolam 0.25 MG tablet  Commonly known as: XANAX   0.25 mg, Oral, Nightly PRN      aspirin 81 MG EC tablet   81 mg, Oral, Daily      bumetanide 1 MG tablet  Commonly known as: BUMEX   1 mg, Oral, Daily      carvedilol 12.5 MG tablet  Commonly known as: COREG   12.5 mg, Oral, 2 Times Daily With Meals      ondansetron 4 MG tablet  Commonly known as: ZOFRAN   4 mg, Oral, Every 8 Hours PRN      traMADol 50 MG tablet  Commonly known as: ULTRAM   50 mg, Oral, Every 8 Hours PRN                Follow-up Information     [facility] .    Specialty: Cardiology  Contact information:  [address] [place] [id number] [phone]           Sebastián Dougherty MD Follow up in 1 week(s).    Specialty: Internal Medicine  Why: Please get BMP at visit.  Contact information:  [address] [facility] [address]  [phone]              CLINIC HEPATOLOGY Follow up.    Why: Cirrhosis  Contact information:  [address], [address], [facility] [address] [phone]                        TEST  RESULTS PENDING AT DISCHARGE  Pending Labs     Order Current Status    Body Fluid Culture - Body Fluid, Peritoneum Preliminary result           CODE STATUS  Code Status and Medical Interventions:   Ordered at: 08/04/22 2497     Level Of Support Discussed With:    Patient     Code Status (Patient has no pulse and is not breathing):    CPR  (Attempt to Resuscitate)     Medical Interventions (Patient has pulse or is breathing):    Full Support       The ASCVD Risk score (Clintonnabor PALM Jr., et al., 2013) failed to calculate for the following reasons:    The valid total cholesterol range is 130 to 320 mg/dL     Jemal Restrepo MD  [facility]  08/09/22  09:12 EDT    Please note that this discharge summary required more than 30 minutes to complete.

## 2022-08-09 NOTE — PLAN OF CARE
Pt ambulated in hallway this evening. Tolerated very well. Pt then rested in bed for remainder of night without complaints. No s/s of acute distress. VSS.

## 2022-08-09 NOTE — DISCHARGE INSTR - APPOINTMENTS
Pt  has  an  apointment  with  dr tinsley  for  auguest 16  at  10 :15  with a  bmp  and  a  referral  for [facility]  for  cirross  and heart  failure  for auguest 15  at  9  am

## 2022-08-09 NOTE — PLAN OF CARE
Goal Outcome Evaluation:         Pt is currently resting comfortably. No s/s of distress noted at this time. Pt is to be d/c home today. Will continue care for pt until d/c.

## 2022-08-09 NOTE — CASE MANAGEMENT/SOCIAL WORK
Discharge Planning Assessment   Yan     Patient Name: Della Jorge  MRN: [id number]  Today's Date: 8/9/2022    Admit Date: 8/4/2022     Discharge Needs Assessment    No documentation.                Discharge Plan     Row Name 08/09/22 1105       Plan    Plan Pt to be dc'd home today with MD referral for home O2@2lnc. CM faxed MD referral to [facility] per pt preferrence and spoke with Tatiana. Per Tatiana, portable O2 tank will be delivered to pts hosp. room on this date. Pt provided CM with contact number for her son Junior [phone], to provide to [facility] for delivery arrangments of home O2. CM updated [facility] with contact info.  Pt to be tranported home today with son providing transportation. No further needs identified.              Continued Care and Services - Admitted Since 8/4/2022    Coordination has not been started for this encounter.       Expected Discharge Date and Time     Expected Discharge Date Expected Discharge Time    Aug 9, 2022          Demographic Summary    No documentation.                Functional Status    No documentation.                Psychosocial    No documentation.                Abuse/Neglect    No documentation.                Legal    No documentation.                Substance Abuse    No documentation.                Patient Forms    No documentation.                   Cari Ellison RN

## 2022-08-09 NOTE — DISCHARGE INSTRUCTIONS
Please take medications as prescribed. Please go to follow-up appointments as recommended. Please seek medical attention if you have shortness of breath, chest pain, worsening swelling, or worsening of any symptom.     Please continue social distancing and isolation efforts as recommended by CDC and [facility] to help prevent spread of COVID-19.

## 2022-08-09 NOTE — DISCHARGE PLACEMENT REQUEST
"Della Chavira (61 y.o. Female)             Date of Birth   1960    Social Security Number       Address   [address] [id number]    Home Phone   [phone]    MRN   [id number]       [facility]    Marital Status                               Admission Date   22    Admission Type   Emergency    Admitting Provider   Fady Dinh MD    Attending Provider   Jemal Restrepo MD    Department, Room/Bed   [address], 3307/1S       Discharge Date       Discharge Disposition   Home or Self Care    Discharge Destination                               Attending Provider: Jemal Restrepo MD    Allergies: Codeine    Isolation: None   Infection: None   Code Status: CPR   Advance Care Planning Activity    Ht: 149.9 cm (59\")   Wt: 96 kg (211 lb 11.2 oz)    Admission Cmt: None   Principal Problem: None                Active Insurance as of 2022     Primary Coverage     Payor Plan Insurance Group Employer/Plan Group    [facility] COMMUNITY PLAN [facility] COMMUNITY PLAN [facility]      Payor Plan Address Payor Plan Phone Number Payor Plan Fax Number Effective Dates    [address]   2022 - None Entered    [facility] [address]       Subscriber Name Subscriber Birth Date Member ID       DELLA CHAVIRA 1960 [id number]                 Emergency Contacts      (Rel.) Home Phone Work Phone Mobile Phone    Triston Chavira (Spouse) [phone] -- --      Ambulated pt in the hallway on room air. The lowest the pt desat'd was 83%. The pt stayed around 85-87% when ambulating. Pt is currently on 1L NC and back up to 93%. MD made aware and home O2 ordered.    [address] [address]  Dept. Phone:  [phone]  Dept. Fax:  [phone] Date Ordered: Aug 9, 2022         Patient:  Della Chavira MRN:  [id number]   [address] [facility] [address] :  1960  SSN:  " "  Phone: [phone] Sex:  F     Weight: 96 kg (211 lb 11.2 oz)         Ht Readings from Last 1 Encounters:   22 149.9 cm (59\")         Home Oxygen Therapy          (Order ID: [id number])    Diagnosis:  Acute on chronic systolic heart failure (HCC) (I50.23 [ICD-10-CM] 428.23 [ICD-9-CM])   Quantity:  1     Delivery Modality: Nasal Cannula  Liters Per Minute: 2  Duration: With Exertion  Equipment:  Oxygen Concentrator &  &  Portable Gaseous Oxygen System & Portable Oxygen Contents Gaseous &  Conserving Regulator  Length of Need (99 Months = Lifetime): 99 Months = Lifetime        Authorizing Provider's Phone: [phone]  Authorizing Provider:Jemal Restrepo MD  Authorizing Provider's NPI: [id number]  Order Entered By: Jemal Restrepo MD 2022 10:21 AM     Electronically signed by: Jemal Restrepo MD 2022 10:21 AM              History & Physical      Fady Dinh MD at 22 0231          Hospitalist History and Physical        Patient Identification  Name: Della Jorge  Age/Sex: 61 y.o. female  :  1960        MRN: [id number]  Visit Number: [id number]  Admit Date: 2022   PCP: Sebastián Dougherty MD          Chief complaint abdominal swelling, leg swelling    History of Present Illness:  Patient is a 61 y.o. female with history of congestive heart failure diagnosed 10 years ago at which time she had significant abdominal ascites that required paracentesis resulting in 4-5 L of fluid being removed. She also has a history of HTN. She takes coreg for HTN and had been on lasix for the last 10 years for her heart failure. However, 2 months ago she started to retain more fluid, so her PCP changed her from lasix to bumex. This initially helped with her fluid retention, but ultimately she started to swell more once again. She reports significant swelling in her abdomen and lower extremities. She does not necessarily report shortness of breath, but does comment " that it feels harder for her to get a full breath because of her abdominal distention. She denies chest pain. She denies significant change in her urine output until the last week or so, during which time it has been decreased. She presented to the ED on 8/4 with complaints of worsening abdominal and lower extremity edema. In the ED, she was found to be hypoxic and placed on 4L NC. Paracentesis was performed, resulting in around 9-10 L of fluid being removed. She reports it is much easier to take a breath now. Labs showed elevated BNP, elevated creatinine 1.50 (0.78 in 1/2017); total bilirubin elevated 1.3, albumin low 3.34, INR 1.41, PTT 41, mildly low hemoglobin 10.9 and low platelets of 115K. Patient has been admitted to the telemetry unit for further management.      Review of Systems  Review of Systems   Constitutional: Positive for activity change, fatigue and unexpected weight change. Negative for chills and diaphoresis.   HENT: Negative for congestion, postnasal drip, rhinorrhea, sinus pressure, sinus pain and sore throat.    Eyes: Negative for photophobia, pain, discharge, redness, itching and visual disturbance.   Respiratory: Negative for cough, choking, shortness of breath and wheezing.    Cardiovascular: Positive for leg swelling. Negative for chest pain and palpitations.   Gastrointestinal: Positive for abdominal distention. Negative for abdominal pain, constipation, diarrhea, nausea and vomiting.   Endocrine: Negative for cold intolerance, heat intolerance, polydipsia, polyphagia and polyuria.   Genitourinary: Positive for decreased urine volume. Negative for difficulty urinating, dysuria, flank pain and frequency.   Musculoskeletal: Negative for arthralgias, back pain, joint swelling, myalgias, neck pain and neck stiffness.   Skin: Negative for color change, pallor, rash and wound.   Neurological: Positive for weakness (generalized). Negative for dizziness, tremors, seizures, syncope,  light-headedness, numbness and headaches.   Hematological: Negative for adenopathy. Does not bruise/bleed easily.   Psychiatric/Behavioral: Negative for agitation, behavioral problems and confusion.       History  Past Medical History:   Diagnosis Date   • Congestive heart failure (CHF) (HCC)    • Hypertension      Past Surgical History:   Procedure Laterality Date   • CHOLECYSTECTOMY     • HYSTERECTOMY      partial, still has ovaries     Family History   Problem Relation Age of Onset   • Heart disease Mother    • Diabetes Sister      Social History     Tobacco Use   • Smoking status: Never Smoker   • Smokeless tobacco: Never Used   Vaping Use   • Vaping Use: Never used   Substance Use Topics   • Alcohol use: Never   • Drug use: Never     Medications Prior to Admission   Medication Sig Dispense Refill Last Dose   • ALPRAZolam (XANAX) 0.25 MG tablet Take 0.25 mg by mouth At Night As Needed for Anxiety.   8/3/2022 at Unknown time   • aspirin 81 MG EC tablet Take 81 mg by mouth Daily.   8/3/2022 at Unknown time   • bumetanide (BUMEX) 1 MG tablet Take 1 mg by mouth Daily.   8/3/2022 at Unknown time   • carvedilol (COREG) 12.5 MG tablet Take 12.5 mg by mouth 2 (Two) Times a Day With Meals.   8/4/2022 at 0800   • ondansetron (ZOFRAN) 4 MG tablet Take 4 mg by mouth Every 8 (Eight) Hours As Needed for Nausea or Vomiting.   8/3/2022 at Unknown time   • traMADol (ULTRAM) 50 MG tablet Take 50 mg by mouth Every 8 (Eight) Hours As Needed for Moderate Pain .   8/4/2022 at 0800     Allergies:  Codeine    Objective     Vital Signs  Temp:  [97.7 °F (36.5 °C)-98 °F (36.7 °C)] 98 °F (36.7 °C)  Heart Rate:  [64-74] 74  Resp:  [16-25] 18  BP: ()/(51-82) 119/51  Body mass index is 43.79 kg/m².    Physical Exam:  Physical Exam  Constitutional:       General: She is not in acute distress.     Appearance: She is ill-appearing (chronically so).   HENT:      Head: Normocephalic and atraumatic.      Right Ear: External ear normal.       Left Ear: External ear normal.      Mouth/Throat:      Mouth: Mucous membranes are moist.      Pharynx: Oropharynx is clear.   Eyes:      Extraocular Movements: Extraocular movements intact.      Conjunctiva/sclera: Conjunctivae normal.      Pupils: Pupils are equal, round, and reactive to light.   Cardiovascular:      Rate and Rhythm: Normal rate and regular rhythm.      Pulses: Normal pulses.      Heart sounds: Normal heart sounds. No murmur heard.  Pulmonary:      Effort: Pulmonary effort is normal. No respiratory distress.      Breath sounds: Normal breath sounds. No wheezing or rales.   Abdominal:      General: There is distension (still distended following large volume paracentesis, but patient reports much improved).      Tenderness: There is no abdominal tenderness. There is no guarding.   Musculoskeletal:         General: Normal range of motion.      Cervical back: Normal range of motion and neck supple. No tenderness.      Right lower leg: Edema (2+ pitting) present.      Left lower leg: Edema (2+ pitting) present.   Lymphadenopathy:      Cervical: No cervical adenopathy.   Skin:     General: Skin is warm and dry.      Capillary Refill: Capillary refill takes less than 2 seconds.      Coloration: Skin is not jaundiced.      Findings: No bruising or lesion.   Neurological:      General: No focal deficit present.      Mental Status: She is alert and oriented to person, place, and time.   Psychiatric:         Mood and Affect: Mood normal.         Behavior: Behavior normal.         Thought Content: Thought content normal.         Judgment: Judgment normal.           Results Review:       Lab Results:  Results from last 7 days   Lab Units 08/05/22  0042 08/04/22  1153   WBC 10*3/mm3 6.08 4.99   HEMOGLOBIN g/dL 10.8* 10.9*   PLATELETS 10*3/mm3 107* 115*         Results from last 7 days   Lab Units 08/04/22  1232   SODIUM mmol/L 133*   POTASSIUM mmol/L 4.1   CHLORIDE mmol/L 98   CO2 mmol/L 21.1*   BUN mg/dL 19    CREATININE mg/dL 1.50*   CALCIUM mg/dL 9.5   GLUCOSE mg/dL 101*     Results from last 7 days   Lab Units 08/04/22  1232   MAGNESIUM mg/dL 2.1     Hemoglobin A1C   Date Value Ref Range Status   08/04/2022 4.80 4.80 - 5.60 % Final     Results from last 7 days   Lab Units 08/04/22  1232   BILIRUBIN mg/dL 1.3*   ALK PHOS U/L 92   AST (SGOT) U/L 30   ALT (SGPT) U/L 7     Results from last 7 days   Lab Units 08/04/22  1232   TROPONIN T ng/mL <0.010         Results from last 7 days   Lab Units 08/04/22  1153   INR  1.41*     Results from last 7 days   Lab Units 08/04/22  1306   PH, ARTERIAL pH units 7.436   PO2 ART mm Hg 62.3*   PCO2, ARTERIAL mm Hg 39.6   HCO3 ART mmol/L 26.6*       I have reviewed the patient's laboratory results.    Imaging:  Imaging Results (Last 72 Hours)     Procedure Component Value Units Date/Time    XR Chest 1 View [[id number]] Collected: 08/05/22 0137     Updated: 08/05/22 0139    Narrative:      CR Chest 1 Vw    INDICATION:   Short of breath. Heart failure.     COMPARISON:    8/4/2022    FINDINGS:  Portable AP view(s) of the chest.    External support equipment artifact    The heart is enlarged. There is bronchovascular crowding. There is no pneumothorax. Probable bibasilar atelectasis. Decrease in vascular congestion.       Impression:        1. Cardiomegaly and interval improvement in volume overload. Probable bibasilar atelectasis. No pneumothorax.    Signer Name: Junior He MD   Signed: 8/5/2022 1:37 AM   Workstation Name: [email or web address]    [facility]    CT Chest Without Contrast Diagnostic [[id number]] Collected: 08/04/22 2245     Updated: 08/04/22 2247    Narrative:      CT Chest WO, CT Abdomen Pelvis WO    INDICATION:   Congestive heart failure. Anemia.    TECHNIQUE:   CT of the chest, abdomen and pelvis without IV contrast. Coronal and sagittal reconstructions were obtained.  Radiation dose reduction techniques included automated exposure control or exposure  modulation based on body size. Count of known CT and cardiac  nuc med studies performed in previous 12 months: 0.     COMPARISON:   None available.    FINDINGS:  Chest: The heart is enlarged. There are trace bilateral pleural effusions. There is mild septal thickening bilaterally more confluent in the perihilar and lower lobes, most likely reflecting sequela of volume overload and pulmonary edema. There is a 6 mm  noncalcified subpleural nodular density in the left upper lobe on image 15. Fleischner recommendations follow. Central airways are patent. There is severe anasarca. Normal caliber aorta and atherosclerotic change. Coronary artery calcifications. 1.4 cm  lower pole thyroid lesion on the left likely a cyst. This can be confirmed with nonemergent thyroid ultrasound. There is no threshold adenopathy. Probable loculated pleural fluid or partially loculated pericardial fluid near the cardiac apex medially on  the right. No suspicious bone lesion. Probable sebaceous cyst in the left of midline at the mid thoracic level posteriorly measures 1.7 cm.    Abdomen: Aorta shows advanced atherosclerotic change. The spleen is borderline enlarged and the liver is cirrhotic. Negative adrenal glands. Fatty replacement and atrophy of the pancreas. Surgical absence of the gallbladder. Evaluation of the solid  abdominal organs is severely limited by noncontrast technique and nonstandard patient positioning causing extensive beam hardening artifact. Moderate volume of perihepatic ascites. The kidneys are nonobstructed. No radiopaque stone. There is some  stranding around the head and uncinate process of the pancreas that is likely related to ascites rather than acute pancreatitis but should be correlated with laboratory data. Tiny punctate calcification in the uncinate process may reflect sequela of  prior bouts of pancreatitis. There is no threshold adenopathy.    Pelvis: Negative bladder. No drainable fluid collection. There  is ascites tracking into the pelvis. The bowel is nonobstructed. There are scattered diverticula. Appendix not clearly identified and presence or absence of appendicitis cannot be  ascertained. There is no inguinal adenopathy or fluid collection. There is no suspicious bone lesion.  negative.      Impression:        1. Limited study secondary to technical factors.  2. Cardiomegaly and probable volume overload with trace pleural effusions.  3. 6 mm noncalcified nodule in the left upper lobe. Fleischner recommendations follow.  4. Cirrhosis with borderline splenomegaly and at least moderate to large volume ascites. Severe anasarca.  5. Surgical absence of the gallbladder.  6. Probable fluid tracking into the peripancreatic soft tissues simulating acute pancreatitis. Correlate with laboratory data.  7. Appendix not visualized or assessed.    Management recommendation: Current published guidelines recommend initial follow-up CT in 6-12 months, and again in 18-24 months for uncomplicated pulmonary nodules measuring 6 to 8 mm in high-risk patients (Fleischner Society guidelines, 2017).          Signer Name: Junior He MD   Signed: 8/4/2022 10:45 PM   Workstation Name: [email or web address]    [facility]    CT Abdomen Pelvis Without Contrast [[id number]] Collected: 08/04/22 2245     Updated: 08/04/22 2247    Narrative:      CT Chest WO, CT Abdomen Pelvis WO    INDICATION:   Congestive heart failure. Anemia.    TECHNIQUE:   CT of the chest, abdomen and pelvis without IV contrast. Coronal and sagittal reconstructions were obtained.  Radiation dose reduction techniques included automated exposure control or exposure modulation based on body size. Count of known CT and cardiac  nuc med studies performed in previous 12 months: 0.     COMPARISON:   None available.    FINDINGS:  Chest: The heart is enlarged. There are trace bilateral pleural effusions. There is mild septal thickening bilaterally more  confluent in the perihilar and lower lobes, most likely reflecting sequela of volume overload and pulmonary edema. There is a 6 mm  noncalcified subpleural nodular density in the left upper lobe on image 15. Fleischner recommendations follow. Central airways are patent. There is severe anasarca. Normal caliber aorta and atherosclerotic change. Coronary artery calcifications. 1.4 cm  lower pole thyroid lesion on the left likely a cyst. This can be confirmed with nonemergent thyroid ultrasound. There is no threshold adenopathy. Probable loculated pleural fluid or partially loculated pericardial fluid near the cardiac apex medially on  the right. No suspicious bone lesion. Probable sebaceous cyst in the left of midline at the mid thoracic level posteriorly measures 1.7 cm.    Abdomen: Aorta shows advanced atherosclerotic change. The spleen is borderline enlarged and the liver is cirrhotic. Negative adrenal glands. Fatty replacement and atrophy of the pancreas. Surgical absence of the gallbladder. Evaluation of the solid  abdominal organs is severely limited by noncontrast technique and nonstandard patient positioning causing extensive beam hardening artifact. Moderate volume of perihepatic ascites. The kidneys are nonobstructed. No radiopaque stone. There is some  stranding around the head and uncinate process of the pancreas that is likely related to ascites rather than acute pancreatitis but should be correlated with laboratory data. Tiny punctate calcification in the uncinate process may reflect sequela of  prior bouts of pancreatitis. There is no threshold adenopathy.    Pelvis: Negative bladder. No drainable fluid collection. There is ascites tracking into the pelvis. The bowel is nonobstructed. There are scattered diverticula. Appendix not clearly identified and presence or absence of appendicitis cannot be  ascertained. There is no inguinal adenopathy or fluid collection. There is no suspicious bone lesion.   negative.      Impression:        1. Limited study secondary to technical factors.  2. Cardiomegaly and probable volume overload with trace pleural effusions.  3. 6 mm noncalcified nodule in the left upper lobe. Fleischner recommendations follow.  4. Cirrhosis with borderline splenomegaly and at least moderate to large volume ascites. Severe anasarca.  5. Surgical absence of the gallbladder.  6. Probable fluid tracking into the peripancreatic soft tissues simulating acute pancreatitis. Correlate with laboratory data.  7. Appendix not visualized or assessed.    Management recommendation: Current published guidelines recommend initial follow-up CT in 6-12 months, and again in 18-24 months for uncomplicated pulmonary nodules measuring 6 to 8 mm in high-risk patients (Fleischner Society guidelines, 2017).          Signer Name: Junior He MD   Signed: 8/4/2022 10:45 PM   Workstation Name: [email or web address]    [facility]    XR Chest 1 View [[id number]] Collected: 08/04/22 1817     Updated: 08/04/22 1819    Narrative:      PROCEDURE: CR Chest 1 Vw    COMPARISON:  No relevant comparison or correlation studies available at time of dictation.     INDICATIONS: CHF    TECHNIQUE: Single AP  view of the chest    FINDINGS: Patient positioning limits the exam and there is poor inspiratory effort. Moderate cardiac enlargement could be due to the low lung volumes or cardiomegaly. Pulmonary vascular congestion with mild left perihilar peribronchial cuffing. This  could be early interstitial edema. No focal consolidation noted with low lung volumes. Osseous structures are intact.      Impression:      Low lung volumes hinders exam. Findings may represent early congestive failure. Correlate clinically.         Signer Name: Spring Dee MD   Signed: 8/4/2022 6:17 PM   Workstation Name: [email or web address]    [facility]    US Paracentesis [[id number]] Resulted: 08/04/22 1602    Specimen: Body  Fluid Updated: 08/04/22 5344          I have personally reviewed the patient's radiologic imaging.        EKG:   Atrial fibrillation with premature ventricular or aberrantly conducted complexes, HR 71, QTc 143  Low voltage QRS  Possible Anterolateral infarct (cited on or before 04-AUG-2022)  Abnormal ECG  When compared with ECG of 04-AUG-2022 19:15,  Previous ECG has undetermined rhythm, needs review  Questionable change in initial forces of Lateral leads    I have personally reviewed the patient's EKG. I appreciate consistent P waves in most leads, however, with occasional PVCs, so disagree with afib interpretation.         Assessment & Plan     - Acute on chronic congestive heart failure, unspecified heart failure type (HCC): treated with IV bumex in the ED. Patient reports good urine output since that time. Closely monitor renal response as creatinine already up from baseline, though could be cardiorenal in nature and if so, would except renal function to improve now that significant amounts of fluid were removed (via diuresis and paracentesis). Evaluate further with ECHO later this morning.   - Cirrhosis, possibly secondary to longstanding hepatic congestion from CHF above, combined with possible KOHLI though no prior diagnosis of such. Imaging commented on cirrhosis with borderline splenomegaly. Labs including mild bilirubin elevation, low albumin, elevated INR and PTT, and low platelets would be consistent with underlying cirrhosis as well. Marked ascites at time of presentation, s/p 10 L removed during paracentesis. Albumin given accordingly for amount of fluid removed. Abdomen still fairly distended on my exam. Monitor for re-accumulation.   - Acute hypoxic respiratory failure requiring 4L NC: previously on room air at home. Likely secondary to CHF and ascites from cirrhosis noted above. O2 saturation during my time with patient was upper 90s on 4L; turned down to 3L and instructed RN to titrate down as  patient tolerates moving forward, anticipating oxygen requirements will go down now that she has undergone large volume paracentesis and is being diuresed.   - ELEUTERIO: creatinine 1.50, up from 0.78 when last checked in 2017. Suspect possibly cardiorenal (though hepatorenal syndrome also in differential if truly has cirrhosis). Again, monitor response to diuresis and large volume paracentesis. If renal function worsens, will consider consulting nephrology for further assistance.  - Hypertension: resume home coreg with hold parameters, which will help with CHF as well.  - Chronic anxiety/pain: resume home xanax with hold parameters. Decrease home tramadol in light of ELEUTERIO and hypoxia.     DVT Prophylaxis: SQ heparin    Estimated Length of Stay >2 midnights    I discussed the patient's findings, assessment and plan with the patient and nursing staff on [facility].    * patient is high risk due to acute on chronic CHF, cirrhosis with marked ascites, acute hypoxic respiratory failure, ELEUTERIO    Fady Dinh MD  08/05/22  02:36 EDT      Electronically signed by Fady Dinh MD at 08/05/22 4135

## 2022-08-09 NOTE — NURSING NOTE
Ambulated pt in the hallway on room air. The lowest the pt desat'd was 83%. The pt stayed around 85-87% when ambulating. Pt is currently on 1L NC and back up to 93%. MD made aware and home O2 ordered.

## 2022-08-10 NOTE — OUTREACH NOTE
Prep Survey    Flowsheet Row Responses   [facility] facility patient discharged from? [place]   Is LACE score < 7 ? No   Emergency Room discharge w/ pulse ox? No   Eligibility Readm Mgmt   Discharge diagnosis Acute on chronic combined diastolic and systolic heart    Does the patient have one of the following disease processes/diagnoses(primary or secondary)? CHF   Does the patient have Home health ordered? No   Is there a DME ordered? Yes   What DME was ordered? O2 pr [employer] [facility]    Prep survey completed? Yes          EMERALD OJEDA - Registered Nurse

## 2022-08-12 ENCOUNTER — READMISSION MANAGEMENT (OUTPATIENT)
Dept: CALL CENTER | Facility: HOSPITAL | Age: 62
End: 2022-08-12

## 2022-08-12 NOTE — OUTREACH NOTE
CHF Week 1 Survey    Flowsheet Row Responses   [facility]regational facility patient discharged from? [facility]   Does the patient have one of the following disease processes/diagnoses(primary or secondary)? CHF   CHF Week 1 attempt successful? No   Unsuccessful attempts Attempt 1          THIEN POSADA - Registered Nurse

## 2022-08-15 ENCOUNTER — READMISSION MANAGEMENT (OUTPATIENT)
Dept: CALL CENTER | Facility: HOSPITAL | Age: 62
End: 2022-08-15

## 2022-08-15 ENCOUNTER — HOSPITAL ENCOUNTER (OUTPATIENT)
Dept: CARDIOLOGY | Facility: HOSPITAL | Age: 62
Discharge: HOME | End: 2022-08-15
Admitting: PHYSICIAN ASSISTANT

## 2022-08-15 VITALS
HEIGHT: 59 IN | SYSTOLIC BLOOD PRESSURE: 103 MMHG | WEIGHT: 206.6 LBS | BODY MASS INDEX: 41.65 KG/M2 | HEART RATE: 58 BPM | OXYGEN SATURATION: 95 % | DIASTOLIC BLOOD PRESSURE: 62 MMHG

## 2022-08-15 DIAGNOSIS — I50.23 ACUTE ON CHRONIC SYSTOLIC CHF (CONGESTIVE HEART FAILURE): Primary | ICD-10-CM

## 2022-08-15 DIAGNOSIS — E66.01 MORBID OBESITY WITH BMI OF 40.0-44.9, ADULT: ICD-10-CM

## 2022-08-15 DIAGNOSIS — K74.69 OTHER CIRRHOSIS OF LIVER: ICD-10-CM

## 2022-08-15 DIAGNOSIS — E87.1 ACUTE HYPONATREMIA: ICD-10-CM

## 2022-08-15 LAB
ABSOLUTE LUNG FLUID CONTENT: 35 % (ref 20–35)
ANION GAP SERPL CALCULATED.3IONS-SCNC: 10.8 MMOL/L (ref 5–15)
BUN SERPL-MCNC: 19 MG/DL (ref 8–23)
BUN/CREAT SERPL: 14.3 (ref 7–25)
CALCIUM SPEC-SCNC: 9.5 MG/DL (ref 8.6–10.5)
CHLORIDE SERPL-SCNC: 94 MMOL/L (ref 98–107)
CO2 SERPL-SCNC: 26.2 MMOL/L (ref 22–29)
CREAT SERPL-MCNC: 1.33 MG/DL (ref 0.57–1)
EGFRCR SERPLBLD CKD-EPI 2021: 45.6 ML/MIN/1.73
GLUCOSE SERPL-MCNC: 97 MG/DL (ref 65–99)
MAGNESIUM SERPL-MCNC: 2.2 MG/DL (ref 1.6–2.4)
NT-PROBNP SERPL-MCNC: 6577 PG/ML (ref 0–900)
POTASSIUM SERPL-SCNC: 4.4 MMOL/L (ref 3.5–5.2)
SODIUM SERPL-SCNC: 131 MMOL/L (ref 136–145)

## 2022-08-15 PROCEDURE — 83735 ASSAY OF MAGNESIUM: CPT | Performed by: PHYSICIAN ASSISTANT

## 2022-08-15 PROCEDURE — 36415 COLL VENOUS BLD VENIPUNCTURE: CPT | Performed by: PHYSICIAN ASSISTANT

## 2022-08-15 PROCEDURE — 99214 OFFICE O/P EST MOD 30 MIN: CPT | Performed by: PHYSICIAN ASSISTANT

## 2022-08-15 PROCEDURE — 80048 BASIC METABOLIC PNL TOTAL CA: CPT | Performed by: PHYSICIAN ASSISTANT

## 2022-08-15 PROCEDURE — 94726 PLETHYSMOGRAPHY LUNG VOLUMES: CPT | Performed by: PHYSICIAN ASSISTANT

## 2022-08-15 PROCEDURE — 83880 ASSAY OF NATRIURETIC PEPTIDE: CPT

## 2022-08-15 NOTE — PROGRESS NOTES
Heart Failure Clinic    Date: 08/15/22     Vitals:    08/15/22 0909   BP: 103/62   Pulse: 58   SpO2: 95%    weight 209.6    Method of arrival: wheelchair    Weighing self daily: Yes    Monitoring Heart Failure Zones: No    Today's HF Zone: Reviewed Zones    Taking medications as prescribed: Yes    Edema yes, improving    Shortness of Air: No    Number of pillows used at night:Recliner    Educational Materials given:  Avs, Living with heart failure booklet                                                                         ReDS Value: 35  25-35 Optimal Value Status      Nelly Michaels RN 08/15/22 09:18 EDT

## 2022-08-15 NOTE — PROGRESS NOTES
[facility]  RAOUL Worthington Daniel W, MD  [address] [facility] [place] [address]    Thank you for asking me to see Della Jorge for congestive heart failure.    HPI:     This is a 61 y.o. female with known past medical history of:    · Chronic combined diastolic & systolic HF  · Severe TVR  · Cirrhosis, suspected cardiac cirrhosis  · Essential HTN  · Generalized Anxiety Disorder  · Irritable Bowel Syndrome    Della Jorge presents for today to establish care in the HF clinic.  The patient is typically seen by Sebastián Dougherty MD.       • Last known EF 46-50%.      • Last known hospitalization and/or ED visit: Patient was hospitalized from August 4, 2022 through August 9, 2022 with acute on chronic combined diastolic and systolic heart failure and acute hypoxemic respiratory failure after presenting to the emergency department at [facility] with anasarca and ascites.  Initially required 4 L via nasal cannula and was felt to be in decompensated heart failure with concern she was also developing cirrhosis from congestive hepatopathy.  She did have 10 L of ascites removed consistent with portal hypertension and patient was diuresed with improvement in symptoms.  Cardiology did recommend right heart cath to further evaluate tricuspid valve regurgitation and pulmonary pressures but patient did refuse during hospitalization to undergo procedure for the time being.  She was maximized on heart failure therapy with the addition of lisinopril and spironolactone in addition to home Coreg and Bumex in addition to reportedly being hypokalemic.  She was discharged on 1 L via nasal cannula and outpatient heart failure clinic appointment was arranged.    • Accompanied by:           Initial visit data/details regarding:   • Dyspnea: Shortness of breath is improving since hospitalization (was on 1L via NC upon hospital discharge).   • Lower  extremity swelling: Present but improved.   • Zone: Yellow  • Abdominal swelling: Yes; has known Cirrhosis with ascites  • Home weight: Improving with diuresis  • Home BP: SBP running ~ 100  • Home heart rate:  High 50s-60s  • Daily activities of living:  Reports she is able to perform more daily activities of living at present then she has been able to in a few months.    • Pillows/lying flat:   Sleeping in recliner.    • Discussed prior recommendations for LHC & RHC with patient wishing to think about it during hospital visit. Patient reports she is hesitant for procedures.  She tells me that 12 years ago she was hospitalized and her EF was around 21-25%.  She reports the 45-50% is actually an improved  for her.   She remains hesitant about further ischemic work-up and would like to continue current diuresis regimen and continue thinking of it.    • She reports she feels more active since leaving the hospital and can actually feel her ankles.    • Mrs. Jorge reports she is very resistant to medication changes.  We discuss refill procedures and how well she is tolerating current regimen.   • We discuss possible SGLT2 to start in future visit pending pre-auth.    • Reports she is chest pain free and until recently had forgotten she even had ankles.       Specialists:   • Cardiology: Refer for LHC & RHC.   • Hepatology referral pending at present.         Review of Systems - Review of Systems   Constitutional: Positive for malaise/fatigue. Negative for chills, decreased appetite and fever.   HENT: Negative for congestion, ear discharge and ear pain.    Eyes: Negative for blurred vision and discharge.   Cardiovascular: Positive for dyspnea on exertion and leg swelling.   Respiratory: Negative for cough and hemoptysis.    Endocrine: Negative for heat intolerance.   Hematologic/Lymphatic: Negative for adenopathy and bleeding problem. Does not bruise/bleed easily.   Skin: Negative for color change, dry skin and nail  changes.   Musculoskeletal: Negative for arthritis and back pain.   Gastrointestinal: Negative for bloating and abdominal pain.   Genitourinary: Negative for bladder incontinence and decreased libido.   Psychiatric/Behavioral: Negative for altered mental status and depression.         All other systems were reviewed and were negative.    Patient Active Problem List   Diagnosis   • Acute on chronic congestive heart failure, unspecified heart failure type (HCC)       family history includes Diabetes in her sister; Heart disease in her mother.     reports that she has never smoked. She has never used smokeless tobacco. She reports that she does not drink alcohol and does not use drugs.    Allergies   Allergen Reactions   • Codeine Hallucinations         Current Outpatient Medications:   •  ALPRAZolam (XANAX) 0.25 MG tablet, Take 0.25 mg by mouth At Night As Needed for Anxiety., Disp: , Rfl:   •  aspirin 81 MG EC tablet, Take 81 mg by mouth Daily., Disp: , Rfl:   •  bumetanide (BUMEX) 1 MG tablet, Take 1 mg by mouth Daily., Disp: , Rfl:   •  carvedilol (COREG) 12.5 MG tablet, Take 12.5 mg by mouth 2 (Two) Times a Day With Meals., Disp: , Rfl:   •  ondansetron (ZOFRAN) 4 MG tablet, Take 4 mg by mouth Every 8 (Eight) Hours As Needed for Nausea or Vomiting., Disp: , Rfl:   •  spironolactone (ALDACTONE) 25 MG tablet, Take 1 tablet by mouth Daily for 30 days., Disp: 30 tablet, Rfl: 0  •  traMADol (ULTRAM) 50 MG tablet, Take 50 mg by mouth Every 8 (Eight) Hours As Needed for Moderate Pain ., Disp: , Rfl:       Physical Exam:  I have reviewed the patient's current vital signs as documented in the patient's EMR.   Vitals:    08/15/22 0909   BP: 103/62   Pulse: 58   SpO2: 95%     Body mass index is 41.73 kg/m².       08/15/22  0909   Weight: 93.7 kg (206 lb 9.6 oz)            Physical Exam  Constitutional:       General: She is awake.      Appearance: She is well-developed.      Interventions: Nasal cannula in place.       Comments: 1L via NC in place.    HENT:      Head: Normocephalic and atraumatic.   Eyes:      General: Lids are normal.      Conjunctiva/sclera:      Right eye: Right conjunctiva is not injected.      Left eye: Left conjunctiva is not injected.   Cardiovascular:      Rate and Rhythm: Normal rate and regular rhythm.      Heart sounds: S1 normal and S2 normal.   Pulmonary:      Effort: No tachypnea or bradypnea.      Breath sounds: No decreased breath sounds, wheezing, rhonchi or rales.   Abdominal:      General: Abdomen is protuberant. Bowel sounds are normal.      Palpations: Abdomen is soft. There is hepatomegaly. There is no fluid wave.   Musculoskeletal:      Right lower leg: No swelling. 1+ Pitting Edema present.      Left lower leg: No swelling. 1+ Pitting Edema present.   Skin:     General: Skin is warm and moist.   Neurological:      Mental Status: She is alert and oriented to person, place, and time.      Comments: Follows commands and answers questions appropriately.    Psychiatric:         Attention and Perception: Attention normal.         Mood and Affect: Mood normal.         Behavior: Behavior is cooperative.         JVP: Volume/Pulsation: Normal.        DATA REVIEWED:     EKG. I personally reviewed and interpreted the EKG.        ---------------------------------------------------  TTE/JOSE ANGEL:  Results for orders placed during the hospital encounter of 08/04/22    Adult Transthoracic Echo Complete w/ Color, Spectral and Contrast if necessary per protocol    Interpretation Summary  · Left ventricular ejection fraction appears to be 46 - 50%. Left ventricular systolic function is mildly decreased.  · Left ventricular diastolic function is consistent with (grade Ia w/high LAP) impaired relaxation.  · The right ventricular cavity is moderately dilated.  · Mildly reduced right ventricular systolic function noted.  · The right atrial cavity is severely dilated.  · Severe tricuspid valve regurgitation is  present.  · Estimated right ventricular systolic pressure from tricuspid regurgitation is normal (<35 mmHg).        -----------------------------------------------------  CXR/Imaging:   Imaging Results (Most Recent)     None          I personally reviewed and interpreted the CXR.      -----------------------------------------------------  CT:   CT Abdomen Pelvis Without Contrast    Result Date: 8/4/2022  1. Limited study secondary to technical factors. 2. Cardiomegaly and probable volume overload with trace pleural effusions. 3. 6 mm noncalcified nodule in the left upper lobe. Fleischner recommendations follow. 4. Cirrhosis with borderline splenomegaly and at least moderate to large volume ascites. Severe anasarca. 5. Surgical absence of the gallbladder. 6. Probable fluid tracking into the peripancreatic soft tissues simulating acute pancreatitis. Correlate with laboratory data. 7. Appendix not visualized or assessed. Management recommendation: Current published guidelines recommend initial follow-up CT in 6-12 months, and again in 18-24 months for uncomplicated pulmonary nodules measuring 6 to 8 mm in high-risk patients (Fleischner Society guidelines, 2017). Signer Name: Junior He MD  Signed: 8/4/2022 10:45 PM  Workstation Name: [email or web address]  [facility]    CT Chest Without Contrast Diagnostic    Result Date: 8/4/2022  1. Limited study secondary to technical factors. 2. Cardiomegaly and probable volume overload with trace pleural effusions. 3. 6 mm noncalcified nodule in the left upper lobe. Fleischner recommendations follow. 4. Cirrhosis with borderline splenomegaly and at least moderate to large volume ascites. Severe anasarca. 5. Surgical absence of the gallbladder. 6. Probable fluid tracking into the peripancreatic soft tissues simulating acute pancreatitis. Correlate with laboratory data. 7. Appendix not visualized or assessed. Management recommendation: Current published guidelines  recommend initial follow-up CT in 6-12 months, and again in 18-24 months for uncomplicated pulmonary nodules measuring 6 to 8 mm in high-risk patients (Fleischner Society guidelines, 2017). Signer Name: Junior He MD  Signed: 8/4/2022 10:45 PM  Workstation [facility]: [facility]  [facility]    XR Chest 1 View    Result Date: 8/5/2022  1. Cardiomegaly and interval improvement in volume overload. Probable bibasilar atelectasis. No pneumothorax. Signer Name: Junior He MD  Signed: 8/5/2022 1:37 AM  Workstation Name: [email or web address] [id number]  [facility]    XR Chest 1 View    Result Date: 8/4/2022  Low lung volumes hinders exam. Findings may represent early congestive failure. Correlate clinically.  Signer Name: Spring Dee MD  Signed: 8/4/2022 6:17 PM  Workstation Name: [facility]  [facility]    I personally reviewed the images of the CT scan.  My personal interpretation is below.      ----------------------------------------------------    PFTs:  None available--will consider further referral.   --------------------------------------------------------------------------------------------------    Laboratory evaluations:    Lab Results   Component Value Date    GLUCOSE 97 08/15/2022    BUN 19 08/15/2022    CREATININE 1.33 (H) 08/15/2022    EGFRIFNONA 76 01/16/2017    BCR 14.3 08/15/2022    K 4.4 08/15/2022    CO2 26.2 08/15/2022    CALCIUM 9.5 08/15/2022    ALBUMIN 3.39 (L) 08/06/2022    AST 26 08/06/2022    ALT 5 08/06/2022     Lab Results   Component Value Date    WBC 4.94 08/09/2022    HGB 10.3 (L) 08/09/2022    HCT 32.3 (L) 08/09/2022    MCV 99.1 (H) 08/09/2022     (L) 08/09/2022     Lab Results   Component Value Date    CHOL 60 08/05/2022    TRIG 52 08/05/2022    HDL 27 (L) 08/05/2022    LDL 19 08/05/2022     Lab Results   Component Value Date    TSH 6.250 (H) 08/05/2022     Lab Results   Component Value Date    HGBA1C 4.80 08/04/2022     Lab  Results   Component Value Date    ALT 5 08/06/2022     Lab Results   Component Value Date    HGBA1C 4.80 08/04/2022    HGBA1C 6.20 (H) 01/16/2017     Lab Results   Component Value Date    CREATININE 1.33 (H) 08/15/2022     No results found for: IRON, TIBC, FERRITIN  Lab Results   Component Value Date    INR 1.41 (H) 08/04/2022    PROTIME 17.6 (H) 08/04/2022        Lab Results   Component Value Date    ABSOLUTELUNG 35 08/15/2022    ABSOLUTELUNG 39 (A) 08/05/2022       PAH RISK ASSESSMENT:      1. Acute on chronic systolic CHF (congestive heart failure) (HCC)    2. Morbid obesity with BMI of 40.0-44.9, adult (HCC)    3. Other cirrhosis of liver (HCC)    4. Acute hyponatremia          ORDERS PLACED TODAY:  Orders Placed This Encounter   Procedures   • BNP   • Basic Metabolic Panel   • Magnesium   • ReDs Vest        Diagnoses and all orders for this visit:    1. Acute on chronic systolic CHF (congestive heart failure) (HCC) (Primary)  -     BNP  -     Basic Metabolic Panel; Standing  -     Magnesium; Standing  -     ReDs Vest  -     Basic Metabolic Panel  -     Magnesium    2. Morbid obesity with BMI of 40.0-44.9, adult (HCC)    3. Other cirrhosis of liver (HCC)    4. Acute hyponatremia             MEDS ORDERED TODAY:    No orders of the defined types were placed in this encounter.       ---------------------------------------------------------------------------------------------------------------------------          ASSESSMENT/PLAN:      Diagnosis Plan   1. Acute on chronic systolic CHF (congestive heart failure) (HCC)  BNP    Basic Metabolic Panel    Magnesium    ReDs Vest    Basic Metabolic Panel    Magnesium   2. Morbid obesity with BMI of 40.0-44.9, adult (HCC)     3. Other cirrhosis of liver (HCC)     4. Acute hyponatremia         first presentation of moderate systolic and diastolic heart failure. CHF. Etiology: Unclear with current refusal of ischemic work-up    NYHA stage C;FC-III. NYHA FC change: improved by  one grades.     Today, Patient is approaching euvolemiaand with  Moderate perfusion. The patient's hemodynamics are currently acceptable. HR is: normal and is at goal. BP/MAP was reviewed and there is notroom for medication up-titration.  Clinical trajectory was assessed and hasimproved.     CHF GOAL DIRECTED MEDICAL THERAPY FOR PATIENT ADDRESSED/ADJUSTED:       GDMT    Drug Class   Drug   Dose Last Dose Adjustment Additional Titration   Notes   ACEi/ARB/ARNI Lisinopril  2.5 mg qd      Beta Blocker Coreg 12.5mg BID      MRA Spironolactone 25mg qd      SGLT2i Consider starting at next visit   N/A        -CHF Specific BB:   •  Carvedilol 12.5mg BID on board; given HR in the high 50s and borderline blood pressures at home with patient reportedly stopping her own Lisinopril, did offer to go down on Coreg dosage in order to add ACE tolerability with blood pressures. Mrs. Jorge, however, was very hesitant to change her Coreg dose, as she reports this medication makes her feel great.  We will continue current dose, but she was educated on hold parameters.    • Discussed benefit of further ischemic evaluation.   • Hospital DC summary reviewed at length with echocardiogram report reviewed and Cardiology consultation notes reviewed.    • Patient received CHF education including zones, dietary intake, fluid restriction, and clinic strategies. Discussed coming back soon to further evaluation and make medication adjustments. Patient reports it is very difficult for her to leave her home and asks for her visits to be at least 4 weeks apart due to difficult getting up and out of home.    • Continue ASA 81mg.          -ACE/ARB/ARNi:   • Stop Lisinopril 2.5mg since patient has not been taking due to lower blood pressures with SBP in the 80s when she does take this medication.   • Continue monitoring and if future pressures improve, consider possibly low dose valsartan addition.      -MRA:   • Spironolactone 25 mg continued.     • Aldactone was explained to the patient.  However, not all possible side effects and adverse reactions are able to be fully discussed.  Handout was offered to the patient detailing the prescription.   • Patient was advised to not use potassium products.  • Quarterly monitoring of potassium and renal function is currently recommended    -SGLT2 inhibitor therapy:   • Discussed with pharmacy staff.  • Pre-authorization pending at present.     • Discussed possibly starting at next visit with patient recently started on new regimen in hospital and somewhat resistant to changes.  Benefits of pillars in HF management discussed.       -Diuretic regimen:   • ReDs Vest reading for 08/15/22 is 35, an improvement from prior hospital reading of 39. ReDs Vest reading reviewed with patient.    • Continue home Bumex at present.    • BMP, Mag, & ProBNP reviewed with patient.      -Fluid restriction/Sodium restriction:   • Requested 2000 ml restriction  • Patient has been asked to weigh daily and was provided with a printed diuretic strategy.  • 1,500 mg Na restriction was discussed.    -Acute vs. Chronic underlying conditions other than HF addressed during visit:   • Acute on likely chronic hyponatremia: Likely 2/2 underlying Cirrhosis.  Prior NA values reviewed within chart.      ------------------------------------------------------------------  -Iron/Anemia in CHF:  -Chronic appearing macrocytic anemia  -Thrombocytopenia likely 2/2 splenic sequestration in setting of Cirrhosis  · Reviewed last PCP note with TSH, vitamin b12 & Vitamin D WNL.       Anemia is common in heart failure.  Typically, this can come from anemia of chronic disease.  However, some patients may have iron deficiency anemia of another etiology and may merit work-up.  Those patients with an anemia should have their anemia investigated.      ----------------------------------------------------------------------    -Sleep/Apnea:   • Will benefit from sleep  study referral and PFTs in future visit; establishing care with medication adjustments today and further referrals planned.      --------------------------------------------------------------------------------      Class 3 Severe Obesity (BMI >=40). Obesity-related health conditions include the following: hypertension and osteoarthritis. Obesity is unchanged. BMI is is above average; BMI management plan is completed. We discussed portion control.              45 minutes out of 60 minutes face to face spent counseling patient extensively on dietary Na+ intake, importance of activity, weight monitoring, compliance with medications in addition to importance of titration with goal directed medical therapy and follow up appointments.            This document has been electronically signed by Hilary Smith PA-C  August 15, 2022 10:57 EDT      Dictated Utilizing Dragon Dictation: Part of this note may be an electronic transcription/translation of spoken language to printed text using the Dragon Dictation System.    Follow-up appointment and medication changes provided in hand delivered After Visit Summary as well as reviewed in the room.

## 2022-08-15 NOTE — PROGRESS NOTES
" [facility] Failure Clinic  Pharmacist Note     Della Jorge is a 61 y.o. female seen in the Heart Failure Clinic for combined HF.  Della Jorge reports a good understanding of medications.  Patient was recently hospitalized 8/4 - 8/9 for c/o abdominal and leg swelling due to decompensated HF.  Pt was diuresed with IV Bumex with improvement in symptoms; also paracentesis performed. Patient reports good adherence with medications and no issues with affordability of medications.      Medication Use:   Hx of med intolerances: None related to HF  Retail Rx Management: n/a    Past Medical History:   Diagnosis Date   • Cirrhosis (HCC)    • Congestive heart failure (CHF) (HCC)    • Hypertension      ALLERGIES: Codeine  Current Outpatient Medications   Medication Sig Dispense Refill   • ALPRAZolam (XANAX) 0.25 MG tablet Take 0.25 mg by mouth At Night As Needed for Anxiety.     • aspirin 81 MG EC tablet Take 81 mg by mouth Daily.     • bumetanide (BUMEX) 1 MG tablet Take 1 mg by mouth Daily.     • carvedilol (COREG) 12.5 MG tablet Take 12.5 mg by mouth 2 (Two) Times a Day With Meals.     • ondansetron (ZOFRAN) 4 MG tablet Take 4 mg by mouth Every 8 (Eight) Hours As Needed for Nausea or Vomiting.     • spironolactone (ALDACTONE) 25 MG tablet Take 1 tablet by mouth Daily for 30 days. 30 tablet 0   • traMADol (ULTRAM) 50 MG tablet Take 50 mg by mouth Every 8 (Eight) Hours As Needed for Moderate Pain .       No current facility-administered medications for this encounter.       Vaccination History:   Pneumonia: declines  Annual Influenza: assess next season      Objective  Vitals:    08/15/22 0909   BP: 103/62   BP Location: Left arm   Patient Position: Sitting   Cuff Size: Adult   Pulse: 58   SpO2: 95%   Weight: 93.7 kg (206 lb 9.6 oz)   Height: 149.9 cm (59\")     Wt Readings from Last 3 Encounters:   08/15/22 93.7 kg (206 lb 9.6 oz)   08/09/22 96 kg (211 lb 11.2 oz)         08/15/22  0909   Weight: 93.7 kg (206 lb 9.6 oz) "     Lab Results   Component Value Date    GLUCOSE 97 08/15/2022    BUN 19 08/15/2022    CREATININE 1.33 (H) 08/15/2022    EGFRIFNONA 76 01/16/2017    BCR 14.3 08/15/2022    K 4.4 08/15/2022    CO2 26.2 08/15/2022    CALCIUM 9.5 08/15/2022    ALBUMIN 3.39 (L) 08/06/2022    AST 26 08/06/2022    ALT 5 08/06/2022     Lab Results   Component Value Date    WBC 4.94 08/09/2022    HGB 10.3 (L) 08/09/2022    HCT 32.3 (L) 08/09/2022    MCV 99.1 (H) 08/09/2022     (L) 08/09/2022     Lab Results   Component Value Date    TROPONINT 0.012 08/05/2022     Lab Results   Component Value Date    PROBNP 6,577.0 (H) 08/15/2022     Results for orders placed during the hospital encounter of 08/04/22    Adult Transthoracic Echo Complete w/ Color, Spectral and Contrast if necessary per protocol    Interpretation Summary  · Left ventricular ejection fraction appears to be 46 - 50%. Left ventricular systolic function is mildly decreased.  · Left ventricular diastolic function is consistent with (grade Ia w/high LAP) impaired relaxation.  · The right ventricular cavity is moderately dilated.  · Mildly reduced right ventricular systolic function noted.  · The right atrial cavity is severely dilated.  · Severe tricuspid valve regurgitation is present.  · Estimated right ventricular systolic pressure from tricuspid regurgitation is normal (<35 mmHg).         GDMT    Drug Class   Drug   Dose Last Dose Adjustment Additional Titration   Notes   ACEi/ARB/ARNI    needed BP can't tolerate   Beta Blocker Coreg 12.5mg bid >1 year needed    MRA spironolactone 25mg 8/5 - new     SGLT2i    N/A        Drug Therapy Problems    1. Drug Interactions Screening: spironolactone + lisinopril increases hyperkalemia risk  2. Drug-Disease Interactions: none noted  3. Adherence: pt stopped lisinopril 2.5 mg after taking 1 dose due to it reportedly lowering her BP to 85/50s  4. GDMT: additional therapy needed  5. Renal function: eGFR=45.6    Recommendations:      1. K WNL today; also, pt stopped lisinopril.  2. n/a  3. Pt reports home BP 90s-low 100s over 50s-60s with HR 60-70. 103/62 today in clinic. Pt reports history of BP not being able to tolerate lisinopril in the past as well. Would recommend holding off on re-initiation at this time.  4. Consider addition of SGLT2i. Either one would need a prior authorization on insurance. PA for [employer] approved 8/15 for $0 copay after patient left visit.  5. Could consider decreasing dose of spironolactone to 12.5 mg for eGFR < 50 per HF guidelines; however, as pt also has ascites, there are no dose adjustment recommendations for altered kidney function for that indication.    Discharge medications have been reviewed and reconciled.    Patient was educated on heart failure medications and the importance of medication adherence. All questions were addressed and patient expressed understanding. Used teach-back method to assess understanding.     Thank you for allowing me to participate in the care of your patient,    Daysi Enriquez, PharmD  08/15/22  10:30 EDT

## 2022-08-15 NOTE — OUTREACH NOTE
CHF Week 1 Survey    Flowsheet Row Responses   [facility] facility patient discharged from? [facility]   Does the patient have one of the following disease processes/diagnoses(primary or secondary)? CHF   CHF Week 1 attempt successful? 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

## 2022-08-19 ENCOUNTER — TELEPHONE (OUTPATIENT)
Dept: CARDIOLOGY | Facility: CLINIC | Age: 62
End: 2022-08-19

## 2022-08-19 DIAGNOSIS — L29.9 PRURITUS: Primary | ICD-10-CM

## 2022-08-19 RX ORDER — FLUOCINONIDE 0.5 MG/G
1 CREAM TOPICAL EVERY 12 HOURS SCHEDULED
Qty: 28 BOTTLE | Refills: 0 | Status: SHIPPED | OUTPATIENT
Start: 2022-08-19 | End: 2022-08-19

## 2022-08-19 RX ORDER — BETAMETHASONE DIPROPIONATE 0.5 MG/G
LOTION TOPICAL 2 TIMES DAILY
Qty: 60 ML | Refills: 1 | Status: SHIPPED | OUTPATIENT
Start: 2022-08-19 | End: 2022-09-02

## 2022-08-19 NOTE — TELEPHONE ENCOUNTER
Received call from patient regarding cream for her legs due to itching. She reported they were itching without rash since her swelling began to go down.  She reports she had been scratching frequently and intensely.   Magic Barrier Cream sent to patient's pharmacy.

## 2022-09-15 ENCOUNTER — HOSPITAL ENCOUNTER (OUTPATIENT)
Dept: CARDIOLOGY | Facility: HOSPITAL | Age: 62
Discharge: HOME | End: 2022-09-15
Admitting: PHYSICIAN ASSISTANT

## 2022-09-15 VITALS
BODY MASS INDEX: 37.29 KG/M2 | SYSTOLIC BLOOD PRESSURE: 103 MMHG | DIASTOLIC BLOOD PRESSURE: 69 MMHG | OXYGEN SATURATION: 95 % | HEIGHT: 59 IN | HEART RATE: 67 BPM | WEIGHT: 185 LBS

## 2022-09-15 DIAGNOSIS — N17.9 AKI (ACUTE KIDNEY INJURY): ICD-10-CM

## 2022-09-15 DIAGNOSIS — I50.9 ACUTE ON CHRONIC CONGESTIVE HEART FAILURE, UNSPECIFIED HEART FAILURE TYPE: Primary | ICD-10-CM

## 2022-09-15 LAB
ABSOLUTE LUNG FLUID CONTENT: 35 % (ref 20–35)
ANION GAP SERPL CALCULATED.3IONS-SCNC: 12.1 MMOL/L (ref 5–15)
BUN SERPL-MCNC: 28 MG/DL (ref 8–23)
BUN/CREAT SERPL: 16.7 (ref 7–25)
CALCIUM SPEC-SCNC: 9.9 MG/DL (ref 8.6–10.5)
CHLORIDE SERPL-SCNC: 95 MMOL/L (ref 98–107)
CO2 SERPL-SCNC: 24.9 MMOL/L (ref 22–29)
CREAT SERPL-MCNC: 1.68 MG/DL (ref 0.57–1)
EGFRCR SERPLBLD CKD-EPI 2021: 34.5 ML/MIN/1.73
GLUCOSE SERPL-MCNC: 94 MG/DL (ref 65–99)
MAGNESIUM SERPL-MCNC: 2.2 MG/DL (ref 1.6–2.4)
NT-PROBNP SERPL-MCNC: 5749 PG/ML (ref 0–900)
POTASSIUM SERPL-SCNC: 4.3 MMOL/L (ref 3.5–5.2)
SODIUM SERPL-SCNC: 132 MMOL/L (ref 136–145)

## 2022-09-15 PROCEDURE — 83735 ASSAY OF MAGNESIUM: CPT | Performed by: PHYSICIAN ASSISTANT

## 2022-09-15 PROCEDURE — 83880 ASSAY OF NATRIURETIC PEPTIDE: CPT

## 2022-09-15 PROCEDURE — 94726 PLETHYSMOGRAPHY LUNG VOLUMES: CPT | Performed by: PHYSICIAN ASSISTANT

## 2022-09-15 PROCEDURE — 80048 BASIC METABOLIC PNL TOTAL CA: CPT | Performed by: PHYSICIAN ASSISTANT

## 2022-09-15 PROCEDURE — 99215 OFFICE O/P EST HI 40 MIN: CPT | Performed by: PHYSICIAN ASSISTANT

## 2022-09-15 PROCEDURE — 36415 COLL VENOUS BLD VENIPUNCTURE: CPT | Performed by: PHYSICIAN ASSISTANT

## 2022-09-15 RX ORDER — SPIRONOLACTONE 25 MG/1
12.5 TABLET ORAL DAILY
Qty: 15 TABLET | Refills: 0 | Status: SHIPPED | OUTPATIENT
Start: 2022-09-15 | End: 2022-09-26

## 2022-09-15 RX ORDER — CARVEDILOL 6.25 MG/1
6.25 TABLET ORAL 2 TIMES DAILY WITH MEALS
Start: 2022-09-15 | End: 2022-09-26 | Stop reason: HOSPADM

## 2022-09-15 NOTE — PROGRESS NOTES
Heart Failure Clinic    Date: 09/15/22     There were no vitals filed for this visit.     Method of arrival: Ambulatory    Weighing self daily: Yes    Monitoring Heart Failure Zones: Yes    Today's HF Zone: Yellow     Taking medications as prescribed: Yes    Edema Yes    Shortness of Air: Yes    Number of pillows used at night:Recliner    Educational Materials given:  avs                                                                         ReDS Value: 35  25-35 Optimal Value Status      Nelly Michaels RN 09/15/22 09:52 EDT

## 2022-09-15 NOTE — PROGRESS NOTES
Heart Failure Clinic  Pharmacist Note     Della Jorge is a 61 y.o. female seen in the Heart Failure Clinic for combined HF.  Della Jorge reports a good understanding of medications.  Patient was recently hospitalized 8/4 - 8/9 for c/o abdominal and leg swelling due to decompensated HF.  Pt was diuresed with IV Bumex with improvement in symptoms; also paracentesis performed. Patient reports good adherence with medications and no issues with affordability of medications. Pt reports some continued swelling in her gut and LE but reports that it is slowly coming off, as her weight continues to decrease each day. She denies any SOB and reports that she is now able to wash dishes and do some chores around the house and is feeling better. She reports that her BP has been running in 90's to low 100's/ 50-60's with her HR in the 60's. She reports occasional lightheadedness.       Medication Use:   Hx of med intolerances: None related to HF  Retail Rx Management: n/a    Past Medical History:   Diagnosis Date   • Cirrhosis (HCC)    • Congestive heart failure (CHF) (HCC)    • Hypertension      ALLERGIES: Codeine  Current Outpatient Medications   Medication Sig Dispense Refill   • ALPRAZolam (XANAX) 0.25 MG tablet Take 0.25 mg by mouth At Night As Needed for Anxiety.     • aspirin 81 MG EC tablet Take 81 mg by mouth Daily.     • bumetanide (BUMEX) 1 MG tablet Take 1 mg by mouth Daily.     • ondansetron (ZOFRAN) 4 MG tablet Take 4 mg by mouth Every 8 (Eight) Hours As Needed for Nausea or Vomiting.     • spironolactone (ALDACTONE) 25 MG tablet Take 0.5 tablets by mouth Daily for 30 days. 15 tablet 0   • traMADol (ULTRAM) 50 MG tablet Take 50 mg by mouth Every 8 (Eight) Hours As Needed for Moderate Pain .     • carvedilol (Coreg) 6.25 MG tablet Take 1 tablet by mouth 2 (Two) Times a Day With Meals.       Current Facility-Administered Medications   Medication Dose Route Frequency Provider Last Rate Last Admin   •  "hydrocortisone-bacitracin-zinc oxide-nystatin (MAGIC BARRIER) ointment 1 application  1 application Topical PRN Hilary Smith PA-C           Vaccination History:   Pneumonia: declines  Annual Influenza: assess next season      Objective  Vitals:    09/15/22 0950   BP: 103/69   BP Location: Left arm   Patient Position: Sitting   Cuff Size: Adult   Pulse: 67   SpO2: 95%   Weight: 83.9 kg (185 lb)   Height: 149.9 cm (59\")     Wt Readings from Last 3 Encounters:   09/15/22 83.9 kg (185 lb)   08/15/22 93.7 kg (206 lb 9.6 oz)   08/09/22 96 kg (211 lb 11.2 oz)         09/15/22  0950   Weight: 83.9 kg (185 lb)     Lab Results   Component Value Date    GLUCOSE 94 09/15/2022    BUN 28 (H) 09/15/2022    CREATININE 1.68 (H) 09/15/2022    EGFRIFNONA 76 01/16/2017    BCR 16.7 09/15/2022    K 4.3 09/15/2022    CO2 24.9 09/15/2022    CALCIUM 9.9 09/15/2022    ALBUMIN 3.39 (L) 08/06/2022    AST 26 08/06/2022    ALT 5 08/06/2022     Lab Results   Component Value Date    WBC 4.94 08/09/2022    HGB 10.3 (L) 08/09/2022    HCT 32.3 (L) 08/09/2022    MCV 99.1 (H) 08/09/2022     (L) 08/09/2022     Lab Results   Component Value Date    TROPONINT 0.012 08/05/2022     Lab Results   Component Value Date    PROBNP 5,749.0 (H) 09/15/2022     Results for orders placed during the hospital encounter of 08/04/22    Adult Transthoracic Echo Complete w/ Color, Spectral and Contrast if necessary per protocol    Interpretation Summary  · Left ventricular ejection fraction appears to be 46 - 50%. Left ventricular systolic function is mildly decreased.  · Left ventricular diastolic function is consistent with (grade Ia w/high LAP) impaired relaxation.  · The right ventricular cavity is moderately dilated.  · Mildly reduced right ventricular systolic function noted.  · The right atrial cavity is severely dilated.  · Severe tricuspid valve regurgitation is present.  · Estimated right ventricular systolic pressure from tricuspid regurgitation " is normal (<35 mmHg).         GDMT    Drug Class   Drug   Dose Last Dose Adjustment Additional Titration   Notes   ACEi/ARB/ARNI    needed BP can't tolerate   Beta Blocker Coreg 6.25mg bid 9/15/22 needed hypoTN   MRA spironolactone 12.5mg 9/15/22     SGLT2i    N/A        Drug Therapy Problems    1. Patient requested refill on Spironolactone  2. Hypotension  3. Renal function: eGFR=34.5  4. GDMT: additional therapy needed    Recommendations:     1. Hilary sent in new dose of Spironolactone  2. Would decrease Coreg dose to avoid further hypotension.   3. Would decrease dose of spironolactone to 12.5 mg for eGFR < 50 per HF guidelines.   4. Consider addition of SGLT2i for additional HF benefit. PA for [employer] approved 8/15 for $0 copay.      Patient was educated on heart failure medications and the importance of medication adherence. All questions were addressed and patient expressed understanding. Used teach-back method to assess understanding.     Thank you for allowing me to participate in the care of your patient,    Rosetta Baker [facility]  09/15/22  12:02 EDT

## 2022-09-15 NOTE — PROGRESS NOTES
[facility]  RAOUL Worthington Daniel W, MD  [address] [place] [address]    Thank you for asking me to see Della Jorge for congestive heart failure.    HPI:     This is a 61 y.o. female with known past medical history of:    · Chronic combined diastolic & systolic HF  · Severe TVR  · Cirrhosis, suspected cardiac cirrhosis  · Essential HTN  · Generalized Anxiety Disorder  · Irritable Bowel Syndrome    Della Jorge presents for today to establish care in the HF clinic.  The patient is typically seen by Sebastián Dougherty MD.       • Last known EF 46-50%.      • Last known hospitalization and/or ED visit: Patient was hospitalized from August 4, 2022 through August 9, 2022 with acute on chronic combined diastolic and systolic heart failure and acute hypoxemic respiratory failure after presenting to the emergency department at [facility] with anasarca and ascites.  Initially required 4 L via nasal cannula and was felt to be in decompensated heart failure with concern she was also developing cirrhosis from congestive hepatopathy.  She did have 10 L of ascites removed consistent with portal hypertension and patient was diuresed with improvement in symptoms.  Cardiology did recommend right heart cath to further evaluate tricuspid valve regurgitation and pulmonary pressures but patient did refuse during hospitalization to undergo procedure for the time being.  She was maximized on heart failure therapy with the addition of lisinopril and spironolactone in addition to home Coreg and Bumex in addition to reportedly being hypokalemic.  She was discharged on 1 L via nasal cannula and outpatient heart failure clinic appointment was arranged.    • Accompanied by:           Initial visit data/details regarding:   • Dyspnea: Shortness of breath is improving since hospitalization (was on 1L via NC upon hospital discharge).   • Lower  extremity swelling: Present but improved.   • Zone: Yellow  • Abdominal swelling: Yes; has known Cirrhosis with ascites  • Home weight: Improving with diuresis  • Home BP: SBP running ~ 90s-100s  • Home heart rate:  High 50s-60s  • Daily activities of living:  Improving well. Able to do her dishes now.   • Pillows/lying flat:   Sleeping in recliner.    • Patient uses cane to get to room today rather than wheelchair.  She reports weight is improving as well as swelling.   • Patient reports lower systolic blood pressures in the 90s at times with periodic dizziness.  She is attempting to become more ambulatory and perform daily activities of living.  She reports she has been having 1 to 2 pounds of weight loss daily and is making adequate urine.  • Chest pain free at the time of evaluation.   • Patient expresses extreme difficulty with leaving the house for extended period of time.  She reports it is very difficult.   • Malaise and fatigue have improved and she is open to the addition of Jardiance; however, this was not started at this visit due to rising creatinine  • Remains on 1 L of supplemental oxygen and inquires about when she can stop this.  This was started during her recent hospitalization with the saturation prior to discharge.  We discussed self monitoring with pulse oximetry at rest and with ambulation.  She reports she is mostly staying in the 90s when she checks her oxygenation on room air.        Specialists:   • Cardiology: Refer for [facility].   • Hepatology referral pending at present.         Review of Systems - Review of Systems   Constitutional: Negative for chills, decreased appetite, fever and malaise/fatigue.   HENT: Negative for congestion, ear discharge and ear pain.    Eyes: Negative for blurred vision and discharge.   Cardiovascular: Positive for dyspnea on exertion and leg swelling.   Respiratory: Negative for cough and hemoptysis.    Endocrine: Negative for heat intolerance.    Hematologic/Lymphatic: Negative for adenopathy and bleeding problem. Does not bruise/bleed easily.   Skin: Negative for color change, dry skin and nail changes.   Musculoskeletal: Negative for arthritis and back pain.   Gastrointestinal: Negative for bloating and abdominal pain.   Genitourinary: Negative for bladder incontinence and decreased libido.   Psychiatric/Behavioral: Negative for altered mental status and depression.         All other systems were reviewed and were negative.    Patient Active Problem List   Diagnosis   • Acute on chronic congestive heart failure, unspecified heart failure type (HCC)       family history includes Diabetes in her sister; Heart disease in her mother.     reports that she has never smoked. She has never used smokeless tobacco. She reports that she does not drink alcohol and does not use drugs.    Allergies   Allergen Reactions   • Codeine Hallucinations         Current Outpatient Medications:   •  ALPRAZolam (XANAX) 0.25 MG tablet, Take 0.25 mg by mouth At Night As Needed for Anxiety., Disp: , Rfl:   •  aspirin 81 MG EC tablet, Take 81 mg by mouth Daily., Disp: , Rfl:   •  bumetanide (BUMEX) 1 MG tablet, Take 1 mg by mouth Daily., Disp: , Rfl:   •  ondansetron (ZOFRAN) 4 MG tablet, Take 4 mg by mouth Every 8 (Eight) Hours As Needed for Nausea or Vomiting., Disp: , Rfl:   •  spironolactone (ALDACTONE) 25 MG tablet, Take 0.5 tablets by mouth Daily for 30 days., Disp: 15 tablet, Rfl: 0  •  traMADol (ULTRAM) 50 MG tablet, Take 50 mg by mouth Every 8 (Eight) Hours As Needed for Moderate Pain ., Disp: , Rfl:   •  carvedilol (Coreg) 6.25 MG tablet, Take 1 tablet by mouth 2 (Two) Times a Day With Meals., Disp: , Rfl:     Current Facility-Administered Medications:   •  hydrocortisone-bacitracin-zinc oxide-nystatin (MAGIC BARRIER) ointment 1 application, 1 application, Topical, PRN, Hilary Smith PA-C      Physical Exam:  I have reviewed the patient's current vital signs as  documented in the patient's EMR.   Vitals:    09/15/22 0950   BP: 103/69   Pulse: 67   SpO2: 95%     Body mass index is 37.37 kg/m².       09/15/22  0950   Weight: 83.9 kg (185 lb)            Physical Exam  Constitutional:       General: She is awake.      Appearance: She is well-developed.      Interventions: Nasal cannula in place.      Comments: 1L via NC in place.  Ambulated independently to evaluation room with cane and required wheelchair during last visit.    HENT:      Head: Normocephalic and atraumatic.   Eyes:      General: Lids are normal.      Conjunctiva/sclera:      Right eye: Right conjunctiva is not injected.      Left eye: Left conjunctiva is not injected.   Cardiovascular:      Rate and Rhythm: Normal rate and regular rhythm.      Heart sounds: S1 normal and S2 normal.   Pulmonary:      Effort: No tachypnea or bradypnea.      Breath sounds: No decreased breath sounds, wheezing, rhonchi or rales.   Abdominal:      General: Abdomen is protuberant. Bowel sounds are normal.      Palpations: Abdomen is soft. There is hepatomegaly. There is no fluid wave.   Musculoskeletal:      Right lower leg: No swelling. 1+ Edema present.      Left lower leg: No swelling. 1+ Edema present.      Comments: Edema is non pitting with some concerns for lymphedema   Skin:     General: Skin is warm and moist.   Neurological:      Mental Status: She is alert and oriented to person, place, and time.      Comments: Follows commands and answers questions appropriately.    Psychiatric:         Attention and Perception: Attention normal.         Mood and Affect: Mood normal.         Behavior: Behavior is cooperative.         JVP: Volume/Pulsation: Normal.        DATA REVIEWED:     EKG. I personally reviewed and interpreted the EKG.        ---------------------------------------------------  TTE/JOSE ANGEL:  Results for orders placed during the hospital encounter of 08/04/22    Adult Transthoracic Echo Complete w/ Color, Spectral and  Contrast if necessary per protocol    Interpretation Summary  · Left ventricular ejection fraction appears to be 46 - 50%. Left ventricular systolic function is mildly decreased.  · Left ventricular diastolic function is consistent with (grade Ia w/high LAP) impaired relaxation.  · The right ventricular cavity is moderately dilated.  · Mildly reduced right ventricular systolic function noted.  · The right atrial cavity is severely dilated.  · Severe tricuspid valve regurgitation is present.  · Estimated right ventricular systolic pressure from tricuspid regurgitation is normal (<35 mmHg).        -----------------------------------------------------  CXR/Imaging:   Imaging Results (Most Recent)     None          I personally reviewed and interpreted the CXR.      -----------------------------------------------------  CT:   No radiology results for the last 30 days.  I personally reviewed the images of the CT scan.  My personal interpretation is below.      ----------------------------------------------------    PFTs:  None available--will consider further referral.   --------------------------------------------------------------------------------------------------    Laboratory evaluations:    Lab Results   Component Value Date    GLUCOSE 94 09/15/2022    BUN 28 (H) 09/15/2022    CREATININE 1.68 (H) 09/15/2022    EGFRIFNONA 76 01/16/2017    BCR 16.7 09/15/2022    K 4.3 09/15/2022    CO2 24.9 09/15/2022    CALCIUM 9.9 09/15/2022    ALBUMIN 3.39 (L) 08/06/2022    AST 26 08/06/2022    ALT 5 08/06/2022     Lab Results   Component Value Date    WBC 4.94 08/09/2022    HGB 10.3 (L) 08/09/2022    HCT 32.3 (L) 08/09/2022    MCV 99.1 (H) 08/09/2022     (L) 08/09/2022     Lab Results   Component Value Date    CHOL 60 08/05/2022    TRIG 52 08/05/2022    HDL 27 (L) 08/05/2022    LDL 19 08/05/2022     Lab Results   Component Value Date    TSH 6.250 (H) 08/05/2022     Lab Results   Component Value Date    HGBA1C 4.80  08/04/2022     Lab Results   Component Value Date    ALT 5 08/06/2022     Lab Results   Component Value Date    HGBA1C 4.80 08/04/2022    HGBA1C 6.20 (H) 01/16/2017     Lab Results   Component Value Date    CREATININE 1.68 (H) 09/15/2022     No results found for: IRON, TIBC, FERRITIN  Lab Results   Component Value Date    INR 1.41 (H) 08/04/2022    PROTIME 17.6 (H) 08/04/2022        Lab Results   Component Value Date    ABSOLUTELUNG 35 09/15/2022    ABSOLUTELUNG 35 08/15/2022    ABSOLUTELUNG 39 (A) 08/05/2022       PAH RISK ASSESSMENT:      1. Acute on chronic congestive heart failure, unspecified heart failure type ([facility])    2. ELEUTERIO (acute kidney injury) (Formerly [facility])          ORDERS PLACED TODAY:  Orders Placed This Encounter   Procedures   • BNP   • Basic Metabolic Panel   • Magnesium   • Ambulatory Referral to Cardiology   • ReDs Vest        Diagnoses and all orders for this visit:    1. Acute on chronic congestive heart failure, unspecified heart failure type (HCC) (Primary)  -     BNP  -     Basic Metabolic Panel; Standing  -     Magnesium; Standing  -     ReDs Vest  -     Basic Metabolic Panel  -     Magnesium  -     Ambulatory Referral to Cardiology    2. ELEUTERIO (acute kidney injury) (Formerly [facility])    Other orders  -     spironolactone (ALDACTONE) 25 MG tablet; Take 0.5 tablets by mouth Daily for 30 days.  Dispense: 15 tablet; Refill: 0  -     carvedilol (Coreg) 6.25 MG tablet; Take 1 tablet by mouth 2 (Two) Times a Day With Meals.             MEDS ORDERED TODAY:    New Medications Ordered This Visit   Medications   • spironolactone (ALDACTONE) 25 MG tablet     Sig: Take 0.5 tablets by mouth Daily for 30 days.     Dispense:  15 tablet     Refill:  0   • carvedilol (Coreg) 6.25 MG tablet     Sig: Take 1 tablet by mouth 2 (Two) Times a Day With Meals.        ---------------------------------------------------------------------------------------------------------------------------          ASSESSMENT/PLAN:      Diagnosis Plan   1.  Acute on chronic congestive heart failure, unspecified heart failure type (HCC)  BNP    Basic Metabolic Panel    Magnesium    ReDs Vest    Basic Metabolic Panel    Magnesium    Ambulatory Referral to Cardiology   2. ELEUTERIO (acute kidney injury) (HCC)         first presentation of moderate systolic and diastolic heart failure. CHF. Etiology: Unclear with current refusal of ischemic work-up    NYHA stage C;FC-III. NYHA FC change: improved by one grades.     Today, Patient is approaching euvolemiaand with  Moderate perfusion. The patient's hemodynamics are currently acceptable. HR is: normal and is at goal. BP/MAP was reviewed and there is notroom for medication up-titration.  Clinical trajectory was assessed and hasimproved.     CHF GOAL DIRECTED MEDICAL THERAPY FOR PATIENT ADDRESSED/ADJUSTED:       GDMT    Drug Class   Drug   Dose Last Dose Adjustment Additional Titration   Notes   ACEi/ARB/ARNI Lisinopril  2.5 mg qd      Beta Blocker Coreg 12.5mg BID Decreased to 6.26mg BID      MRA Spironolactone 25mg qd Decreased to 12.5mg      SGLT2i Consider starting at next visit   N/A        -CHF Specific BB:   • Decrease Coreg to 6.25mg BID given lower blood pressures at home.   • Discussed benefit of further ischemic evaluation.   • Hospital DC summary reviewed at length with echocardiogram report reviewed and Cardiology consultation notes reviewed.    • Patient received CHF education including zones, dietary intake, fluid restriction, and clinic strategies. Discussed coming back soon to further evaluation and make medication adjustments. Patient reports it is very difficult for her to leave her home and asks for her visits to be at least 4 weeks apart due to difficult getting up and out of home.    • Continue ASA 81mg.    • Interventional Cardiology referral made for further evaluation for [facility].   • Patient was discharged from hospital previously with supplemental oxygen inquires regarding if she Continue to hold if she  should continue to wear the 1 L/min via nasal cannula.  We discussed monitoring with pulse oximetry and both room air and ambulatory evaluations.      -ACE/ARB/ARNi:   • Stop Lisinopril 2.5mg since patient has not been taking due to lower blood pressures with SBP in the 80s when she does take this medication.   • Continue monitoring and if future pressures improve, consider possibly low dose valsartan addition.      -MRA:   • Spironolactone 25 mg decreased to 12.5mg qd given FEDERICA on CKD.    • Aldactone was explained to the patient.  However, not all possible side effects and adverse reactions are able to be fully discussed.  Handout was offered to the patient detailing the prescription.   • Patient was advised to not use potassium products.  • Quarterly monitoring of potassium and renal function is currently recommended    -SGLT2 inhibitor therapy:   • Discussed with pharmacy staff.  • Prior to return of labs, planned to start Jardiance on 09/15/22; however, given Federica, will hold off for now.     -Diuretic regimen:   • ReDs Vest reading for 09/15/22 is 35, stable in comparison to prior value.   • Bumex 1mg daily at present.   • MRA dosing reduced to 12.5mg qd.    • BMP, Mag, & ProBNP reviewed with patient.      -Fluid restriction/Sodium restriction:   • Requested 2000 ml restriction  • Patient has been asked to weigh daily and was provided with a printed diuretic strategy.  • 1,500 mg Na restriction was discussed.    -Acute vs. Chronic underlying conditions other than HF addressed during visit:   • FEDERICA on CKD:   o Decrease Coreg to 6.25 mg twice daily given suspicion that poor renal perfusion from lower blood pressures is contributing to FEDERICA.  o Decreased spironolactone dose to 12.5 mg.  Have asked patient to return to see in 10 days as she was hesitant to return in 5-7 given difficulties leaving her home.  Counseled regarding signs and symptoms for which she should seek emergency department treatment.  • Tricuspid  valve regurgitation and elevated pulmonary pressures:  o Refer to interventional cardiology for further evaluation for left and right heart cath.    • Acute on likely chronic hyponatremia:  o Likely 2/2 underlying Cirrhosis.    o Prior sodium values reviewed within chart.      ------------------------------------------------------------------  -Iron/Anemia in CHF:  -Chronic appearing macrocytic anemia  -Thrombocytopenia likely 2/2 splenic sequestration in setting of Cirrhosis  · Reviewed last PCP note with TSH, vitamin b12 & Vitamin D WNL.       Anemia is common in heart failure.  Typically, this can come from anemia of chronic disease.  However, some patients may have iron deficiency anemia of another etiology and may merit work-up.  Those patients with an anemia should have their anemia investigated.      ----------------------------------------------------------------------    -Sleep/Apnea:   • Will benefit from sleep study referral and PFTs in future visit; establishing care with medication adjustments today and further referrals planned.      --------------------------------------------------------------------------------      Class 3 Severe Obesity (BMI >=40). Obesity-related health conditions include the following: hypertension and osteoarthritis. Obesity is unchanged. BMI is is above average; BMI management plan is completed. We discussed portion control.              45 minutes out of 60 minutes face to face spent counseling patient extensively on dietary Na+ intake, importance of activity, weight monitoring, compliance with medications in addition to importance of titration with goal directed medical therapy and follow up appointments.            This document has been electronically signed by Hilary Smith PA-C  September 15, 2022 12:26 EDT      Dictated Utilizing Dragon Dictation: Part of this note may be an electronic transcription/translation of spoken language to printed text using the Dragon  Dictation System.    Follow-up appointment and medication changes provided in hand delivered After Visit Summary as well as reviewed in the room.

## 2022-09-26 ENCOUNTER — HOSPITAL ENCOUNTER (OUTPATIENT)
Dept: CARDIOLOGY | Facility: HOSPITAL | Age: 62
Discharge: HOME | End: 2022-09-26
Admitting: PHYSICIAN ASSISTANT

## 2022-09-26 VITALS
SYSTOLIC BLOOD PRESSURE: 114 MMHG | WEIGHT: 184.4 LBS | BODY MASS INDEX: 37.24 KG/M2 | OXYGEN SATURATION: 97 % | DIASTOLIC BLOOD PRESSURE: 67 MMHG | HEART RATE: 63 BPM

## 2022-09-26 DIAGNOSIS — K74.60 HEPATIC CIRRHOSIS, UNSPECIFIED HEPATIC CIRRHOSIS TYPE, UNSPECIFIED WHETHER ASCITES PRESENT: ICD-10-CM

## 2022-09-26 DIAGNOSIS — I50.41 ACUTE COMBINED SYSTOLIC AND DIASTOLIC CHF, NYHA CLASS 3: Primary | ICD-10-CM

## 2022-09-26 DIAGNOSIS — I07.1 SEVERE TRICUSPID REGURGITATION: ICD-10-CM

## 2022-09-26 LAB
ABSOLUTE LUNG FLUID CONTENT: 30 % (ref 20–35)
ANION GAP SERPL CALCULATED.3IONS-SCNC: 12.3 MMOL/L (ref 5–15)
BUN SERPL-MCNC: 23 MG/DL (ref 8–23)
BUN/CREAT SERPL: 16.7 (ref 7–25)
CALCIUM SPEC-SCNC: 9.7 MG/DL (ref 8.6–10.5)
CHLORIDE SERPL-SCNC: 96 MMOL/L (ref 98–107)
CO2 SERPL-SCNC: 24.7 MMOL/L (ref 22–29)
CREAT SERPL-MCNC: 1.38 MG/DL (ref 0.57–1)
EGFRCR SERPLBLD CKD-EPI 2021: 43.6 ML/MIN/1.73
GLUCOSE SERPL-MCNC: 90 MG/DL (ref 65–99)
MAGNESIUM SERPL-MCNC: 2.3 MG/DL (ref 1.6–2.4)
NT-PROBNP SERPL-MCNC: 5851 PG/ML (ref 0–900)
POTASSIUM SERPL-SCNC: 3.9 MMOL/L (ref 3.5–5.2)
SODIUM SERPL-SCNC: 133 MMOL/L (ref 136–145)

## 2022-09-26 PROCEDURE — 99215 OFFICE O/P EST HI 40 MIN: CPT | Performed by: PHYSICIAN ASSISTANT

## 2022-09-26 PROCEDURE — 83880 ASSAY OF NATRIURETIC PEPTIDE: CPT

## 2022-09-26 PROCEDURE — 94726 PLETHYSMOGRAPHY LUNG VOLUMES: CPT | Performed by: PHYSICIAN ASSISTANT

## 2022-09-26 PROCEDURE — 80048 BASIC METABOLIC PNL TOTAL CA: CPT | Performed by: PHYSICIAN ASSISTANT

## 2022-09-26 PROCEDURE — 83735 ASSAY OF MAGNESIUM: CPT | Performed by: PHYSICIAN ASSISTANT

## 2022-09-26 PROCEDURE — 36415 COLL VENOUS BLD VENIPUNCTURE: CPT | Performed by: PHYSICIAN ASSISTANT

## 2022-09-26 RX ORDER — CARVEDILOL 3.12 MG/1
3.12 TABLET ORAL 2 TIMES DAILY WITH MEALS
Qty: 60 TABLET | Refills: 0 | Status: SHIPPED | OUTPATIENT
Start: 2022-09-26 | End: 2022-10-24 | Stop reason: SDUPTHER

## 2022-09-26 RX ORDER — BUMETANIDE 1 MG/1
1 TABLET ORAL DAILY
Qty: 45 TABLET | Refills: 0 | Status: SHIPPED | OUTPATIENT
Start: 2022-09-26 | End: 2022-10-13

## 2022-09-26 RX ORDER — SPIRONOLACTONE 25 MG/1
25 TABLET ORAL DAILY
Qty: 30 TABLET | Refills: 0 | Status: SHIPPED | OUTPATIENT
Start: 2022-09-26 | End: 2022-10-13

## 2022-09-26 NOTE — PROGRESS NOTES
Heart Failure Clinic  Pharmacist Note     Della Jorge is a 61 y.o. female seen in the Heart Failure Clinic for combined HF.  Della Jorge reports a good understanding of medications.  Patient was recently hospitalized 8/4 - 8/9 for c/o abdominal and leg swelling due to decompensated HF.  Pt was diuresed with IV Bumex with improvement in symptoms; also paracentesis performed. Patient reports good adherence with medications and no issues with affordability of medications. Pt reports feeling worst after last appointment where her Spironolactone dose was cut in half due to kidney function. She reports that she has started to swell more in her legs and gut and that her weights started to trend upward and she could not make any progress in decreasing her weight or getting the swelling to come off. She reported that she increased her Spironolactone back to the 25mg dose on this past Friday and has been taking that dose ever since. She denies any SOB. She reports that her BP has continued to run in 90's to low 100's/ 50-60's even with the recent decrease in Coreg to 6.25mg, but pt reports that she is tolerating it better. Her HR in the 50-60's. She reports occasional lightheadedness.       Medication Use:   Hx of med intolerances: None related to HF  Retail Rx Management: n/a    Past Medical History:   Diagnosis Date   • Cirrhosis (HCC)    • Congestive heart failure (CHF) (HCC)    • Hypertension      ALLERGIES: Codeine  Current Outpatient Medications   Medication Sig Dispense Refill   • ALPRAZolam (XANAX) 0.25 MG tablet Take 0.25 mg by mouth At Night As Needed for Anxiety.     • aspirin 81 MG EC tablet Take 81 mg by mouth Daily.     • bumetanide (BUMEX) 1 MG tablet Take 1 tablet by mouth Daily for 45 days. Take 1mg daily with extra 1mg at night as needed for swelling. 45 tablet 0   • ondansetron (ZOFRAN) 4 MG tablet Take 4 mg by mouth Every 8 (Eight) Hours As Needed for Nausea or Vomiting.     • spironolactone  (ALDACTONE) 25 MG tablet Take 1 tablet by mouth Daily for 30 days. 30 tablet 0   • traMADol (ULTRAM) 50 MG tablet Take 50 mg by mouth Every 8 (Eight) Hours As Needed for Moderate Pain .     • carvedilol (Coreg) 3.125 MG tablet Take 1 tablet by mouth 2 (Two) Times a Day With Meals for 30 days. 60 tablet 0   • empagliflozin (JARDIANCE) 10 MG tablet tablet Take 1 tablet by mouth Daily for 30 days. 30 tablet 0     Current Facility-Administered Medications   Medication Dose Route Frequency Provider Last Rate Last Admin   • hydrocortisone-bacitracin-zinc oxide-nystatin (MAGIC BARRIER) ointment 1 application  1 application Topical PRN Hilary Smith PA-C           Vaccination History:   Pneumonia: declines  Annual Influenza: assess next season      Objective  Vitals:    09/26/22 0927   BP: 114/67   Pulse: 63   SpO2: 97%   Weight: 83.6 kg (184 lb 6.4 oz)     Wt Readings from Last 3 Encounters:   09/26/22 83.6 kg (184 lb 6.4 oz)   09/15/22 83.9 kg (185 lb)   08/15/22 93.7 kg (206 lb 9.6 oz)         09/26/22 0927   Weight: 83.6 kg (184 lb 6.4 oz)     Lab Results   Component Value Date    GLUCOSE 90 09/26/2022    BUN 23 09/26/2022    CREATININE 1.38 (H) 09/26/2022    EGFRIFNONA 76 01/16/2017    BCR 16.7 09/26/2022    K 3.9 09/26/2022    CO2 24.7 09/26/2022    CALCIUM 9.7 09/26/2022    ALBUMIN 3.39 (L) 08/06/2022    AST 26 08/06/2022    ALT 5 08/06/2022     Lab Results   Component Value Date    WBC 4.94 08/09/2022    HGB 10.3 (L) 08/09/2022    HCT 32.3 (L) 08/09/2022    MCV 99.1 (H) 08/09/2022     (L) 08/09/2022     Lab Results   Component Value Date    TROPONINT 0.012 08/05/2022     Lab Results   Component Value Date    PROBNP 5,851.0 (H) 09/26/2022     Results for orders placed during the hospital encounter of 08/04/22    Adult Transthoracic Echo Complete w/ Color, Spectral and Contrast if necessary per protocol    Interpretation Summary  · Left ventricular ejection fraction appears to be 46 - 50%. Left  ventricular systolic function is mildly decreased.  · Left ventricular diastolic function is consistent with (grade Ia w/high LAP) impaired relaxation.  · The right ventricular cavity is moderately dilated.  · Mildly reduced right ventricular systolic function noted.  · The right atrial cavity is severely dilated.  · Severe tricuspid valve regurgitation is present.  · Estimated right ventricular systolic pressure from tricuspid regurgitation is normal (<35 mmHg).         GDMT    Drug Class   Drug   Dose Last Dose Adjustment Additional Titration   Notes   ACEi/ARB/ARNI    needed BP can't tolerate   Beta Blocker Coreg 3.125mg bid  decreased 9/26/22 for hypoTN needed hypoTN   MRA Spironolactone 25mg 9/26/22     SGLT2i Jardiance 10mg 9/26/22         Drug Therapy Problems    1. Patient requested refill on Xanax, Bumex and Zofran  2. Hypotension  3. Renal function: eGFR=43.6, increased today  4. GDMT: additional therapy needed  5. Edema    Recommendations:     1. Hilary sent in refills on everything but Xanax. Called Dr. Dougherty's office and they reported that she would need to make an appointment for refills on Xanax. Patient was made aware.    2. Would decrease Coreg dose to avoid further hypotension.   3. Would continue Spironolactone dose at 12.5mg at this time for eGFR < 50 per HF guidelines. Continue to closely monitor  4. Consider addition of SGLT2i for additional HF benefit. PA for Jardiance approved 8/15 for $0 copay.  5. Add an extra prn dose of Bumex for additional diuresis.     Patient was educated on heart failure medications and the importance of medication adherence. All questions were addressed and patient expressed understanding. Used teach-back method to assess understanding.     Thank you for allowing me to participate in the care of your patient,    Rosetta Baker, [facility]  09/26/22  11:24 EDT

## 2022-09-26 NOTE — PROGRESS NOTES
[facility]  RAOUL Worthington Daniel W, MD  [address] [place] [address]    Thank you for asking me to see Della Jorge for congestive heart failure.    HPI:     This is a 61 y.o. 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  The patient is typically seen by Sebastián Dougherty MD.       • Last known EF 46-50%.      • Last known hospitalization and/or ED visit: Patient was hospitalized from August 4, 2022 through August 9, 2022 with acute on chronic combined diastolic and systolic heart failure and acute hypoxemic respiratory failure after presenting to the emergency department at [facility] with anasarca and ascites.  Initially required 4 L via nasal cannula and was felt to be in decompensated heart failure with concern she was also developing cirrhosis from congestive hepatopathy.  She did have 10 L of ascites removed consistent with portal hypertension and patient was diuresed with improvement in symptoms.  Cardiology did recommend right heart cath to further evaluate tricuspid valve regurgitation and pulmonary pressures but patient did refuse during hospitalization to undergo procedure for the time being.  She was maximized on heart failure therapy with the addition of lisinopril and spironolactone in addition to home Coreg and Bumex in addition to reportedly being hypokalemic.  She was discharged on 1 L via nasal cannula and outpatient heart failure clinic appointment was arranged.    • Accompanied by:   • On 09/15/22 visit, patient's creatinine had  increased to 1.6. Coreg was decreased in addition to decreasing Spironolactone to 12.5mg due to a rise in creatinine.           Initial visit data/details regarding:   • Dyspnea: Shortness of breath is improving since hospitalization (was on 1L via NC upon hospital discharge) and continues to improve   • Lower extremity swelling: Present but improved.   • Zone: Yellow  • Abdominal swelling: Yes; has known Cirrhosis with ascites  • Home weight: Improving with diuresis  • Home BP: SBP running ~ 90s-100s  • Home heart rate:  High 50s-60s  • Daily activities of living:  Improving well. Able to do her dishes now and continued ability to do more household chores, though reports she was slightly more fatigued over the past few visits.   • Pillows/lying flat:   Sleeping in recliner.    • Patient reports feeling she took a step back with lowering spironolactone and began to not feel as well over the weekend.  She then took full dose of Spironolactone starting on Friday.   • We discuss prior lowering of medication due to increased creatinine. She expresses understanding.   • BP log was reviewed at length.   • Patient denies chest pain and continues to well ambulation without assistance.   • She reports she continues to utilize home oxygen.   • She denies significant dyspnea on exertion in comparison to prior times but has had more fatigue.        Specialists:   • Cardiology: Refer for [facility].   • Hepatology referral placed during hospital discharge        Review of Systems - Review of Systems   Constitutional: Positive for malaise/fatigue. Negative for chills, decreased appetite and fever.   HENT: Negative for congestion, ear discharge and ear pain.    Eyes: Negative for blurred vision and discharge.   Cardiovascular: Positive for leg swelling. Negative for dyspnea on exertion.   Respiratory: Negative for cough and hemoptysis.    Endocrine: Negative for heat intolerance.   Hematologic/Lymphatic: Negative for adenopathy and  bleeding problem. Does not bruise/bleed easily.   Skin: Negative for color change, dry skin and nail changes.   Musculoskeletal: Negative for arthritis and back pain.   Gastrointestinal: Negative for bloating and abdominal pain.   Genitourinary: Negative for bladder incontinence and decreased libido.   Psychiatric/Behavioral: Negative for altered mental status and depression.         All other systems were reviewed and were negative.    Patient Active Problem List   Diagnosis   • Acute on chronic congestive heart failure, unspecified heart failure type (HCC)   • Hepatic cirrhosis (HCC)       family history includes Diabetes in her sister; Heart disease in her mother.     reports that she has never smoked. She has never used smokeless tobacco. She reports that she does not drink alcohol and does not use drugs.    Allergies   Allergen Reactions   • Codeine Hallucinations         Current Outpatient Medications:   •  ALPRAZolam (XANAX) 0.25 MG tablet, Take 0.25 mg by mouth At Night As Needed for Anxiety., Disp: , Rfl:   •  aspirin 81 MG EC tablet, Take 81 mg by mouth Daily., Disp: , Rfl:   •  bumetanide (BUMEX) 1 MG tablet, Take 1 tablet by mouth Daily for 45 days. Take 1mg daily with extra 1mg at night as needed for swelling., Disp: 45 tablet, Rfl: 0  •  ondansetron (ZOFRAN) 4 MG tablet, Take 4 mg by mouth Every 8 (Eight) Hours As Needed for Nausea or Vomiting., Disp: , Rfl:   •  spironolactone (ALDACTONE) 25 MG tablet, Take 1 tablet by mouth Daily for 30 days., Disp: 30 tablet, Rfl: 0  •  traMADol (ULTRAM) 50 MG tablet, Take 50 mg by mouth Every 8 (Eight) Hours As Needed for Moderate Pain ., Disp: , Rfl:   •  carvedilol (Coreg) 3.125 MG tablet, Take 1 tablet by mouth 2 (Two) Times a Day With Meals for 30 days., Disp: 60 tablet, Rfl: 0  •  empagliflozin (JARDIANCE) 10 MG tablet tablet, Take 1 tablet by mouth Daily for 30 days., Disp: 30 tablet, Rfl: 0    Current Facility-Administered Medications:   •   hydrocortisone-bacitracin-zinc oxide-nystatin (MAGIC BARRIER) ointment 1 application, 1 application, Topical, PREETHIN, Hilary Smith PA-C      Physical Exam:  I have reviewed the patient's current vital signs as documented in the patient's EMR.   Vitals:    09/26/22 0927   BP: 114/67   Pulse: 63   SpO2: 97%     Body mass index is 37.24 kg/m².       09/26/22 0927   Weight: 83.6 kg (184 lb 6.4 oz)     Accompanied by     Physical Exam  Constitutional:       General: She is awake.      Appearance: She is well-developed.      Interventions: Nasal cannula in place.      Comments: 1L via NC in place.  Ambulated independently to evaluation room with cane and required wheelchair during last visit.    HENT:      Head: Normocephalic and atraumatic.   Eyes:      General: Lids are normal.      Conjunctiva/sclera:      Right eye: Right conjunctiva is not injected.      Left eye: Left conjunctiva is not injected.   Cardiovascular:      Rate and Rhythm: Normal rate and regular rhythm.      Heart sounds: S1 normal and S2 normal.   Pulmonary:      Effort: No tachypnea or bradypnea.      Breath sounds: No decreased breath sounds, wheezing, rhonchi or rales.   Abdominal:      General: Abdomen is protuberant. Bowel sounds are normal.      Palpations: Abdomen is soft. There is hepatomegaly. There is no fluid wave.   Musculoskeletal:      Right lower leg: No swelling. 1+ Edema present.      Left lower leg: No swelling. 1+ Edema present.      Comments: Edema is non pitting with some concerns for lymphedema   Skin:     General: Skin is warm and moist.   Neurological:      Mental Status: She is alert and oriented to person, place, and time.      Comments: Follows commands and answers questions appropriately.    Psychiatric:         Attention and Perception: Attention normal.         Mood and Affect: Mood normal.         Behavior: Behavior is cooperative.         JVP: Volume/Pulsation: Normal.        DATA REVIEWED:     EKG. I  personally reviewed and interpreted the EKG.        ---------------------------------------------------  TTE/JOSE ANGEL:  Results for orders placed during the hospital encounter of 08/04/22    Adult Transthoracic Echo Complete w/ Color, Spectral and Contrast if necessary per protocol    Interpretation Summary  · Left ventricular ejection fraction appears to be 46 - 50%. Left ventricular systolic function is mildly decreased.  · Left ventricular diastolic function is consistent with (grade Ia w/high LAP) impaired relaxation.  · The right ventricular cavity is moderately dilated.  · Mildly reduced right ventricular systolic function noted.  · The right atrial cavity is severely dilated.  · Severe tricuspid valve regurgitation is present.  · Estimated right ventricular systolic pressure from tricuspid regurgitation is normal (<35 mmHg).        -----------------------------------------------------  CXR/Imaging:   Imaging Results (Most Recent)     None          I personally reviewed and interpreted the CXR.      -----------------------------------------------------  CT:   No radiology results for the last 30 days.  I personally reviewed the images of the CT scan.  My personal interpretation is below.      ----------------------------------------------------    PFTs:  None available--will consider further referral.   --------------------------------------------------------------------------------------------------    Laboratory evaluations:    Lab Results   Component Value Date    GLUCOSE 90 09/26/2022    BUN 23 09/26/2022    CREATININE 1.38 (H) 09/26/2022    EGFRIFNONA 76 01/16/2017    BCR 16.7 09/26/2022    K 3.9 09/26/2022    CO2 24.7 09/26/2022    CALCIUM 9.7 09/26/2022    ALBUMIN 3.39 (L) 08/06/2022    AST 26 08/06/2022    ALT 5 08/06/2022     Lab Results   Component Value Date    WBC 4.94 08/09/2022    HGB 10.3 (L) 08/09/2022    HCT 32.3 (L) 08/09/2022    MCV 99.1 (H) 08/09/2022     (L) 08/09/2022     Lab Results    Component Value Date    CHOL 60 08/05/2022    TRIG 52 08/05/2022    HDL 27 (L) 08/05/2022    LDL 19 08/05/2022     Lab Results   Component Value Date    TSH 6.250 (H) 08/05/2022     Lab Results   Component Value Date    HGBA1C 4.80 08/04/2022     Lab Results   Component Value Date    ALT 5 08/06/2022     Lab Results   Component Value Date    HGBA1C 4.80 08/04/2022    HGBA1C 6.20 (H) 01/16/2017     Lab Results   Component Value Date    CREATININE 1.38 (H) 09/26/2022     No results found for: IRON, TIBC, FERRITIN  Lab Results   Component Value Date    INR 1.41 (H) 08/04/2022    PROTIME 17.6 (H) 08/04/2022        Lab Results   Component Value Date    ABSOLUTELUNG 30 09/26/2022    ABSOLUTELUNG 35 09/15/2022    ABSOLUTELUNG 35 08/15/2022       PAH RISK ASSESSMENT:      1. Acute combined systolic and diastolic CHF, NYHA class 3 (HCC)    2. Hepatic cirrhosis, unspecified hepatic cirrhosis type, unspecified whether ascites present (HCC)    3. Severe tricuspid regurgitation          ORDERS PLACED TODAY:  Orders Placed This Encounter   Procedures   • BNP   • Basic Metabolic Panel   • Magnesium   • ReDs Vest        Diagnoses and all orders for this visit:    1. Acute combined systolic and diastolic CHF, NYHA class 3 (HCC) (Primary)  -     BNP  -     Basic Metabolic Panel; Standing  -     Magnesium; Standing  -     ReDs Vest  -     Basic Metabolic Panel  -     Magnesium    2. Hepatic cirrhosis, unspecified hepatic cirrhosis type, unspecified whether ascites present (HCC)    3. Severe tricuspid regurgitation    Other orders  -     carvedilol (Coreg) 3.125 MG tablet; Take 1 tablet by mouth 2 (Two) Times a Day With Meals for 30 days.  Dispense: 60 tablet; Refill: 0  -     spironolactone (ALDACTONE) 25 MG tablet; Take 1 tablet by mouth Daily for 30 days.  Dispense: 30 tablet; Refill: 0  -     empagliflozin (JARDIANCE) 10 MG tablet tablet; Take 1 tablet by mouth Daily for 30 days.  Dispense: 30 tablet; Refill: 0  -     bumetanide  (BUMEX) 1 MG tablet; Take 1 tablet by mouth Daily for 45 days. Take 1mg daily with extra 1mg at night as needed for swelling.  Dispense: 45 tablet; Refill: 0             MEDS ORDERED TODAY:    New Medications Ordered This Visit   Medications   • carvedilol (Coreg) 3.125 MG tablet     Sig: Take 1 tablet by mouth 2 (Two) Times a Day With Meals for 30 days.     Dispense:  60 tablet     Refill:  0   • spironolactone (ALDACTONE) 25 MG tablet     Sig: Take 1 tablet by mouth Daily for 30 days.     Dispense:  30 tablet     Refill:  0   • empagliflozin (JARDIANCE) 10 MG tablet tablet     Sig: Take 1 tablet by mouth Daily for 30 days.     Dispense:  30 tablet     Refill:  0   • bumetanide (BUMEX) 1 MG tablet     Sig: Take 1 tablet by mouth Daily for 45 days. Take 1mg daily with extra 1mg at night as needed for swelling.     Dispense:  45 tablet     Refill:  0        ---------------------------------------------------------------------------------------------------------------------------          ASSESSMENT/PLAN:      Diagnosis Plan   1. Acute combined systolic and diastolic CHF, NYHA class 3 (HCC)  BNP    Basic Metabolic Panel    Magnesium    ReDs Vest    Basic Metabolic Panel    Magnesium   2. Hepatic cirrhosis, unspecified hepatic cirrhosis type, unspecified whether ascites present (HCC)     3. Severe tricuspid regurgitation         first presentation of moderate systolic and diastolic heart failure. CHF. Etiology: Unclear with current refusal of ischemic work-up    NYHA stage C;FC-III. NYHA FC change: improved by one grades.     Today, Patient is approaching euvolemiaand with  Moderate perfusion. The patient's hemodynamics are currently acceptable. HR is: normal and is at goal. BP/MAP was reviewed and there is notroom for medication up-titration.  Clinical trajectory was assessed and hasimproved.     CHF GOAL DIRECTED MEDICAL THERAPY FOR PATIENT ADDRESSED/ADJUSTED:       GDMT    Drug Class   Drug   Dose Last Dose  Adjustment Additional Titration   Notes   ACEi/ARB/ARNI xxx xxx BP low with Lisinopril previously discontinued.      Beta Blocker Coreg 6.25 Decreased to 3.125mg BID      MRA Spironolactone 12.5mg qd Increase back to 25mg qd     SGLT2i Start Jardiance 10mg  N/A        -CHF Specific BB:   • Decrease Coreg to 3.125mg BID.   • Discussed benefit of further ischemic evaluation.   • Continue ASA 81mg.    • Interventional Cardiology referral made for further evaluation for [facility] &[facility].   • Patient was discharged from hospital previously with supplemental oxygen inquires regarding if she Continue to hold if she should continue to wear the 1 L/min via nasal cannula.    • We discussed monitoring with pulse oximetry and both room air and ambulatory evaluations.      -ACE/ARB/ARNi:   • Stop Lisinopril 2.5mg since patient has not been taking due to lower blood pressures with SBP in the 80s when she does take this medication.   • Continue monitoring and if future pressures improve, consider possibly low dose valsartan addition.      -MRA:   • Spironolactone titrated back to prior dose with improvement in renal function.   • Aldactone was explained to the patient.  However, not all possible side effects and adverse reactions are able to be fully discussed.  Handout was offered to the patient detailing the prescription.   • Patient was advised to not use potassium products.  • Quarterly monitoring of potassium and renal function is currently recommended    -SGLT2 inhibitor therapy:   SGLT2 inhibition therapy.   The patient has HFimpEF with LV EF 46-50%, is NYHA FC-II-IV, HF hospitilization within the past 12 months, and abnormal BNP consistent with EMPEROR-Reduced trial. The pt does not have exclusion criteria: no ACS/TIA within 90 days; no AHF consideration; no severe VHD; no ICD/CRT in the past 90 days; not in ADHF. Empagliflozin was recommended at 10mg a day to reduce CV death and/or HF hospitlization. The fact this is typically  used to treat DM2 was discussed. The benefit of the trial extended to patients without DM, so this information was conveyed to the pt.   Pt was advised side effects, some severe, including hypersensitivity and Drew's; in addition to common side effects of UTIs and female genital mycotic infections were discussed.  Recommend starting  Jardiance (empagliflozin) 10mg with quarterly assessment of GFR.      -Diuretic regimen:   • ReDs Vest reading for 09/26/22 is 35, stable in comparison to prior value.   • Bumex 1mg daily at present. Instructed to take extra 1mg as needed for weight gain or swelling of extremities.    • MRA dosing resumed as outlined.   • BMP, Mag, & ProBNP reviewed with patient.      -Fluid restriction/Sodium restriction:   • Requested 2000 ml restriction  • Patient has been asked to weigh daily and was provided with a printed diuretic strategy.  • 1,500 mg Na restriction was discussed.    -Acute vs. Chronic underlying conditions other than HF addressed during visit:   • ELEUTERIO on CKD, stage 3a-b, improved:   o Continue medications as adjusted.   o BMP reviewed with creatinine improvement.     • Tricuspid valve regurgitation and elevated pulmonary pressures:  o Refer to interventional cardiology for further evaluation for left and right heart cath.      • Acute on likely chronic hyponatremia:  o Likely 2/2 underlying Cirrhosis.    o Prior sodium values reviewed within chart.      ------------------------------------------------------------------  -Iron/Anemia in CHF:  -Chronic appearing macrocytic anemia  -Thrombocytopenia likely 2/2 splenic sequestration in setting of Cirrhosis  · Reviewed last PCP note with TSH, vitamin b12 & Vitamin D WNL.       Anemia is common in heart failure.  Typically, this can come from anemia of chronic disease.  However, some patients may have iron deficiency anemia of another etiology and may merit work-up.  Those patients with an anemia should have their anemia  investigated.      ----------------------------------------------------------------------    -Sleep/Apnea:   • Will benefit from sleep study referral and PFTs in future visit; establishing care with medication adjustments today and further referrals planned.      --------------------------------------------------------------------------------      Class 3 Severe Obesity (BMI >=40). Obesity-related health conditions include the following: hypertension and osteoarthritis. Obesity is unchanged. BMI is is above average; BMI management plan is completed. We discussed portion control.              45 minutes out of 60 minutes face to face spent counseling patient extensively on dietary Na+ intake, importance of activity, weight monitoring, compliance with medications in addition to importance of titration with goal directed medical therapy and follow up appointments.            This document has been electronically signed by Hilary Smith PA-C  September 26, 2022 11:58 EDT      Dictated Utilizing Dragon Dictation: Part of this note may be an electronic transcription/translation of spoken language to printed text using the Dragon Dictation System.    Follow-up appointment and medication changes provided in hand delivered After Visit Summary as well as reviewed in the room.

## 2022-09-26 NOTE — PROGRESS NOTES
Heart Failure Clinic    Date: 09/26/22     Vitals:    09/26/22 0927   BP: 114/67   Pulse: 63   SpO2: 97%      Weight 184.4lb  Method of arrival: Ambulatory and Ambulatory with cane    Weighing self daily: Yes    Monitoring Heart Failure Zones: Yes    Today's HF Zone: Yellow     Taking medications as prescribed: Yes    Edema Yes    Shortness of Air: No    Number of pillows used at night:Recliner    Educational Materials given:  avs                                                                         ReDS Value: 30  25-35 Optimal Value Status      Nelly Michaels RN 09/26/22 09:57 EDT

## 2022-10-13 ENCOUNTER — HOSPITAL ENCOUNTER (OUTPATIENT)
Dept: CARDIOLOGY | Facility: HOSPITAL | Age: 62
Discharge: HOME | End: 2022-10-13
Admitting: PHYSICIAN ASSISTANT

## 2022-10-13 VITALS
HEART RATE: 75 BPM | WEIGHT: 180 LBS | OXYGEN SATURATION: 90 % | HEIGHT: 59 IN | DIASTOLIC BLOOD PRESSURE: 68 MMHG | SYSTOLIC BLOOD PRESSURE: 116 MMHG | BODY MASS INDEX: 36.29 KG/M2

## 2022-10-13 DIAGNOSIS — N18.31 STAGE 3A CHRONIC KIDNEY DISEASE: ICD-10-CM

## 2022-10-13 DIAGNOSIS — I50.42 CHRONIC COMBINED SYSTOLIC AND DIASTOLIC CHF, NYHA CLASS 3: Primary | ICD-10-CM

## 2022-10-13 DIAGNOSIS — K74.60 CIRRHOSIS OF LIVER WITHOUT ASCITES, UNSPECIFIED HEPATIC CIRRHOSIS TYPE: ICD-10-CM

## 2022-10-13 DIAGNOSIS — E87.1 CHRONIC HYPONATREMIA: ICD-10-CM

## 2022-10-13 DIAGNOSIS — I07.1 SEVERE TRICUSPID VALVE REGURGITATION: ICD-10-CM

## 2022-10-13 LAB
ANION GAP SERPL CALCULATED.3IONS-SCNC: 13.2 MMOL/L (ref 5–15)
BUN SERPL-MCNC: 24 MG/DL (ref 8–23)
BUN/CREAT SERPL: 15.9 (ref 7–25)
CALCIUM SPEC-SCNC: 9.4 MG/DL (ref 8.6–10.5)
CHLORIDE SERPL-SCNC: 95 MMOL/L (ref 98–107)
CO2 SERPL-SCNC: 23.8 MMOL/L (ref 22–29)
CREAT SERPL-MCNC: 1.51 MG/DL (ref 0.57–1)
EGFRCR SERPLBLD CKD-EPI 2021: 39.2 ML/MIN/1.73
GLUCOSE SERPL-MCNC: 99 MG/DL (ref 65–99)
MAGNESIUM SERPL-MCNC: 2.4 MG/DL (ref 1.6–2.4)
NT-PROBNP SERPL-MCNC: 4694 PG/ML (ref 0–900)
POTASSIUM SERPL-SCNC: 4 MMOL/L (ref 3.5–5.2)
SODIUM SERPL-SCNC: 132 MMOL/L (ref 136–145)

## 2022-10-13 PROCEDURE — 99215 OFFICE O/P EST HI 40 MIN: CPT | Performed by: PHYSICIAN ASSISTANT

## 2022-10-13 PROCEDURE — 36415 COLL VENOUS BLD VENIPUNCTURE: CPT | Performed by: PHYSICIAN ASSISTANT

## 2022-10-13 PROCEDURE — 80048 BASIC METABOLIC PNL TOTAL CA: CPT | Performed by: PHYSICIAN ASSISTANT

## 2022-10-13 PROCEDURE — 83880 ASSAY OF NATRIURETIC PEPTIDE: CPT

## 2022-10-13 PROCEDURE — 83735 ASSAY OF MAGNESIUM: CPT | Performed by: PHYSICIAN ASSISTANT

## 2022-10-13 RX ORDER — SPIRONOLACTONE 25 MG/1
25 TABLET ORAL DAILY
Qty: 30 TABLET | Refills: 0 | Status: SHIPPED | OUTPATIENT
Start: 2022-10-13 | End: 2022-11-14

## 2022-10-13 RX ORDER — BUMETANIDE 1 MG/1
1 TABLET ORAL DAILY
Qty: 120 TABLET | Refills: 0 | Status: SHIPPED | OUTPATIENT
Start: 2022-10-13 | End: 2023-01-09

## 2022-10-13 RX ORDER — BUMETANIDE 1 MG/1
1 TABLET ORAL DAILY
Qty: 45 TABLET | Refills: 0 | Status: SHIPPED | OUTPATIENT
Start: 2022-10-13 | End: 2022-10-13 | Stop reason: SDUPTHER

## 2022-10-13 NOTE — PROGRESS NOTES
[facility]  Pharmacist Note     Della Jorge is a 61 y.o. female seen in the [facility] Failure Clinic for combined HF.  Della Jorge reports a good understanding of medications.  Patient was recently hospitalized 8/4 - 8/9 for c/o abdominal and leg swelling due to decompensated HF.  Pt was diuresed with IV Bumex with improvement in symptoms; also paracentesis performed. Patient reports good adherence with medications and no issues with affordability of medications. Pt reports feeling better overall but that she is still tired and fatigued a lot of the time. She reports that her swelling is continuing to improve, as she now can see her ankles. She still reports some swelling in her belly, upper legs and feet, but it is slowly improving. She is currently taking Bumex 1mg + 1mg prn. Pt reports taking an extra 1 and then 1/2 tab on 2 separate occasions since last appointment. She denies any side effects to Jardiance which was started at last visit. She brought in her daily logs and her weight is steadily decreasing and she reports some occasional SOB. Pt's BP ranges mostly in the 90's-kam940's/50-70 with HR in the 60-70's. She reports that her BP does rise when she is moving around. BP today in clinic was 116/68 with a HR of 75. Pt denies any episodes of lightheadedness and dizziness.     Medication Use:   Hx of med intolerances: BP could not tolerate Lisinopril 2.5mg  Retail Rx Management: Pt uses [facility] and gets some assistance from them     Past Medical History:   Diagnosis Date   • Cirrhosis (HCC)    • Congestive heart failure (CHF) (HCC)    • Hypertension      ALLERGIES: Codeine  Current Outpatient Medications   Medication Sig Dispense Refill   • ALPRAZolam (XANAX) 0.25 MG tablet Take 0.25 mg by mouth At Night As Needed for Anxiety.     • aspirin 81 MG EC tablet Take 81 mg by mouth Daily.     • bumetanide (BUMEX) 1 MG tablet Take 1 tablet by mouth Daily for 45 days. Take 1mg daily with extra  "1mg at night as needed for swelling. 45 tablet 0   • carvedilol (Coreg) 3.125 MG tablet Take 1 tablet by mouth 2 (Two) Times a Day With Meals for 30 days. 60 tablet 0   • empagliflozin (JARDIANCE) 10 MG tablet tablet Take 1 tablet by mouth Daily for 30 days. 30 tablet 0   • ondansetron (ZOFRAN) 4 MG tablet Take 4 mg by mouth Every 8 (Eight) Hours As Needed for Nausea or Vomiting.     • spironolactone (ALDACTONE) 25 MG tablet Take 1 tablet by mouth Daily for 30 days. 30 tablet 0   • traMADol (ULTRAM) 50 MG tablet Take 50 mg by mouth Every 8 (Eight) Hours As Needed for Moderate Pain .       Current Facility-Administered Medications   Medication Dose Route Frequency Provider Last Rate Last Admin   • hydrocortisone-bacitracin-zinc oxide-nystatin (MAGIC BARRIER) ointment 1 application  1 application Topical PRN Hilary Smith PA-C           Vaccination History:   Pneumonia: never had, declines  Annual Influenza: never had, declines 10/13/22  COVID 19: never had, declines 10/13/22      Objective  Vitals:    10/13/22 0921   BP: 116/68   BP Location: Right arm   Patient Position: Sitting   Cuff Size: Adult   Pulse: 75   SpO2: 90%   Weight: 81.6 kg (180 lb)   Height: 149.9 cm (59\")     Wt Readings from Last 3 Encounters:   10/13/22 81.6 kg (180 lb)   09/26/22 83.6 kg (184 lb 6.4 oz)   09/15/22 83.9 kg (185 lb)         10/13/22  0921   Weight: 81.6 kg (180 lb)     Lab Results   Component Value Date    GLUCOSE 99 10/13/2022    BUN 24 (H) 10/13/2022    CREATININE 1.51 (H) 10/13/2022    EGFRIFNONA 76 01/16/2017    BCR 15.9 10/13/2022    K 4.0 10/13/2022    CO2 23.8 10/13/2022    CALCIUM 9.4 10/13/2022    ALBUMIN 3.39 (L) 08/06/2022    AST 26 08/06/2022    ALT 5 08/06/2022     Lab Results   Component Value Date    WBC 4.94 08/09/2022    HGB 10.3 (L) 08/09/2022    HCT 32.3 (L) 08/09/2022    MCV 99.1 (H) 08/09/2022     (L) 08/09/2022     Lab Results   Component Value Date    TROPONINT 0.012 08/05/2022     Lab Results "   Component Value Date    PROBNP 4,694.0 (H) 10/13/2022     Results for orders placed during the hospital encounter of 08/04/22    Adult Transthoracic Echo Complete w/ Color, Spectral and Contrast if necessary per protocol    Interpretation Summary  · Left ventricular ejection fraction appears to be 46 - 50%. Left ventricular systolic function is mildly decreased.  · Left ventricular diastolic function is consistent with (grade Ia w/high LAP) impaired relaxation.  · The right ventricular cavity is moderately dilated.  · Mildly reduced right ventricular systolic function noted.  · The right atrial cavity is severely dilated.  · Severe tricuspid valve regurgitation is present.  · Estimated right ventricular systolic pressure from tricuspid regurgitation is normal (<35 mmHg).         GDMT    Drug Class   Drug   Dose Last Dose Adjustment Additional Titration   Notes   ACEi/ARB/ARNI    needed BP can't tolerate   Beta Blocker Coreg 3.125mg bid  decreased 9/26/22 for hypoTN needed hypoTN   MRA Spironolactone 12.5mg/25mg Started alternating 10/3     SGLT2i Jardiance 10mg 9/26/22         Drug Therapy Problems    1. Renal function: eGFR=39.2  2. Edema  3. Vaccinations    Recommendations:     1. Would recommend decreasing Spironolactone dose to 12.5mg at this time for eGFR < 50 per HF guidelines, but when we did this on 9/15, pt reported feeling immediately worst and gaining weight and therefore self-increased her Spironolactone dose back up to 25mg. Hilary discussed with pt and decided to alternate between taking 12.5mg and 25mg daily. Continue to closely monitor labs.   2. Continue current Bumex dosing, pt to take an extra tab QOD when taking 12.5mg dose of Spironolactone  3. Spoke with patient about this season's flu shot being available now. Pt reports that she does not like shots and will not get them. I recorded in above vaccination status area.     Patient was educated on heart failure medications and the importance of  medication adherence. All questions were addressed and patient expressed understanding. Used teach-back method to assess understanding.     Thank you for allowing me to participate in the care of your patient,    Rosetta Baker RPH  10/13/22  10:27 EDT

## 2022-10-13 NOTE — PROGRESS NOTES
[facility] Failure Clinic    Date: 10/13/22     Vitals:    10/13/22 0921   BP: 116/68   Pulse: 75   SpO2: 90%    weight: 180 lbs    Method of arrival: Ambulatory    Weighing self daily: Yes    Monitoring Heart Failure Zones: Yes    Today's HF Zone: Yellow     Taking medications as prescribed: Yes    Edema Yes    Shortness of Air: No    Number of pillows used at night:Recliner    Educational Materials given:  karl Marshall Value:         August Gao Rep 10/13/22 09:22 EDT

## 2022-10-13 NOTE — PROGRESS NOTES
[facility]  RAOUL Worthington Daniel W, MD  [address] [place] [address]    Thank you for asking me to see Della Jorge for congestive heart failure.    HPI:     This is a 61 y.o. 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  The patient is typically seen by Sebastián Dougherty MD.       • Last known EF 46-50%.      • Last known hospitalization and/or ED visit: Patient was hospitalized from August 4, 2022 through August 9, 2022 with acute on chronic combined diastolic and systolic heart failure and acute hypoxemic respiratory failure after presenting to the emergency department at [facility] with anasarca and ascites.  Initially required 4 L via nasal cannula and was felt to be in decompensated heart failure with concern she was also developing cirrhosis from congestive hepatopathy.  She did have 10 L of ascites removed consistent with portal hypertension and patient was diuresed with improvement in symptoms.  Cardiology did recommend right heart cath to further evaluate tricuspid valve regurgitation and pulmonary pressures but patient did refuse during hospitalization to undergo procedure for the time being.  She was maximized on heart failure therapy with the addition of lisinopril and spironolactone in addition to home Coreg and Bumex in addition to reportedly being hypokalemic.  She was discharged on 1 L via nasal cannula and outpatient heart failure clinic appointment  was arranged.    • Accompanied by:       Initial visit data/details regarding:   • Dyspnea: No significant dyspnea on exertion, still using 1 LPM  • Lower extremity swelling: Present as usual but worse in feet  • Abdominal swelling: Protuberant abdomen with known swelling  • Home weight: Improving with diuresis  • Home BP: SBP running ~ 90s-100s  • Home heart rate:  High 50s-60s  • Daily activities of living:  Improving well. Able to do her dishes now and continued ability to do more household chores, though reports she was slightly more fatigued over the past few visits.   • HF zone: Yellow  • Pillows/lying flat:   Sleeping in recliner.    • She has had a few extra doses of Bumex since her last visit. She reports she continues to do well.   • She denies chest pain and denies  symptoms with starting Jardiance.  She reports she does feel slightly more active.   • She continues to utilize 1LPM of supplmeental oxygen.   • She has ongoing lymphedema with worsening foot swelling that is usually relieved by elevation.   • She reports some protuberance of abdomen. We discuss Cirrhosis and prior paracentesis.    • She brings blood pressure log and readings are consistent.      Specialists:   • Cardiology: Refer for [facility].   • Hepatology referral placed during hospital discharge        Review of Systems - Review of Systems   Constitutional: Negative for chills, decreased appetite, fever and malaise/fatigue.   HENT: Negative for congestion, ear discharge and ear pain.    Eyes: Negative for blurred vision and discharge.   Cardiovascular: Positive for leg swelling. Negative for dyspnea on exertion.   Respiratory: Negative for cough and hemoptysis.    Endocrine: Negative for heat intolerance.   Hematologic/Lymphatic: Negative for adenopathy and bleeding problem. Does not bruise/bleed easily.   Skin: Negative for color change, dry skin and nail changes.   Musculoskeletal: Negative for arthritis and back pain.    Gastrointestinal: Negative for bloating and abdominal pain.   Genitourinary: Negative for bladder incontinence and decreased libido.   Psychiatric/Behavioral: Negative for altered mental status and depression.         All other systems were reviewed and were negative.    Patient Active Problem List   Diagnosis   • Acute on chronic congestive heart failure, unspecified heart failure type (HCC)   • Hepatic cirrhosis (HCC)   • Chronic combined systolic and diastolic heart failure (HCC)   • Stage 3a chronic kidney disease (HCC)   • Chronic hyponatremia   • Severe tricuspid valve regurgitation       family history includes Diabetes in her sister; Heart disease in her mother.     reports that she has never smoked. She has never used smokeless tobacco. She reports that she does not drink alcohol and does not use drugs.    Allergies   Allergen Reactions   • Codeine Hallucinations         Current Outpatient Medications:   •  ALPRAZolam (XANAX) 0.25 MG tablet, Take 0.25 mg by mouth At Night As Needed for Anxiety., Disp: , Rfl:   •  aspirin 81 MG EC tablet, Take 81 mg by mouth Daily., Disp: , Rfl:   •  bumetanide (BUMEX) 1 MG tablet, Take 1 tablet by mouth Daily for 90 days. Take 1mg daily with extra 1mg at night as needed for swelling (take extra dose on days when you take half pill of spironolactone), Disp: 120 tablet, Rfl: 0  •  carvedilol (Coreg) 3.125 MG tablet, Take 1 tablet by mouth 2 (Two) Times a Day With Meals for 30 days., Disp: 60 tablet, Rfl: 0  •  empagliflozin (JARDIANCE) 10 MG tablet tablet, Take 1 tablet by mouth Daily for 90 days., Disp: 90 tablet, Rfl: 0  •  ondansetron (ZOFRAN) 4 MG tablet, Take 4 mg by mouth Every 8 (Eight) Hours As Needed for Nausea or Vomiting., Disp: , Rfl:   •  spironolactone (ALDACTONE) 25 MG tablet, Take 1 tablet by mouth Daily for 30 days. Take 1/2 pill every other day., Disp: 30 tablet, Rfl: 0  •  traMADol (ULTRAM) 50 MG tablet, Take 50 mg by mouth Every 8 (Eight) Hours As Needed  for Moderate Pain ., Disp: , Rfl:     Current Facility-Administered Medications:   •  hydrocortisone-bacitracin-zinc oxide-nystatin (MAGIC BARRIER) ointment 1 application, 1 application, Topical, PRN, Hilary Smith PA-C      Physical Exam:  I have reviewed the patient's current vital signs as documented in the patient's EMR.   Vitals:    10/13/22 0921   BP: 116/68   Pulse: 75   SpO2: 90%     Body mass index is 36.36 kg/m².       10/13/22  0921   Weight: 81.6 kg (180 lb)     Accompanied by     Physical Exam  Constitutional:       General: She is awake.      Appearance: She is well-developed.      Interventions: Nasal cannula in place.      Comments: 1L via NC in place.  Ambulated independently to evaluation room with cane and required wheelchair during last visit.    HENT:      Head: Normocephalic and atraumatic.   Eyes:      General: Lids are normal.      Conjunctiva/sclera:      Right eye: Right conjunctiva is not injected.      Left eye: Left conjunctiva is not injected.   Cardiovascular:      Rate and Rhythm: Normal rate and regular rhythm.      Heart sounds: S1 normal and S2 normal.   Pulmonary:      Effort: No tachypnea or bradypnea.      Breath sounds: No decreased breath sounds, wheezing, rhonchi or rales.   Abdominal:      General: Abdomen is protuberant. Bowel sounds are normal.      Palpations: Abdomen is soft. There is hepatomegaly. There is no fluid wave.   Musculoskeletal:      Right lower leg: No swelling. 1+ Edema present.      Left lower leg: No swelling. 1+ Edema present.      Comments: Edema is non pitting with some concerns for lymphedema   Skin:     General: Skin is warm and moist.   Neurological:      Mental Status: She is alert and oriented to person, place, and time.      Comments: Follows commands and answers questions appropriately.    Psychiatric:         Attention and Perception: Attention normal.         Mood and Affect: Mood normal.         Behavior: Behavior is  cooperative.         JVP: Volume/Pulsation: Normal.        DATA REVIEWED:     EKG. I personally reviewed and interpreted the EKG.        ---------------------------------------------------  TTE/JOSE ANGEL:  Results for orders placed during the hospital encounter of 08/04/22    Adult Transthoracic Echo Complete w/ Color, Spectral and Contrast if necessary per protocol    Interpretation Summary  · Left ventricular ejection fraction appears to be 46 - 50%. Left ventricular systolic function is mildly decreased.  · Left ventricular diastolic function is consistent with (grade Ia w/high LAP) impaired relaxation.  · The right ventricular cavity is moderately dilated.  · Mildly reduced right ventricular systolic function noted.  · The right atrial cavity is severely dilated.  · Severe tricuspid valve regurgitation is present.  · Estimated right ventricular systolic pressure from tricuspid regurgitation is normal (<35 mmHg).        -----------------------------------------------------  CXR/Imaging:   Imaging Results (Most Recent)     None          I personally reviewed and interpreted the CXR.      -----------------------------------------------------  CT:   No radiology results for the last 30 days.  I personally reviewed the images of the CT scan.  My personal interpretation is below.      ----------------------------------------------------    PFTs:  None available--will consider further referral.   --------------------------------------------------------------------------------------------------    Laboratory evaluations:    Lab Results   Component Value Date    GLUCOSE 99 10/13/2022    BUN 24 (H) 10/13/2022    CREATININE 1.51 (H) 10/13/2022    EGFRIFNONA 76 01/16/2017    BCR 15.9 10/13/2022    K 4.0 10/13/2022    CO2 23.8 10/13/2022    CALCIUM 9.4 10/13/2022    ALBUMIN 3.39 (L) 08/06/2022    AST 26 08/06/2022    ALT 5 08/06/2022     Lab Results   Component Value Date    WBC 4.94 08/09/2022    HGB 10.3 (L) 08/09/2022    HCT  32.3 (L) 08/09/2022    MCV 99.1 (H) 08/09/2022     (L) 08/09/2022     Lab Results   Component Value Date    CHOL 60 08/05/2022    TRIG 52 08/05/2022    HDL 27 (L) 08/05/2022    LDL 19 08/05/2022     Lab Results   Component Value Date    TSH 6.250 (H) 08/05/2022     Lab Results   Component Value Date    HGBA1C 4.80 08/04/2022     Lab Results   Component Value Date    ALT 5 08/06/2022     Lab Results   Component Value Date    HGBA1C 4.80 08/04/2022    HGBA1C 6.20 (H) 01/16/2017     Lab Results   Component Value Date    CREATININE 1.51 (H) 10/13/2022     No results found for: IRON, TIBC, FERRITIN  Lab Results   Component Value Date    INR 1.41 (H) 08/04/2022    PROTIME 17.6 (H) 08/04/2022        Lab Results   Component Value Date    ABSOLUTELUNG 30 09/26/2022    ABSOLUTELUNG 35 09/15/2022    ABSOLUTELUNG 35 08/15/2022       PAH RISK ASSESSMENT:      1. Chronic combined systolic and diastolic CHF, NYHA class 3 (HCC)    2. Stage 3a chronic kidney disease (HCC)    3. Chronic hyponatremia    4. Severe tricuspid valve regurgitation    5. Cirrhosis of liver without ascites, unspecified hepatic cirrhosis type (HCC)          ORDERS PLACED TODAY:  Orders Placed This Encounter   Procedures   • BNP   • Basic Metabolic Panel   • Magnesium        Diagnoses and all orders for this visit:    1. Chronic combined systolic and diastolic CHF, NYHA class 3 (HCC) (Primary)  -     BNP  -     Basic Metabolic Panel; Standing  -     Magnesium; Standing  -     Basic Metabolic Panel  -     Magnesium    2. Stage 3a chronic kidney disease (HCC)    3. Chronic hyponatremia    4. Severe tricuspid valve regurgitation    5. Cirrhosis of liver without ascites, unspecified hepatic cirrhosis type (HCC)    Other orders  -     spironolactone (ALDACTONE) 25 MG tablet; Take 1 tablet by mouth Daily for 30 days. Take 1/2 pill every other day.  Dispense: 30 tablet; Refill: 0  -     Discontinue: bumetanide (BUMEX) 1 MG tablet; Take 1 tablet by mouth Daily  for 45 days. Take 1mg daily with extra 1mg at night as needed for swelling (take extra dose on days when you take half pill of spironolactone)  Dispense: 45 tablet; Refill: 0  -     empagliflozin (JARDIANCE) 10 MG tablet tablet; Take 1 tablet by mouth Daily for 90 days.  Dispense: 90 tablet; Refill: 0  -     bumetanide (BUMEX) 1 MG tablet; Take 1 tablet by mouth Daily for 90 days. Take 1mg daily with extra 1mg at night as needed for swelling (take extra dose on days when you take half pill of spironolactone)  Dispense: 120 tablet; Refill: 0             MEDS ORDERED TODAY:    New Medications Ordered This Visit   Medications   • spironolactone (ALDACTONE) 25 MG tablet     Sig: Take 1 tablet by mouth Daily for 30 days. Take 1/2 pill every other day.     Dispense:  30 tablet     Refill:  0   • empagliflozin (JARDIANCE) 10 MG tablet tablet     Sig: Take 1 tablet by mouth Daily for 90 days.     Dispense:  90 tablet     Refill:  0   • bumetanide (BUMEX) 1 MG tablet     Sig: Take 1 tablet by mouth Daily for 90 days. Take 1mg daily with extra 1mg at night as needed for swelling (take extra dose on days when you take half pill of spironolactone)     Dispense:  120 tablet     Refill:  0        ---------------------------------------------------------------------------------------------------------------------------          ASSESSMENT/PLAN:      Diagnosis Plan   1. Chronic combined systolic and diastolic CHF, NYHA class 3 (HCC)  BNP    Basic Metabolic Panel    Magnesium    Basic Metabolic Panel    Magnesium      2. Stage 3a chronic kidney disease (HCC)        3. Chronic hyponatremia        4. Severe tricuspid valve regurgitation        5. Cirrhosis of liver without ascites, unspecified hepatic cirrhosis type (HCC)            not acutely decompensated chronic moderate systolic and diastolic heart failure. CHF. Etiology: Unclear with current refusal of ischemic work-up    NYHA stage C;FC-III. NYHA FC change: improved by one  grades.     Today, Patient is approaching euvolemiaand with  Moderate perfusion. The patient's hemodynamics are currently acceptable. HR is: normal and is at goal. BP/MAP was reviewed and there is notroom for medication up-titration.  Clinical trajectory was assessed and hasimproved.     CHF GOAL DIRECTED MEDICAL THERAPY FOR PATIENT ADDRESSED/ADJUSTED:       GDMT: Chronic combined systolic & diastolic HF with improved EF    Drug Class   Drug   Dose Last Dose Adjustment Additional Titration   Notes   ACEi/ARB/ARNI xxx xxx BP low with Lisinopril previously discontinued.      Beta Blocker Coreg 3.125mg BID Decreased to 3.125mg BID      MRA Spironolactone 25 mg qd Increase back to 25mg qd     SGLT2i Jardiance 10mg  N/A    Secondary pharmacological therapies: Patient does not meet EF criteria for Verquevo.       -CHF Specific BB:   • Continue Coreg to 3.125mg BID.    • Continue ASA 81mg.     • We discussed monitoring with pulse oximetry and both room air and ambulatory evaluations.      -ACE/ARB/ARNi:   • Stopped Lisinopril 2.5mg in prior visits since patient has not been taking due to lower blood pressures with SBP in the 80s when she does take this medication.   • Continue monitoring and if future pressures improve, consider possibly low dose valsartan addition; however, thus far, pressures have not allowed for this.     -MRA:   • Continue Spironolactone 25 every other day and alternate with 12.5mg tablets on the day she is not taking the full tablet. When we decreased her full dose last time, she reports she felt more swollen.    • BMP with potassium WNL and creatinine slightly elevated, though still within baseline.   • Patient was advised to not use potassium products.  • Quarterly monitoring of potassium and renal function is currently recommended    -SGLT2 inhibitor therapy:   The patient has HFimpEF with LV EF 46-50%, is NYHA FC-II-IV, HF hospitilization within the past 12 months, and abnormal BNP consistent with  EMPEROR-Reduced trial. The pt does not have exclusion criteria: no ACS/TIA within 90 days; no AHF consideration; no severe VHD; no ICD/CRT in the past 90 days; not in ADHF. Empagliflozin was recommended at 10mg a day to reduce CV death and/or HF hospitlization. The fact this is typically used to treat DM2 was discussed. The benefit of the trial extended to patients without DM, so this information was conveyed to the pt.   Pt was advised side effects, some severe, including hypersensitivity and Drew's; in addition to common side effects of UTIs and female genital mycotic infections were discussed.  Continue  Jardiance (empagliflozin) 10mg during last visit with quarterly assessment of GFR.    Denies  side effects.   BMP after starting was assessed today with slight rise in creatinine, though we also recently increased Spironolactone that previously caused rise.     -Diuretic regimen:   • ReDs Vest reading for 10/13/22 could not be obtained due to  issues this week.   • ProBNP is chronically elevated, but is decreased some on today's visit @ 4694.   • Bumex 1mg qd continued with extra Bumex 1mg as needed for 2lb weight gain in a day or 5lbs in a week. Instructed patient to call if she notices this happening.   • Have instructed to take extra Bumex on days when she takes half tablet of Spironolactone if she notices worsening swelling.   • BMP, Mag, & ProBNP reviewed with patient.      -Fluid restriction/Sodium restriction:   • Patient has been asked to weigh daily and was provided with a printed diuretic strategy.  • 1,500 mg Na restriction was discussed.    -Acute vs. Chronic underlying conditions other than HF addressed during visit:   • Hepatic Cirrhosis:   o We discuss monitoring abdominal protuberance, as she could need another paracentesis in the future.     • ELEUTERIO on CKD, stage 3a-b, improved:   o Continue medications as adjusted.   o BMP reviewed with creatinine improvement.      • Tricuspid valve regurgitation and elevated pulmonary pressures:  o Refer to interventional cardiology for further evaluation for left and right heart cath.      • Acute on likely chronic hyponatremia:  o Likely 2/2 underlying Cirrhosis.    o Prior sodium values reviewed within chart.    o Also sees PCP on 11/03/22.      ------------------------------------------------------------------  -Iron/Anemia in CHF:  -Chronic appearing macrocytic anemia  -Thrombocytopenia likely 2/2 splenic sequestration in setting of Cirrhosis  · Reviewed last PCP note with TSH, vitamin b12 & Vitamin D WNL.       Anemia is common in heart failure.  Typically, this can come from anemia of chronic disease.  However, some patients may have iron deficiency anemia of another etiology and may merit work-up.  Those patients with an anemia should have their anemia investigated.      ----------------------------------------------------------------------    -Sleep/Apnea:   • Will benefit from sleep study referral and PFTs in future visit; establishing care with medication adjustments today and further referrals planned.      --------------------------------------------------------------------------------      Class 3 Severe Obesity (BMI >=40). Obesity-related health conditions include the following: hypertension and osteoarthritis. Obesity is unchanged. BMI is is above average; BMI management plan is completed. We discussed portion control.              >45 minutes out of 60 minutes face to face spent counseling patient extensively on dietary Na+ intake, importance of activity, weight monitoring, compliance with medications in addition to importance of titration with goal directed medical therapy and follow up appointments.            This document has been electronically signed by Hilary Smith PA-C  October 13, 2022 10:40 EDT      Dictated Utilizing Dragon Dictation: Part of this note may be an electronic transcription/translation of  spoken language to printed text using the Dragon Dictation System.    Follow-up appointment and medication changes provided in hand delivered After Visit Summary as well as reviewed in the room.

## 2022-10-24 RX ORDER — CARVEDILOL 3.12 MG/1
3.12 TABLET ORAL 2 TIMES DAILY WITH MEALS
Qty: 60 TABLET | Refills: 2 | Status: SHIPPED | OUTPATIENT
Start: 2022-10-24 | End: 2023-01-09 | Stop reason: SDUPTHER

## 2022-11-14 ENCOUNTER — HOSPITAL ENCOUNTER (OUTPATIENT)
Dept: CARDIOLOGY | Facility: HOSPITAL | Age: 62
Discharge: HOME | End: 2022-11-14
Admitting: PHYSICIAN ASSISTANT

## 2022-11-14 VITALS
WEIGHT: 172.4 LBS | HEART RATE: 54 BPM | BODY MASS INDEX: 34.76 KG/M2 | DIASTOLIC BLOOD PRESSURE: 70 MMHG | SYSTOLIC BLOOD PRESSURE: 112 MMHG | OXYGEN SATURATION: 92 % | HEIGHT: 59 IN

## 2022-11-14 DIAGNOSIS — I50.9 ACUTE ON CHRONIC CONGESTIVE HEART FAILURE, UNSPECIFIED HEART FAILURE TYPE: Primary | ICD-10-CM

## 2022-11-14 DIAGNOSIS — I50.42 CHRONIC COMBINED SYSTOLIC AND DIASTOLIC HEART FAILURE: ICD-10-CM

## 2022-11-14 DIAGNOSIS — N18.31 STAGE 3A CHRONIC KIDNEY DISEASE: ICD-10-CM

## 2022-11-14 LAB
ANION GAP SERPL CALCULATED.3IONS-SCNC: 13.2 MMOL/L (ref 5–15)
BUN SERPL-MCNC: 26 MG/DL (ref 8–23)
BUN/CREAT SERPL: 15.5 (ref 7–25)
CALCIUM SPEC-SCNC: 10 MG/DL (ref 8.6–10.5)
CHLORIDE SERPL-SCNC: 96 MMOL/L (ref 98–107)
CO2 SERPL-SCNC: 25.8 MMOL/L (ref 22–29)
CREAT SERPL-MCNC: 1.68 MG/DL (ref 0.57–1)
EGFRCR SERPLBLD CKD-EPI 2021: 34.5 ML/MIN/1.73
GLUCOSE SERPL-MCNC: 102 MG/DL (ref 65–99)
MAGNESIUM SERPL-MCNC: 2.7 MG/DL (ref 1.6–2.4)
NT-PROBNP SERPL-MCNC: 4920 PG/ML (ref 0–900)
POTASSIUM SERPL-SCNC: 4.4 MMOL/L (ref 3.5–5.2)
SODIUM SERPL-SCNC: 135 MMOL/L (ref 136–145)

## 2022-11-14 PROCEDURE — 83880 ASSAY OF NATRIURETIC PEPTIDE: CPT | Performed by: PHYSICIAN ASSISTANT

## 2022-11-14 PROCEDURE — 83735 ASSAY OF MAGNESIUM: CPT | Performed by: PHYSICIAN ASSISTANT

## 2022-11-14 PROCEDURE — 80048 BASIC METABOLIC PNL TOTAL CA: CPT | Performed by: PHYSICIAN ASSISTANT

## 2022-11-14 PROCEDURE — 99215 OFFICE O/P EST HI 40 MIN: CPT | Performed by: PHYSICIAN ASSISTANT

## 2022-11-14 RX ORDER — SPIRONOLACTONE 25 MG/1
12.5 TABLET ORAL DAILY
COMMUNITY
End: 2023-01-20 | Stop reason: SDUPTHER

## 2022-11-14 NOTE — PROGRESS NOTES
[facility]  RAOUL Worthington Daniel W, MD  [address] [place] [address]    Thank you for asking me to see Della Jorge for congestive heart failure.    HPI:     This is a 61 y.o. 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  The patient is typically seen by Sebastián Dougherty MD.       • Last known EF 46-50%.      • Last known hospitalization and/or ED visit: Patient was hospitalized from August 4, 2022 through August 9, 2022 with acute on chronic combined diastolic and systolic heart failure and acute hypoxemic respiratory failure after presenting to the emergency department at [facility] with anasarca and ascites.  Initially required 4 L via nasal cannula and was felt to be in decompensated heart failure with concern she was also developing cirrhosis from congestive hepatopathy.  She did have 10 L of ascites removed consistent with portal hypertension and patient was diuresed with improvement in symptoms.  Cardiology did recommend right heart cath to further evaluate tricuspid valve regurgitation and pulmonary pressures but patient did refuse during hospitalization to undergo procedure for the time being.  She was maximized on heart failure therapy with the addition of lisinopril and spironolactone in addition to home Coreg and Bumex in addition to reportedly being hypokalemic.  She was discharged on 1 L via nasal cannula and outpatient heart failure clinic appointment  was arranged.    • Accompanied by:       Initial visit data/details regarding:   • Dyspnea: Denies dyspnea on exertion  • Lower extremity swelling: Improving swelling.   • Abdominal swelling: Protuberant abdomen with known swelling.   • Home weight: Improving with diuresis  • Home BP: SBP running ~ 100s-110s  • Home heart rate:  High 50s-60s  • Daily activities of living: Performing independently with increased energy.  Able to get herself in and out of the car now.   • HF zone: Yellow zone  • Pillows/lying flat:   Sleeping in recliner. Use 2 pillows  • Mrs. Jorge reports she feels more stable than she did from HF standpoint. She is without significant shortness of breath.   • She is more ambulatory since her last visit and feels her quality of live is improving with current medication regimen.    • She is chest pain free.  Her  is with her and agrees with her improved quality of live.    • We discuss medication and treatment plan moving forward.      Specialists:   • Cardiology: Refer for [facility].   • Hepatology referral placed during hospital discharge        Review of Systems - Review of Systems   Constitutional: Negative for chills, decreased appetite, fever and malaise/fatigue.   HENT: Negative for congestion, ear discharge and ear pain.    Eyes: Negative for blurred vision and discharge.   Cardiovascular: Positive for leg swelling. Negative for dyspnea on exertion.   Respiratory: Negative for cough and hemoptysis.    Endocrine: Negative for heat intolerance.   Hematologic/Lymphatic: Negative for adenopathy and bleeding problem. Does not bruise/bleed easily.   Skin: Negative for color change, dry skin and nail changes.   Musculoskeletal: Negative for arthritis and back pain.   Gastrointestinal: Negative for bloating and abdominal pain.   Genitourinary: Negative for bladder incontinence and decreased libido.   Psychiatric/Behavioral: Negative for altered mental status and depression.         All  other systems were reviewed and were negative.    Patient Active Problem List   Diagnosis   • Acute on chronic congestive heart failure, unspecified heart failure type (HCC)   • Hepatic cirrhosis (HCC)   • Chronic combined systolic and diastolic heart failure (HCC)   • Stage 3a chronic kidney disease (HCC)   • Chronic hyponatremia   • Severe tricuspid valve regurgitation       family history includes Diabetes in her sister; Heart disease in her mother.     reports that she has never smoked. She has never used smokeless tobacco. She reports that she does not drink alcohol and does not use drugs.    Allergies   Allergen Reactions   • Codeine Hallucinations         Current Outpatient Medications:   •  ALPRAZolam (XANAX) 0.25 MG tablet, Take 0.25 mg by mouth At Night As Needed for Anxiety., Disp: , Rfl:   •  aspirin 81 MG EC tablet, Take 81 mg by mouth Daily., Disp: , Rfl:   •  bumetanide (BUMEX) 1 MG tablet, Take 1 tablet by mouth Daily for 90 days. Take 1mg daily with extra 1mg at night as needed for swelling (take extra dose on days when you take half pill of spironolactone), Disp: 120 tablet, Rfl: 0  •  carvedilol (Coreg) 3.125 MG tablet, Take 1 tablet by mouth 2 (Two) Times a Day With Meals for 90 days., Disp: 60 tablet, Rfl: 2  •  empagliflozin (JARDIANCE) 10 MG tablet tablet, Take 1 tablet by mouth Daily for 90 days., Disp: 90 tablet, Rfl: 0  •  ondansetron (ZOFRAN) 4 MG tablet, Take 4 mg by mouth Every 8 (Eight) Hours As Needed for Nausea or Vomiting., Disp: , Rfl:   •  spironolactone (ALDACTONE) 25 MG tablet, Take 12.5 mg by mouth Daily., Disp: , Rfl:   •  traMADol (ULTRAM) 50 MG tablet, Take 50 mg by mouth Every 8 (Eight) Hours As Needed for Moderate Pain ., Disp: , Rfl:     Current Facility-Administered Medications:   •  hydrocortisone-bacitracin-zinc oxide-nystatin (MAGIC BARRIER) ointment 1 application, 1 application, Topical, PRN, Hilary Smith PA-C      Physical Exam:  I have reviewed the  patient's current vital signs as documented in the patient's EMR.   Vitals:    11/14/22 0924   BP: 112/70   Pulse: 54   SpO2: 92%     Body mass index is 34.82 kg/m².       11/14/22 0924   Weight: 78.2 kg (172 lb 6.4 oz)     Accompanied by     Physical Exam  Constitutional:       General: She is awake.      Appearance: She is well-developed.      Interventions: Nasal cannula in place.      Comments: Patient is pleasant with positive outlook.    HENT:      Head: Normocephalic and atraumatic.   Eyes:      General: Lids are normal.      Conjunctiva/sclera:      Right eye: Right conjunctiva is not injected.      Left eye: Left conjunctiva is not injected.   Cardiovascular:      Rate and Rhythm: Normal rate and regular rhythm.      Heart sounds: S1 normal and S2 normal.   Pulmonary:      Effort: No tachypnea or bradypnea.      Breath sounds: No decreased breath sounds, wheezing, rhonchi or rales.   Abdominal:      General: Abdomen is protuberant. Bowel sounds are normal.      Palpations: Abdomen is soft. There is hepatomegaly. There is no fluid wave.   Musculoskeletal:      Right lower leg: No swelling. 1+ Edema present.      Left lower leg: No swelling. 1+ Edema present.      Comments: Edema is non pitting with some concerns for lymphedema   Skin:     General: Skin is warm and moist.   Neurological:      Mental Status: She is alert and oriented to person, place, and time.      Comments: Follows commands and answers questions appropriately.    Psychiatric:         Attention and Perception: Attention normal.         Mood and Affect: Mood normal.         Behavior: Behavior is cooperative.         JVP: Volume/Pulsation: Normal.        DATA REVIEWED:     EKG. I personally reviewed and interpreted the EKG.        ---------------------------------------------------  TTE/JOSE ANGEL:  Results for orders placed during the hospital encounter of 08/04/22    Adult Transthoracic Echo Complete w/ Color, Spectral and Contrast if  necessary per protocol    Interpretation Summary  · Left ventricular ejection fraction appears to be 46 - 50%. Left ventricular systolic function is mildly decreased.  · Left ventricular diastolic function is consistent with (grade Ia w/high LAP) impaired relaxation.  · The right ventricular cavity is moderately dilated.  · Mildly reduced right ventricular systolic function noted.  · The right atrial cavity is severely dilated.  · Severe tricuspid valve regurgitation is present.  · Estimated right ventricular systolic pressure from tricuspid regurgitation is normal (<35 mmHg).        -----------------------------------------------------  CXR/Imaging:   Imaging Results (Most Recent)     None          I personally reviewed and interpreted the CXR.      -----------------------------------------------------  CT:   No radiology results for the last 30 days.  I personally reviewed the images of the CT scan.  My personal interpretation is below.      ----------------------------------------------------    PFTs:  None available--will consider further referral.   --------------------------------------------------------------------------------------------------    Laboratory evaluations:    Lab Results   Component Value Date    GLUCOSE 102 (H) 11/14/2022    BUN 26 (H) 11/14/2022    CREATININE 1.68 (H) 11/14/2022    EGFRIFNONA 76 01/16/2017    BCR 15.5 11/14/2022    K 4.4 11/14/2022    CO2 25.8 11/14/2022    CALCIUM 10.0 11/14/2022    ALBUMIN 3.39 (L) 08/06/2022    AST 26 08/06/2022    ALT 5 08/06/2022     Lab Results   Component Value Date    WBC 4.94 08/09/2022    HGB 10.3 (L) 08/09/2022    HCT 32.3 (L) 08/09/2022    MCV 99.1 (H) 08/09/2022     (L) 08/09/2022     Lab Results   Component Value Date    CHOL 60 08/05/2022    TRIG 52 08/05/2022    HDL 27 (L) 08/05/2022    LDL 19 08/05/2022     Lab Results   Component Value Date    TSH 6.250 (H) 08/05/2022     Lab Results   Component Value Date    HGBA1C 4.80 08/04/2022      Lab Results   Component Value Date    ALT 5 08/06/2022     Lab Results   Component Value Date    HGBA1C 4.80 08/04/2022    HGBA1C 6.20 (H) 01/16/2017     Lab Results   Component Value Date    CREATININE 1.68 (H) 11/14/2022     No results found for: IRON, TIBC, FERRITIN  Lab Results   Component Value Date    INR 1.41 (H) 08/04/2022    PROTIME 17.6 (H) 08/04/2022        Lab Results   Component Value Date    ABSOLUTELUNG 30 09/26/2022    ABSOLUTELUNG 35 09/15/2022    ABSOLUTELUNG 35 08/15/2022       PAH RISK ASSESSMENT:      1. Acute on chronic congestive heart failure, unspecified heart failure type (HCC)    2. Chronic combined systolic and diastolic heart failure (HCC)    3. Stage 3a chronic kidney disease (HCC)          ORDERS PLACED TODAY:  Orders Placed This Encounter   Procedures   • proBNP   • Basic Metabolic Panel   • Magnesium   • proBNP   • Basic Metabolic Panel   • Magnesium   • ReDs Vest        Diagnoses and all orders for this visit:    1. Acute on chronic congestive heart failure, unspecified heart failure type (HCC) (Primary)  -     proBNP; Future  -     Basic Metabolic Panel; Future  -     Magnesium; Future  -     proBNP; Standing  -     proBNP  -     Basic Metabolic Panel; Standing  -     Basic Metabolic Panel  -     Magnesium; Standing  -     Magnesium    2. Chronic combined systolic and diastolic heart failure (HCC)    3. Stage 3a chronic kidney disease (HCC)    Other orders  -     ReDs Vest; Standing  -     ReDs Vest             MEDS ORDERED TODAY:    No orders of the defined types were placed in this encounter.       ---------------------------------------------------------------------------------------------------------------------------          ASSESSMENT/PLAN:      Diagnosis Plan   1. Acute on chronic congestive heart failure, unspecified heart failure type (HCC)  proBNP    Basic Metabolic Panel    Magnesium    proBNP    proBNP    Basic Metabolic Panel    Basic Metabolic Panel    Magnesium     Magnesium      2. Chronic combined systolic and diastolic heart failure (HCC)        3. Stage 3a chronic kidney disease (HCC)            not acutely decompensated chronic moderate systolic and diastolic heart failure. CHF. Etiology: Unclear with current refusal of ischemic work-up    NYHA stage C;FC-III. NYHA FC change: improved by one grades.     Today, Patient is approaching euvolemiaand with  Moderate perfusion. The patient's hemodynamics are currently acceptable. HR is: normal and is at goal. BP/MAP was reviewed and there is notroom for medication up-titration.  Clinical trajectory was assessed and hasimproved.     CHF GOAL DIRECTED MEDICAL THERAPY FOR PATIENT ADDRESSED/ADJUSTED:       GDMT: Chronic combined systolic & diastolic HF with improved EF    Drug Class   Drug   Dose Last Dose Adjustment Additional Titration   Notes   ACEi/ARB/ARNI xxx xxx BP low with Lisinopril previously discontinued.      Beta Blocker Coreg 3.125mg BID      MRA Spironolactone 12.5mg      SGLT2i Jardiance 10mg  N/A    Secondary pharmacological therapies: Patient does not meet EF criteria for Verquevo.       -CHF Specific BB:   • Continue Coreg to 3.125mg BID.    • Continue ASA 81mg.     • We discussed monitoring with pulse oximetry and both room air and ambulatory evaluations.      -ACE/ARB/ARNi:   • Stopped Lisinopril 2.5mg in prior visits since patient has not been taking due to lower blood pressures with SBP in the 80s when she does take this medication.   • BPs have been too low to add ARB thus far.     -MRA:   • Continue Spironolactone 12.5mg with patient tolerating well and feeling improved.   • BMP with potassium WNL and creatinine slightly elevated, though still within baseline.   • Patient was advised to not use potassium products.  • Quarterly monitoring of potassium and renal function is currently recommended    -SGLT2 inhibitor therapy:   The patient has HFimpEF with LV EF 46-50%, is NYHA FC-II-IV, HF hospitilization  within the past 12 months, and abnormal BNP consistent with EMPEROR-Reduced trial. The pt does not have exclusion criteria: no ACS/TIA within 90 days; no AHF consideration; no severe VHD; no ICD/CRT in the past 90 days; not in ADHF. Empagliflozin was recommended at 10mg a day to reduce CV death and/or HF hospitlization. The fact this is typically used to treat DM2 was discussed. The benefit of the trial extended to patients without DM, so this information was conveyed to the pt.   Pt was advised side effects, some severe, including hypersensitivity and Drew's; in addition to common side effects of UTIs and female genital mycotic infections were discussed.  Continue  Jardiance (empagliflozin) 10mg during last visit with quarterly assessment of GFR.    Denies  side effects.   BMP acceptable.  Creatinine is within her baseline of 1.3-1.6.     -Diuretic regimen:   • ReDs Vest reading for 11/14/22 could not be obtained; reviewed reading with patient.   • ProBNP is chronically elevated, but is within her prior ranges.    • Bumex on board  • BMP, Mag, & ProBNP reviewed with patient.      -Fluid restriction/Sodium restriction:   • Patient has been asked to weigh daily and was provided with a printed diuretic strategy.  • 1,500 mg Na restriction was discussed.    -Acute vs. Chronic underlying conditions other than HF addressed during visit:   • Hepatic Cirrhosis:   o We discuss monitoring abdominal protuberance, as she could need another paracentesis in the future.     • ELEUTERIO on CKD, stage 3a-b, improved:   o Continue medications as adjusted.   o BMP reviewed with creatinine within baseline.      • Tricuspid valve regurgitation and elevated pulmonary pressures:  o Refer to interventional cardiology for further evaluation for left and right heart cath.      • Acute on likely chronic hyponatremia:  o Likely 2/2 underlying Cirrhosis.    o Prior sodium values reviewed within chart.    o Also sees PCP on 11/03/22.       ------------------------------------------------------------------  -Iron/Anemia in CHF:  -Chronic appearing macrocytic anemia  -Thrombocytopenia likely 2/2 splenic sequestration in setting of Cirrhosis  · Reviewed last PCP note with TSH, vitamin b12 & Vitamin D WNL.         --------------------------------------------------------------------------------      Class 3 Severe Obesity (BMI >=40). Obesity-related health conditions include the following: hypertension and osteoarthritis. Obesity is unchanged. BMI is is above average; BMI management plan is completed. We discussed portion control.              >45 minutes out of 60 minutes face to face spent counseling patient extensively on dietary Na+ intake, importance of activity, weight monitoring, compliance with medications in addition to importance of titration with goal directed medical therapy and follow up appointments.            This document has been electronically signed by Hilary Smith PA-C  November 14, 2022 13:04 EST      Dictated Utilizing Dragon Dictation: Part of this note may be an electronic transcription/translation of spoken language to printed text using the Dragon Dictation System.    Follow-up appointment and medication changes provided in hand delivered After Visit Summary as well as reviewed in the room.

## 2022-11-14 NOTE — PROGRESS NOTES
Heart Failure Clinic  Pharmacist Note     Della Jorge is a 61 y.o. female seen in the Heart Failure Clinic for HFmrEF 46-50%.  Della Jorge reports a good understanding of medications. She reports to have noted an improvement in her heart failure symptoms and does not have much SOB and has swelling but reports that it has continued to improve. She still reports some swelling in her belly, upper legs and feet, but it is slowly improving. She is currently taking Bumex 1 mg daily and about 4-5 extra doses since the last visit and is taking Spironolactone 12.5 mg once daily. Unlike last time when the Spironolactone was decreased 12.5mg, patient denies having any weight gain or change in her symptoms. Patient reports that she may have switched her Bumex and Spironolactone for a few days and noticed worsening in her symptoms and once patient went back to instructed dose, her swelling symptoms improved. Otherwise, patient reports good adherence with medications and no issues with affordability of medications. Pt reports feeling better overall but that she is still tired and fatigued due to the lack of sleep. She brought in her daily logs and her weight is steadily decreasing and she reports some occasional SOB. Pt's BP ranges mostly in the /50-70 with HR in the 60-70's. She reports that her BP does rise when she is moving around. BP today in clinic was 112/70 with a HR of 54. Pt denies any episodes of lightheadedness and dizziness.     Medication Use:   Hx of med intolerances: BP could not tolerate Lisinopril 2.5mg  Retail Rx Management: Pt uses [facility] and gets some assistance from them     Past Medical History:   Diagnosis Date   • Cirrhosis (HCC)    • Congestive heart failure (CHF) (HCC)    • Hypertension      ALLERGIES: Codeine  Current Outpatient Medications   Medication Sig Dispense Refill   • ALPRAZolam (XANAX) 0.25 MG tablet Take 0.25 mg by mouth At Night As Needed for Anxiety.     • aspirin  "81 MG EC tablet Take 81 mg by mouth Daily.     • bumetanide (BUMEX) 1 MG tablet Take 1 tablet by mouth Daily for 90 days. Take 1mg daily with extra 1mg at night as needed for swelling (take extra dose on days when you take half pill of spironolactone) 120 tablet 0   • carvedilol (Coreg) 3.125 MG tablet Take 1 tablet by mouth 2 (Two) Times a Day With Meals for 90 days. 60 tablet 2   • empagliflozin (JARDIANCE) 10 MG tablet tablet Take 1 tablet by mouth Daily for 90 days. 90 tablet 0   • ondansetron (ZOFRAN) 4 MG tablet Take 4 mg by mouth Every 8 (Eight) Hours As Needed for Nausea or Vomiting.     • spironolactone (ALDACTONE) 25 MG tablet Take 12.5 mg by mouth Daily.     • traMADol (ULTRAM) 50 MG tablet Take 50 mg by mouth Every 8 (Eight) Hours As Needed for Moderate Pain .       Current Facility-Administered Medications   Medication Dose Route Frequency Provider Last Rate Last Admin   • hydrocortisone-bacitracin-zinc oxide-nystatin (MAGIC BARRIER) ointment 1 application  1 application Topical PRN Hilary Smith PA-C           Vaccination History:   Pneumonia: never had, declines  Annual Influenza: never had, declines 10/13/22  COVID 19: never had, declines 10/13/22      Objective  Vitals:    11/14/22 0924   BP: 112/70   BP Location: Left arm   Patient Position: Sitting   Cuff Size: Adult   Pulse: 54   SpO2: 92%   Weight: 78.2 kg (172 lb 6.4 oz)   Height: 149.9 cm (59\")     Wt Readings from Last 3 Encounters:   11/14/22 78.2 kg (172 lb 6.4 oz)   10/13/22 81.6 kg (180 lb)   09/26/22 83.6 kg (184 lb 6.4 oz)         11/14/22 0924   Weight: 78.2 kg (172 lb 6.4 oz)     Lab Results   Component Value Date    GLUCOSE 102 (H) 11/14/2022    BUN 26 (H) 11/14/2022    CREATININE 1.68 (H) 11/14/2022    EGFRIFNONA 76 01/16/2017    BCR 15.5 11/14/2022    K 4.4 11/14/2022    CO2 25.8 11/14/2022    CALCIUM 10.0 11/14/2022    ALBUMIN 3.39 (L) 08/06/2022    AST 26 08/06/2022    ALT 5 08/06/2022     Lab Results   Component Value " Date    WBC 4.94 08/09/2022    HGB 10.3 (L) 08/09/2022    HCT 32.3 (L) 08/09/2022    MCV 99.1 (H) 08/09/2022     (L) 08/09/2022     Lab Results   Component Value Date    TROPONINT 0.012 08/05/2022     Lab Results   Component Value Date    PROBNP 4,920.0 (H) 11/14/2022     Results for orders placed during the hospital encounter of 08/04/22    Adult Transthoracic Echo Complete w/ Color, Spectral and Contrast if necessary per protocol    Interpretation Summary  · Left ventricular ejection fraction appears to be 46 - 50%. Left ventricular systolic function is mildly decreased.  · Left ventricular diastolic function is consistent with (grade Ia w/high LAP) impaired relaxation.  · The right ventricular cavity is moderately dilated.  · Mildly reduced right ventricular systolic function noted.  · The right atrial cavity is severely dilated.  · Severe tricuspid valve regurgitation is present.  · Estimated right ventricular systolic pressure from tricuspid regurgitation is normal (<35 mmHg).         GDMT    Drug Class   Drug   Dose Last Dose Adjustment Additional Titration   Notes   ACEi/ARB/ARNI    needed BP can't tolerate   Beta Blocker Coreg 3.125mg bid  decreased 9/26/22 for hypoTN needed hypoTN   MRA Spironolactone 12.5mg 10/13/22  Was initially alternating 12.5 mg and 25mg however reports to be taking 12.5mg once daily   SGLT2i Jardiance 10mg 9/26/22 N/A        Drug Therapy Problems    1. Renal function: eGFR=34.5    Recommendations:     1. Would recommend continue taking Spironolactone dose to 12.5mg at this time for eGFR < 50 per HF guidelines. Currently, pt's other medications are appropriate and do not need to be renally adjusted.    Patient was educated on heart failure medications and the importance of medication adherence. All questions were addressed and patient expressed understanding. Used teach-back method to assess understanding.     Thank you for allowing me to participate in the care of your  patient,    Alissa Martínez, [facility]  11/14/22  11:10 EST

## 2022-11-14 NOTE — PROGRESS NOTES
Heart Failure Clinic    Date: 11/14/22     Vitals:    11/14/22 0924   BP: 112/70   Pulse: 54    weight 172.4    Method of arrival: Ambulatory    Weighing self daily: Yes    Monitoring Heart Failure Zones: Yes    Today's HF Zone: Yellow     Taking medications as prescribed: Yes    Edema Yes improving    Shortness of Air: No    Number of pillows used at night:Recliner    Educational Materials given:  karl Michaels RN 11/14/22 09:26 EST

## 2023-01-09 ENCOUNTER — HOSPITAL ENCOUNTER (OUTPATIENT)
Dept: CARDIOLOGY | Facility: HOSPITAL | Age: 63
Discharge: HOME | End: 2023-01-09
Admitting: PHYSICIAN ASSISTANT
Payer: MEDICAID

## 2023-01-09 VITALS
HEART RATE: 65 BPM | BODY MASS INDEX: 34.72 KG/M2 | OXYGEN SATURATION: 90 % | HEIGHT: 59 IN | SYSTOLIC BLOOD PRESSURE: 116 MMHG | DIASTOLIC BLOOD PRESSURE: 72 MMHG | WEIGHT: 172.2 LBS

## 2023-01-09 DIAGNOSIS — E87.1 CHRONIC HYPONATREMIA: ICD-10-CM

## 2023-01-09 DIAGNOSIS — I50.42 CHRONIC COMBINED SYSTOLIC AND DIASTOLIC HEART FAILURE: Primary | ICD-10-CM

## 2023-01-09 DIAGNOSIS — N18.32 STAGE 3B CHRONIC KIDNEY DISEASE: ICD-10-CM

## 2023-01-09 LAB
ABSOLUTE LUNG FLUID CONTENT: 27 % (ref 20–35)
ANION GAP SERPL CALCULATED.3IONS-SCNC: 12.4 MMOL/L (ref 5–15)
BUN SERPL-MCNC: 22 MG/DL (ref 8–23)
BUN/CREAT SERPL: 13.3 (ref 7–25)
CALCIUM SPEC-SCNC: 9.7 MG/DL (ref 8.6–10.5)
CHLORIDE SERPL-SCNC: 89 MMOL/L (ref 98–107)
CO2 SERPL-SCNC: 27.6 MMOL/L (ref 22–29)
CREAT SERPL-MCNC: 1.65 MG/DL (ref 0.57–1)
EGFRCR SERPLBLD CKD-EPI 2021: 35 ML/MIN/1.73
GLUCOSE SERPL-MCNC: 100 MG/DL (ref 65–99)
MAGNESIUM SERPL-MCNC: 2.5 MG/DL (ref 1.6–2.4)
NT-PROBNP SERPL-MCNC: 4060 PG/ML (ref 0–900)
POTASSIUM SERPL-SCNC: 4.1 MMOL/L (ref 3.5–5.2)
SODIUM SERPL-SCNC: 129 MMOL/L (ref 136–145)

## 2023-01-09 PROCEDURE — 94726 PLETHYSMOGRAPHY LUNG VOLUMES: CPT | Performed by: PHYSICIAN ASSISTANT

## 2023-01-09 PROCEDURE — 83880 ASSAY OF NATRIURETIC PEPTIDE: CPT | Performed by: PHYSICIAN ASSISTANT

## 2023-01-09 PROCEDURE — 80048 BASIC METABOLIC PNL TOTAL CA: CPT | Performed by: PHYSICIAN ASSISTANT

## 2023-01-09 PROCEDURE — 83735 ASSAY OF MAGNESIUM: CPT | Performed by: PHYSICIAN ASSISTANT

## 2023-01-09 PROCEDURE — 99215 OFFICE O/P EST HI 40 MIN: CPT | Performed by: PHYSICIAN ASSISTANT

## 2023-01-09 RX ORDER — BUMETANIDE 1 MG/1
1 TABLET ORAL DAILY
Qty: 120 TABLET | Refills: 0 | Status: SHIPPED | OUTPATIENT
Start: 2023-01-09 | End: 2023-01-20 | Stop reason: SDUPTHER

## 2023-01-09 RX ORDER — CARVEDILOL 3.12 MG/1
3.12 TABLET ORAL 2 TIMES DAILY WITH MEALS
Qty: 60 TABLET | Refills: 2 | Status: SHIPPED | OUTPATIENT
Start: 2023-01-09 | End: 2023-03-06 | Stop reason: SDUPTHER

## 2023-01-09 NOTE — PROGRESS NOTES
Heart Failure Clinic    Date: 01/09/23     Vitals:    01/09/23 0919   BP: 116/72   Pulse: 65   SpO2: 90%    weight 172.2    Method of arrival: Ambulatory    Weighing self daily: Yes    Monitoring Heart Failure Zones: Yes    Today's HF Zone: yellow    Taking medications as prescribed: Yes    Edema Yes    Shortness of Air: No    Number of pillows used at night:<2    Educational Materials given:  avs                                                                         ReDS Value: 27  25-35 Optimal Value Status      Nelly Michaels RN 01/09/23 09:24 EST

## 2023-01-09 NOTE — PROGRESS NOTES
[facility]  RAOUL Worthington Daniel W, MD  [address] [place] [address]    Thank you for asking me to see Della Jorge for congestive heart failure.    HPI:     This is a 62 y.o. 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  The patient is typically seen by Sebastián Dougherty MD.       • Last known EF 46-50%.    • Last known hospitalization and/or ED visit: Patient was hospitalized from August 4, 2022 through August 9, 2022 with acute on chronic combined diastolic and systolic heart failure and acute hypoxemic respiratory failure after presenting to the emergency department at [facility] with anasarca and ascites.  Initially required 4 L via nasal cannula and was felt to be in decompensated heart failure with concern she was also developing cirrhosis from congestive hepatopathy.  She did have 10 L of ascites removed consistent with portal hypertension and patient was diuresed with improvement in symptoms.  Cardiology did recommend right heart cath to further evaluate tricuspid valve regurgitation and pulmonary pressures but patient did refuse during hospitalization to undergo procedure for the time being.  She was maximized on heart failure therapy with the addition of lisinopril and spironolactone in addition to home Coreg and Bumex in addition to reportedly being hypokalemic.  She was discharged on 1 L via nasal cannula and outpatient heart failure clinic appointment was  arranged.  • Accompanied by:       Initial visit data/details regarding:   • Dyspnea: Denies dyspnea on exertion  • Lower extremity swelling: Improving swelling.   • Abdominal swelling: Protuberant abdomen with known swelling.   • Home weight: Improving with diuresis  • Home BP: SBP running ~ 100s-110s; periodic low blood pressures.   • Home heart rate:  High 50s-60s  • Daily activities of living: Performing independently with increased energy.  Able to get herself in and out of the car now.   • HF zone: Yellow  • Pillows/lying flat:   Sleeping in bed now, and able to get herself in and out (progress from prior visits).    • Mrs. Jorge is doing well overall.    • Patient is eating well.   • Energy waxes and wanes.  In comparison to when she first started coming to the clinic, she feels much improved.  She denies chest pain.   • She reports she often watches her fluid intake but feels maybe she is not drinking enough.   • Fluid restriction discussed at length.     Specialists:   • Cardiology: Refer for [facility].   • Hepatology referral placed during hospital discharge        Review of Systems - Review of Systems   Constitutional: Negative for chills, decreased appetite, fever and malaise/fatigue.   HENT: Negative for congestion, ear discharge and ear pain.    Eyes: Negative for blurred vision and discharge.   Cardiovascular: Positive for leg swelling. Negative for dyspnea on exertion.   Respiratory: Negative for cough and hemoptysis.    Endocrine: Negative for heat intolerance.   Hematologic/Lymphatic: Negative for adenopathy and bleeding problem. Does not bruise/bleed easily.   Skin: Negative for color change, dry skin and nail changes.   Musculoskeletal: Negative for arthritis and back pain.   Gastrointestinal: Negative for bloating and abdominal pain.   Genitourinary: Negative for bladder incontinence and decreased libido.   Psychiatric/Behavioral: Negative for altered mental status and depression.          All other systems were reviewed and were negative.    Patient Active Problem List   Diagnosis   • Acute on chronic congestive heart failure, unspecified heart failure type (HCC)   • Hepatic cirrhosis (HCC)   • Chronic combined systolic and diastolic heart failure (HCC)   • Stage 3a chronic kidney disease (HCC)   • Chronic hyponatremia   • Severe tricuspid valve regurgitation       family history includes Diabetes in her sister; Heart disease in her mother.     reports that she has never smoked. She has never used smokeless tobacco. She reports that she does not drink alcohol and does not use drugs.    Allergies   Allergen Reactions   • Codeine Hallucinations         Current Outpatient Medications:   •  ALPRAZolam (XANAX) 0.25 MG tablet, Take 0.25 mg by mouth At Night As Needed for Anxiety., Disp: , Rfl:   •  aspirin 81 MG EC tablet, Take 81 mg by mouth Daily., Disp: , Rfl:   •  bumetanide (BUMEX) 1 MG tablet, Take 1 tablet by mouth Daily for 90 days. Take 1mg daily with extra 1mg at night as needed for swelling (take extra dose on days when you take half pill of spironolactone), Disp: 120 tablet, Rfl: 0  •  carvedilol (Coreg) 3.125 MG tablet, Take 1 tablet by mouth 2 (Two) Times a Day With Meals for 90 days., Disp: 60 tablet, Rfl: 2  •  empagliflozin (JARDIANCE) 10 MG tablet tablet, Take 1 tablet by mouth Daily for 90 days., Disp: 90 tablet, Rfl: 0  •  ondansetron (ZOFRAN) 4 MG tablet, Take 4 mg by mouth Every 8 (Eight) Hours As Needed for Nausea or Vomiting., Disp: , Rfl:   •  spironolactone (ALDACTONE) 25 MG tablet, Take 12.5 mg by mouth Daily., Disp: , Rfl:   •  traMADol (ULTRAM) 50 MG tablet, Take 50 mg by mouth Every 8 (Eight) Hours As Needed for Moderate Pain ., Disp: , Rfl:     Current Facility-Administered Medications:   •  hydrocortisone-bacitracin-zinc oxide-nystatin (MAGIC BARRIER) ointment 1 application, 1 application, Topical, PRN, Hilary Smith PA-C      Physical Exam:  I have reviewed  the patient's current vital signs as documented in the patient's EMR.   Vitals:    01/09/23 0919   BP: 116/72   Pulse: 65   SpO2: 90%     Body mass index is 34.78 kg/m².       01/09/23 0919   Weight: 78.1 kg (172 lb 3.2 oz)     Accompanied by     Physical Exam  Constitutional:       General: She is awake.      Appearance: She is well-developed.      Interventions: Nasal cannula in place.      Comments: Ambulates independently to patient room.     HENT:      Head: Normocephalic and atraumatic.   Eyes:      General: Lids are normal.      Conjunctiva/sclera:      Right eye: Right conjunctiva is not injected.      Left eye: Left conjunctiva is not injected.   Cardiovascular:      Rate and Rhythm: Normal rate and regular rhythm.      Heart sounds: S1 normal and S2 normal.   Pulmonary:      Effort: No tachypnea or bradypnea.      Breath sounds: No decreased breath sounds, wheezing, rhonchi or rales.   Abdominal:      General: Abdomen is protuberant. Bowel sounds are normal.      Palpations: Abdomen is soft. There is hepatomegaly. There is no fluid wave.   Musculoskeletal:      Right lower leg: No swelling. 1+ Edema present.      Left lower leg: No swelling. 1+ Edema present.      Comments: Edema is non pitting with some concerns for lymphedema   Skin:     General: Skin is warm and moist.   Neurological:      Mental Status: She is alert and oriented to person, place, and time.      Comments: Follows commands and answers questions appropriately.    Psychiatric:         Attention and Perception: Attention normal.         Mood and Affect: Mood normal.         Behavior: Behavior is cooperative.       JVP: Volume/Pulsation: Normal.        DATA REVIEWED:     EKG. I personally reviewed and interpreted the EKG.        ---------------------------------------------------  TTE/JOSE ANGEL:  Results for orders placed during the hospital encounter of 08/04/22    Adult Transthoracic Echo Complete w/ Color, Spectral and Contrast if  necessary per protocol    Interpretation Summary  · Left ventricular ejection fraction appears to be 46 - 50%. Left ventricular systolic function is mildly decreased.  · Left ventricular diastolic function is consistent with (grade Ia w/high LAP) impaired relaxation.  · The right ventricular cavity is moderately dilated.  · Mildly reduced right ventricular systolic function noted.  · The right atrial cavity is severely dilated.  · Severe tricuspid valve regurgitation is present.  · Estimated right ventricular systolic pressure from tricuspid regurgitation is normal (<35 mmHg).        -----------------------------------------------------  CXR/Imaging:   Imaging Results (Most Recent)     None          I personally reviewed and interpreted the CXR.      -----------------------------------------------------  CT:   No radiology results for the last 30 days.  I personally reviewed the images of the CT scan.  My personal interpretation is below.      ----------------------------------------------------    PFTs:  None available--will consider further referral.   --------------------------------------------------------------------------------------------------    Laboratory evaluations:    Lab Results   Component Value Date    GLUCOSE 100 (H) 01/09/2023    BUN 22 01/09/2023    CREATININE 1.65 (H) 01/09/2023    EGFRIFNONA 76 01/16/2017    BCR 13.3 01/09/2023    K 4.1 01/09/2023    CO2 27.6 01/09/2023    CALCIUM 9.7 01/09/2023    ALBUMIN 3.39 (L) 08/06/2022    AST 26 08/06/2022    ALT 5 08/06/2022     Lab Results   Component Value Date    WBC 4.94 08/09/2022    HGB 10.3 (L) 08/09/2022    HCT 32.3 (L) 08/09/2022    MCV 99.1 (H) 08/09/2022     (L) 08/09/2022     Lab Results   Component Value Date    CHOL 60 08/05/2022    TRIG 52 08/05/2022    HDL 27 (L) 08/05/2022    LDL 19 08/05/2022     Lab Results   Component Value Date    TSH 6.250 (H) 08/05/2022     Lab Results   Component Value Date    HGBA1C 4.80 08/04/2022     Lab  Results   Component Value Date    ALT 5 08/06/2022     Lab Results   Component Value Date    HGBA1C 4.80 08/04/2022    HGBA1C 6.20 (H) 01/16/2017     Lab Results   Component Value Date    CREATININE 1.65 (H) 01/09/2023     No results found for: IRON, TIBC, FERRITIN  Lab Results   Component Value Date    INR 1.41 (H) 08/04/2022    PROTIME 17.6 (H) 08/04/2022        Lab Results   Component Value Date    ABSOLUTELUNG 30 09/26/2022    ABSOLUTELUNG 35 09/15/2022    ABSOLUTELUNG 35 08/15/2022       PAH RISK ASSESSMENT:      1. Chronic combined systolic and diastolic heart failure (HCC)    2. Chronic hyponatremia    3. Stage 3b chronic kidney disease (HCC)          ORDERS PLACED TODAY:  Orders Placed This Encounter   Procedures   • proBNP   • Basic Metabolic Panel   • Magnesium   • proBNP   • Basic Metabolic Panel   • Magnesium   • ReDs Vest        Diagnoses and all orders for this visit:    1. Chronic combined systolic and diastolic heart failure (HCC) (Primary)  -     proBNP; Future  -     Basic Metabolic Panel; Future  -     Magnesium; Future  -     ReDs Vest  -     proBNP; Standing  -     proBNP  -     Basic Metabolic Panel; Standing  -     Basic Metabolic Panel  -     Magnesium; Standing  -     Magnesium    2. Chronic hyponatremia    3. Stage 3b chronic kidney disease (HCC)    Other orders  -     bumetanide (BUMEX) 1 MG tablet; Take 1 tablet by mouth Daily for 90 days. Take 1mg daily with extra 1mg at night as needed for swelling (take extra dose on days when you take half pill of spironolactone)  Dispense: 120 tablet; Refill: 0  -     empagliflozin (JARDIANCE) 10 MG tablet tablet; Take 1 tablet by mouth Daily for 90 days.  Dispense: 90 tablet; Refill: 0  -     carvedilol (Coreg) 3.125 MG tablet; Take 1 tablet by mouth 2 (Two) Times a Day With Meals for 90 days.  Dispense: 60 tablet; Refill: 2             MEDS ORDERED TODAY:    New Medications Ordered This Visit   Medications   • bumetanide (BUMEX) 1 MG tablet      Sig: Take 1 tablet by mouth Daily for 90 days. Take 1mg daily with extra 1mg at night as needed for swelling (take extra dose on days when you take half pill of spironolactone)     Dispense:  120 tablet     Refill:  0   • empagliflozin (JARDIANCE) 10 MG tablet tablet     Sig: Take 1 tablet by mouth Daily for 90 days.     Dispense:  90 tablet     Refill:  0   • carvedilol (Coreg) 3.125 MG tablet     Sig: Take 1 tablet by mouth 2 (Two) Times a Day With Meals for 90 days.     Dispense:  60 tablet     Refill:  2     Hold if top number is less than 100.        ---------------------------------------------------------------------------------------------------------------------------          ASSESSMENT/PLAN:      Diagnosis Plan   1. Chronic combined systolic and diastolic heart failure (HCC)  proBNP    Basic Metabolic Panel    Magnesium    ReDs Vest    proBNP    proBNP    Basic Metabolic Panel    Basic Metabolic Panel    Magnesium    Magnesium      2. Chronic hyponatremia        3. Stage 3b chronic kidney disease (HCC)            not acutely decompensated chronic moderate systolic and diastolic heart failure. CHF. Etiology: Unclear with current refusal of ischemic work-up    NYHA stage C;FC-III. NYHA FC change: improved by one grades.     Today, Patient is approaching euvolemiaand with  Moderate perfusion. The patient's hemodynamics are currently acceptable. HR is: normal and is at goal. BP/MAP was reviewed and there is notroom for medication up-titration.  Clinical trajectory was assessed and hasimproved.     CHF GOAL DIRECTED MEDICAL THERAPY FOR PATIENT ADDRESSED/ADJUSTED:       GDMT: Chronic combined systolic & diastolic HF with improved EF    Drug Class   Drug   Dose Last Dose Adjustment Additional Titration   Notes   ACEi/ARB/ARNI xxx xxx BP low with Lisinopril previously discontinued.      Beta Blocker Coreg 3.125mg BID      MRA Spironolactone 12.5mg      SGLT2i Jardiance 10mg  N/A      Secondary pharmacological  therapies: Patient does not meet EF criteria for Verquevo.       -CHF Specific BB:   • Continue Coreg to 3.125mg BID.    • Continue ASA 81mg.     • We discussed monitoring with pulse oximetry and both room air and ambulatory evaluations.      -ACE/ARB/ARNi:   • Stopped Lisinopril 2.5mg in prior visits since patient has not been taking due to lower blood pressures with SBP in the 80s when she does take this medication.   • BPs have been too low to add ARB thus far.     -MRA:   • Continue Spironolactone 12.5mg with patient tolerating well and feeling improved.   • BMP with potassium WNL and creatinine slightly elevated, though still within baseline.   • Patient was advised to not use potassium products.  • Quarterly monitoring of potassium and renal function is currently recommended    -SGLT2 inhibitor therapy:   The patient has HFimpEF with LV EF 46-50%, is NYHA FC-II-IV, HF hospitilization within the past 12 months, and abnormal BNP consistent with EMPEROR-Reduced trial. The pt does not have exclusion criteria: no ACS/TIA within 90 days; no AHF consideration; no severe VHD; no ICD/CRT in the past 90 days; not in ADHF. Empagliflozin was recommended at 10mg a day to reduce CV death and/or HF hospitlization. The fact this is typically used to treat DM2 was discussed. The benefit of the trial extended to patients without DM, so this information was conveyed to the pt.   Pt was advised side effects, some severe, including hypersensitivity and Drew's; in addition to common side effects of UTIs and female genital mycotic infections were discussed.  Continue  Jardiance (empagliflozin) 10mg during last visit with quarterly assessment of GFR.    Denies  side effects.   BMP acceptable.  Creatinine is within her baseline of 1.3-1.6.     -Diuretic regimen:   • ReDs Vest reading for 01/09/23 was 27 and WNL;  reviewed reading with patient.   • ProBNP is chronically elevated, but is within her prior ranges.   • Bumex on board  and patient tolerating well.    • BMP, Mag, & ProBNP reviewed with patient.      -Fluid restriction/Sodium restriction:   • Patient has been asked to weigh daily and was provided with a printed diuretic strategy.  • 1,500 mg Na restriction was discussed.    -Acute vs. Chronic underlying conditions other than HF addressed during visit:   • Hepatic Cirrhosis:   o We discuss monitoring abdominal protuberance, as she could need another paracentesis in the future.     • CKD, stage 3a-b, improved:   o Continue medications as adjusted.   o BMP reviewed with creatinine within baseline.      • Tricuspid valve regurgitation and elevated pulmonary pressures:  o Refer to interventional cardiology for further evaluation for left and right heart cath.      • Acute on likely chronic hyponatremia:  o Likely 2/2 underlying Cirrhosis.    o Prior sodium values reviewed within chart.       ------------------------------------------------------------------  -Iron/Anemia in CHF:  -Chronic appearing macrocytic anemia  -Thrombocytopenia likely 2/2 splenic sequestration in setting of Cirrhosis  · Reviewed last PCP note with TSH, vitamin b12 & Vitamin D WNL.         --------------------------------------------------------------------------------      Class 3 Severe Obesity (BMI >=40). Obesity-related health conditions include the following: hypertension and osteoarthritis. Obesity is unchanged. BMI is is above average; BMI management plan is completed. We discussed portion control.               >45 minutes out of 60 minutes face to face spent counseling patient extensively on dietary Na+ intake, importance of activity, weight monitoring, compliance with medications in addition to importance of titration with goal directed medical therapy and follow up appointments.            This document has been electronically signed by Hilary Smith PA-C  January 9, 2023 10:43 EST      Dictated Utilizing Dragon Dictation: Part of this note may  be an electronic transcription/translation of spoken language to printed text using the Dragon Dictation System.    Follow-up appointment and medication changes provided in hand delivered After Visit Summary as well as reviewed in the room.

## 2023-01-09 NOTE — ADDENDUM NOTE
Encounter addended by: Eleanor Naylor, August Rep on: 1/9/2023 3:38 PM   Actions taken: Result filed, Charge Capture section accepted

## 2023-01-09 NOTE — PROGRESS NOTES
Heart Failure Clinic  Pharmacist Note     Della Jorge is a 62 y.o. female seen in the Heart Failure Clinic for HFmrEF 46-50%.  Della Jorge reports a good understanding of medications. Pt continues to reports that she is feeling better overall. She reports some remaining swelling in her feet and belly and reports that her legs are started to look a lot better. She is currently taking Bumex 1mg daily with an additional dose at night almost every night the past week. She reports taking Spironolactone 12.5mg every day and sometimes reporting taking a full tab once or twice. She reports that her swelling improved more when she takes the Spironolactone with the Bumex. She reports only some SOB when she exerts herself and pt brought in her weight log and most of the time her weights are decreasing with some fluctuations but up on some days.  Patient reports good adherence with medications and no issues with affordability of medications. Through looking at her logs, her BP looks to be running in the 110's/50-60's for the most part with some SBP dropping into the 90's. She reports some lightheadedness but no episodes of dizziness and reports that she is tolerating the current doses of her Coreg.     Medication Use:   Hx of med intolerances: BP could not tolerate Lisinopril 2.5mg  Retail Rx Management: Pt uses [facility] and gets some assistance from them     Past Medical History:   Diagnosis Date   • Cirrhosis (HCC)    • Congestive heart failure (CHF) (HCC)    • Hypertension      ALLERGIES: Codeine  Current Outpatient Medications   Medication Sig Dispense Refill   • ALPRAZolam (XANAX) 0.25 MG tablet Take 0.25 mg by mouth At Night As Needed for Anxiety.     • aspirin 81 MG EC tablet Take 81 mg by mouth Daily.     • bumetanide (BUMEX) 1 MG tablet Take 1 tablet by mouth Daily for 90 days. Take 1mg daily with extra 1mg at night as needed for swelling (take extra dose on days when you take half pill of  spironolactone) 120 tablet 0   • carvedilol (Coreg) 3.125 MG tablet Take 1 tablet by mouth 2 (Two) Times a Day With Meals for 90 days. 60 tablet 2   • empagliflozin (JARDIANCE) 10 MG tablet tablet Take 1 tablet by mouth Daily for 90 days. 90 tablet 0   • ondansetron (ZOFRAN) 4 MG tablet Take 4 mg by mouth Every 8 (Eight) Hours As Needed for Nausea or Vomiting.     • spironolactone (ALDACTONE) 25 MG tablet Take 12.5 mg by mouth Daily.     • traMADol (ULTRAM) 50 MG tablet Take 50 mg by mouth Every 8 (Eight) Hours As Needed for Moderate Pain .       Current Facility-Administered Medications   Medication Dose Route Frequency Provider Last Rate Last Admin   • hydrocortisone-bacitracin-zinc oxide-nystatin (MAGIC BARRIER) ointment 1 application  1 application Topical PRN Hilary Smith PA-C           Vaccination History:   Pneumonia: never had, declines  Annual Influenza: never had, declines 10/13/22  COVID 19: never had, declines 10/13/22      Objective  Vitals:    01/09/23 0919   BP: 116/72   BP Location: Left arm   Patient Position: Sitting   Cuff Size: Adult   Pulse: 65   SpO2: 90%   Weight: 78.1 kg (172 lb 3.2 oz)   Height: 149.9 cm (59\")     Wt Readings from Last 3 Encounters:   01/09/23 78.1 kg (172 lb 3.2 oz)   11/14/22 78.2 kg (172 lb 6.4 oz)   10/13/22 81.6 kg (180 lb)         01/09/23 0919   Weight: 78.1 kg (172 lb 3.2 oz)     Lab Results   Component Value Date    GLUCOSE 100 (H) 01/09/2023    BUN 22 01/09/2023    CREATININE 1.65 (H) 01/09/2023    EGFRIFNONA 76 01/16/2017    BCR 13.3 01/09/2023    K 4.1 01/09/2023    CO2 27.6 01/09/2023    CALCIUM 9.7 01/09/2023    ALBUMIN 3.39 (L) 08/06/2022    AST 26 08/06/2022    ALT 5 08/06/2022     Lab Results   Component Value Date    WBC 4.94 08/09/2022    HGB 10.3 (L) 08/09/2022    HCT 32.3 (L) 08/09/2022    MCV 99.1 (H) 08/09/2022     (L) 08/09/2022     Lab Results   Component Value Date    TROPONINT 0.012 08/05/2022     Lab Results   Component Value  Date    PROBNP 4,060.0 (H) 01/09/2023     Results for orders placed during the hospital encounter of 08/04/22    Adult Transthoracic Echo Complete w/ Color, Spectral and Contrast if necessary per protocol    Interpretation Summary  · Left ventricular ejection fraction appears to be 46 - 50%. Left ventricular systolic function is mildly decreased.  · Left ventricular diastolic function is consistent with (grade Ia w/high LAP) impaired relaxation.  · The right ventricular cavity is moderately dilated.  · Mildly reduced right ventricular systolic function noted.  · The right atrial cavity is severely dilated.  · Severe tricuspid valve regurgitation is present.  · Estimated right ventricular systolic pressure from tricuspid regurgitation is normal (<35 mmHg).         GDMT    Drug Class   Drug   Dose Last Dose Adjustment Additional Titration   Notes   ACEi/ARB/ARNI    needed BP can't tolerate   Beta Blocker Coreg 3.125mg bid  decreased 9/26/22 for hypoTN needed hypoTN   MRA Spironolactone 12.5mg 10/13/22     SGLT2i Jardiance 10mg 9/26/22 N/A        Drug Therapy Problems    1. GDMT/Renal function: eGFR=35  2. Pt request refills of Bumex, Coreg and Jardiance    Recommendations:     1. Continue with current doses of GDMT  2. Hilary to send in     Patient was educated on heart failure medications and the importance of medication adherence. All questions were addressed and patient expressed understanding. Used teach-back method to assess understanding.     Thank you for allowing me to participate in the care of your patient,    Rosetta Baker, [facility]  01/09/23  11:42 EST

## 2023-01-20 RX ORDER — BUMETANIDE 1 MG/1
1 TABLET ORAL DAILY
Qty: 120 TABLET | Refills: 0 | Status: SHIPPED | OUTPATIENT
Start: 2023-01-20 | End: 2023-03-06 | Stop reason: SDUPTHER

## 2023-01-20 RX ORDER — SPIRONOLACTONE 25 MG/1
12.5 TABLET ORAL DAILY
Qty: 15 TABLET | Refills: 0 | Status: SHIPPED | OUTPATIENT
Start: 2023-01-20 | End: 2023-03-06

## 2023-03-06 ENCOUNTER — HOSPITAL ENCOUNTER (OUTPATIENT)
Dept: CARDIOLOGY | Facility: HOSPITAL | Age: 63
Discharge: HOME | End: 2023-03-06
Admitting: PHYSICIAN ASSISTANT
Payer: MEDICAID

## 2023-03-06 VITALS
OXYGEN SATURATION: 96 % | HEART RATE: 62 BPM | DIASTOLIC BLOOD PRESSURE: 66 MMHG | WEIGHT: 164.2 LBS | BODY MASS INDEX: 33.1 KG/M2 | SYSTOLIC BLOOD PRESSURE: 111 MMHG | HEIGHT: 59 IN

## 2023-03-06 DIAGNOSIS — K74.60 HEPATIC CIRRHOSIS, UNSPECIFIED HEPATIC CIRRHOSIS TYPE, UNSPECIFIED WHETHER ASCITES PRESENT: ICD-10-CM

## 2023-03-06 DIAGNOSIS — E87.1 CHRONIC HYPONATREMIA: ICD-10-CM

## 2023-03-06 DIAGNOSIS — I50.42 CHRONIC COMBINED SYSTOLIC AND DIASTOLIC HEART FAILURE: Primary | ICD-10-CM

## 2023-03-06 DIAGNOSIS — N18.31 STAGE 3A CHRONIC KIDNEY DISEASE: ICD-10-CM

## 2023-03-06 LAB
ABSOLUTE LUNG FLUID CONTENT: 29 % (ref 20–35)
ANION GAP SERPL CALCULATED.3IONS-SCNC: 13.3 MMOL/L (ref 5–15)
BUN SERPL-MCNC: 27 MG/DL (ref 8–23)
BUN/CREAT SERPL: 17.3 (ref 7–25)
CALCIUM SPEC-SCNC: 9.8 MG/DL (ref 8.6–10.5)
CHLORIDE SERPL-SCNC: 92 MMOL/L (ref 98–107)
CO2 SERPL-SCNC: 24.7 MMOL/L (ref 22–29)
CREAT SERPL-MCNC: 1.56 MG/DL (ref 0.57–1)
EGFRCR SERPLBLD CKD-EPI 2021: 37.4 ML/MIN/1.73
GLUCOSE SERPL-MCNC: 104 MG/DL (ref 65–99)
MAGNESIUM SERPL-MCNC: 2.5 MG/DL (ref 1.6–2.4)
NT-PROBNP SERPL-MCNC: 3169 PG/ML (ref 0–900)
POTASSIUM SERPL-SCNC: 3.5 MMOL/L (ref 3.5–5.2)
SODIUM SERPL-SCNC: 130 MMOL/L (ref 136–145)

## 2023-03-06 PROCEDURE — 83735 ASSAY OF MAGNESIUM: CPT | Performed by: PHYSICIAN ASSISTANT

## 2023-03-06 PROCEDURE — 94726 PLETHYSMOGRAPHY LUNG VOLUMES: CPT | Performed by: PHYSICIAN ASSISTANT

## 2023-03-06 PROCEDURE — 83880 ASSAY OF NATRIURETIC PEPTIDE: CPT | Performed by: PHYSICIAN ASSISTANT

## 2023-03-06 PROCEDURE — 80048 BASIC METABOLIC PNL TOTAL CA: CPT | Performed by: PHYSICIAN ASSISTANT

## 2023-03-06 PROCEDURE — 99214 OFFICE O/P EST MOD 30 MIN: CPT | Performed by: PHYSICIAN ASSISTANT

## 2023-03-06 RX ORDER — ASPIRIN 81 MG/1
81 TABLET ORAL DAILY
Qty: 90 TABLET | Refills: 0 | Status: SHIPPED | OUTPATIENT
Start: 2023-03-06 | End: 2023-06-04

## 2023-03-06 RX ORDER — SPIRONOLACTONE 25 MG/1
12.5 TABLET ORAL DAILY
Qty: 15 TABLET | Refills: 2 | Status: SHIPPED | OUTPATIENT
Start: 2023-03-06 | End: 2023-06-04

## 2023-03-06 RX ORDER — SPIRONOLACTONE 25 MG/1
12.5 TABLET ORAL DAILY
COMMUNITY
End: 2023-03-06 | Stop reason: SDUPTHER

## 2023-03-06 RX ORDER — BUMETANIDE 1 MG/1
1 TABLET ORAL DAILY
Qty: 120 TABLET | Refills: 0 | Status: SHIPPED | OUTPATIENT
Start: 2023-03-06 | End: 2023-06-04

## 2023-03-06 RX ORDER — CARVEDILOL 3.12 MG/1
3.12 TABLET ORAL 2 TIMES DAILY WITH MEALS
Qty: 60 TABLET | Refills: 2 | Status: SHIPPED | OUTPATIENT
Start: 2023-03-06 | End: 2023-06-04

## 2023-03-06 NOTE — ADDENDUM NOTE
Encounter addended by: Eleanor Naylor, August Rep on: 3/6/2023 1:58 PM   Actions taken: Result filed

## 2023-03-06 NOTE — ADDENDUM NOTE
Encounter addended by: Eleanor Naylor RegSched Rep on: 3/6/2023 1:57 PM   Actions taken: Charge Capture section accepted

## 2023-03-06 NOTE — PROGRESS NOTES
Heart Failure Clinic    Date: 03/06/23     Vitals:    03/06/23 0934   BP: 111/66   Pulse: 62   SpO2: 96%   weight: 164.2     Method of arrival: Ambulatory    Weighing self daily: Yes    Monitoring Heart Failure Zones: Yes    Today's HF Zone: Green    Taking medications as prescribed: Yes    Edema Yes    Shortness of Air: No    Number of pillows used at night:>3    Educational Materials given:  karl Marshall Value: 29  25-35 Optimal Value Status      August Gao Rep 03/06/23 09:36 EST

## 2023-03-06 NOTE — PROGRESS NOTES
[facility]  RAOUL Worthington Daniel W, MD  [address] [place] [address]    Thank you for asking me to see Della Jorge for congestive heart failure.    HPI:     This is a 62 y.o. 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  The patient is typically seen by Sebastián Dougherty MD.       • Last known EF 46-50%.    • Last known hospitalization and/or ED visit: Patient was hospitalized from August 4, 2022 through August 9, 2022 with acute on chronic combined diastolic and systolic heart failure and acute hypoxemic respiratory failure after presenting to the emergency department at [facility] with anasarca and ascites.  Initially required 4 L via nasal cannula and was felt to be in decompensated heart failure with concern she was also developing cirrhosis from congestive hepatopathy.  She did have 10 L of ascites removed consistent with portal hypertension and patient was diuresed with improvement in symptoms.  Cardiology did recommend right heart cath to further evaluate tricuspid valve regurgitation and pulmonary pressures but patient did refuse during hospitalization to undergo procedure for the time being.  She was maximized on heart failure therapy with the addition of lisinopril and spironolactone in addition to home Coreg and Bumex in addition to reportedly being hypokalemic.  She was discharged on 1 L via nasal cannula and outpatient heart failure clinic appointment was  arranged.  • Accompanied by:       03/06/23 visit data/details regarding:   • Dyspnea: Denies dyspnea on exertion  • Lower extremity swelling: Improving swelling but still present.    • Abdominal swelling: Protuberant abdomen with known swelling.   • Home weight: Improving with diuresis  • Home BP: SBP running ~ 90s-110s systolic  • Home heart rate:  High 50s-60s  • Daily activities of living: Performing independently with increased energy.  Able to get herself in and out of the car now.   • HF zone: Green  • Pillows/lying flat:   3 pillows in a bed  • Mrs. Jorge overall feels much improved.  She is able to do more around her house. She is able to get up and down from chairs and in and out of cars.  She is able to do more housework.  She is able to better ambulate to the restroom without dyspnea on exertion  • She reports ongoing abdominal protuberance.  We discuss her previously requiring abdominal paracentesis and how she likely may require more of these in the future given known ascites.   She declines at present.        Specialists:   • Cardiology: Refer for [facility].       Review of Systems - Review of Systems   Constitutional: Negative for chills, decreased appetite, fever and malaise/fatigue.   HENT: Negative for congestion, ear discharge and ear pain.    Eyes: Negative for blurred vision and discharge.   Cardiovascular: Positive for leg swelling. Negative for dyspnea on exertion.   Respiratory: Negative for cough and hemoptysis.    Endocrine: Negative for heat intolerance.   Hematologic/Lymphatic: Negative for adenopathy and bleeding problem. Does not bruise/bleed easily.   Skin: Negative for color change, dry skin and nail changes.   Musculoskeletal: Negative for arthritis and back pain.   Gastrointestinal: Negative for bloating and abdominal pain.   Genitourinary: Negative for bladder incontinence and decreased libido.   Psychiatric/Behavioral: Negative for altered mental status and depression.          All other systems were reviewed and were negative.    Patient Active Problem List   Diagnosis   • Acute on chronic congestive heart failure, unspecified heart failure type (HCC)   • Hepatic cirrhosis (HCC)   • Chronic combined systolic and diastolic heart failure (HCC)   • Stage 3a chronic kidney disease (HCC)   • Chronic hyponatremia   • Severe tricuspid valve regurgitation       family history includes Diabetes in her sister; Heart disease in her mother.     reports that she has never smoked. She has never used smokeless tobacco. She reports that she does not drink alcohol and does not use drugs.    Allergies   Allergen Reactions   • Codeine Hallucinations         Current Outpatient Medications:   •  aspirin 81 MG EC tablet, Take 1 tablet by mouth Daily for 90 days., Disp: 90 tablet, Rfl: 0  •  bumetanide (BUMEX) 1 MG tablet, Take 1 tablet by mouth Daily for 90 days. Take 1mg daily with extra 1mg at night as needed for swelling (take extra dose on days when you take half pill of spironolactone), Disp: 120 tablet, Rfl: 0  •  carvedilol (Coreg) 3.125 MG tablet, Take 1 tablet by mouth 2 (Two) Times a Day With Meals for 90 days., Disp: 60 tablet, Rfl: 2  •  empagliflozin (JARDIANCE) 10 MG tablet tablet, Take 1 tablet by mouth Daily for 90 days., Disp: 90 tablet, Rfl: 0  •  ondansetron (ZOFRAN) 4 MG tablet, Take 1 tablet by mouth Every 8 (Eight) Hours As Needed for Nausea or Vomiting., Disp: , Rfl:   •  spironolactone (ALDACTONE) 25 MG tablet, Take 0.5 tablets by mouth Daily for 90 days., Disp: 15 tablet, Rfl: 2  •  traMADol (ULTRAM) 50 MG tablet, Take 1 tablet by mouth Every 8 (Eight) Hours As Needed for Moderate Pain., Disp: , Rfl:   •  ALPRAZolam (XANAX) 0.25 MG tablet, Take 1 tablet by mouth At Night As Needed for Anxiety., Disp: , Rfl:     Current Facility-Administered Medications:   •  hydrocortisone-bacitracin-zinc oxide-nystatin (MAGIC BARRIER) ointment 1 application, 1 application, Topical, PRN, Luis,  Hilary Ernandez PA-C      Physical Exam:  I have reviewed the patient's current vital signs as documented in the patient's EMR.   Vitals:    03/06/23 0934   BP: 111/66   Pulse: 62   SpO2: 96%     Body mass index is 33.16 kg/m².       03/06/23  0934   Weight: 74.5 kg (164 lb 3.2 oz)     Accompanied by     Physical Exam  Constitutional:       General: She is awake.      Appearance: She is well-developed.      Interventions: Nasal cannula in place.      Comments: Ambulates independently to patient room.     HENT:      Head: Normocephalic and atraumatic.   Eyes:      General: Lids are normal.      Conjunctiva/sclera:      Right eye: Right conjunctiva is not injected.      Left eye: Left conjunctiva is not injected.   Cardiovascular:      Rate and Rhythm: Normal rate and regular rhythm.      Heart sounds: S1 normal and S2 normal.   Pulmonary:      Effort: No tachypnea or bradypnea.      Breath sounds: No decreased breath sounds, wheezing, rhonchi or rales.   Abdominal:      General: Abdomen is protuberant. Bowel sounds are normal.      Palpations: Abdomen is soft. There is hepatomegaly. There is no fluid wave.   Musculoskeletal:      Right lower leg: No swelling. 1+ Edema present.      Left lower leg: No swelling. 1+ Edema present.      Comments: Edema is non pitting with some concerns for lymphedema   Skin:     General: Skin is warm and moist.   Neurological:      Mental Status: She is alert and oriented to person, place, and time.      Comments: Follows commands and answers questions appropriately.    Psychiatric:         Attention and Perception: Attention normal.         Mood and Affect: Mood normal.         Behavior: Behavior is cooperative.       JVP: Volume/Pulsation: Normal.        DATA REVIEWED:     EKG. I personally reviewed and interpreted the EKG.        ---------------------------------------------------  TTE/JOSE ANGEL:  Results for orders placed during the hospital encounter of 08/04/22    Adult  Transthoracic Echo Complete w/ Color, Spectral and Contrast if necessary per protocol    Interpretation Summary  · Left ventricular ejection fraction appears to be 46 - 50%. Left ventricular systolic function is mildly decreased.  · Left ventricular diastolic function is consistent with (grade Ia w/high LAP) impaired relaxation.  · The right ventricular cavity is moderately dilated.  · Mildly reduced right ventricular systolic function noted.  · The right atrial cavity is severely dilated.  · Severe tricuspid valve regurgitation is present.  · Estimated right ventricular systolic pressure from tricuspid regurgitation is normal (<35 mmHg).        -----------------------------------------------------  CXR/Imaging:   Imaging Results (Most Recent)     None          I personally reviewed and interpreted the CXR.      -----------------------------------------------------  CT:   No radiology results for the last 30 days.  I personally reviewed the images of the CT scan.  My personal interpretation is below.      ----------------------------------------------------    PFTs:  None available--will consider further referral.   --------------------------------------------------------------------------------------------------    Laboratory evaluations:    Lab Results   Component Value Date    GLUCOSE 104 (H) 03/06/2023    BUN 27 (H) 03/06/2023    CREATININE 1.56 (H) 03/06/2023    EGFRIFNONA 76 01/16/2017    BCR 17.3 03/06/2023    K 3.5 03/06/2023    CO2 24.7 03/06/2023    CALCIUM 9.8 03/06/2023    ALBUMIN 3.39 (L) 08/06/2022    AST 26 08/06/2022    ALT 5 08/06/2022     Lab Results   Component Value Date    WBC 4.94 08/09/2022    HGB 10.3 (L) 08/09/2022    HCT 32.3 (L) 08/09/2022    MCV 99.1 (H) 08/09/2022     (L) 08/09/2022     Lab Results   Component Value Date    CHOL 60 08/05/2022    TRIG 52 08/05/2022    HDL 27 (L) 08/05/2022    LDL 19 08/05/2022     Lab Results   Component Value Date    TSH 6.250 (H) 08/05/2022      Lab Results   Component Value Date    HGBA1C 4.80 08/04/2022     Lab Results   Component Value Date    ALT 5 08/06/2022     Lab Results   Component Value Date    HGBA1C 4.80 08/04/2022    HGBA1C 6.20 (H) 01/16/2017     Lab Results   Component Value Date    CREATININE 1.56 (H) 03/06/2023     No results found for: IRON, TIBC, FERRITIN  Lab Results   Component Value Date    INR 1.41 (H) 08/04/2022    PROTIME 17.6 (H) 08/04/2022        Lab Results   Component Value Date    ABSOLUTELUNG 27 01/09/2023    ABSOLUTELUNG 30 09/26/2022    ABSOLUTELUNG 35 09/15/2022       PAH RISK ASSESSMENT:      1. Chronic combined systolic and diastolic heart failure (HCC)          ORDERS PLACED TODAY:  Orders Placed This Encounter   Procedures   • Basic Metabolic Panel   • proBNP   • Magnesium   • Basic Metabolic Panel   • proBNP   • Magnesium   • ReDs Vest        Diagnoses and all orders for this visit:    1. Chronic combined systolic and diastolic heart failure (HCC) (Primary)  -     Basic Metabolic Panel; Future  -     proBNP; Future  -     Magnesium; Future  -     ReDs Vest  -     Basic Metabolic Panel; Standing  -     Basic Metabolic Panel  -     proBNP; Standing  -     proBNP  -     Magnesium; Standing  -     Magnesium    Other orders  -     aspirin 81 MG EC tablet; Take 1 tablet by mouth Daily for 90 days.  Dispense: 90 tablet; Refill: 0  -     bumetanide (BUMEX) 1 MG tablet; Take 1 tablet by mouth Daily for 90 days. Take 1mg daily with extra 1mg at night as needed for swelling (take extra dose on days when you take half pill of spironolactone)  Dispense: 120 tablet; Refill: 0  -     carvedilol (Coreg) 3.125 MG tablet; Take 1 tablet by mouth 2 (Two) Times a Day With Meals for 90 days.  Dispense: 60 tablet; Refill: 2  -     empagliflozin (JARDIANCE) 10 MG tablet tablet; Take 1 tablet by mouth Daily for 90 days.  Dispense: 90 tablet; Refill: 0  -     spironolactone (ALDACTONE) 25 MG tablet; Take 0.5 tablets by mouth Daily for 90  days.  Dispense: 15 tablet; Refill: 2             MEDS ORDERED TODAY:    New Medications Ordered This Visit   Medications   • aspirin 81 MG EC tablet     Sig: Take 1 tablet by mouth Daily for 90 days.     Dispense:  90 tablet     Refill:  0   • bumetanide (BUMEX) 1 MG tablet     Sig: Take 1 tablet by mouth Daily for 90 days. Take 1mg daily with extra 1mg at night as needed for swelling (take extra dose on days when you take half pill of spironolactone)     Dispense:  120 tablet     Refill:  0   • carvedilol (Coreg) 3.125 MG tablet     Sig: Take 1 tablet by mouth 2 (Two) Times a Day With Meals for 90 days.     Dispense:  60 tablet     Refill:  2     Hold if top number is less than 100.   • empagliflozin (JARDIANCE) 10 MG tablet tablet     Sig: Take 1 tablet by mouth Daily for 90 days.     Dispense:  90 tablet     Refill:  0   • spironolactone (ALDACTONE) 25 MG tablet     Sig: Take 0.5 tablets by mouth Daily for 90 days.     Dispense:  15 tablet     Refill:  2        ---------------------------------------------------------------------------------------------------------------------------          ASSESSMENT/PLAN:      Diagnosis Plan   1. Chronic combined systolic and diastolic heart failure (HCC)  Basic Metabolic Panel    proBNP    Magnesium    ReDs Vest    Basic Metabolic Panel    Basic Metabolic Panel    proBNP    proBNP    Magnesium    Magnesium          not acutely decompensated chronic moderate systolic and diastolic heart failure. CHF. Etiology: Unclear with current refusal of ischemic work-up    NYHA stage C;FC-III. NYHA FC change: improved by one grades.     Today, Patient is approaching euvolemiaand with  Moderate perfusion. The patient's hemodynamics are currently acceptable. HR is: normal and is at goal. BP/MAP was reviewed and there is notroom for medication up-titration.  Clinical trajectory was assessed and hasimproved.     CHF GOAL DIRECTED MEDICAL THERAPY FOR PATIENT ADDRESSED/ADJUSTED:       GDMT:  Chronic combined systolic & diastolic HF with improved EF    Drug Class   Drug   Dose Last Dose Adjustment Additional Titration   Notes   ACEi/ARB/ARNI xxx xxx BP low with Lisinopril previously discontinued.      Beta Blocker Coreg 3.125mg BID      MRA Spironolactone 12.5mg      SGLT2i Jardiance 10mg  N/A      Secondary pharmacological therapies: Patient does not meet EF criteria for Verquevo.       -CHF Specific BB:   • Continue Coreg to 3.125mg BID; holding parameters reviewed.    • Continue ASA 81mg.     • We discussed monitoring with pulse oximetry and both room air and ambulatory evaluations.      -ACE/ARB/ARNi:   • Stopped Lisinopril 2.5mg in prior visits since patient has not been taking due to lower blood pressures with SBP in the 80s when she does take this medication.   • BPs have been too low to add ARB thus far.     -MRA:   • Continue Spironolactone 12.5mg with patient tolerating well and feeling improved.   • BMP with potassium WNL and creatinine slightly elevated, though still within baseline.   • Patient was advised to not use potassium products.  • Quarterly monitoring of potassium and renal function is currently recommended    -SGLT2 inhibitor therapy:   The patient has HFimpEF with LV EF 46-50%, is NYHA FC-II-IV, HF hospitilization within the past 12 months, and abnormal BNP consistent with EMPEROR-Reduced trial. The pt does not have exclusion criteria: no ACS/TIA within 90 days; no AHF consideration; no severe VHD; no ICD/CRT in the past 90 days; not in ADHF. Empagliflozin was recommended at 10mg a day to reduce CV death and/or HF hospitlization. The fact this is typically used to treat DM2 was discussed. The benefit of the trial extended to patients without DM, so this information was conveyed to the pt.   Pt was advised side effects, some severe, including hypersensitivity and Drew's; in addition to common side effects of UTIs and female genital mycotic infections were discussed.  Continue   Jardiance (empagliflozin) 10mg during last visit with quarterly assessment of GFR.    Denies  side effects.   BMP acceptable.  Creatinine is within her baseline of 1.3-1.6.     -Diuretic regimen:   • ReDs Vest reading for 03/06/23 was 29 and WNL;  reviewed reading with patient.   • ProBNP is chronically elevated, but is within her prior ranges and showing some improvement today.    • Weight is also down.   • Bumex on board and patient tolerating well.    • BMP, Mag, & ProBNP reviewed with patient.      -Fluid restriction/Sodium restriction:   • Patient has been asked to weigh daily and was provided with a printed diuretic strategy.  • 1,500 mg Na restriction was discussed.    -Acute vs. Chronic underlying conditions other than HF addressed during visit:   • Hepatic Cirrhosis:   o We discuss monitoring abdominal protuberance, as she could need another paracentesis in the future.     • CKD, stage 3a-b, improved:   o Continue medications as adjusted.   o BMP reviewed with creatinine within baseline.      • Tricuspid valve regurgitation and elevated pulmonary pressures:  o Refer to interventional cardiology for further evaluation for left and right heart cath.      • Chronic hyponatremia:  o Likely 2/2 underlying Cirrhosis.    o Prior sodium values reviewed within chart.       ------------------------------------------------------------------  -Iron/Anemia in CHF:  -Chronic appearing macrocytic anemia  -Thrombocytopenia likely 2/2 splenic sequestration in setting of Cirrhosis  · Reviewed last PCP note with TSH, vitamin b12 & Vitamin D WNL.         --------------------------------------------------------------------------------      Class 3 Severe Obesity (BMI >=40). Obesity-related health conditions include the following: hypertension and osteoarthritis. Obesity is unchanged. BMI is is above average; BMI management plan is completed. We discussed portion control.               >30 minutes out of 60 minutes face to  face spent counseling patient extensively on dietary Na+ intake, importance of activity, weight monitoring, compliance with medications in addition to importance of titration with goal directed medical therapy and follow up appointments.            This document has been electronically signed by Hilary Smith PA-C  March 6, 2023 11:58 EST      Dictated Utilizing Dragon Dictation: Part of this note may be an electronic transcription/translation of spoken language to printed text using the Dragon Dictation System.    Follow-up appointment and medication changes provided in hand delivered After Visit Summary as well as reviewed in the room.

## 2023-03-06 NOTE — PROGRESS NOTES
Heart Failure Clinic  Pharmacist Note     Della Jorge is a 62 y.o. female seen in the Heart Failure Clinic for HFmrEF 46-50%.  Della Jorge reports a good understanding of medications. Pt continues to reports that she is feeling better overall. She brought in her daily logs and her weights had reduced from 170lbs to 159lb and she reported feeling much better at the lower weight. Her weight has increased to 164lb today and she reports being confused about the fluctuations in her weight, as her medications and diet have not changed. She reports that the swelling in her feet is much improved but that some swelling remains in her belly and on some days it feels tighter than others. She is currently taking Bumex 1mg bid and occasionally takes an additional dose at night if needed. She denies any SOB. Patient reports good adherence with medications and no issues with affordability of medications. Through looking at her logs, her BP runs from /60's and she only reports some tiredness, no lightheadedness or dizziness and reports that she is tolerating the current doses of her Coreg.     Medication Use:   Hx of med intolerances: BP could not tolerate Lisinopril 2.5mg  Retail Rx Management: Pt uses [facility] and gets some assistance from them     Past Medical History:   Diagnosis Date   • Cirrhosis (HCC)    • Congestive heart failure (CHF) (HCC)    • Hypertension      ALLERGIES: Codeine  Current Outpatient Medications   Medication Sig Dispense Refill   • aspirin 81 MG EC tablet Take 1 tablet by mouth Daily for 90 days. 90 tablet 0   • bumetanide (BUMEX) 1 MG tablet Take 1 tablet by mouth Daily for 90 days. Take 1mg daily with extra 1mg at night as needed for swelling (take extra dose on days when you take half pill of spironolactone) 120 tablet 0   • carvedilol (Coreg) 3.125 MG tablet Take 1 tablet by mouth 2 (Two) Times a Day With Meals for 90 days. 60 tablet 2   • empagliflozin (JARDIANCE) 10 MG tablet  "tablet Take 1 tablet by mouth Daily for 90 days. 90 tablet 0   • ondansetron (ZOFRAN) 4 MG tablet Take 1 tablet by mouth Every 8 (Eight) Hours As Needed for Nausea or Vomiting.     • spironolactone (ALDACTONE) 25 MG tablet Take 0.5 tablets by mouth Daily for 90 days. 15 tablet 2   • traMADol (ULTRAM) 50 MG tablet Take 1 tablet by mouth Every 8 (Eight) Hours As Needed for Moderate Pain.     • ALPRAZolam (XANAX) 0.25 MG tablet Take 1 tablet by mouth At Night As Needed for Anxiety.       Current Facility-Administered Medications   Medication Dose Route Frequency Provider Last Rate Last Admin   • hydrocortisone-bacitracin-zinc oxide-nystatin (MAGIC BARRIER) ointment 1 application  1 application Topical PRN Hilary Smith PA-C           Vaccination History:   Pneumonia: never had, declines  Annual Influenza: never had, declines 10/13/22  COVID 19: never had, declines 10/13/22      Objective  Vitals:    03/06/23 0934   BP: 111/66   BP Location: Right arm   Patient Position: Sitting   Cuff Size: Small Adult   Pulse: 62   SpO2: 96%   Weight: 74.5 kg (164 lb 3.2 oz)   Height: 149.9 cm (59\")     Wt Readings from Last 3 Encounters:   03/06/23 74.5 kg (164 lb 3.2 oz)   01/09/23 78.1 kg (172 lb 3.2 oz)   11/14/22 78.2 kg (172 lb 6.4 oz)         03/06/23  0934   Weight: 74.5 kg (164 lb 3.2 oz)     Lab Results   Component Value Date    GLUCOSE 104 (H) 03/06/2023    BUN 27 (H) 03/06/2023    CREATININE 1.56 (H) 03/06/2023    EGFRIFNONA 76 01/16/2017    BCR 17.3 03/06/2023    K 3.5 03/06/2023    CO2 24.7 03/06/2023    CALCIUM 9.8 03/06/2023    ALBUMIN 3.39 (L) 08/06/2022    AST 26 08/06/2022    ALT 5 08/06/2022     Lab Results   Component Value Date    WBC 4.94 08/09/2022    HGB 10.3 (L) 08/09/2022    HCT 32.3 (L) 08/09/2022    MCV 99.1 (H) 08/09/2022     (L) 08/09/2022     Lab Results   Component Value Date    TROPONINT 0.012 08/05/2022     Lab Results   Component Value Date    PROBNP 3,169.0 (H) 03/06/2023 "     Results for orders placed during the hospital encounter of 08/04/22    Adult Transthoracic Echo Complete w/ Color, Spectral and Contrast if necessary per protocol    Interpretation Summary  · Left ventricular ejection fraction appears to be 46 - 50%. Left ventricular systolic function is mildly decreased.  · Left ventricular diastolic function is consistent with (grade Ia w/high LAP) impaired relaxation.  · The right ventricular cavity is moderately dilated.  · Mildly reduced right ventricular systolic function noted.  · The right atrial cavity is severely dilated.  · Severe tricuspid valve regurgitation is present.  · Estimated right ventricular systolic pressure from tricuspid regurgitation is normal (<35 mmHg).         GDMT    Drug Class   Drug   Dose Last Dose Adjustment Additional Titration   Notes   ACEi/ARB/ARNI    needed BP can't tolerate   Beta Blocker Coreg 3.125mg bid  decreased 9/26/22 for hypoTN needed hypoTN   MRA Spironolactone 12.5mg 10/13/22     SGLT2i Jardiance 10mg 9/26/22 N/A        Drug Therapy Problems    1. GDMT  2. Pt request refills of Bumex, Coreg, spironolactone and Jardiance  3. Slightly hypermagnesemia and hyponatremia    Recommendations:     1. Counseled patient about possibly adding a hold parameter for her Coreg, but she reported that she has to take her Coreg daily and would be scared to not take it. Continue to current doses at this time.   2. Hilary to send in   3. Continue to monitor.     Patient was educated on heart failure medications and the importance of medication adherence. All questions were addressed and patient expressed understanding. Used teach-back method to assess understanding.     Thank you for allowing me to participate in the care of your patient,    Rosettadenis Baker, [facility]  03/06/23  11:49 EST

## 2023-04-13 ENCOUNTER — TRANSCRIBE ORDERS (OUTPATIENT)
Dept: ADMINISTRATIVE | Facility: HOSPITAL | Age: 63
End: 2023-04-13
Payer: MEDICAID

## 2023-04-13 DIAGNOSIS — Z12.31 VISIT FOR SCREENING MAMMOGRAM: Primary | ICD-10-CM

## 2023-05-09 ENCOUNTER — HOSPITAL ENCOUNTER (OUTPATIENT)
Dept: CARDIOLOGY | Facility: HOSPITAL | Age: 63
Discharge: HOME | End: 2023-05-09
Admitting: PHYSICIAN ASSISTANT
Payer: MEDICAID

## 2023-05-09 ENCOUNTER — APPOINTMENT (OUTPATIENT)
Dept: MAMMOGRAPHY | Facility: HOSPITAL | Age: 63
End: 2023-05-09
Payer: MEDICAID

## 2023-05-09 VITALS
OXYGEN SATURATION: 90 % | BODY MASS INDEX: 33.55 KG/M2 | WEIGHT: 166.4 LBS | HEART RATE: 61 BPM | SYSTOLIC BLOOD PRESSURE: 104 MMHG | DIASTOLIC BLOOD PRESSURE: 65 MMHG | HEIGHT: 59 IN

## 2023-05-09 DIAGNOSIS — N18.31 STAGE 3A CHRONIC KIDNEY DISEASE: ICD-10-CM

## 2023-05-09 DIAGNOSIS — I50.42 CHRONIC COMBINED SYSTOLIC AND DIASTOLIC HEART FAILURE: Primary | ICD-10-CM

## 2023-05-09 DIAGNOSIS — K74.60 HEPATIC CIRRHOSIS, UNSPECIFIED HEPATIC CIRRHOSIS TYPE, UNSPECIFIED WHETHER ASCITES PRESENT: ICD-10-CM

## 2023-05-09 LAB
ABSOLUTE LUNG FLUID CONTENT: 33 % (ref 20–35)
ANION GAP SERPL CALCULATED.3IONS-SCNC: 12.8 MMOL/L (ref 5–15)
BUN SERPL-MCNC: 32 MG/DL (ref 8–23)
BUN/CREAT SERPL: 17.8 (ref 7–25)
CALCIUM SPEC-SCNC: 9.4 MG/DL (ref 8.6–10.5)
CHLORIDE SERPL-SCNC: 92 MMOL/L (ref 98–107)
CO2 SERPL-SCNC: 25.2 MMOL/L (ref 22–29)
CREAT SERPL-MCNC: 1.8 MG/DL (ref 0.57–1)
EGFRCR SERPLBLD CKD-EPI 2021: 31.5 ML/MIN/1.73
GLUCOSE SERPL-MCNC: 91 MG/DL (ref 65–99)
MAGNESIUM SERPL-MCNC: 2.3 MG/DL (ref 1.6–2.4)
NT-PROBNP SERPL-MCNC: 5359 PG/ML (ref 0–900)
POTASSIUM SERPL-SCNC: 4.2 MMOL/L (ref 3.5–5.2)
SODIUM SERPL-SCNC: 130 MMOL/L (ref 136–145)

## 2023-05-09 PROCEDURE — 83880 ASSAY OF NATRIURETIC PEPTIDE: CPT | Performed by: PHYSICIAN ASSISTANT

## 2023-05-09 PROCEDURE — 80048 BASIC METABOLIC PNL TOTAL CA: CPT | Performed by: PHYSICIAN ASSISTANT

## 2023-05-09 PROCEDURE — 94726 PLETHYSMOGRAPHY LUNG VOLUMES: CPT | Performed by: PHYSICIAN ASSISTANT

## 2023-05-09 PROCEDURE — 83735 ASSAY OF MAGNESIUM: CPT | Performed by: PHYSICIAN ASSISTANT

## 2023-05-09 RX ORDER — BUMETANIDE 1 MG/1
1 TABLET ORAL DAILY
Qty: 120 TABLET | Refills: 0 | Status: SHIPPED | OUTPATIENT
Start: 2023-05-09 | End: 2023-08-07

## 2023-05-09 RX ORDER — CARVEDILOL 3.12 MG/1
3.12 TABLET ORAL 2 TIMES DAILY WITH MEALS
Qty: 60 TABLET | Refills: 2 | Status: SHIPPED | OUTPATIENT
Start: 2023-05-09 | End: 2023-08-07

## 2023-05-09 RX ORDER — SPIRONOLACTONE 25 MG/1
12.5 TABLET ORAL 3 TIMES WEEKLY
Qty: 6 TABLET | Refills: 2 | Status: SHIPPED | OUTPATIENT
Start: 2023-05-10 | End: 2023-08-08

## 2023-05-09 RX ORDER — FLUOCINONIDE 1 MG/G
1 CREAM TOPICAL 2 TIMES DAILY PRN
COMMUNITY
Start: 2023-04-12

## 2023-05-09 NOTE — PROGRESS NOTES
Heart Failure Clinic  Pharmacist Note     Della Jorge is a 62 y.o. female seen in the Heart Failure Clinic for HFmrEF 46-50%. She reports some SOB and swelling in her stomach and feet.    Della Jorge reports a good understanding of medications. She is concerned about not having enough bumetanide because she is taking 1mg BID and some days takes an additional 1mg.     Medication Use:   Hx of med intolerances: BP could not tolerate Lisinopril 2.5mg  Retail Rx Management: [facility]    Past Medical History:   Diagnosis Date   • Cirrhosis    • Congestive heart failure (CHF)    • Hypertension      ALLERGIES: Codeine  Current Outpatient Medications   Medication Sig Dispense Refill   • aspirin 81 MG EC tablet Take 1 tablet by mouth Daily for 90 days. 90 tablet 0   • bumetanide (BUMEX) 1 MG tablet Take 1 tablet by mouth Daily for 90 days. Take 1mg in the morning daily. Take extra 1mg tablet in the morning for 3 weight lb in day or 5 lbs in a week. 120 tablet 0   • carvedilol (Coreg) 3.125 MG tablet Take 1 tablet by mouth 2 (Two) Times a Day With Meals for 90 days. 60 tablet 2   • empagliflozin (JARDIANCE) 10 MG tablet tablet Take 1 tablet by mouth Daily for 90 days. 90 tablet 0   • ondansetron (ZOFRAN) 4 MG tablet Take 1 tablet by mouth Every 8 (Eight) Hours As Needed for Nausea or Vomiting.     • [START ON 5/10/2023] spironolactone (ALDACTONE) 25 MG tablet Take 0.5 tablets by mouth 3 (Three) Times a Week for 90 days. 6 tablet 2   • traMADol (ULTRAM) 50 MG tablet Take 1 tablet by mouth Every 8 (Eight) Hours As Needed for Moderate Pain.     • fluocinonide 0.1 % cream cream Apply 1 application topically to the appropriate area as directed 2 (Two) Times a Day As Needed.       Current Facility-Administered Medications   Medication Dose Route Frequency Provider Last Rate Last Admin   • hydrocortisone-bacitracin-zinc oxide-nystatin (MAGIC BARRIER) ointment 1 application  1 application Topical PRN Hilary Smith  "RAOUL Ernandez           Vaccination History:   Pneumonia: never had, declines  Annual Influenza: never had, declines 10/13/22  COVID 19: never had, declines 10/13/22      Objective  Vitals:    05/09/23 0948   BP: 104/65   BP Location: Left arm   Patient Position: Sitting   Cuff Size: Adult   Pulse: 61   SpO2: 90%   Weight: 75.5 kg (166 lb 6.4 oz)   Height: 149.9 cm (59\")     Wt Readings from Last 3 Encounters:   05/09/23 75.5 kg (166 lb 6.4 oz)   03/06/23 74.5 kg (164 lb 3.2 oz)   01/09/23 78.1 kg (172 lb 3.2 oz)         05/09/23 0948   Weight: 75.5 kg (166 lb 6.4 oz)     Lab Results   Component Value Date    GLUCOSE 91 05/09/2023    BUN 32 (H) 05/09/2023    CREATININE 1.80 (H) 05/09/2023    EGFRIFNONA 76 01/16/2017    BCR 17.8 05/09/2023    K 4.2 05/09/2023    CO2 25.2 05/09/2023    CALCIUM 9.4 05/09/2023    ALBUMIN 3.39 (L) 08/06/2022    AST 26 08/06/2022    ALT 5 08/06/2022     Lab Results   Component Value Date    WBC 4.94 08/09/2022    HGB 10.3 (L) 08/09/2022    HCT 32.3 (L) 08/09/2022    MCV 99.1 (H) 08/09/2022     (L) 08/09/2022     Lab Results   Component Value Date    TROPONINT 0.012 08/05/2022     Lab Results   Component Value Date    PROBNP 5,359.0 (H) 05/09/2023     Results for orders placed during the hospital encounter of 08/04/22    Adult Transthoracic Echo Complete w/ Color, Spectral and Contrast if necessary per protocol    Interpretation Summary  · Left ventricular ejection fraction appears to be 46 - 50%. Left ventricular systolic function is mildly decreased.  · Left ventricular diastolic function is consistent with (grade Ia w/high LAP) impaired relaxation.  · The right ventricular cavity is moderately dilated.  · Mildly reduced right ventricular systolic function noted.  · The right atrial cavity is severely dilated.  · Severe tricuspid valve regurgitation is present.  · Estimated right ventricular systolic pressure from tricuspid regurgitation is normal (<35 mmHg).         GDMT    Drug " Class   Drug   Dose Last Dose Adjustment Additional Titration   Notes   ACEi/ARB/ARNI    needed BP can't tolerate   Beta Blocker Coreg 3.125mg bid  decreased 9/26/22 for hypoTN needed hypoTN   MRA Spironolactone 12.5mg 10/13/22     SGLT2i Jardiance 10mg 9/26/22 N/A        Drug Therapy Problems    1. GDMT      Recommendations:     1. Consider titration of GDMT in the future if BP can tolerate.     Patient was educated on heart failure medications and the importance of medication adherence. All questions were addressed and patient expressed understanding.     Thank you for allowing me to participate in the care of your patient,    Laura Guerrero, PharmD  05/09/23  15:52 EDT

## 2023-05-09 NOTE — PROGRESS NOTES
Heart Failure Clinic    Date: 05/09/23     Vitals:    05/09/23 0948   BP: 104/65   Pulse: 61   SpO2: 90%    weight 166.4    Method of arrival: Ambulatory    Weighing self daily: Most days    Monitoring Heart Failure Zones: Yes    Today's HF Zone: Yellow     Taking medications as prescribed: Yes    Edema Yes    Shortness of Air: No    Number of pillows used at night:<2    Educational Materials given:  avs                                                                         ReDS Value: 33  25-35 Optimal Value Status      Nelly Michaels RN 05/09/23 09:52 EDT

## 2023-05-09 NOTE — PROGRESS NOTES
[facility]  RAOUL Worthington Daniel W, MD  [address] [place] [address]    Thank you for asking me to see Della Jorge for congestive heart failure.    HPI:     This is a 62 y.o. 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  The patient is typically seen by Sebastián Dougherty MD.       • Last known EF 46-50%.    • Last known hospitalization and/or ED visit: Patient was hospitalized from August 4, 2022 through August 9, 2022 with acute on chronic combined diastolic and systolic heart failure and acute hypoxemic respiratory failure after presenting to the emergency department at [facility] with anasarca and ascites.  Initially required 4 L via nasal cannula and was felt to be in decompensated heart failure with concern she was also developing cirrhosis from congestive hepatopathy.  She did have 10 L of ascites removed consistent with portal hypertension and patient was diuresed with improvement in symptoms.  Cardiology did recommend right heart cath to further evaluate tricuspid valve regurgitation and pulmonary pressures but patient did refuse during hospitalization to undergo procedure for the time being.  She was maximized on heart failure therapy with the addition of lisinopril and spironolactone in addition to home Coreg and Bumex in addition to reportedly being hypokalemic.  She was discharged on 1 L via nasal cannula and outpatient heart failure clinic appointment was  arranged.  • Accompanied by:       05/09/23 visit data/details regarding:   • Dyspnea: Denies dyspnea on exertion  • Lower extremity swelling: Improving swelling.   • Abdominal swelling: Protuberant abdomen with known swelling.   • Home weight: Improving with diuresis  • Home BP: SBP running ~ 100s-110s; periodic low blood pressures.   • Home heart rate:  High 50s-60s  • Daily activities of living: Performing independently with increased energy.  Able to get herself in and out of the car now.   • HF zone: Yellow  • Pillows/lying flat:   Sleeping in bed now, and able to get herself in and out (progress from prior visits).    • Mrs. Jorge is chest pain free.  She reports swelling is intermittent in nature.   • We discuss specifically abdominal swelling and prior paracentesis. We discuss that she may need another in the future, but for now she would like to continue her regimen of medications.     Specialists:   • Cardiology: Refer for [facility].   • Hepatology referral placed during hospital discharge        Review of Systems - Review of Systems   Constitutional: Negative for chills, decreased appetite, fever and malaise/fatigue.   HENT: Negative for congestion, ear discharge and ear pain.    Eyes: Negative for blurred vision and discharge.   Cardiovascular: Positive for leg swelling. Negative for dyspnea on exertion.   Respiratory: Negative for cough and hemoptysis.    Endocrine: Negative for heat intolerance.   Hematologic/Lymphatic: Negative for adenopathy and bleeding problem. Does not bruise/bleed easily.   Skin: Negative for color change, dry skin and nail changes.   Musculoskeletal: Negative for arthritis and back pain.   Gastrointestinal: Negative for bloating and abdominal pain.   Genitourinary: Negative for bladder incontinence and decreased libido.   Psychiatric/Behavioral: Negative for altered mental status and depression.         All other systems were reviewed and were negative.    Patient Active  Problem List   Diagnosis   • Acute on chronic congestive heart failure, unspecified heart failure type   • Hepatic cirrhosis   • Chronic combined systolic and diastolic heart failure   • Stage 3a chronic kidney disease   • Chronic hyponatremia   • Severe tricuspid valve regurgitation       family history includes Diabetes in her sister; Heart disease in her mother.     reports that she has never smoked. She has never used smokeless tobacco. She reports that she does not drink alcohol and does not use drugs.    Allergies   Allergen Reactions   • Codeine Hallucinations         Current Outpatient Medications:   •  ALPRAZolam (XANAX) 0.25 MG tablet, Take 1 tablet by mouth At Night As Needed for Anxiety., Disp: , Rfl:   •  aspirin 81 MG EC tablet, Take 1 tablet by mouth Daily for 90 days., Disp: 90 tablet, Rfl: 0  •  bumetanide (BUMEX) 1 MG tablet, Take 1 tablet by mouth Daily for 90 days. Take 1mg daily with extra 1mg at night as needed for swelling (take extra dose on days when you take half pill of spironolactone), Disp: 120 tablet, Rfl: 0  •  carvedilol (Coreg) 3.125 MG tablet, Take 1 tablet by mouth 2 (Two) Times a Day With Meals for 90 days., Disp: 60 tablet, Rfl: 2  •  empagliflozin (JARDIANCE) 10 MG tablet tablet, Take 1 tablet by mouth Daily for 90 days., Disp: 90 tablet, Rfl: 0  •  ondansetron (ZOFRAN) 4 MG tablet, Take 1 tablet by mouth Every 8 (Eight) Hours As Needed for Nausea or Vomiting., Disp: , Rfl:   •  spironolactone (ALDACTONE) 25 MG tablet, Take 0.5 tablets by mouth Daily for 90 days., Disp: 15 tablet, Rfl: 2  •  traMADol (ULTRAM) 50 MG tablet, Take 1 tablet by mouth Every 8 (Eight) Hours As Needed for Moderate Pain., Disp: , Rfl:     Current Facility-Administered Medications:   •  hydrocortisone-bacitracin-zinc oxide-nystatin (MAGIC BARRIER) ointment 1 application, 1 application, Topical, PRN, Hilary Smith PA-C      Physical Exam:  I have reviewed the patient's current vital signs as  documented in the patient's EMR.   Vitals:    05/09/23 0948   BP: 104/65   Pulse: 61   SpO2: 90%     Body mass index is 33.61 kg/m².       05/09/23 0948   Weight: 75.5 kg (166 lb 6.4 oz)     Accompanied by     Physical Exam  Constitutional:       General: She is awake.      Appearance: She is well-developed.      Interventions: Nasal cannula in place.      Comments: Ambulates independently to patient room.     HENT:      Head: Normocephalic and atraumatic.   Eyes:      General: Lids are normal.      Conjunctiva/sclera:      Right eye: Right conjunctiva is not injected.      Left eye: Left conjunctiva is not injected.   Cardiovascular:      Rate and Rhythm: Normal rate and regular rhythm.      Heart sounds: S1 normal and S2 normal.   Pulmonary:      Effort: No tachypnea or bradypnea.      Breath sounds: No decreased breath sounds, wheezing, rhonchi or rales.   Abdominal:      General: Abdomen is protuberant. Bowel sounds are normal.      Palpations: Abdomen is soft. There is hepatomegaly. There is no fluid wave.   Musculoskeletal:      Right lower leg: No swelling. 1+ Edema present.      Left lower leg: No swelling. 1+ Edema present.      Comments: Edema is non pitting with some concerns for lymphedema   Skin:     General: Skin is warm and moist.   Neurological:      Mental Status: She is alert and oriented to person, place, and time.      Comments: Follows commands and answers questions appropriately.    Psychiatric:         Attention and Perception: Attention normal.         Mood and Affect: Mood normal.         Behavior: Behavior is cooperative.       JVP: Volume/Pulsation: Normal.        DATA REVIEWED:     EKG. I personally reviewed and interpreted the EKG.        ---------------------------------------------------  TTE/JOSE ANGEL:  Results for orders placed during the hospital encounter of 08/04/22    Adult Transthoracic Echo Complete w/ Color, Spectral and Contrast if necessary per protocol    Interpretation  Summary  · Left ventricular ejection fraction appears to be 46 - 50%. Left ventricular systolic function is mildly decreased.  · Left ventricular diastolic function is consistent with (grade Ia w/high LAP) impaired relaxation.  · The right ventricular cavity is moderately dilated.  · Mildly reduced right ventricular systolic function noted.  · The right atrial cavity is severely dilated.  · Severe tricuspid valve regurgitation is present.  · Estimated right ventricular systolic pressure from tricuspid regurgitation is normal (<35 mmHg).        -----------------------------------------------------  CXR/Imaging:   Imaging Results (Most Recent)     None          I personally reviewed and interpreted the CXR.      -----------------------------------------------------  CT:   No radiology results for the last 30 days.  I personally reviewed the images of the CT scan.  My personal interpretation is below.      ----------------------------------------------------    PFTs:  None available--will consider further referral.   --------------------------------------------------------------------------------------------------    Laboratory evaluations:    Lab Results   Component Value Date    GLUCOSE 104 (H) 03/06/2023    BUN 27 (H) 03/06/2023    CREATININE 1.56 (H) 03/06/2023    EGFRIFNONA 76 01/16/2017    BCR 17.3 03/06/2023    K 3.5 03/06/2023    CO2 24.7 03/06/2023    CALCIUM 9.8 03/06/2023    ALBUMIN 3.39 (L) 08/06/2022    AST 26 08/06/2022    ALT 5 08/06/2022     Lab Results   Component Value Date    WBC 4.94 08/09/2022    HGB 10.3 (L) 08/09/2022    HCT 32.3 (L) 08/09/2022    MCV 99.1 (H) 08/09/2022     (L) 08/09/2022     Lab Results   Component Value Date    CHOL 60 08/05/2022    TRIG 52 08/05/2022    HDL 27 (L) 08/05/2022    LDL 19 08/05/2022     Lab Results   Component Value Date    TSH 6.250 (H) 08/05/2022     Lab Results   Component Value Date    HGBA1C 4.80 08/04/2022     Lab Results   Component Value Date     ALT 5 08/06/2022     Lab Results   Component Value Date    HGBA1C 4.80 08/04/2022    HGBA1C 6.20 (H) 01/16/2017     Lab Results   Component Value Date    CREATININE 1.56 (H) 03/06/2023     No results found for: IRON, TIBC, FERRITIN  Lab Results   Component Value Date    INR 1.41 (H) 08/04/2022    PROTIME 17.6 (H) 08/04/2022        Lab Results   Component Value Date    ABSOLUTELUNG 29 03/06/2023    ABSOLUTELUNG 27 01/09/2023    ABSOLUTELUNG 30 09/26/2022       PAH RISK ASSESSMENT:      1. Chronic combined systolic and diastolic heart failure          ORDERS PLACED TODAY:  Orders Placed This Encounter   Procedures   • Basic Metabolic Panel   • proBNP   • Magnesium   • Basic Metabolic Panel   • proBNP   • Magnesium   • ReDs Vest        Diagnoses and all orders for this visit:    1. Chronic combined systolic and diastolic heart failure (Primary)  -     Basic Metabolic Panel; Future  -     proBNP; Future  -     Magnesium; Future  -     Basic Metabolic Panel; Standing  -     Basic Metabolic Panel  -     proBNP; Standing  -     proBNP  -     Magnesium; Standing  -     Magnesium  -     ReDs Vest             MEDS ORDERED TODAY:    No orders of the defined types were placed in this encounter.       ---------------------------------------------------------------------------------------------------------------------------          ASSESSMENT/PLAN:      Diagnosis Plan   1. Chronic combined systolic and diastolic heart failure  Basic Metabolic Panel    proBNP    Magnesium    Basic Metabolic Panel    Basic Metabolic Panel    proBNP    proBNP    Magnesium    Magnesium    ReDs Vest          not acutely decompensated chronic moderate systolic and diastolic heart failure. CHF. Etiology: Unclear with current refusal of ischemic work-up    NYHA stage C;FC-III. NYHA FC change: improved by one grades.     Today, Patient is approaching euvolemiaand with  Moderate perfusion. The patient's hemodynamics are currently acceptable. HR is: normal  and is at goal. BP/MAP was reviewed and there is notroom for medication up-titration.  Clinical trajectory was assessed and hasimproved.     CHF GOAL DIRECTED MEDICAL THERAPY FOR PATIENT ADDRESSED/ADJUSTED:       GDMT: Chronic combined systolic & diastolic HF with improved EF    Drug Class   Drug   Dose Last Dose Adjustment Additional Titration   Notes   ACEi/ARB/ARNI xxx xxx BP low with Lisinopril previously discontinued.      Beta Blocker Coreg 3.125mg BID      MRA Spironolactone 12.5mg 3x weekly decrease     SGLT2i Jardiance 10mg  N/A      Secondary pharmacological therapies: Patient does not meet EF criteria for Verquevo.        -CHF Specific BB:   • Continue Coreg to 3.125mg BID.    • Continue ASA 81mg.     • We discussed monitoring with pulse oximetry and both room air and ambulatory evaluations.      -ACE/ARB/ARNi:   • Stopped Lisinopril 2.5mg in prior visits since patient has not been taking due to lower blood pressures with SBP in the 80s when she does take this medication.   • BPs have been too low to add ARB thus far.     -MRA:   • Continue Spironolactone 12.5mg decreased to 3x weekly on MWF due to worsening renal function and decreasing eGFR.   • BMP with potassium WNL and creatinine slightly elevated, though still within baseline.   • Patient was advised to not use potassium products.  • Quarterly monitoring of potassium and renal function is currently recommended    -SGLT2 inhibitor therapy:   The patient has HFimpEF with LV EF 46-50%, is NYHA FC-II-IV, HF hospitilization within the past 12 months, and abnormal BNP consistent with EMPEROR-Reduced trial. The pt does not have exclusion criteria: no ACS/TIA within 90 days; no AHF consideration; no severe VHD; no ICD/CRT in the past 90 days; not in ADHF. Empagliflozin was recommended at 10mg a day to reduce CV death and/or HF hospitlization. The fact this is typically used to treat DM2 was discussed. The benefit of the trial extended to patients without  DM, so this information was conveyed to the pt.   Pt was advised side effects, some severe, including hypersensitivity and Drew's; in addition to common side effects of UTIs and female genital mycotic infections were discussed.  Continue  Jardiance (empagliflozin) 10mg during last visit with quarterly assessment of GFR.    Denies  side effects.   BMP acceptable.  Creatinine is within her baseline of 1.3-1.6.     -Diuretic regimen:   • ReDs Vest reading for 05/09/23 was 33 and WNL;  reviewed reading with patient.   • ProBNP is chronically elevated, but is within her prior ranges.   • Bumex 1mg in the morning with extra 1mg in the morning in addition to her usual dose for 3lb weight gain in a day and 5lbs in a week.    • BMP, Mag, & ProBNP reviewed with patient.     -Fluid restriction/Sodium restriction:   • Patient has been asked to weigh daily and was provided with a printed diuretic strategy.  • 1,500 mg Na restriction was discussed.    -Acute vs. Chronic underlying conditions other than HF addressed during visit:   • Hepatic Cirrhosis:   o We discuss monitoring abdominal protuberance, as she could need another paracentesis in the future.     • CKD, stage 3a-b, improved:   o Continue medications as adjusted.   o BMP reviewed with creatinine within baseline.      • Tricuspid valve regurgitation and elevated pulmonary pressures:  o Refer to interventional cardiology for further evaluation for left and right heart cath.      • Acute on likely chronic hyponatremia:  o Likely 2/2 underlying Cirrhosis.    o Prior sodium values reviewed within chart.       ------------------------------------------------------------------  -Iron/Anemia in CHF:  -Chronic appearing macrocytic anemia  -Thrombocytopenia likely 2/2 splenic sequestration in setting of Cirrhosis  · Reviewed last PCP note with TSH, vitamin b12 & Vitamin D WNL.          --------------------------------------------------------------------------------      Class 3 Severe Obesity (BMI >=40). Obesity-related health conditions include the following: hypertension and osteoarthritis. Obesity is unchanged. BMI is is above average; BMI management plan is completed. We discussed portion control.               >45 minutes out of 60 minutes face to face spent counseling patient extensively on dietary Na+ intake, importance of activity, weight monitoring, compliance with medications in addition to importance of titration with goal directed medical therapy and follow up appointments.            This document has been electronically signed by Hilary Smith PA-C  May 9, 2023 09:58 EDT      Dictated Utilizing Dragon Dictation: Part of this note may be an electronic transcription/translation of spoken language to printed text using the Dragon Dictation System.    Follow-up appointment and medication changes provided in hand delivered After Visit Summary as well as reviewed in the room.

## 2023-05-09 NOTE — ADDENDUM NOTE
Encounter addended by: Laura Guerrero, PharmD on: 5/9/2023 3:57 PM   Actions taken: Clinical Note Signed

## 2023-08-24 RX ORDER — BUMETANIDE 1 MG/1
2 TABLET ORAL 2 TIMES DAILY
Qty: 120 TABLET | Refills: 0 | Status: SHIPPED | OUTPATIENT
Start: 2023-08-24 | End: 2023-11-22

## 2023-09-14 ENCOUNTER — HOSPITAL ENCOUNTER (OUTPATIENT)
Dept: CARDIOLOGY | Facility: HOSPITAL | Age: 63
Discharge: HOME | End: 2023-09-14
Admitting: PHYSICIAN ASSISTANT
Payer: MEDICAID

## 2023-09-14 VITALS
HEART RATE: 69 BPM | OXYGEN SATURATION: 92 % | DIASTOLIC BLOOD PRESSURE: 60 MMHG | BODY MASS INDEX: 31.77 KG/M2 | HEIGHT: 59 IN | WEIGHT: 157.6 LBS | SYSTOLIC BLOOD PRESSURE: 113 MMHG

## 2023-09-14 DIAGNOSIS — N18.31 STAGE 3A CHRONIC KIDNEY DISEASE: ICD-10-CM

## 2023-09-14 DIAGNOSIS — R18.8 OTHER ASCITES: Primary | ICD-10-CM

## 2023-09-14 DIAGNOSIS — K74.60 HEPATIC CIRRHOSIS, UNSPECIFIED HEPATIC CIRRHOSIS TYPE, UNSPECIFIED WHETHER ASCITES PRESENT: ICD-10-CM

## 2023-09-14 DIAGNOSIS — I50.42 CHRONIC COMBINED SYSTOLIC AND DIASTOLIC HEART FAILURE: ICD-10-CM

## 2023-09-14 LAB
ABSOLUTE LUNG FLUID CONTENT: 28 % (ref 20–35)
ANION GAP SERPL CALCULATED.3IONS-SCNC: 12.2 MMOL/L (ref 5–15)
BUN SERPL-MCNC: 32 MG/DL (ref 8–23)
BUN/CREAT SERPL: 19.4 (ref 7–25)
CALCIUM SPEC-SCNC: 9.4 MG/DL (ref 8.6–10.5)
CHLORIDE SERPL-SCNC: 94 MMOL/L (ref 98–107)
CO2 SERPL-SCNC: 23.8 MMOL/L (ref 22–29)
CREAT SERPL-MCNC: 1.65 MG/DL (ref 0.57–1)
EGFRCR SERPLBLD CKD-EPI 2021: 35 ML/MIN/1.73
GLUCOSE SERPL-MCNC: 95 MG/DL (ref 65–99)
MAGNESIUM SERPL-MCNC: 2.5 MG/DL (ref 1.6–2.4)
NT-PROBNP SERPL-MCNC: 3026 PG/ML (ref 0–900)
POTASSIUM SERPL-SCNC: 3.9 MMOL/L (ref 3.5–5.2)
SODIUM SERPL-SCNC: 130 MMOL/L (ref 136–145)

## 2023-09-14 PROCEDURE — 83880 ASSAY OF NATRIURETIC PEPTIDE: CPT | Performed by: PHYSICIAN ASSISTANT

## 2023-09-14 PROCEDURE — 83735 ASSAY OF MAGNESIUM: CPT | Performed by: PHYSICIAN ASSISTANT

## 2023-09-14 PROCEDURE — 80048 BASIC METABOLIC PNL TOTAL CA: CPT | Performed by: PHYSICIAN ASSISTANT

## 2023-09-14 PROCEDURE — 94726 PLETHYSMOGRAPHY LUNG VOLUMES: CPT | Performed by: PHYSICIAN ASSISTANT

## 2023-09-14 NOTE — PROGRESS NOTES
Heart Failure Clinic  Pharmacist Note     Della Jorge is a 62 y.o. female seen in the Heart Failure Clinic for HFmrEF 46-50%. She reports a good understanding of medications. Patient denies SOB, but reports swelling in her feet and abdomen. She states her stomach is where she retains fluid, and reports it is very swollen and tight today.      She reports she takes 25 mg of spironolactone most days of the week; despite it being written for 12.5 mg daily. She also takes Bumex 2 mg daily and takes 1 mg PRN. Patient states she has only needed to take the PRN dose ~3-4 in the past 30-days.     Patient checks her weight, BP, and HR daily. She reports that her SBP is usually in the 's, her HR is typically 50-70's, and weight remains stable around ~155.     Medication Use:   Hx of med intolerances: BP could not tolerate Lisinopril 2.5mg  Retail Rx Management: [facility]    Past Medical History:   Diagnosis Date    Cirrhosis     Congestive heart failure (CHF)     Hypertension      ALLERGIES: Codeine  Current Outpatient Medications   Medication Sig Dispense Refill    bumetanide (BUMEX) 1 MG tablet Take 2 tablets by mouth 2 (Two) Times a Day for 90 days. 2mg in the morning and then 2mg at lunch. 120 tablet 0    carvedilol (Coreg) 3.125 MG tablet Take 1 tablet by mouth 2 (Two) Times a Day With Meals for 90 days. 60 tablet 2    empagliflozin (JARDIANCE) 10 MG tablet tablet Take 1 tablet by mouth Daily for 90 days. 90 tablet 0    fluocinonide 0.1 % cream cream Apply 1 application  topically to the appropriate area as directed 2 (Two) Times a Day As Needed.      ondansetron (ZOFRAN) 4 MG tablet Take 1 tablet by mouth Every 8 (Eight) Hours As Needed for Nausea or Vomiting.      spironolactone (ALDACTONE) 25 MG tablet Take 0.5 tablets by mouth Daily for 90 days. 20 tablet 2    traMADol (ULTRAM) 50 MG tablet Take 1 tablet by mouth Every 8 (Eight) Hours As Needed for Moderate Pain.       No current facility-administered  "medications for this encounter.       Vaccination History:   Pneumonia: never had, declines  Annual Influenza: never had, declines 10/13/22  COVID 19: never had, declines 10/13/22      Objective  Vitals:    09/14/23 1023   BP: 113/60   BP Location: Left arm   Patient Position: Sitting   Cuff Size: Adult   Pulse: 69   SpO2: 92%   Weight: 71.5 kg (157 lb 9.6 oz)   Height: 149.9 cm (59\")       Wt Readings from Last 3 Encounters:   09/14/23 71.5 kg (157 lb 9.6 oz)   07/07/23 72.3 kg (159 lb 6.4 oz)   05/09/23 75.5 kg (166 lb 6.4 oz)         09/14/23  1023   Weight: 71.5 kg (157 lb 9.6 oz)       Lab Results   Component Value Date    GLUCOSE 97 07/07/2023    BUN 26 (H) 07/07/2023    CREATININE 1.69 (H) 07/07/2023    EGFRIFNONA 76 01/16/2017    BCR 15.4 07/07/2023    K 4.3 07/07/2023    CO2 26.5 07/07/2023    CALCIUM 9.8 07/07/2023    ALBUMIN 3.39 (L) 08/06/2022    AST 26 08/06/2022    ALT 5 08/06/2022     Lab Results   Component Value Date    WBC 4.25 07/07/2023    HGB 12.1 07/07/2023    HCT 37.9 07/07/2023    MCV 89.6 07/07/2023     07/07/2023     Lab Results   Component Value Date    TROPONINT 0.012 08/05/2022     Lab Results   Component Value Date    PROBNP 3,026.0 (H) 09/14/2023     Results for orders placed during the hospital encounter of 08/04/22    Adult Transthoracic Echo Complete w/ Color, Spectral and Contrast if necessary per protocol    Interpretation Summary  · Left ventricular ejection fraction appears to be 46 - 50%. Left ventricular systolic function is mildly decreased.  · Left ventricular diastolic function is consistent with (grade Ia w/high LAP) impaired relaxation.  · The right ventricular cavity is moderately dilated.  · Mildly reduced right ventricular systolic function noted.  · The right atrial cavity is severely dilated.  · Severe tricuspid valve regurgitation is present.  · Estimated right ventricular systolic pressure from tricuspid regurgitation is normal (<35 mmHg).     "     GDMT    Drug Class   Drug   Dose Last Dose Adjustment Additional Titration   Notes   ACEi/ARB/ARNI    needed BP can't tolerate   Beta Blocker Coreg 3.125mg bid  decreased 9/26/22 for hypoTN needed hypoTN   MRA Spironolactone 12.5mg and 25 mg alternating 10/13/22     SGLT2i Jardiance 10mg 9/26/22 N/A        Drug Therapy Problems    GDMT      Recommendations:     No recommendations at this time. Consider titration of GDMT medications in the future as BP allows.      Patient was educated on heart failure medications and the importance of medication adherence. All questions were addressed and patient expressed understanding.     Thank you for allowing me to participate in the care of your patient,    Rosaura Jean, [facility]  09/14/23  11:15 EDT

## 2023-09-14 NOTE — PROGRESS NOTES
Heart Failure Clinic    Date: 09/14/23     Vitals:    09/14/23 1023   BP: 113/60   Pulse: 69   SpO2: 92%    Weight 157.6    Method of arrival: Ambulatory    Weighing self daily: Yes    Monitoring Heart Failure Zones: Yes    Today's HF Zone: Yellow     Taking medications as prescribed: Yes    Edema Yes    Shortness of Air: No    Number of pillows used at night:<2    Educational Materials given:  avs                                                                         ReDS Value: 28  25-35 Optimal Value Status      Nelly Michaels RN 09/14/23 10:24 EDT

## 2023-09-14 NOTE — PROGRESS NOTES
[facility]  RAOUL Worthington, Hilary Ernandez PA-C  [address] [place] [address]    Thank you for asking me to see Della Jorge for congestive heart failure.    HPI:     This is a 62 y.o. 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  The patient is typically seen by Sebastián Dougherty MD.       Last known EF 46-50%.    Last known hospitalization and/or ED visit: Patient was hospitalized from August 4, 2022 through August 9, 2022 with acute on chronic combined diastolic and systolic heart failure and acute hypoxemic respiratory failure after presenting to the emergency department at [facility] with anasarca and ascites.  Initially required 4 L via nasal cannula and was felt to be in decompensated heart failure with concern she was also developing cirrhosis from congestive hepatopathy.  She did have 10 L of ascites removed consistent with portal hypertension and patient was diuresed with improvement in symptoms.  Cardiology did recommend right heart cath to further evaluate tricuspid valve regurgitation and pulmonary pressures but patient did refuse during hospitalization to undergo procedure for the time being.  She was maximized on heart failure therapy with the addition of lisinopril and spironolactone in addition to home Coreg and Bumex in addition to reportedly being hypokalemic.  She was discharged on 1 L via nasal cannula and outpatient heart failure clinic appointment was  arranged.  Accompanied by:       09/14/23 visit data/details regarding:   Dyspnea: Denies dyspnea on exertion  Lower extremity swelling: Improving swelling.   Abdominal swelling: Protuberant abdomen with known swelling that has continued to worsen  Home weight: Improving with diuresis  Home BP: SBP running ~ 100s-110s; periodic low blood pressures.   Home heart rate:  High 50s-60s  Daily activities of living: Performing independently with increased energy.  Able to get herself in and out of the car now.   HF zone: Yellow  Pillows/lying flat:   Sleeping in bed now, and able to get herself in and out (progress from prior visits).    Abdominal distension is worsening.  For sometime now, we have been discussing possible paracentesis.  Patient is now agreeable for procedure.    She is chest pain free but reports pressure from her abdominal fluid seems to press upward.      Specialists:   Cardiology: Refer for [facility].   Hepatology referral placed during hospital discharge        Review of Systems - Review of Systems   Constitutional: Negative for chills, decreased appetite, fever and malaise/fatigue.   HENT:  Negative for congestion, ear discharge and ear pain.    Eyes:  Negative for blurred vision and discharge.   Cardiovascular:  Positive for leg swelling. Negative for dyspnea on exertion.   Respiratory:  Negative for cough and hemoptysis.    Endocrine: Negative for heat intolerance.   Hematologic/Lymphatic: Negative for adenopathy and bleeding problem. Does not bruise/bleed easily.   Skin:  Negative for color change, dry skin and nail changes.   Musculoskeletal:  Negative for arthritis and back pain.   Gastrointestinal:  Positive for bloating. Negative for abdominal pain.   Genitourinary:  Negative for bladder incontinence and decreased libido.   Psychiatric/Behavioral:  Negative for altered mental status and depression.        All other systems were reviewed and were negative.    Patient Active Problem List    Diagnosis    Acute on chronic congestive heart failure, unspecified heart failure type    Hepatic cirrhosis    Chronic combined systolic and diastolic heart failure    Stage 3a chronic kidney disease    Chronic hyponatremia    Severe tricuspid valve regurgitation       family history includes Diabetes in her sister; Heart disease in her mother.     reports that she has never smoked. She has never used smokeless tobacco. She reports that she does not drink alcohol and does not use drugs.    Allergies   Allergen Reactions    Codeine Hallucinations         Current Outpatient Medications:     bumetanide (BUMEX) 1 MG tablet, Take 2 tablets by mouth 2 (Two) Times a Day for 90 days. 2mg in the morning and then 2mg at lunch., Disp: 120 tablet, Rfl: 0    carvedilol (Coreg) 3.125 MG tablet, Take 1 tablet by mouth 2 (Two) Times a Day With Meals for 90 days., Disp: 60 tablet, Rfl: 2    empagliflozin (JARDIANCE) 10 MG tablet tablet, Take 1 tablet by mouth Daily for 90 days., Disp: 90 tablet, Rfl: 0    fluocinonide 0.1 % cream cream, Apply 1 application  topically to the appropriate area as directed 2 (Two) Times a Day As Needed., Disp: , Rfl:     ondansetron (ZOFRAN) 4 MG tablet, Take 1 tablet by mouth Every 8 (Eight) Hours As Needed for Nausea or Vomiting., Disp: , Rfl:     spironolactone (ALDACTONE) 25 MG tablet, Take 0.5 tablets by mouth Daily for 90 days., Disp: 20 tablet, Rfl: 2    traMADol (ULTRAM) 50 MG tablet, Take 1 tablet by mouth Every 8 (Eight) Hours As Needed for Moderate Pain., Disp: , Rfl:       Physical Exam:  I have reviewed the patient's current vital signs as documented in the patient's EMR.   Vitals:    09/14/23 1023   BP: 113/60   Pulse: 69   SpO2: 92%     Body mass index is 31.83 kg/m².       09/14/23  1023   Weight: 71.5 kg (157 lb 9.6 oz)         Physical Exam  Constitutional:       General: She is awake.      Appearance: She is well-developed.      Interventions: Nasal cannula in place.      Comments:  Ambulates independently to patient room.     HENT:      Head: Normocephalic and atraumatic.   Eyes:      General: Lids are normal.      Conjunctiva/sclera:      Right eye: Right conjunctiva is not injected.      Left eye: Left conjunctiva is not injected.   Cardiovascular:      Rate and Rhythm: Normal rate and regular rhythm.      Heart sounds: S1 normal and S2 normal.   Pulmonary:      Effort: No tachypnea or bradypnea.      Breath sounds: No decreased breath sounds, wheezing, rhonchi or rales.   Abdominal:      General: Abdomen is protuberant. Bowel sounds are normal. There is distension.      Palpations: Abdomen is soft. There is hepatomegaly. There is no fluid wave.   Musculoskeletal:      Right lower leg: No swelling. 1+ Edema present.      Left lower leg: No swelling. 1+ Edema present.      Comments: Edema is non pitting with some concerns for lymphedema   Skin:     General: Skin is warm and moist.   Neurological:      Mental Status: She is alert and oriented to person, place, and time.      Comments: Follows commands and answers questions appropriately.    Psychiatric:         Attention and Perception: Attention normal.         Mood and Affect: Mood normal.         Behavior: Behavior is cooperative.     JVP: Volume/Pulsation: Normal.        DATA REVIEWED:     EKG. I personally reviewed and interpreted the EKG.        ---------------------------------------------------  TTE/JOSE ANGEL:  Results for orders placed during the hospital encounter of 08/04/22    Adult Transthoracic Echo Complete w/ Color, Spectral and Contrast if necessary per protocol    Interpretation Summary  · Left ventricular ejection fraction appears to be 46 - 50%. Left ventricular systolic function is mildly decreased.  · Left ventricular diastolic function is consistent with (grade Ia w/high LAP) impaired relaxation.  · The right ventricular cavity is moderately dilated.  · Mildly reduced right ventricular systolic function noted.  · The right  atrial cavity is severely dilated.  · Severe tricuspid valve regurgitation is present.  · Estimated right ventricular systolic pressure from tricuspid regurgitation is normal (<35 mmHg).        -----------------------------------------------------  CXR/Imaging:   Imaging Results (Most Recent)       None            I personally reviewed and interpreted the CXR.      -----------------------------------------------------  CT:   No radiology results for the last 30 days.  I personally reviewed the images of the CT scan.  My personal interpretation is below.      ----------------------------------------------------    PFTs:  None available--will consider further referral.   --------------------------------------------------------------------------------------------------    Laboratory evaluations:    Lab Results   Component Value Date    GLUCOSE 95 09/14/2023    BUN 32 (H) 09/14/2023    CREATININE 1.65 (H) 09/14/2023    EGFRIFNONA 76 01/16/2017    BCR 19.4 09/14/2023    K 3.9 09/14/2023    CO2 23.8 09/14/2023    CALCIUM 9.4 09/14/2023    ALBUMIN 3.39 (L) 08/06/2022    AST 26 08/06/2022    ALT 5 08/06/2022     Lab Results   Component Value Date    WBC 4.25 07/07/2023    HGB 12.1 07/07/2023    HCT 37.9 07/07/2023    MCV 89.6 07/07/2023     07/07/2023     Lab Results   Component Value Date    CHOL 60 08/05/2022    TRIG 52 08/05/2022    HDL 27 (L) 08/05/2022    LDL 19 08/05/2022     Lab Results   Component Value Date    TSH 6.250 (H) 08/05/2022     Lab Results   Component Value Date    HGBA1C 4.80 08/04/2022     Lab Results   Component Value Date    ALT 5 08/06/2022     Lab Results   Component Value Date    HGBA1C 4.80 08/04/2022    HGBA1C 6.20 (H) 01/16/2017     Lab Results   Component Value Date    CREATININE 1.65 (H) 09/14/2023     No results found for: IRON, TIBC, FERRITIN  Lab Results   Component Value Date    INR 1.41 (H) 08/04/2022    PROTIME 17.6 (H) 08/04/2022        Lab Results   Component Value Date     ABSOLUTELUNG 28 09/14/2023    ABSOLUTELUNG 29 07/07/2023    ABSOLUTELUNG 33 05/09/2023       PAH RISK ASSESSMENT:      1. Other ascites    2. Chronic combined systolic and diastolic heart failure    3. Hepatic cirrhosis, unspecified hepatic cirrhosis type, unspecified whether ascites present    4. Acute on chronic congestive heart failure, unspecified heart failure type          ORDERS PLACED TODAY:  Orders Placed This Encounter   Procedures    ABDOMINAL PARACENTESIS    ABDOMINAL PARACENTESIS    US Abdomen Complete    Basic Metabolic Panel    Magnesium    proBNP    Basic Metabolic Panel    Magnesium    proBNP    ReDs Vest        Diagnoses and all orders for this visit:    1. Other ascites (Primary)  -     US Abdomen Complete; Future  -     ABDOMINAL PARACENTESIS    2. Chronic combined systolic and diastolic heart failure  -     Basic Metabolic Panel; Future  -     Magnesium; Future  -     proBNP; Future  -     Basic Metabolic Panel; Standing  -     Basic Metabolic Panel  -     Magnesium; Standing  -     Magnesium  -     proBNP; Standing  -     proBNP  -     ReDs Vest    3. Hepatic cirrhosis, unspecified hepatic cirrhosis type, unspecified whether ascites present  -     US Abdomen Complete; Future  -     ABDOMINAL PARACENTESIS  -     ABDOMINAL PARACENTESIS    4. Acute on chronic congestive heart failure, unspecified heart failure type             MEDS ORDERED TODAY:    No orders of the defined types were placed in this encounter.       ---------------------------------------------------------------------------------------------------------------------------          ASSESSMENT/PLAN:      Diagnosis Plan   1. Other ascites  US Abdomen Complete    ABDOMINAL PARACENTESIS      2. Chronic combined systolic and diastolic heart failure  Basic Metabolic Panel    Magnesium    proBNP    Basic Metabolic Panel    Basic Metabolic Panel    Magnesium    Magnesium    proBNP    proBNP    ReDs Vest      3. Hepatic cirrhosis,  unspecified hepatic cirrhosis type, unspecified whether ascites present  US Abdomen Complete    ABDOMINAL PARACENTESIS    ABDOMINAL PARACENTESIS      4. Acute on chronic congestive heart failure, unspecified heart failure type            not acutely decompensated chronic moderate systolic and diastolic heart failure. CHF. Etiology: Unclear with current refusal of ischemic work-up    NYHA stage C;FC-III. NYHA FC change: improved by one grades.     Today, Patient is approaching euvolemiaand with  Moderate perfusion. The patient's hemodynamics are currently acceptable. HR is: normal and is at goal. BP/MAP was reviewed and there is notroom for medication up-titration.  Clinical trajectory was assessed and hasimproved.     CHF GOAL DIRECTED MEDICAL THERAPY FOR PATIENT ADDRESSED/ADJUSTED:       GDMT: Chronic combined systolic & diastolic HF with improved EF    Drug Class   Drug   Dose Last Dose Adjustment Additional Titration   Notes   ACEi/ARB/ARNI xxx xxx BP low with Lisinopril previously discontinued.      Beta Blocker Coreg 3.125mg BID      MRA Spironolactone 12.5mg Daily     SGLT2i Jardiance 10mg  N/A      Secondary pharmacological therapies: Patient does not meet EF criteria for Verquevo.        -CHF Specific BB:   Continue Coreg to 3.125mg BID.    We discussed monitoring with pulse oximetry and both room air and ambulatory evaluations.      -ACE/ARB/ARNi:   Stopped Lisinopril 2.5mg in prior visits since patient has not been taking due to lower blood pressures with SBP in the 80s when she does take this medication.   BPs have been too low to add ARB thus far.     -MRA:   Continue Spironolactone 12.5mg  & 25mg in alternating doses on daily basis  We have tried adjusting previously but patient had ongoing swelling with adjustments.  Unfortunately, eGFR has not tolerated attempts with increased dosing.    Patient was advised to not use potassium products.  Quarterly monitoring of potassium and renal function is  currently recommended    -SGLT2 inhibitor therapy:   The patient has HFimpEF with LV EF 46-50%, is NYHA FC-II-IV, HF hospitilization within the past 12 months, and abnormal BNP consistent with EMPEROR-Reduced trial. The pt does not have exclusion criteria: no ACS/TIA within 90 days; no AHF consideration; no severe VHD; no ICD/CRT in the past 90 days; not in ADHF. Empagliflozin was recommended at 10mg a day to reduce CV death and/or HF hospitlization. The fact this is typically used to treat DM2 was discussed. The benefit of the trial extended to patients without DM, so this information was conveyed to the pt.   Pt was advised side effects, some severe, including hypersensitivity and Drew's; in addition to common side effects of UTIs and female genital mycotic infections were discussed.  Continue  Jardiance (empagliflozin) 10mg during last visit with quarterly assessment of GFR.    Denies  side effects.   BMP acceptable.  Creatinine is within her baseline of 1.3-1.6.     -Diuretic regimen:   ReDs Vest reading for 09/14/23 was 28 and WNL;  reviewed reading with patient.   ProBNP is chronically elevated, but is within her prior ranges.   Continue Bumex 2mg qd with extra 2mg as needed.  Discussed as well hold parameters for hypotension given prior hx of hypotension.   BMP, Mag, & ProBNP reviewed with patient.     -Fluid restriction/Sodium restriction:   Patient has been asked to weigh daily and was provided with a printed diuretic strategy.  1,500 mg Na restriction was discussed.    -Acute vs. Chronic underlying conditions other than HF addressed during visit:   Hepatic Cirrhosis:   Therapeutic/Diagnostic Paracentesis ordered.  Patient reports worsening abdominal distension that has not improved in spite diuresis.  She had 10L removed around one year ago.      CKD, stage 3a-b, improved:   Continue medications as adjusted.   BMP reviewed with creatinine within baseline.      Tricuspid valve regurgitation and  "elevated pulmonary pressures:  Refer to interventional cardiology for further evaluation for left and right heart cath previously placed.   Patient previously and continuing to voice that she does not currently want \"more tests or workups\" at this time.      Chronic hyponatremia:  Likely 2/2 underlying Cirrhosis.    Prior sodium values reviewed within chart.         ------------------------------------------------------------------  -Iron/Anemia in CHF:  -Chronic appearing macrocytic anemia  -Thrombocytopenia likely 2/2 splenic sequestration in setting of Cirrhosis  CBC checked on 07/07/23 and Hgb is stable.          --------------------------------------------------------------------------------      Class 3 Severe Obesity (BMI >=40). Obesity-related health conditions include the following: hypertension and osteoarthritis. Obesity is unchanged. BMI is is above average; BMI management plan is completed. We discussed portion control.               >45 minutes out of 60 minutes face to face spent counseling patient extensively on dietary Na+ intake, importance of activity, weight monitoring, compliance with medications in addition to importance of titration with goal directed medical therapy and follow up appointments.            This document has been electronically signed by Hilary Smith PA-C  September 14, 2023 12:00 EDT      Dictated Utilizing Dragon Dictation: Part of this note may be an electronic transcription/translation of spoken language to printed text using the Dragon Dictation System.    Follow-up appointment and medication changes provided in hand delivered After Visit Summary as well as reviewed in the room.      "

## 2023-09-22 DIAGNOSIS — R18.8 OTHER ASCITES: Primary | ICD-10-CM

## 2023-09-22 RX ORDER — FLUCONAZOLE 150 MG/1
150 TABLET ORAL ONCE
Qty: 1 TABLET | Refills: 0 | Status: SHIPPED | OUTPATIENT
Start: 2023-09-22 | End: 2023-09-22

## 2023-09-28 DIAGNOSIS — R18.8 CIRRHOSIS OF LIVER WITH ASCITES, UNSPECIFIED HEPATIC CIRRHOSIS TYPE: Primary | ICD-10-CM

## 2023-09-28 DIAGNOSIS — K74.60 CIRRHOSIS OF LIVER WITH ASCITES, UNSPECIFIED HEPATIC CIRRHOSIS TYPE: Primary | ICD-10-CM

## 2023-10-02 DIAGNOSIS — I50.42 CHRONIC COMBINED SYSTOLIC AND DIASTOLIC HEART FAILURE: Primary | ICD-10-CM

## 2023-10-02 DIAGNOSIS — R18.8 CIRRHOSIS OF LIVER WITH ASCITES, UNSPECIFIED HEPATIC CIRRHOSIS TYPE: ICD-10-CM

## 2023-10-02 DIAGNOSIS — K74.60 CIRRHOSIS OF LIVER WITH ASCITES, UNSPECIFIED HEPATIC CIRRHOSIS TYPE: ICD-10-CM

## 2023-10-03 DIAGNOSIS — R18.8 OTHER ASCITES: Primary | ICD-10-CM

## 2023-10-03 RX ORDER — ALBUMIN (HUMAN) 12.5 G/50ML
25 SOLUTION INTRAVENOUS ONCE
OUTPATIENT
Start: 2023-10-03 | End: 2023-10-03

## 2023-10-05 DIAGNOSIS — K74.60 CIRRHOSIS OF LIVER WITH ASCITES, UNSPECIFIED HEPATIC CIRRHOSIS TYPE: ICD-10-CM

## 2023-10-05 DIAGNOSIS — R18.8 CIRRHOSIS OF LIVER WITH ASCITES, UNSPECIFIED HEPATIC CIRRHOSIS TYPE: ICD-10-CM

## 2023-10-05 DIAGNOSIS — R18.8 OTHER ASCITES: Primary | ICD-10-CM

## 2023-10-05 RX ORDER — ALBUMIN (HUMAN) 12.5 G/50ML
50 SOLUTION INTRAVENOUS ONCE
OUTPATIENT
Start: 2023-10-05 | End: 2023-10-05

## 2023-10-16 RX ORDER — CARVEDILOL 3.12 MG/1
3.12 TABLET ORAL 2 TIMES DAILY WITH MEALS
Qty: 60 TABLET | Refills: 5 | Status: SHIPPED | OUTPATIENT
Start: 2023-10-16 | End: 2024-01-14

## 2023-10-16 RX ORDER — BUMETANIDE 1 MG/1
2 TABLET ORAL 2 TIMES DAILY
Qty: 120 TABLET | Refills: 3 | Status: SHIPPED | OUTPATIENT
Start: 2023-10-16 | End: 2024-01-14

## 2023-10-16 RX ORDER — SPIRONOLACTONE 25 MG/1
12.5 TABLET ORAL DAILY
Qty: 20 TABLET | Refills: 5 | Status: SHIPPED | OUTPATIENT
Start: 2023-10-16 | End: 2024-01-14

## 2023-10-18 ENCOUNTER — HOSPITAL ENCOUNTER (OUTPATIENT)
Dept: ULTRASOUND IMAGING | Facility: HOSPITAL | Age: 63
Discharge: HOME | End: 2023-10-18
Admitting: PHYSICIAN ASSISTANT
Payer: MEDICAID

## 2023-10-18 ENCOUNTER — INFUSION (OUTPATIENT)
Dept: ONCOLOGY | Facility: HOSPITAL | Age: 63
End: 2023-10-18
Payer: MEDICAID

## 2023-10-18 VITALS
BODY MASS INDEX: 32.52 KG/M2 | DIASTOLIC BLOOD PRESSURE: 56 MMHG | WEIGHT: 161 LBS | SYSTOLIC BLOOD PRESSURE: 103 MMHG | TEMPERATURE: 96.4 F | HEART RATE: 65 BPM | RESPIRATION RATE: 18 BRPM | OXYGEN SATURATION: 91 %

## 2023-10-18 VITALS — RESPIRATION RATE: 18 BRPM | SYSTOLIC BLOOD PRESSURE: 119 MMHG | DIASTOLIC BLOOD PRESSURE: 73 MMHG

## 2023-10-18 DIAGNOSIS — K74.60 HEPATIC CIRRHOSIS, UNSPECIFIED HEPATIC CIRRHOSIS TYPE, UNSPECIFIED WHETHER ASCITES PRESENT: Primary | ICD-10-CM

## 2023-10-18 DIAGNOSIS — K74.60 CIRRHOSIS OF LIVER WITH ASCITES, UNSPECIFIED HEPATIC CIRRHOSIS TYPE: ICD-10-CM

## 2023-10-18 DIAGNOSIS — R18.8 OTHER ASCITES: ICD-10-CM

## 2023-10-18 DIAGNOSIS — R18.8 CIRRHOSIS OF LIVER WITH ASCITES, UNSPECIFIED HEPATIC CIRRHOSIS TYPE: ICD-10-CM

## 2023-10-18 LAB
INR PPP: 1.34 (ref 0.9–1.1)
PLATELET # BLD AUTO: 159 10*3/MM3 (ref 140–450)
PROTHROMBIN TIME: 17.1 SECONDS (ref 12.1–14.7)

## 2023-10-18 PROCEDURE — 96365 THER/PROPH/DIAG IV INF INIT: CPT

## 2023-10-18 PROCEDURE — P9047 ALBUMIN (HUMAN), 25%, 50ML: HCPCS | Performed by: PHYSICIAN ASSISTANT

## 2023-10-18 PROCEDURE — 76942 ECHO GUIDE FOR BIOPSY: CPT

## 2023-10-18 PROCEDURE — 85610 PROTHROMBIN TIME: CPT | Performed by: RADIOLOGY

## 2023-10-18 PROCEDURE — 25010000002 ALBUMIN HUMAN 25% PER 50 ML: Performed by: PHYSICIAN ASSISTANT

## 2023-10-18 PROCEDURE — 85049 AUTOMATED PLATELET COUNT: CPT | Performed by: RADIOLOGY

## 2023-10-18 RX ORDER — ALBUMIN (HUMAN) 12.5 G/50ML
25 SOLUTION INTRAVENOUS
Status: COMPLETED | OUTPATIENT
Start: 2023-10-18 | End: 2023-10-18

## 2023-10-18 RX ORDER — ALBUMIN (HUMAN) 12.5 G/50ML
50 SOLUTION INTRAVENOUS ONCE
Status: DISCONTINUED | OUTPATIENT
Start: 2023-10-18 | End: 2023-10-18

## 2023-10-18 RX ADMIN — ALBUMIN HUMAN 25 G: 0.25 SOLUTION INTRAVENOUS at 12:29

## 2023-10-18 RX ADMIN — ALBUMIN HUMAN 25 G: 0.25 SOLUTION INTRAVENOUS at 12:57

## 2023-11-09 ENCOUNTER — HOSPITAL ENCOUNTER (OUTPATIENT)
Dept: CARDIOLOGY | Facility: HOSPITAL | Age: 63
Discharge: HOME | End: 2023-11-09
Admitting: PHYSICIAN ASSISTANT
Payer: MEDICAID

## 2023-11-09 VITALS
SYSTOLIC BLOOD PRESSURE: 103 MMHG | HEART RATE: 55 BPM | DIASTOLIC BLOOD PRESSURE: 55 MMHG | WEIGHT: 139.8 LBS | HEIGHT: 59 IN | BODY MASS INDEX: 28.18 KG/M2

## 2023-11-09 DIAGNOSIS — N18.32 STAGE 3B CHRONIC KIDNEY DISEASE (CKD): ICD-10-CM

## 2023-11-09 DIAGNOSIS — E87.1 CHRONIC HYPONATREMIA: ICD-10-CM

## 2023-11-09 DIAGNOSIS — I50.42 CHRONIC COMBINED SYSTOLIC AND DIASTOLIC HEART FAILURE: Primary | ICD-10-CM

## 2023-11-09 DIAGNOSIS — K74.60 HEPATIC CIRRHOSIS, UNSPECIFIED HEPATIC CIRRHOSIS TYPE, UNSPECIFIED WHETHER ASCITES PRESENT: ICD-10-CM

## 2023-11-09 LAB
ABSOLUTE LUNG FLUID CONTENT: 29 % (ref 20–35)
ANION GAP SERPL CALCULATED.3IONS-SCNC: 9.5 MMOL/L (ref 5–15)
BUN SERPL-MCNC: 32 MG/DL (ref 8–23)
BUN/CREAT SERPL: 19.6 (ref 7–25)
CALCIUM SPEC-SCNC: 9.7 MG/DL (ref 8.6–10.5)
CHLORIDE SERPL-SCNC: 94 MMOL/L (ref 98–107)
CO2 SERPL-SCNC: 27.5 MMOL/L (ref 22–29)
CREAT SERPL-MCNC: 1.63 MG/DL (ref 0.57–1)
EGFRCR SERPLBLD CKD-EPI 2021: 35.5 ML/MIN/1.73
GLUCOSE SERPL-MCNC: 105 MG/DL (ref 65–99)
MAGNESIUM SERPL-MCNC: 2.4 MG/DL (ref 1.6–2.4)
NT-PROBNP SERPL-MCNC: 3593 PG/ML (ref 0–900)
POTASSIUM SERPL-SCNC: 3.8 MMOL/L (ref 3.5–5.2)
SODIUM SERPL-SCNC: 131 MMOL/L (ref 136–145)

## 2023-11-09 PROCEDURE — 80048 BASIC METABOLIC PNL TOTAL CA: CPT | Performed by: PHYSICIAN ASSISTANT

## 2023-11-09 PROCEDURE — 83735 ASSAY OF MAGNESIUM: CPT | Performed by: PHYSICIAN ASSISTANT

## 2023-11-09 PROCEDURE — 83880 ASSAY OF NATRIURETIC PEPTIDE: CPT | Performed by: PHYSICIAN ASSISTANT

## 2023-11-09 PROCEDURE — 94726 PLETHYSMOGRAPHY LUNG VOLUMES: CPT | Performed by: PHYSICIAN ASSISTANT

## 2023-11-09 NOTE — PROGRESS NOTES
Heart Failure Clinic    Date: 11/09/23     Vitals:    11/09/23 0910   BP: 103/55   Pulse: 55      Weight 139.8  Method of arrival: Ambulatory    Weighing self daily: Yes    Monitoring Heart Failure Zones: Yes    Today's HF Zone: Green    Taking medications as prescribed: Yes    Edema Yes    Shortness of Air: No    Number of pillows used at night:>3    Educational Materials given:  karl Marshall Value: 29    25-35 Optimal Value Status      Nelly Michaels RN 11/09/23 09:12 EST

## 2023-11-09 NOTE — PROGRESS NOTES
[facility] Failure [facility]  RAOUL Worthington Daniel W, MD  [address] [place] [address]    Thank you for asking me to see Della Jorge for congestive heart failure.    HPI:     This is a 62 y.o. 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  The patient is typically seen by Sebastián Dougherty MD.       Last known EF 46-50%.    Last known hospitalization and/or ED visit: Patient was hospitalized from August 4, 2022 through August 9, 2022 with acute on chronic combined diastolic and systolic heart failure and acute hypoxemic respiratory failure after presenting to the emergency department at [facility] with anasarca and ascites.  Initially required 4 L via nasal cannula and was felt to be in decompensated heart failure with concern she was also developing cirrhosis from congestive hepatopathy.  She did have 10 L of ascites removed consistent with portal hypertension and patient was diuresed with improvement in symptoms.  Cardiology did recommend right heart cath to further evaluate tricuspid valve regurgitation and pulmonary pressures but patient did refuse during hospitalization to undergo procedure for the time being.  She was maximized on heart failure therapy with the addition of lisinopril and spironolactone in addition to home Coreg and Bumex in addition to reportedly being hypokalemic.  She was discharged on 1 L via nasal cannula and outpatient heart failure clinic appointment was  arranged.  Accompanied by:       11/09/23 visit data/details regarding:   Dyspnea: Denies dyspnea on exertion  Lower extremity swelling: Improving swelling.   Abdominal swelling: Protuberant abdomen with known swelling that has continued to worsen  Home weight: Improving with diuresis  Home BP: SBP running ~ 100s-110s; periodic low blood pressures. Patient keeps daily BP & weight log and brings to her appointments.    Home heart rate:  High 50s-60s  Daily activities of living: Performing independently with increased energy.  Able to get herself in and out of the car now.   HF zone: Green  Pillows/lying flat:   Sleeping in bed now, and able to get herself in and out (progress from prior visits).    Patient had 12L of fluid off with paracentesis last month and feels significantly improved since that time.  She reports improvement in quality of life with paracentesis.   She denies chest pain and reports she feels she can be more active. She is down 18 lbs from her last visit.      Specialists:   Cardiology: Refer for [facility].   Hepatology referral placed during hospital discharge        Review of Systems - Review of Systems   Constitutional: Negative for chills, decreased appetite, fever and malaise/fatigue.   HENT:  Negative for congestion, ear discharge and ear pain.    Eyes:  Negative for blurred vision and discharge.   Cardiovascular:  Positive for leg swelling. Negative for dyspnea on exertion.   Respiratory:  Negative for cough and hemoptysis.    Endocrine: Negative for heat intolerance.   Hematologic/Lymphatic: Negative for adenopathy and bleeding problem. Does not bruise/bleed easily.   Skin:  Negative for color change, dry skin and nail changes.   Musculoskeletal:  Negative for arthritis and back pain.   Gastrointestinal:  Negative for bloating and abdominal pain.   Genitourinary:  Negative for bladder incontinence and decreased libido.   Psychiatric/Behavioral:  Negative for altered mental status and  depression.          All other systems were reviewed and were negative.    Patient Active Problem List   Diagnosis    Acute on chronic congestive heart failure, unspecified heart failure type    Hepatic cirrhosis    Chronic combined systolic and diastolic heart failure    Stage 3a chronic kidney disease    Chronic hyponatremia    Severe tricuspid valve regurgitation       family history includes Diabetes in her sister; Heart disease in her mother.     reports that she has never smoked. She has never used smokeless tobacco. She reports that she does not drink alcohol and does not use drugs.    Allergies   Allergen Reactions    Codeine Hallucinations         Current Outpatient Medications:     bumetanide (BUMEX) 1 MG tablet, Take 2 tablets by mouth 2 (Two) Times a Day for 90 days. 2mg in the morning and then 2mg at lunch., Disp: 120 tablet, Rfl: 3    carvedilol (Coreg) 3.125 MG tablet, Take 1 tablet by mouth 2 (Two) Times a Day With Meals for 90 days., Disp: 60 tablet, Rfl: 5    empagliflozin (JARDIANCE) 10 MG tablet tablet, Take 1 tablet by mouth Daily for 180 days., Disp: 90 tablet, Rfl: 1    fluocinonide 0.1 % cream cream, Apply 1 application  topically to the appropriate area as directed 2 (Two) Times a Day As Needed., Disp: , Rfl:     ondansetron (ZOFRAN) 4 MG tablet, Take 1 tablet by mouth Every 8 (Eight) Hours As Needed for Nausea or Vomiting., Disp: , Rfl:     spironolactone (ALDACTONE) 25 MG tablet, Take 0.5 tablets by mouth Daily for 90 days., Disp: 20 tablet, Rfl: 5    traMADol (ULTRAM) 50 MG tablet, Take 1 tablet by mouth Every 8 (Eight) Hours As Needed for Moderate Pain., Disp: , Rfl:       Physical Exam:  I have reviewed the patient's current vital signs as documented in the patient's EMR.   Vitals:    11/09/23 0910   BP: 103/55   Pulse: 55     Body mass index is 28.24 kg/m².       11/09/23 0910   Weight: 63.4 kg (139 lb 12.8 oz)         Physical Exam  Constitutional:       General: She is awake.       Appearance: She is well-developed.      Interventions: Nasal cannula in place.      Comments: Ambulates independently to patient room.     HENT:      Head: Normocephalic and atraumatic.   Eyes:      General: Lids are normal.      Conjunctiva/sclera:      Right eye: Right conjunctiva is not injected.      Left eye: Left conjunctiva is not injected.   Cardiovascular:      Rate and Rhythm: Normal rate and regular rhythm.      Heart sounds: S1 normal and S2 normal.   Pulmonary:      Effort: No tachypnea or bradypnea.      Breath sounds: No decreased breath sounds, wheezing, rhonchi or rales.   Abdominal:      General: Abdomen is protuberant. Bowel sounds are normal. There is distension.      Palpations: Abdomen is soft. There is hepatomegaly. There is no fluid wave.      Comments: Distension present but improved   Musculoskeletal:      Right lower leg: No swelling. 1+ Edema present.      Left lower leg: No swelling. 1+ Edema present.      Comments: Edema is non pitting with some concerns for lymphedema   Skin:     General: Skin is warm and moist.   Neurological:      Mental Status: She is alert and oriented to person, place, and time.      Comments: Follows commands and answers questions appropriately.    Psychiatric:         Attention and Perception: Attention normal.         Mood and Affect: Mood normal.         Behavior: Behavior is cooperative.       JVP: Volume/Pulsation: Normal.        DATA REVIEWED:     EKG. I personally reviewed and interpreted the EKG.        ---------------------------------------------------  TTE/JOSE ANGEL:  Results for orders placed during the hospital encounter of 08/04/22    Adult Transthoracic Echo Complete w/ Color, Spectral and Contrast if necessary per protocol    Interpretation Summary  · Left ventricular ejection fraction appears to be 46 - 50%. Left ventricular systolic function is mildly decreased.  · Left ventricular diastolic function is consistent with (grade Ia w/high LAP) impaired  relaxation.  · The right ventricular cavity is moderately dilated.  · Mildly reduced right ventricular systolic function noted.  · The right atrial cavity is severely dilated.  · Severe tricuspid valve regurgitation is present.  · Estimated right ventricular systolic pressure from tricuspid regurgitation is normal (<35 mmHg).        -----------------------------------------------------  CXR/Imaging:   Imaging Results (Most Recent)       None            I personally reviewed and interpreted the CXR.      -----------------------------------------------------  CT:   US Paracentesis    Result Date: 10/18/2023    8 Guyanese catheter used to remove ascites under ultrasound guidance.   This report was finalized on 10/18/2023 1:21 PM by Dr. Moe Jonas MD.     I personally reviewed the images of the CT scan.  My personal interpretation is below.      ----------------------------------------------------    PFTs:  None available--will consider further referral.   --------------------------------------------------------------------------------------------------    Laboratory evaluations:    Lab Results   Component Value Date    GLUCOSE 105 (H) 11/09/2023    BUN 32 (H) 11/09/2023    CREATININE 1.63 (H) 11/09/2023    EGFRIFNONA 76 01/16/2017    BCR 19.6 11/09/2023    K 3.8 11/09/2023    CO2 27.5 11/09/2023    CALCIUM 9.7 11/09/2023    ALBUMIN 3.39 (L) 08/06/2022    AST 26 08/06/2022    ALT 5 08/06/2022     Lab Results   Component Value Date    WBC 4.25 07/07/2023    HGB 12.1 07/07/2023    HCT 37.9 07/07/2023    MCV 89.6 07/07/2023     10/18/2023     Lab Results   Component Value Date    CHOL 60 08/05/2022    TRIG 52 08/05/2022    HDL 27 (L) 08/05/2022    LDL 19 08/05/2022     Lab Results   Component Value Date    TSH 6.250 (H) 08/05/2022     Lab Results   Component Value Date    HGBA1C 4.80 08/04/2022     Lab Results   Component Value Date    ALT 5 08/06/2022     Lab Results   Component Value Date    HGBA1C 4.80  "08/04/2022    HGBA1C 6.20 (H) 01/16/2017     Lab Results   Component Value Date    CREATININE 1.63 (H) 11/09/2023     No results found for: \"IRON\", \"TIBC\", \"FERRITIN\"  Lab Results   Component Value Date    INR 1.34 (H) 10/18/2023    INR 1.41 (H) 08/04/2022    PROTIME 17.1 (H) 10/18/2023    PROTIME 17.6 (H) 08/04/2022        Lab Results   Component Value Date    ABSOLUTELUNG 29 11/09/2023    ABSOLUTELUNG 28 09/14/2023    ABSOLUTELUNG 29 07/07/2023       PAH RISK ASSESSMENT:      1. Chronic combined systolic and diastolic heart failure    2. Hepatic cirrhosis, unspecified hepatic cirrhosis type, unspecified whether ascites present    3. Stage 3b chronic kidney disease (CKD)    4. Chronic hyponatremia          ORDERS PLACED TODAY:  Orders Placed This Encounter   Procedures    Basic Metabolic Panel    Magnesium    proBNP    Basic Metabolic Panel    Magnesium    proBNP    ReDs Vest        Diagnoses and all orders for this visit:    1. Chronic combined systolic and diastolic heart failure (Primary)  -     Basic Metabolic Panel; Future  -     Magnesium; Future  -     proBNP; Future  -     Basic Metabolic Panel; Standing  -     Basic Metabolic Panel  -     Magnesium; Standing  -     Magnesium  -     proBNP; Standing  -     proBNP  -     ReDs Vest    2. Hepatic cirrhosis, unspecified hepatic cirrhosis type, unspecified whether ascites present    3. Stage 3b chronic kidney disease (CKD)    4. Chronic hyponatremia             MEDS ORDERED TODAY:    No orders of the defined types were placed in this encounter.       ---------------------------------------------------------------------------------------------------------------------------          ASSESSMENT/PLAN:      Diagnosis Plan   1. Chronic combined systolic and diastolic heart failure  Basic Metabolic Panel    Magnesium    proBNP    Basic Metabolic Panel    Basic Metabolic Panel    Magnesium    Magnesium    proBNP    proBNP    ReDs Vest      2. Hepatic cirrhosis, " unspecified hepatic cirrhosis type, unspecified whether ascites present        3. Stage 3b chronic kidney disease (CKD)        4. Chronic hyponatremia            not acutely decompensated chronic moderate systolic and diastolic heart failure. CHF. Etiology: Unclear with current refusal of ischemic work-up    NYHA stage C;FC-III. NYHA FC change: improved by one grades.     Today, Patient is approaching euvolemiaand with  Moderate perfusion. The patient's hemodynamics are currently acceptable. HR is: normal and is at goal. BP/MAP was reviewed and there is notroom for medication up-titration.  Clinical trajectory was assessed and hasimproved.     CHF GOAL DIRECTED MEDICAL THERAPY FOR PATIENT ADDRESSED/ADJUSTED:       GDMT: Chronic combined systolic & diastolic HF with improved EF    Drug Class   Drug   Dose Last Dose Adjustment Additional Titration   Notes   ACEi/ARB/ARNI xxx xxx BP low with Lisinopril previously discontinued.      Beta Blocker Coreg 3.125mg BID      MRA Spironolactone 12.5mg Daily     SGLT2i Jardiance 10mg  N/A      Secondary pharmacological therapies: Patient does not meet EF criteria for Verquevo.        -CHF Specific BB:   Continue Coreg to 3.125mg BID.    We discussed monitoring with pulse oximetry and both room air and ambulatory evaluations.      -ACE/ARB/ARNi:   Stopped Lisinopril 2.5mg in prior visits 2/2 hypoTN.    BPs have been too low to add ARB thus far.     -MRA:   Continue Spironolactone 12.5mg  & 25mg in alternating doses on daily basis  We have tried adjusting previously but patient had ongoing swelling with adjustments.  Unfortunately, eGFR has not tolerated attempts with increased dosing.    Patient was advised to not use potassium products.  Quarterly monitoring of potassium and renal function is currently recommended    -SGLT2 inhibitor therapy:   The patient has HFimpEF with LV EF 46-50%, is NYHA FC-II-IV, HF hospitilization within the past 12 months, and abnormal BNP consistent  "with EMPEROR-Reduced trial. The pt does not have exclusion criteria: no ACS/TIA within 90 days; no AHF consideration; no severe VHD; no ICD/CRT in the past 90 days; not in ADHF. Empagliflozin was recommended at 10mg a day to reduce CV death and/or HF hospitlization. The fact this is typically used to treat DM2 was discussed. The benefit of the trial extended to patients without DM, so this information was conveyed to the pt.   Pt was advised side effects, some severe, including hypersensitivity and Drew's; in addition to common side effects of UTIs and female genital mycotic infections were discussed.  Continue  Jardiance (empagliflozin) 10mg during last visit with quarterly assessment of GFR.    Denies  side effects.   BMP acceptable.  Creatinine is within her baseline of 1.3-1.6.     -Diuretic regimen:   ReDs Vest reading for 11/09/23 was 29 and WNL;  reviewed reading with patient.   ProBNP is chronically elevated, but is within her prior ranges.   Continue Bumex 2mg qd with extra 2mg as needed.  Discussed as well hold parameters for hypotension given prior hx of hypotension.   BMP, Mag, & ProBNP reviewed with patient.     -Fluid restriction/Sodium restriction:   Patient has been asked to weigh daily and was provided with a printed diuretic strategy.  1,500 mg Na restriction was discussed.    -Acute vs. Chronic underlying conditions other than HF addressed during visit:   Hepatic Cirrhosis:   US paracentesis of 12L on 10/18/23 with albumin following.   Advised patient to monitor abdominal circumference.      CKD, stage 3b, stable:   Continue current medications.   BMP reviewed with creatinine within baseline.      Tricuspid valve regurgitation and elevated pulmonary pressures:  Refer to interventional cardiology for further evaluation for left and right heart cath previously placed.   Patient previously and continuing to voice that she does not currently want \"more tests or workups\" at this time.  "     Chronic hyponatremia:  Likely 2/2 underlying Cirrhosis.    Prior sodium values reviewed within chart.         ------------------------------------------------------------------  -Iron/Anemia in CHF:  -Chronic appearing macrocytic anemia  -Thrombocytopenia likely 2/2 splenic sequestration in setting of Cirrhosis  CBC checked on 07/07/23 and Hgb is stable.          --------------------------------------------------------------------------------      Class 3 Severe Obesity (BMI >=40). Obesity-related health conditions include the following: hypertension and osteoarthritis. Obesity is unchanged. BMI is is above average; BMI management plan is completed. We discussed portion control.               >45 minutes out of 60 minutes face to face spent counseling patient extensively on dietary Na+ intake, importance of activity, weight monitoring, compliance with medications in addition to importance of titration with goal directed medical therapy and follow up appointments.            This document has been electronically signed by Hilary Smith PA-C  November 9, 2023 10:45 EST      Dictated Utilizing Dragon Dictation: Part of this note may be an electronic transcription/translation of spoken language to printed text using the Dragon Dictation System.    Follow-up appointment and medication changes provided in hand delivered After Visit Summary as well as reviewed in the room.

## 2023-11-09 NOTE — PROGRESS NOTES
Heart Failure Clinic  Pharmacist Note     Della Jorge is a 62 y.o. female seen in the Heart Failure Clinic for HFmrEF 46-50%.  Della Jorge reports a good understanding of medications. Patient reports good adherence with medications and no issues with affordability of medications.     She brought in her daily logs and her blood pressures are often 90s/60s (ocassional readings in the 100s systolic). HR is often in the 60s.   She reports that the swelling in her feet is much improved but that some swelling remains but is able to put her shoes on comfortably.   She is currently taking Bumex 1mg bid and occasionally takes an additional dose at night if needed. She denies any SOB. Is occasionally dizzy but reports she believes this is tolerable. Also reports some fatigue that she believes could be related to lower BP readings.     Reports she recently got fluid pulled off of her (paracentesis) and states they pulled off 12.2 L. Has been feeling much better following paracentesis.     Medication Use:   Hx of med intolerances: BP could not tolerate Lisinopril 2.5mg  Retail Rx Management: Pt uses [facility] and gets some assistance from them     Past Medical History:   Diagnosis Date    Cirrhosis     Congestive heart failure (CHF)     Hypertension      ALLERGIES: Codeine  Current Outpatient Medications   Medication Sig Dispense Refill    bumetanide (BUMEX) 1 MG tablet Take 2 tablets by mouth 2 (Two) Times a Day for 90 days. 2mg in the morning and then 2mg at lunch. 120 tablet 3    carvedilol (Coreg) 3.125 MG tablet Take 1 tablet by mouth 2 (Two) Times a Day With Meals for 90 days. 60 tablet 5    empagliflozin (JARDIANCE) 10 MG tablet tablet Take 1 tablet by mouth Daily for 180 days. 90 tablet 1    fluocinonide 0.1 % cream cream Apply 1 application  topically to the appropriate area as directed 2 (Two) Times a Day As Needed.      ondansetron (ZOFRAN) 4 MG tablet Take 1 tablet by mouth Every 8 (Eight) Hours As  Needed for Nausea or Vomiting.      spironolactone (ALDACTONE) 25 MG tablet Take 0.5 tablets by mouth Daily for 90 days. 20 tablet 5    traMADol (ULTRAM) 50 MG tablet Take 1 tablet by mouth Every 8 (Eight) Hours As Needed for Moderate Pain.       No current facility-administered medications for this encounter.       Vaccination History:   Pneumonia: never had, declines  Annual Influenza: never had, declines 10/13/22  COVID 19: never had, declines 10/13/22      Objective  There were no vitals filed for this visit.    Wt Readings from Last 3 Encounters:   10/18/23 73 kg (161 lb)   09/14/23 71.5 kg (157 lb 9.6 oz)   07/07/23 72.3 kg (159 lb 6.4 oz)     There were no vitals filed for this visit.    Lab Results   Component Value Date    GLUCOSE 95 09/14/2023    BUN 32 (H) 09/14/2023    CREATININE 1.65 (H) 09/14/2023    EGFRIFNONA 76 01/16/2017    BCR 19.4 09/14/2023    K 3.9 09/14/2023    CO2 23.8 09/14/2023    CALCIUM 9.4 09/14/2023    ALBUMIN 3.39 (L) 08/06/2022    AST 26 08/06/2022    ALT 5 08/06/2022     Lab Results   Component Value Date    WBC 4.25 07/07/2023    HGB 12.1 07/07/2023    HCT 37.9 07/07/2023    MCV 89.6 07/07/2023     10/18/2023     Lab Results   Component Value Date    TROPONINT 0.012 08/05/2022     Lab Results   Component Value Date    PROBNP 3,026.0 (H) 09/14/2023     Results for orders placed during the hospital encounter of 08/04/22    Adult Transthoracic Echo Complete w/ Color, Spectral and Contrast if necessary per protocol    Interpretation Summary  · Left ventricular ejection fraction appears to be 46 - 50%. Left ventricular systolic function is mildly decreased.  · Left ventricular diastolic function is consistent with (grade Ia w/high LAP) impaired relaxation.  · The right ventricular cavity is moderately dilated.  · Mildly reduced right ventricular systolic function noted.  · The right atrial cavity is severely dilated.  · Severe tricuspid valve regurgitation is present.  · Estimated  right ventricular systolic pressure from tricuspid regurgitation is normal (<35 mmHg).         GDMT    Drug Class   Drug   Dose Last Dose Adjustment Additional Titration   Notes   ACEi/ARB/ARNI    needed BP can't tolerate   Beta Blocker Coreg 3.125mg bid  decreased 9/26/22 for hypoTN needed hypoTN   MRA Spironolactone 12.5mg 10/13/22     SGLT2i Jardiance 10mg 9/26/22 N/A        Drug Therapy Problems    1. GDMT  2. Pt request refills of Jardiance  3. Slight hyponatremia    Recommendations:     1. Due to hypotension, HR often in the 60s, and reported dizziness/fatigue patient may benefit from either hold parameters or discontinuation of the      Coreg. Patient wishes to continue Coreg. Will notify provider, Hilary, so she can make a decision regarding GDMT.  2. Hilary to send in   3. Continue to monitor.     Patient was educated on heart failure medications and the importance of medication adherence. All questions were addressed and patient expressed understanding.     Thank you for allowing me to participate in the care of your patient,    Zunilda Klein, Alexandra  Community PGY1 Pharmacy Resident  [facility]  11/09/23

## 2024-02-08 ENCOUNTER — HOSPITAL ENCOUNTER (OUTPATIENT)
Dept: CARDIOLOGY | Facility: HOSPITAL | Age: 64
Discharge: HOME | End: 2024-02-08
Payer: MEDICAID

## 2024-02-08 VITALS
DIASTOLIC BLOOD PRESSURE: 68 MMHG | BODY MASS INDEX: 31.91 KG/M2 | SYSTOLIC BLOOD PRESSURE: 124 MMHG | OXYGEN SATURATION: 90 % | HEART RATE: 63 BPM | WEIGHT: 158.3 LBS | HEIGHT: 59 IN

## 2024-02-08 DIAGNOSIS — K74.60 CIRRHOSIS OF LIVER WITH ASCITES, UNSPECIFIED HEPATIC CIRRHOSIS TYPE: ICD-10-CM

## 2024-02-08 DIAGNOSIS — I50.43 ACUTE ON CHRONIC COMBINED SYSTOLIC AND DIASTOLIC CHF (CONGESTIVE HEART FAILURE): Primary | ICD-10-CM

## 2024-02-08 DIAGNOSIS — I07.1 SEVERE TRICUSPID VALVE REGURGITATION: ICD-10-CM

## 2024-02-08 DIAGNOSIS — N18.31 STAGE 3A CHRONIC KIDNEY DISEASE: ICD-10-CM

## 2024-02-08 DIAGNOSIS — E87.1 CHRONIC HYPONATREMIA: ICD-10-CM

## 2024-02-08 DIAGNOSIS — R18.8 CIRRHOSIS OF LIVER WITH ASCITES, UNSPECIFIED HEPATIC CIRRHOSIS TYPE: ICD-10-CM

## 2024-02-08 DIAGNOSIS — R18.8 OTHER ASCITES: Primary | ICD-10-CM

## 2024-02-08 LAB
ABSOLUTE LUNG FLUID CONTENT: 25 % (ref 20–35)
ANION GAP SERPL CALCULATED.3IONS-SCNC: 10.5 MMOL/L (ref 5–15)
BUN SERPL-MCNC: 31 MG/DL (ref 8–23)
BUN/CREAT SERPL: 20.9 (ref 7–25)
CALCIUM SPEC-SCNC: 9.8 MG/DL (ref 8.6–10.5)
CHLORIDE SERPL-SCNC: 93 MMOL/L (ref 98–107)
CO2 SERPL-SCNC: 27.5 MMOL/L (ref 22–29)
CREAT SERPL-MCNC: 1.48 MG/DL (ref 0.57–1)
EGFRCR SERPLBLD CKD-EPI 2021: 39.6 ML/MIN/1.73
GLUCOSE SERPL-MCNC: 114 MG/DL (ref 65–99)
MAGNESIUM SERPL-MCNC: 2.6 MG/DL (ref 1.6–2.4)
NT-PROBNP SERPL-MCNC: 4704 PG/ML (ref 0–900)
POTASSIUM SERPL-SCNC: 3.9 MMOL/L (ref 3.5–5.2)
SODIUM SERPL-SCNC: 131 MMOL/L (ref 136–145)

## 2024-02-08 PROCEDURE — 83880 ASSAY OF NATRIURETIC PEPTIDE: CPT | Performed by: PHYSICIAN ASSISTANT

## 2024-02-08 PROCEDURE — 94726 PLETHYSMOGRAPHY LUNG VOLUMES: CPT | Performed by: PHYSICIAN ASSISTANT

## 2024-02-08 PROCEDURE — 83735 ASSAY OF MAGNESIUM: CPT | Performed by: PHYSICIAN ASSISTANT

## 2024-02-08 PROCEDURE — 80048 BASIC METABOLIC PNL TOTAL CA: CPT | Performed by: PHYSICIAN ASSISTANT

## 2024-02-08 RX ORDER — ALBUMIN (HUMAN) 12.5 G/50ML
12.5 SOLUTION INTRAVENOUS ONCE
OUTPATIENT
Start: 2024-02-08 | End: 2024-02-08

## 2024-02-08 RX ORDER — BUMETANIDE 1 MG/1
1 TABLET ORAL SEE ADMIN INSTRUCTIONS
Qty: 75 TABLET | Refills: 0 | Status: SHIPPED | OUTPATIENT
Start: 2024-02-08 | End: 2024-03-09

## 2024-02-08 NOTE — PROGRESS NOTES
[facility] Failure [facility]  RAOUL Worthington Daniel W, MD  [address] [place] [address]    Thank you for asking me to see Della Jorge for congestive heart failure.    HPI:     This is a 63 y.o. 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  The patient is typically seen by Sebastián Dougherty MD.       Last known EF 46-50%.    Last known hospitalization and/or ED visit: Patient was hospitalized from August 4, 2022 through August 9, 2022 with acute on chronic combined diastolic and systolic heart failure and acute hypoxemic respiratory failure after presenting to the emergency department at [facility] with anasarca and ascites.  Initially required 4 L via nasal cannula and was felt to be in decompensated heart failure with concern she was also developing cirrhosis from congestive hepatopathy.  She did have 10 L of ascites removed consistent with portal hypertension and patient was diuresed with improvement in symptoms.  Cardiology did recommend right heart cath to further evaluate tricuspid valve regurgitation and pulmonary pressures but patient did refuse during hospitalization to undergo procedure for the time being.  She was maximized on heart failure therapy with the addition of lisinopril and spironolactone in addition to home Coreg and Bumex in addition to reportedly being hypokalemic.  She was discharged on 1 L via nasal cannula and outpatient heart failure clinic appointment was  arranged.  Accompanied by:       02/08/24 visit data/details regarding:   Dyspnea: Denies dyspnea on exertion  Lower extremity swelling: Improving swelling.   Abdominal swelling: Worsening swelling of the abdomen with weight gain   Home weight: Improving with diuresis  Home BP: SBP running ~ 100s-110s; periodic low blood pressures. Patient keeps daily BP & weight log and brings to her appointments.    Home heart rate:  High 50s-60s  Daily activities of living: Performing independently with increased energy.  Able to get herself in and out of the car now.   HF zone: Yellow  Pillows/lying flat:   Sleeping in bed now, and able to get herself in and out (progress from prior visits).    Over the past 2-3 weeks, Mrs. Jorge reports increase in her abdominal distension and weight.  She reports some worsening edema of her lower extremities.  She reports abdominal distension has worsened and made it difficult to ambulate at times.   We discuss her underlying Cirrhosis coupled with Heart Failure and prior referrals for evaluation.  She is agreeable to be evaluated by Earnestine Rae NP with GI.      Specialists:   Cardiology: Referred for [facility] & [facility] previously with patient declining  Hepatology referral placed during hospital discharge        Review of Systems - Review of Systems   Constitutional: Negative for chills, decreased appetite, fever and malaise/fatigue.   HENT:  Negative for congestion, ear discharge and ear pain.    Eyes:  Negative for blurred vision and discharge.   Cardiovascular:  Positive for leg swelling. Negative for dyspnea on exertion.   Respiratory:  Negative for cough and hemoptysis.    Endocrine: Negative for heat intolerance.   Hematologic/Lymphatic: Negative for adenopathy and bleeding problem. Does not bruise/bleed easily.   Skin:  Negative for color change, dry skin and nail changes.   Musculoskeletal:  Negative for arthritis and back pain.   Gastrointestinal:  Positive for abdominal pain.  Negative for bloating.   Genitourinary:  Negative for bladder incontinence and decreased libido.   Psychiatric/Behavioral:  Negative for altered mental status and depression.          All other systems were reviewed and were negative.    Patient Active Problem List   Diagnosis    Acute on chronic congestive heart failure, unspecified heart failure type    Hepatic cirrhosis    Chronic combined systolic and diastolic heart failure    Stage 3a chronic kidney disease    Chronic hyponatremia    Severe tricuspid valve regurgitation       family history includes Diabetes in her sister; Heart disease in her mother.     reports that she has never smoked. She has never used smokeless tobacco. She reports that she does not drink alcohol and does not use drugs.    Allergies   Allergen Reactions    Codeine Hallucinations         Current Outpatient Medications:     carvedilol (Coreg) 3.125 MG tablet, Take 1 tablet by mouth 2 (Two) Times a Day With Meals for 90 days., Disp: 60 tablet, Rfl: 5    empagliflozin (JARDIANCE) 10 MG tablet tablet, Take 1 tablet by mouth Daily for 180 days., Disp: 90 tablet, Rfl: 1    fluocinonide 0.1 % cream cream, Apply 1 Application topically to the appropriate area as directed 2 (Two) Times a Day As Needed., Disp: , Rfl:     ondansetron (ZOFRAN) 4 MG tablet, Take 1 tablet by mouth Every 8 (Eight) Hours As Needed for Nausea or Vomiting., Disp: , Rfl:     spironolactone (ALDACTONE) 25 MG tablet, Take 0.5 tablets by mouth Daily for 90 days., Disp: 20 tablet, Rfl: 5    traMADol (ULTRAM) 50 MG tablet, Take 1 tablet by mouth Every 8 (Eight) Hours As Needed for Moderate Pain., Disp: , Rfl:     bumetanide (Bumex) 1 MG tablet, Take 1 tablet by mouth See Admin Instructions for 30 days. 3mg in the morning and 3mg at lunch for 5 days . Then back to 2mg BID., Disp: 75 tablet, Rfl: 0      Physical Exam:  I have reviewed the patient's current vital signs as documented in the patient's EMR.   Vitals:    02/08/24 0852    BP: 124/68   Pulse: 63   SpO2: 90%     Body mass index is 31.97 kg/m².       02/08/24  0852   Weight: 71.8 kg (158 lb 4.8 oz)         Physical Exam  Constitutional:       General: She is awake.      Appearance: She is well-developed.      Interventions: Nasal cannula in place.      Comments: Ambulates independently to patient room.     HENT:      Head: Normocephalic and atraumatic.   Eyes:      General: Lids are normal.      Conjunctiva/sclera:      Right eye: Right conjunctiva is not injected.      Left eye: Left conjunctiva is not injected.   Cardiovascular:      Rate and Rhythm: Normal rate and regular rhythm.      Heart sounds: S1 normal and S2 normal.   Pulmonary:      Effort: No tachypnea or bradypnea.      Breath sounds: No decreased breath sounds, wheezing, rhonchi or rales.   Abdominal:      General: Abdomen is protuberant. Bowel sounds are normal. There is distension.      Palpations: Abdomen is soft. There is hepatomegaly. There is no fluid wave.      Comments: Distension present but improved   Musculoskeletal:      Right lower leg: No swelling. 2+ Edema present.      Left lower leg: No swelling. 2+ Edema present.      Comments: Edema is non pitting with some concerns for lymphedema   Skin:     General: Skin is warm and moist.   Neurological:      Mental Status: She is alert and oriented to person, place, and time.      Comments: Follows commands and answers questions appropriately.    Psychiatric:         Attention and Perception: Attention normal.         Mood and Affect: Mood normal.         Behavior: Behavior is cooperative.       JVP: Volume/Pulsation: Normal.        DATA REVIEWED:     EKG. I personally reviewed and interpreted the EKG.        ---------------------------------------------------  TTE/JOSE ANGEL:  Results for orders placed during the hospital encounter of 08/04/22    Adult Transthoracic Echo Complete w/ Color, Spectral and Contrast if necessary per protocol    Interpretation Summary  · Left  ventricular ejection fraction appears to be 46 - 50%. Left ventricular systolic function is mildly decreased.  · Left ventricular diastolic function is consistent with (grade Ia w/high LAP) impaired relaxation.  · The right ventricular cavity is moderately dilated.  · Mildly reduced right ventricular systolic function noted.  · The right atrial cavity is severely dilated.  · Severe tricuspid valve regurgitation is present.  · Estimated right ventricular systolic pressure from tricuspid regurgitation is normal (<35 mmHg).        -----------------------------------------------------  CXR/Imaging:   Imaging Results (Most Recent)       None            I personally reviewed and interpreted the CXR.      -----------------------------------------------------  CT:   No radiology results for the last 30 days.  I personally reviewed the images of the CT scan.  My personal interpretation is below.      ----------------------------------------------------    PFTs:  None available--will consider further referral.   --------------------------------------------------------------------------------------------------    Laboratory evaluations:    Lab Results   Component Value Date    GLUCOSE 114 (H) 02/08/2024    BUN 31 (H) 02/08/2024    CREATININE 1.48 (H) 02/08/2024    EGFRIFNONA 76 01/16/2017    BCR 20.9 02/08/2024    K 3.9 02/08/2024    CO2 27.5 02/08/2024    CALCIUM 9.8 02/08/2024    ALBUMIN 3.39 (L) 08/06/2022    AST 26 08/06/2022    ALT 5 08/06/2022     Lab Results   Component Value Date    WBC 4.25 07/07/2023    HGB 12.1 07/07/2023    HCT 37.9 07/07/2023    MCV 89.6 07/07/2023     10/18/2023     Lab Results   Component Value Date    CHOL 60 08/05/2022    TRIG 52 08/05/2022    HDL 27 (L) 08/05/2022    LDL 19 08/05/2022     Lab Results   Component Value Date    TSH 6.250 (H) 08/05/2022     Lab Results   Component Value Date    HGBA1C 4.80 08/04/2022     Lab Results   Component Value Date    ALT 5 08/06/2022     Lab  "Results   Component Value Date    HGBA1C 4.80 08/04/2022    HGBA1C 6.20 (H) 01/16/2017     Lab Results   Component Value Date    CREATININE 1.48 (H) 02/08/2024     No results found for: \"IRON\", \"TIBC\", \"FERRITIN\"  Lab Results   Component Value Date    INR 1.34 (H) 10/18/2023    INR 1.41 (H) 08/04/2022    PROTIME 17.1 (H) 10/18/2023    PROTIME 17.6 (H) 08/04/2022        Lab Results   Component Value Date    ABSOLUTELUNG 25 02/08/2024    ABSOLUTELUNG 29 11/09/2023    ABSOLUTELUNG 28 09/14/2023       PAH RISK ASSESSMENT:      1. Acute on chronic combined systolic and diastolic CHF (congestive heart failure)    2. Cirrhosis of liver with ascites, unspecified hepatic cirrhosis type    3. Stage 3a chronic kidney disease    4. Severe tricuspid valve regurgitation    5. Chronic hyponatremia          ORDERS PLACED TODAY:  Orders Placed This Encounter   Procedures    ReDs Vest    ABDOMINAL PARACENTESIS    Basic Metabolic Panel    proBNP    Magnesium    Basic Metabolic Panel    proBNP    Magnesium    Ambulatory Referral to Gastroenterology        Diagnoses and all orders for this visit:    1. Acute on chronic combined systolic and diastolic CHF (congestive heart failure) (Primary)  -     Basic Metabolic Panel; Future  -     proBNP; Future  -     Magnesium; Future  -     Basic Metabolic Panel; Standing  -     Basic Metabolic Panel  -     proBNP; Standing  -     proBNP  -     Magnesium; Standing  -     Magnesium  -     ReDs Vest  -     Ambulatory Referral to Gastroenterology    2. Cirrhosis of liver with ascites, unspecified hepatic cirrhosis type  -     ABDOMINAL PARACENTESIS  -     Ambulatory Referral to Gastroenterology    3. Stage 3a chronic kidney disease    4. Severe tricuspid valve regurgitation    5. Chronic hyponatremia    Other orders  -     bumetanide (Bumex) 1 MG tablet; Take 1 tablet by mouth See Admin Instructions for 30 days. 3mg in the morning and 3mg at lunch for 5 days . Then back to 2mg BID.  Dispense: 75 " tablet; Refill: 0             MEDS ORDERED TODAY:    New Medications Ordered This Visit   Medications    bumetanide (Bumex) 1 MG tablet     Sig: Take 1 tablet by mouth See Admin Instructions for 30 days. 3mg in the morning and 3mg at lunch for 5 days . Then back to 2mg BID.     Dispense:  75 tablet     Refill:  0        ---------------------------------------------------------------------------------------------------------------------------          ASSESSMENT/PLAN:      Diagnosis Plan   1. Acute on chronic combined systolic and diastolic CHF (congestive heart failure)  Basic Metabolic Panel    proBNP    Magnesium    Basic Metabolic Panel    Basic Metabolic Panel    proBNP    proBNP    Magnesium    Magnesium    ReDs Vest    Ambulatory Referral to Gastroenterology      2. Cirrhosis of liver with ascites, unspecified hepatic cirrhosis type  ABDOMINAL PARACENTESIS    Ambulatory Referral to Gastroenterology      3. Stage 3a chronic kidney disease        4. Severe tricuspid valve regurgitation        5. Chronic hyponatremia            acute on chronic moderate systolic and diastolic heart failure. CHF. Etiology: Unclear with current refusal of ischemic work-up    NYHA stage C;FC-III. NYHA FC change: improved by one grades.     Today, Patient is approaching euvolemiaand with  Moderate perfusion. The patient's hemodynamics are currently acceptable. HR is: normal and is at goal. BP/MAP was reviewed and there is notroom for medication up-titration.  Clinical trajectory was assessed and hasimproved.     CHF GOAL DIRECTED MEDICAL THERAPY FOR PATIENT ADDRESSED/ADJUSTED:       GDMT: Chronic combined systolic & diastolic HF with improved EF    Drug Class   Drug   Dose Last Dose Adjustment Additional Titration   Notes   ACEi/ARB/ARNI xxx xxx BP low with Lisinopril previously discontinued.      Beta Blocker Coreg 3.125mg BID      MRA Spironolactone 12.5mg Daily     SGLT2i Jardiance 10mg  N/A      Secondary pharmacological  therapies: Patient does not meet EF criteria for Verquevo.        -CHF Specific BB:   Continue Coreg to 3.125mg BID.    We discussed monitoring with pulse oximetry and both room air and ambulatory evaluations.      -ACE/ARB/ARNi:   Stopped Lisinopril 2.5mg in prior visits 2/2 hypoTN.    BPs have been too low to add ARB thus far.     -MRA:   Continue Spironolactone 12.5mg  & 25mg in alternating doses on daily basis  We have tried adjusting previously but patient had ongoing swelling with adjustments.  Unfortunately, eGFR has not tolerated attempts with prior attempts at increasing her MRA dosing.   Patient was advised to not use potassium products.  Quarterly monitoring of potassium and renal function is currently recommended    -SGLT2 inhibitor therapy:   The patient has HFimpEF with LV EF 46-50%, is NYHA FC-II-IV, HF hospitilization within the past 12 months, and abnormal BNP consistent with EMPEROR-Reduced trial. The pt does not have exclusion criteria: no ACS/TIA within 90 days; no AHF consideration; no severe VHD; no ICD/CRT in the past 90 days; not in ADHF. Empagliflozin was recommended at 10mg a day to reduce CV death and/or HF hospitlization. The fact this is typically used to treat DM2 was discussed. The benefit of the trial extended to patients without DM, so this information was conveyed to the pt.   Pt was advised side effects, some severe, including hypersensitivity and Drew's; in addition to common side effects of UTIs and female genital mycotic infections were discussed.  Continue  Jardiance (empagliflozin) 10mg during last visit with quarterly assessment of GFR.    Denies  side effects.   BMP acceptable.  Creatinine is within her baseline of 1.3-1.6.     -Diuretic regimen:   ReDs Vest reading for 02/08/24 was 25 and WNL;  reviewed reading with patient.   ProBNP is chronically elevated, but is is more so elevated today than usual.   Discussed Bumex 3mg in the morning and 3mg at lunch for 4 days  "then back to 2mg BID dosing.    Discussed as well hold parameters for hypotension given prior hx of hypotension.   BMP, Mag, & ProBNP reviewed with patient.     -Fluid restriction/Sodium restriction:   Patient has been asked to weigh daily and was provided with a printed diuretic strategy.  1,500 mg Na restriction was discussed.    -Acute vs. Chronic underlying conditions other than HF addressed during visit:     Hepatic Cirrhosis:   US paracentesis of 12L on 10/18/23 with albumin following.   Repeat paracentesis ordered.   Advised patient to monitor abdominal circumference.    Referral placed to GI with patient agreeable to go. Has been hesitant for further referrals in prior visits.  She reports she does not want to have any further procedures. We discuss having her evaluated regarding possible medical management options.      CKD, stage 3b, stable:   Continue current medications.   BMP reviewed with creatinine within baseline.      Tricuspid valve regurgitation and elevated pulmonary pressures:  Refer to interventional cardiology for further evaluation for left and right heart cath previously placed.   Patient previously and continuing to voice that she does not currently want \"more tests or workups\" at this time.      Chronic hyponatremia:  Likely 2/2 underlying Cirrhosis.    Prior sodium values reviewed within chart.         ------------------------------------------------------------------  -Iron/Anemia in CHF:  -Chronic appearing macrocytic anemia  -Thrombocytopenia likely 2/2 splenic sequestration in setting of Cirrhosis  CBC checked on 07/07/23 and Hgb is stable; recheck next visit.          --------------------------------------------------------------------------------      Class 3 Severe Obesity (BMI >=40). Obesity-related health conditions include the following: hypertension and osteoarthritis. Obesity is unchanged. BMI is is above average; BMI management plan is completed. We discussed portion " control.               >45 minutes out of 60 minutes face to face spent counseling patient extensively on dietary Na+ intake, importance of activity, weight monitoring, compliance with medications in addition to importance of titration with goal directed medical therapy and follow up appointments.            This document has been electronically signed by Hilary Smith PA-C  February 8, 2024 14:35 EST      Dictated Utilizing Dragon Dictation: Part of this note may be an electronic transcription/translation of spoken language to printed text using the Dragon Dictation System.    Follow-up appointment and medication changes provided in hand delivered After Visit Summary as well as reviewed in the room.

## 2024-02-08 NOTE — PROGRESS NOTES
Heart Failure Clinic    Date: 02/08/24     Vitals:    02/08/24 0852   BP: 124/68   Pulse: 63   SpO2: 90%      Weight 158.3  Method of arrival: Ambulatory    Weighing self daily: Yes    Monitoring Heart Failure Zones: Yes    Today's HF Zone: Yellow     Taking medications as prescribed: Yes    Edema Yes    Shortness of Air: Yes with activity    Number of pillows used at night:Recliner    Educational Materials given:  avs                                                                         ReDS Value: 25  25-35 Optimal Value Status      Nelly Michaels RN 02/08/24 08:55 EST

## 2024-02-08 NOTE — PROGRESS NOTES
Heart Failure Clinic  Pharmacist Note     Della Jorge is a 63 y.o. female seen in the Heart Failure Clinic for HFmrEF 46-50%.  Della Jorge reports a good understanding of medications. Patient reports good adherence with medications and no issues with affordability of medications.     She reports SOB and reports swelling in the abdomen and legs. She reports that she needs another paracentesis and wants Hilary to schedule that for her. Her weights have been increasing from [phone]. She reports that her usual dose of Bumex is 2mg daily, but she reports having to take 3mg on some days and it does give her some relief. She reports that her BP's at home range from 's/60-70's with HR in the 60's, but she denies any lightheadedness.        Medication Use:   Hx of med intolerances: BP could not tolerate Lisinopril 2.5mg  Retail Rx Management: Pt uses [facility] and gets some assistance from them     Past Medical History:   Diagnosis Date    Cirrhosis     Congestive heart failure (CHF)     Hypertension      ALLERGIES: Codeine  Current Outpatient Medications   Medication Sig Dispense Refill    bumetanide (BUMEX) 1 MG tablet Take 2 tablets by mouth 2 (Two) Times a Day for 90 days. 2mg in the morning and then 2mg at lunch. 120 tablet 3    carvedilol (Coreg) 3.125 MG tablet Take 1 tablet by mouth 2 (Two) Times a Day With Meals for 90 days. 60 tablet 5    empagliflozin (JARDIANCE) 10 MG tablet tablet Take 1 tablet by mouth Daily for 180 days. 90 tablet 1    fluocinonide 0.1 % cream cream Apply 1 application  topically to the appropriate area as directed 2 (Two) Times a Day As Needed.      ondansetron (ZOFRAN) 4 MG tablet Take 1 tablet by mouth Every 8 (Eight) Hours As Needed for Nausea or Vomiting.      spironolactone (ALDACTONE) 25 MG tablet Take 0.5 tablets by mouth Daily for 90 days. 20 tablet 5    traMADol (ULTRAM) 50 MG tablet Take 1 tablet by mouth Every 8 (Eight) Hours As Needed for Moderate  "Pain.       No current facility-administered medications for this encounter.       Vaccination History:   Pneumonia: never had, declines  Annual Influenza: never had, declines 10/13/22  COVID 19: never had, declines 10/13/22      Objective  Vitals:    02/08/24 0852   BP: 124/68   BP Location: Left arm   Patient Position: Sitting   Cuff Size: Adult   Pulse: 63   SpO2: 90%   Weight: 71.8 kg (158 lb 4.8 oz)   Height: 149.9 cm (59\")       Wt Readings from Last 3 Encounters:   02/08/24 71.8 kg (158 lb 4.8 oz)   11/09/23 63.4 kg (139 lb 12.8 oz)   10/18/23 73 kg (161 lb)         02/08/24 0852   Weight: 71.8 kg (158 lb 4.8 oz)       Lab Results   Component Value Date    GLUCOSE 105 (H) 11/09/2023    BUN 32 (H) 11/09/2023    CREATININE 1.63 (H) 11/09/2023    EGFRIFNONA 76 01/16/2017    BCR 19.6 11/09/2023    K 3.8 11/09/2023    CO2 27.5 11/09/2023    CALCIUM 9.7 11/09/2023    ALBUMIN 3.39 (L) 08/06/2022    AST 26 08/06/2022    ALT 5 08/06/2022     Lab Results   Component Value Date    WBC 4.25 07/07/2023    HGB 12.1 07/07/2023    HCT 37.9 07/07/2023    MCV 89.6 07/07/2023     10/18/2023     Lab Results   Component Value Date    TROPONINT 0.012 08/05/2022     Lab Results   Component Value Date    PROBNP 3,593.0 (H) 11/09/2023     Results for orders placed during the hospital encounter of 08/04/22    Adult Transthoracic Echo Complete w/ Color, Spectral and Contrast if necessary per protocol    Interpretation Summary  · Left ventricular ejection fraction appears to be 46 - 50%. Left ventricular systolic function is mildly decreased.  · Left ventricular diastolic function is consistent with (grade Ia w/high LAP) impaired relaxation.  · The right ventricular cavity is moderately dilated.  · Mildly reduced right ventricular systolic function noted.  · The right atrial cavity is severely dilated.  · Severe tricuspid valve regurgitation is present.  · Estimated right ventricular systolic pressure from tricuspid " regurgitation is normal (<35 mmHg).         GDMT    Drug Class   Drug   Dose Last Dose Adjustment Additional Titration   Notes   ACEi/ARB/ARNI    needed BP can't tolerate   Beta Blocker Coreg 3.125mg bid  decreased 9/26/22 for hypoTN needed hypoTN   MRA Spironolactone 12.5mg 10/13/22     SGLT2i Jardiance 10mg 9/26/22 N/A        Drug Therapy Problems    1. Edema  2. Hypermagnesemia - 2.6      Recommendations:     1. Hilary to increase diuretic to Bumex 3mg bid (AM and Lunch) for 5 day, then decrease to 2mg bid.   2. Made Hilary aware    Patient was educated on heart failure medications and the importance of medication adherence. All questions were addressed and patient expressed understanding.     Thank you for allowing me to participate in the care of your patient,    Rosetta Baker, [facility]  02/08/24  08:56 EST

## 2024-02-12 DIAGNOSIS — R18.8 OTHER ASCITES: Primary | ICD-10-CM

## 2024-02-12 DIAGNOSIS — K74.60 CIRRHOSIS OF LIVER WITH ASCITES, UNSPECIFIED HEPATIC CIRRHOSIS TYPE: ICD-10-CM

## 2024-02-12 DIAGNOSIS — R18.8 CIRRHOSIS OF LIVER WITH ASCITES, UNSPECIFIED HEPATIC CIRRHOSIS TYPE: ICD-10-CM

## 2024-02-12 RX ORDER — ALBUMIN (HUMAN) 12.5 G/50ML
12.5 SOLUTION INTRAVENOUS ONCE
OUTPATIENT
Start: 2024-02-12 | End: 2024-02-12

## 2024-02-28 ENCOUNTER — HOSPITAL ENCOUNTER (OUTPATIENT)
Dept: ULTRASOUND IMAGING | Facility: HOSPITAL | Age: 64
Discharge: HOME | End: 2024-02-28
Admitting: PHYSICIAN ASSISTANT
Payer: MEDICAID

## 2024-02-28 ENCOUNTER — INFUSION (OUTPATIENT)
Dept: ONCOLOGY | Facility: HOSPITAL | Age: 64
End: 2024-02-28
Payer: MEDICAID

## 2024-02-28 VITALS
SYSTOLIC BLOOD PRESSURE: 91 MMHG | RESPIRATION RATE: 18 BRPM | OXYGEN SATURATION: 92 % | TEMPERATURE: 97.3 F | DIASTOLIC BLOOD PRESSURE: 49 MMHG | HEART RATE: 59 BPM

## 2024-02-28 VITALS
DIASTOLIC BLOOD PRESSURE: 62 MMHG | SYSTOLIC BLOOD PRESSURE: 104 MMHG | HEART RATE: 59 BPM | RESPIRATION RATE: 16 BRPM | OXYGEN SATURATION: 91 %

## 2024-02-28 DIAGNOSIS — K74.60 CIRRHOSIS OF LIVER WITH ASCITES, UNSPECIFIED HEPATIC CIRRHOSIS TYPE: ICD-10-CM

## 2024-02-28 DIAGNOSIS — K74.60 HEPATIC CIRRHOSIS, UNSPECIFIED HEPATIC CIRRHOSIS TYPE, UNSPECIFIED WHETHER ASCITES PRESENT: Primary | ICD-10-CM

## 2024-02-28 DIAGNOSIS — R18.8 OTHER ASCITES: ICD-10-CM

## 2024-02-28 DIAGNOSIS — R18.8 CIRRHOSIS OF LIVER WITH ASCITES, UNSPECIFIED HEPATIC CIRRHOSIS TYPE: ICD-10-CM

## 2024-02-28 LAB
INR PPP: 1.38 (ref 0.9–1.1)
PLATELET # BLD AUTO: 161 10*3/MM3 (ref 140–450)
PROTHROMBIN TIME: 17.5 SECONDS (ref 12.1–14.7)

## 2024-02-28 PROCEDURE — 49083 ABD PARACENTESIS W/IMAGING: CPT | Performed by: RADIOLOGY

## 2024-02-28 PROCEDURE — P9047 ALBUMIN (HUMAN), 25%, 50ML: HCPCS | Performed by: PHYSICIAN ASSISTANT

## 2024-02-28 PROCEDURE — 96365 THER/PROPH/DIAG IV INF INIT: CPT

## 2024-02-28 PROCEDURE — 85049 AUTOMATED PLATELET COUNT: CPT | Performed by: RADIOLOGY

## 2024-02-28 PROCEDURE — 76942 ECHO GUIDE FOR BIOPSY: CPT

## 2024-02-28 PROCEDURE — 85610 PROTHROMBIN TIME: CPT | Performed by: RADIOLOGY

## 2024-02-28 PROCEDURE — 25010000002 ALBUMIN HUMAN 25% PER 50 ML: Performed by: PHYSICIAN ASSISTANT

## 2024-02-28 RX ORDER — ALBUMIN (HUMAN) 12.5 G/50ML
25 SOLUTION INTRAVENOUS
Qty: 200 ML | Refills: 0 | Status: COMPLETED | OUTPATIENT
Start: 2024-02-28 | End: 2024-02-28

## 2024-02-28 RX ADMIN — ALBUMIN HUMAN 25 G: 0.25 SOLUTION INTRAVENOUS at 11:01

## 2024-02-28 RX ADMIN — ALBUMIN HUMAN 25 G: 0.25 SOLUTION INTRAVENOUS at 11:29

## 2024-02-28 NOTE — NURSING NOTE
Procedure completed, patient tolerated well, denies needs or complaints at this time. Will call report to infusion clinic. Approx 12,400ml of clear, dark greenish yellow colored fluid noted.

## 2024-04-09 ENCOUNTER — HOSPITAL ENCOUNTER (OUTPATIENT)
Dept: CARDIOLOGY | Facility: HOSPITAL | Age: 64
Discharge: HOME | End: 2024-04-09
Admitting: PHYSICIAN ASSISTANT
Payer: MEDICAID

## 2024-04-09 VITALS
HEIGHT: 59 IN | BODY MASS INDEX: 35.76 KG/M2 | WEIGHT: 177.4 LBS | HEART RATE: 83 BPM | DIASTOLIC BLOOD PRESSURE: 70 MMHG | SYSTOLIC BLOOD PRESSURE: 106 MMHG | OXYGEN SATURATION: 91 %

## 2024-04-09 DIAGNOSIS — E87.1 CHRONIC HYPONATREMIA: ICD-10-CM

## 2024-04-09 DIAGNOSIS — N18.31 STAGE 3A CHRONIC KIDNEY DISEASE: ICD-10-CM

## 2024-04-09 DIAGNOSIS — I50.42 CHRONIC COMBINED SYSTOLIC AND DIASTOLIC HEART FAILURE: Primary | ICD-10-CM

## 2024-04-09 DIAGNOSIS — K74.60 HEPATIC CIRRHOSIS, UNSPECIFIED HEPATIC CIRRHOSIS TYPE, UNSPECIFIED WHETHER ASCITES PRESENT: ICD-10-CM

## 2024-04-09 DIAGNOSIS — R18.8 OTHER ASCITES: Primary | ICD-10-CM

## 2024-04-09 LAB
ABSOLUTE LUNG FLUID CONTENT: 25 % (ref 20–35)
ALBUMIN SERPL-MCNC: 3.6 G/DL (ref 3.5–5.2)
ALBUMIN SERPL-MCNC: 3.7 G/DL (ref 3.5–5.2)
ALBUMIN/GLOB SERPL: 0.9 G/DL
ALBUMIN/GLOB SERPL: 0.9 G/DL
ALP SERPL-CCNC: 115 U/L (ref 39–117)
ALP SERPL-CCNC: 120 U/L (ref 39–117)
ALT SERPL W P-5'-P-CCNC: 6 U/L (ref 1–33)
ALT SERPL W P-5'-P-CCNC: 6 U/L (ref 1–33)
ANION GAP SERPL CALCULATED.3IONS-SCNC: 11.2 MMOL/L (ref 5–15)
ANION GAP SERPL CALCULATED.3IONS-SCNC: 12.9 MMOL/L (ref 5–15)
AST SERPL-CCNC: 21 U/L (ref 1–32)
AST SERPL-CCNC: 23 U/L (ref 1–32)
BILIRUB SERPL-MCNC: 1.4 MG/DL (ref 0–1.2)
BILIRUB SERPL-MCNC: 1.5 MG/DL (ref 0–1.2)
BUN SERPL-MCNC: 22 MG/DL (ref 8–23)
BUN SERPL-MCNC: 23 MG/DL (ref 8–23)
BUN/CREAT SERPL: 15.3 (ref 7–25)
BUN/CREAT SERPL: 16.5 (ref 7–25)
CALCIUM SPEC-SCNC: 9.5 MG/DL (ref 8.6–10.5)
CALCIUM SPEC-SCNC: 9.5 MG/DL (ref 8.6–10.5)
CHLORIDE SERPL-SCNC: 93 MMOL/L (ref 98–107)
CHLORIDE SERPL-SCNC: 93 MMOL/L (ref 98–107)
CO2 SERPL-SCNC: 25.8 MMOL/L (ref 22–29)
CO2 SERPL-SCNC: 26.1 MMOL/L (ref 22–29)
CREAT SERPL-MCNC: 1.33 MG/DL (ref 0.57–1)
CREAT SERPL-MCNC: 1.5 MG/DL (ref 0.57–1)
DEPRECATED RDW RBC AUTO: 55.1 FL (ref 37–54)
EGFRCR SERPLBLD CKD-EPI 2021: 39 ML/MIN/1.73
EGFRCR SERPLBLD CKD-EPI 2021: 45 ML/MIN/1.73
ERYTHROCYTE [DISTWIDTH] IN BLOOD BY AUTOMATED COUNT: 16.3 % (ref 12.3–15.4)
GLOBULIN UR ELPH-MCNC: 4 GM/DL
GLOBULIN UR ELPH-MCNC: 4 GM/DL
GLUCOSE SERPL-MCNC: 86 MG/DL (ref 65–99)
GLUCOSE SERPL-MCNC: 86 MG/DL (ref 65–99)
HCT VFR BLD AUTO: 39.4 % (ref 34–46.6)
HGB BLD-MCNC: 12.7 G/DL (ref 12–15.9)
MAGNESIUM SERPL-MCNC: 2.4 MG/DL (ref 1.6–2.4)
MCH RBC QN AUTO: 29.5 PG (ref 26.6–33)
MCHC RBC AUTO-ENTMCNC: 32.2 G/DL (ref 31.5–35.7)
MCV RBC AUTO: 91.6 FL (ref 79–97)
NT-PROBNP SERPL-MCNC: 5942 PG/ML (ref 0–900)
PLATELET # BLD AUTO: 186 10*3/MM3 (ref 140–450)
PMV BLD AUTO: 9 FL (ref 6–12)
POTASSIUM SERPL-SCNC: 4 MMOL/L (ref 3.5–5.2)
POTASSIUM SERPL-SCNC: 4 MMOL/L (ref 3.5–5.2)
PROT SERPL-MCNC: 7.6 G/DL (ref 6–8.5)
PROT SERPL-MCNC: 7.7 G/DL (ref 6–8.5)
RBC # BLD AUTO: 4.3 10*6/MM3 (ref 3.77–5.28)
SODIUM SERPL-SCNC: 130 MMOL/L (ref 136–145)
SODIUM SERPL-SCNC: 132 MMOL/L (ref 136–145)
WBC NRBC COR # BLD AUTO: 4.58 10*3/MM3 (ref 3.4–10.8)

## 2024-04-09 PROCEDURE — 83880 ASSAY OF NATRIURETIC PEPTIDE: CPT | Performed by: PHYSICIAN ASSISTANT

## 2024-04-09 PROCEDURE — 80053 COMPREHEN METABOLIC PANEL: CPT | Performed by: PHYSICIAN ASSISTANT

## 2024-04-09 PROCEDURE — 94726 PLETHYSMOGRAPHY LUNG VOLUMES: CPT | Performed by: PHYSICIAN ASSISTANT

## 2024-04-09 PROCEDURE — 83735 ASSAY OF MAGNESIUM: CPT | Performed by: PHYSICIAN ASSISTANT

## 2024-04-09 PROCEDURE — 85027 COMPLETE CBC AUTOMATED: CPT | Performed by: PHYSICIAN ASSISTANT

## 2024-04-09 RX ORDER — BUMETANIDE 1 MG/1
1 TABLET ORAL SEE ADMIN INSTRUCTIONS
Qty: 120 TABLET | Refills: 0 | Status: SHIPPED | OUTPATIENT
Start: 2024-04-09 | End: 2024-05-09

## 2024-04-09 RX ORDER — ALBUMIN (HUMAN) 12.5 G/50ML
12.5 SOLUTION INTRAVENOUS ONCE
OUTPATIENT
Start: 2024-04-09 | End: 2024-04-09

## 2024-04-09 RX ORDER — NYSTATIN 100000 [USP'U]/G
POWDER TOPICAL 2 TIMES DAILY
Qty: 60 G | Refills: 0 | Status: SHIPPED | OUTPATIENT
Start: 2024-04-09 | End: 2024-05-09

## 2024-04-09 NOTE — PROGRESS NOTES
Heart Failure [facility]    Date: 04/09/24     Vitals:    04/09/24 0849   BP: 106/70   Pulse: 83   SpO2: 91%    Weight 177.4    Method of arrival: Ambulatory    Weighing self daily: Most days    Monitoring Heart Failure Zones: Most days    Today's HF Zone: Yellow     Taking medications as prescribed: Yes    Edema Yes    Shortness of Air: No    Number of pillows used at night: hospital bed    Educational Materials given:  avs                                                                         ReDS Value: 25  25-35 Optimal Value Status      Nelly Michaels RN 04/09/24 08:53 EDT

## 2024-04-09 NOTE — PROGRESS NOTES
[facility] Failure [facility]  Pharmacist Note     Della Jorge is a 63 y.o. female seen in the Heart Failure Clinic for HFmrEF 46-50%.  Della Jorge reports a good understanding of medications. Patient reports good adherence with medications and no issues with affordability of medications.     She reports significant swelling in her abdomen and hasn't been weighing herself because she doesn't like seeing the higher number.  She reprots taking Bumex 1mg, 2 tablets everyday and sometimes takes an extra when she needs it.  She denies any SOB.      She also reports falling Sunday and hasn't felt good since then.       Medication Use:   Hx of med intolerances: BP could not tolerate Lisinopril 2.5mg  Retail Rx Management: Pt uses [facility] and gets some assistance from them     Past Medical History:   Diagnosis Date    Cirrhosis     Congestive heart failure (CHF)     Hypertension      ALLERGIES: Codeine  Current Outpatient Medications   Medication Sig Dispense Refill    bumetanide (Bumex) 1 MG tablet Take 1 tablet by mouth See Admin Instructions for 30 days. 3mg in the morning and 3mg at lunch for 5 days . Then back to 2mg BID. 75 tablet 0    carvedilol (Coreg) 3.125 MG tablet Take 1 tablet by mouth 2 (Two) Times a Day With Meals for 90 days. 60 tablet 5    empagliflozin (JARDIANCE) 10 MG tablet tablet Take 1 tablet by mouth Daily for 180 days. 90 tablet 1    fluocinonide 0.1 % cream cream Apply 1 Application topically to the appropriate area as directed 2 (Two) Times a Day As Needed.      linaclotide (Linzess) 72 MCG capsule capsule Take 1 capsule by mouth Daily As Needed.      ondansetron (ZOFRAN) 4 MG tablet Take 1 tablet by mouth Every 8 (Eight) Hours As Needed for Nausea or Vomiting.      spironolactone (ALDACTONE) 25 MG tablet Take 0.5 tablets by mouth Daily for 90 days. 20 tablet 5    traMADol (ULTRAM) 50 MG tablet Take 1 tablet by mouth Every 8 (Eight) Hours As Needed for Moderate Pain.       No current  "facility-administered medications for this encounter.       Vaccination History:   Pneumonia: never had, declines  Annual Influenza: never had, declines 10/13/22  COVID 19: never had, declines 10/13/22      Objective  Vitals:    04/09/24 0849   BP: 106/70   BP Location: Left arm   Patient Position: Sitting   Cuff Size: Adult   Pulse: 83   SpO2: 91%   Weight: 80.5 kg (177 lb 6.4 oz)   Height: 149.9 cm (59\")       Wt Readings from Last 3 Encounters:   04/09/24 80.5 kg (177 lb 6.4 oz)   02/08/24 71.8 kg (158 lb 4.8 oz)   11/09/23 63.4 kg (139 lb 12.8 oz)         04/09/24  0849   Weight: 80.5 kg (177 lb 6.4 oz)       Lab Results   Component Value Date    GLUCOSE 114 (H) 02/08/2024    BUN 31 (H) 02/08/2024    CREATININE 1.48 (H) 02/08/2024    EGFRIFNONA 76 01/16/2017    BCR 20.9 02/08/2024    K 3.9 02/08/2024    CO2 27.5 02/08/2024    CALCIUM 9.8 02/08/2024    ALBUMIN 3.39 (L) 08/06/2022    AST 26 08/06/2022    ALT 5 08/06/2022     Lab Results   Component Value Date    WBC 4.58 04/09/2024    HGB 12.7 04/09/2024    HCT 39.4 04/09/2024    MCV 91.6 04/09/2024     04/09/2024     Lab Results   Component Value Date    TROPONINT 0.012 08/05/2022     Lab Results   Component Value Date    PROBNP 5,942.0 (H) 04/09/2024     Results for orders placed during the hospital encounter of 08/04/22    Adult Transthoracic Echo Complete w/ Color, Spectral and Contrast if necessary per protocol    Interpretation Summary  · Left ventricular ejection fraction appears to be 46 - 50%. Left ventricular systolic function is mildly decreased.  · Left ventricular diastolic function is consistent with (grade Ia w/high LAP) impaired relaxation.  · The right ventricular cavity is moderately dilated.  · Mildly reduced right ventricular systolic function noted.  · The right atrial cavity is severely dilated.  · Severe tricuspid valve regurgitation is present.  · Estimated right ventricular systolic pressure from tricuspid regurgitation is normal " (<35 mmHg).         GDMT    Drug Class   Drug   Dose Last Dose Adjustment Additional Titration   Notes   ACEi/ARB/ARNI    needed BP can't tolerate   Beta Blocker Coreg 3.125mg bid  decreased 9/26/22 for hypoTN needed hypoTN   MRA Spironolactone 12.5mg 10/13/22     SGLT2i Jardiance 10mg 9/26/22 N/A        Drug Therapy Problems        Recommendations: None at this time    Patient was educated on heart failure medications and the importance of medication adherence. All questions were addressed and patient expressed understanding.     Thank you for allowing me to participate in the care of your patient,    Laura Guerrero, PharmD  04/09/24  09:44 EDT

## 2024-04-09 NOTE — PROGRESS NOTES
[facility]  RAOUL Worthington Daniel W, MD  [address] [place] [address]    Thank you for asking me to see Della Jorge for congestive heart failure.    HPI:     This is a 63 y.o. 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  The patient is typically seen by Sebastián Dougherty MD.       Last known EF 46-50%.    Last known hospitalization and/or ED visit: Patient was hospitalized from August 4, 2022 through August 9, 2022 with acute on chronic combined diastolic and systolic heart failure and acute hypoxemic respiratory failure after presenting to the emergency department at [facility] with anasarca and ascites.  Initially required 4 L via nasal cannula and was felt to be in decompensated heart failure with concern she was also developing cirrhosis from congestive hepatopathy.  She did have 10 L of ascites removed consistent with portal hypertension and patient was diuresed with improvement in symptoms.  Cardiology did recommend right heart cath to further evaluate tricuspid valve regurgitation and pulmonary pressures but patient did refuse during hospitalization to undergo procedure for the time being.  She was maximized on heart failure therapy with the addition of lisinopril and spironolactone in addition to home Coreg and Bumex in addition to reportedly being hypokalemic.  She was discharged on 1 L via nasal cannula and outpatient heart failure clinic appointment was  arranged.  Accompanied by:       04/09/24 visit data/details regarding:   Dyspnea: Denies dyspnea on exertion  Lower extremity swelling: Improving swelling.   Abdominal swelling: Worsening swelling of the abdomen with weight gain   Home weight: Improving with diuresis  Home BP: SBP running ~ 100s-110s; periodic low blood pressures. Patient keeps daily BP & weight log and brings to her appointments.    Home heart rate:  High 50s-60s  Daily activities of living: Performing independently with increased energy.  Able to get herself in and out of the car now.   HF zone: Yellow  Pillows/lying flat:  Utilizing hospital bed  Mrs. Jorge reports weight gain and swelling since last paracentesis.  She reports she has not had GI appointment scheduled yet.   We report following up for Cirrhosis and possibly need for recurrent paracentesis.   She has been previously referred to Hepatology but has issues travelling far distances and would prefer to be seen locally.    She tripped and fell recently, landing on her .  She reports she did not lose consciousness and this was a mechanical fall.   She reports being treated for yeast infxn by PCP after taking antibiotics for dermatitis.      Specialists:   Cardiology: Referred for [facility] previously with patient declining  GI referral placed for Cirrhosis        Review of Systems - Review of Systems   Constitutional: Negative for chills, decreased appetite, fever and malaise/fatigue.   HENT:  Negative for congestion, ear discharge and ear pain.    Eyes:  Negative for blurred vision and discharge.   Cardiovascular:  Positive for leg swelling. Negative for dyspnea on exertion.   Respiratory:  Negative for cough and hemoptysis.    Endocrine: Negative for heat intolerance.   Hematologic/Lymphatic: Negative for adenopathy and bleeding problem. Does not bruise/bleed easily.   Skin:  Negative for color change, dry skin and nail changes.   Musculoskeletal:  Negative for arthritis  and back pain.   Gastrointestinal:  Positive for abdominal pain. Negative for bloating.   Genitourinary:  Negative for bladder incontinence and decreased libido.   Psychiatric/Behavioral:  Negative for altered mental status and depression.          All other systems were reviewed and were negative.    Patient Active Problem List   Diagnosis    Acute on chronic congestive heart failure, unspecified heart failure type    Hepatic cirrhosis    Chronic combined systolic and diastolic heart failure    Stage 3a chronic kidney disease    Chronic hyponatremia    Severe tricuspid valve regurgitation       family history includes Diabetes in her sister; Heart disease in her mother.     reports that she has never smoked. She has never used smokeless tobacco. She reports that she does not drink alcohol and does not use drugs.    Allergies   Allergen Reactions    Codeine Hallucinations         Current Outpatient Medications:     bumetanide (Bumex) 1 MG tablet, Take 1 tablet by mouth See Admin Instructions for 30 days. 2mg in the morning and 2mg at lunch., Disp: 120 tablet, Rfl: 0    carvedilol (Coreg) 3.125 MG tablet, Take 1 tablet by mouth 2 (Two) Times a Day With Meals for 90 days., Disp: 60 tablet, Rfl: 5    empagliflozin (JARDIANCE) 10 MG tablet tablet, Take 1 tablet by mouth Daily for 180 days., Disp: 90 tablet, Rfl: 1    fluocinonide 0.1 % cream cream, Apply 1 Application topically to the appropriate area as directed 2 (Two) Times a Day As Needed., Disp: , Rfl:     linaclotide (Linzess) 72 MCG capsule capsule, Take 1 capsule by mouth Daily As Needed., Disp: , Rfl:     ondansetron (ZOFRAN) 4 MG tablet, Take 1 tablet by mouth Every 8 (Eight) Hours As Needed for Nausea or Vomiting., Disp: , Rfl:     spironolactone (ALDACTONE) 25 MG tablet, Take 0.5 tablets by mouth Daily for 90 days., Disp: 20 tablet, Rfl: 5    traMADol (ULTRAM) 50 MG tablet, Take 1 tablet by mouth Every 8 (Eight) Hours As Needed for Moderate Pain., Disp: ,  Rfl:     nystatin (MYCOSTATIN) [id number] UNIT/GM powder, Apply  topically to the appropriate area as directed 2 (Two) Times a Day for 30 days., Disp: 60 g, Rfl: 0      Physical Exam:  I have reviewed the patient's current vital signs as documented in the patient's EMR.   Vitals:    04/09/24 0849   BP: 106/70   Pulse: 83   SpO2: 91%     Body mass index is 35.83 kg/m².       04/09/24  0849   Weight: 80.5 kg (177 lb 6.4 oz)         Physical Exam  Constitutional:       General: She is awake.      Appearance: She is well-developed.      Interventions: Nasal cannula in place.      Comments: Ambulates independently to patient room.     HENT:      Head: Normocephalic and atraumatic.   Eyes:      General: Lids are normal.      Conjunctiva/sclera:      Right eye: Right conjunctiva is not injected.      Left eye: Left conjunctiva is not injected.   Cardiovascular:      Rate and Rhythm: Normal rate and regular rhythm.      Heart sounds: S1 normal and S2 normal.   Pulmonary:      Effort: No tachypnea or bradypnea.      Breath sounds: No decreased breath sounds, wheezing, rhonchi or rales.   Abdominal:      General: Abdomen is protuberant. Bowel sounds are normal. There is distension.      Palpations: Abdomen is soft. There is hepatomegaly. There is no fluid wave.      Comments: Distension present but improved   Musculoskeletal:      Right lower leg: No swelling. 2+ Edema present.      Left lower leg: No swelling. 2+ Edema present.      Comments: Edema is non pitting with some concerns for lymphedema   Skin:     General: Skin is warm and moist.   Neurological:      Mental Status: She is alert and oriented to person, place, and time.      Comments: Follows commands and answers questions appropriately.    Psychiatric:         Attention and Perception: Attention normal.         Mood and Affect: Mood normal.         Behavior: Behavior is cooperative.       JVP: Volume/Pulsation: Normal.        DATA REVIEWED:     EKG. I personally  reviewed and interpreted the EKG.        ---------------------------------------------------  TTE/JOSE ANGEL:  Results for orders placed during the hospital encounter of 08/04/22    Adult Transthoracic Echo Complete w/ Color, Spectral and Contrast if necessary per protocol    Interpretation Summary  · Left ventricular ejection fraction appears to be 46 - 50%. Left ventricular systolic function is mildly decreased.  · Left ventricular diastolic function is consistent with (grade Ia w/high LAP) impaired relaxation.  · The right ventricular cavity is moderately dilated.  · Mildly reduced right ventricular systolic function noted.  · The right atrial cavity is severely dilated.  · Severe tricuspid valve regurgitation is present.  · Estimated right ventricular systolic pressure from tricuspid regurgitation is normal (<35 mmHg).        -----------------------------------------------------  CXR/Imaging:   Imaging Results (Most Recent)       None            I personally reviewed and interpreted the CXR.      -----------------------------------------------------  CT:   No radiology results for the last 30 days.  I personally reviewed the images of the CT scan.  My personal interpretation is below.      ----------------------------------------------------    PFTs:  None available--will consider further referral.   --------------------------------------------------------------------------------------------------    Laboratory evaluations:    Lab Results   Component Value Date    GLUCOSE 86 04/09/2024    BUN 22 04/09/2024    CREATININE 1.33 (H) 04/09/2024    EGFRIFNONA 76 01/16/2017    BCR 16.5 04/09/2024    K 4.0 04/09/2024    CO2 26.1 04/09/2024    CALCIUM 9.5 04/09/2024    ALBUMIN 3.7 04/09/2024    AST 21 04/09/2024    ALT 6 04/09/2024     Lab Results   Component Value Date    WBC 4.58 04/09/2024    HGB 12.7 04/09/2024    HCT 39.4 04/09/2024    MCV 91.6 04/09/2024     04/09/2024     Lab Results   Component Value Date    CHOL  "60 08/05/2022    TRIG 52 08/05/2022    HDL 27 (L) 08/05/2022    LDL 19 08/05/2022     Lab Results   Component Value Date    TSH 6.250 (H) 08/05/2022     Lab Results   Component Value Date    HGBA1C 4.80 08/04/2022     Lab Results   Component Value Date    ALT 6 04/09/2024     Lab Results   Component Value Date    HGBA1C 4.80 08/04/2022    HGBA1C 6.20 (H) 01/16/2017     Lab Results   Component Value Date    CREATININE 1.33 (H) 04/09/2024     No results found for: \"IRON\", \"TIBC\", \"FERRITIN\"  Lab Results   Component Value Date    INR 1.38 (H) 02/28/2024    INR 1.34 (H) 10/18/2023    INR 1.41 (H) 08/04/2022    PROTIME 17.5 (H) 02/28/2024    PROTIME 17.1 (H) 10/18/2023    PROTIME 17.6 (H) 08/04/2022        Lab Results   Component Value Date    ABSOLUTELUNG 25 04/09/2024    ABSOLUTELUNG 25 02/08/2024    ABSOLUTELUNG 29 11/09/2023       PAH RISK ASSESSMENT:      1. Chronic combined systolic and diastolic heart failure    2. Chronic hyponatremia    3. Stage 3a chronic kidney disease    4. Hepatic cirrhosis, unspecified hepatic cirrhosis type, unspecified whether ascites present          ORDERS PLACED TODAY:  Orders Placed This Encounter   Procedures    ReDs Vest    Comprehensive Metabolic Panel    CBC (No Diff)    Magnesium    proBNP    Comprehensive Metabolic Panel    CBC (No Diff)    Magnesium    proBNP    Comprehensive Metabolic Panel    Comprehensive Metabolic Panel    Ambulatory Referral to Gastroenterology        Diagnoses and all orders for this visit:    1. Chronic combined systolic and diastolic heart failure (Primary)  -     Comprehensive Metabolic Panel; Future  -     CBC (No Diff); Future  -     Magnesium; Future  -     proBNP; Future  -     Comprehensive Metabolic Panel; Standing  -     Comprehensive Metabolic Panel  -     CBC (No Diff); Standing  -     CBC (No Diff)  -     Magnesium; Standing  -     Magnesium  -     proBNP; Standing  -     proBNP  -     ReDs Vest  -     Comprehensive Metabolic Panel; Future  - "     Comprehensive Metabolic Panel; Standing  -     Comprehensive Metabolic Panel    2. Chronic hyponatremia  -     Comprehensive Metabolic Panel; Future  -     Comprehensive Metabolic Panel; Standing  -     Comprehensive Metabolic Panel    3. Stage 3a chronic kidney disease  -     Comprehensive Metabolic Panel; Future  -     Comprehensive Metabolic Panel; Standing  -     Comprehensive Metabolic Panel    4. Hepatic cirrhosis, unspecified hepatic cirrhosis type, unspecified whether ascites present  -     Comprehensive Metabolic Panel; Future  -     Comprehensive Metabolic Panel; Standing  -     Comprehensive Metabolic Panel  -     Ambulatory Referral to Gastroenterology    Other orders  -     bumetanide (Bumex) 1 MG tablet; Take 1 tablet by mouth See Admin Instructions for 30 days. 2mg in the morning and 2mg at lunch.  Dispense: 120 tablet; Refill: 0  -     nystatin (MYCOSTATIN) [id number] UNIT/GM powder; Apply  topically to the appropriate area as directed 2 (Two) Times a Day for 30 days.  Dispense: 60 g; Refill: 0             MEDS ORDERED TODAY:    New Medications Ordered This Visit   Medications    bumetanide (Bumex) 1 MG tablet     Sig: Take 1 tablet by mouth See Admin Instructions for 30 days. 2mg in the morning and 2mg at lunch.     Dispense:  120 tablet     Refill:  0    nystatin (MYCOSTATIN) [id number] UNIT/GM powder     Sig: Apply  topically to the appropriate area as directed 2 (Two) Times a Day for 30 days.     Dispense:  60 g     Refill:  0        ---------------------------------------------------------------------------------------------------------------------------          ASSESSMENT/PLAN:      Diagnosis Plan   1. Chronic combined systolic and diastolic heart failure  Comprehensive Metabolic Panel    CBC (No Diff)    Magnesium    proBNP    Comprehensive Metabolic Panel    Comprehensive Metabolic Panel    CBC (No Diff)    CBC (No Diff)    Magnesium    Magnesium    proBNP    proBNP    ReDs Vest    Comprehensive  Metabolic Panel    Comprehensive Metabolic Panel    Comprehensive Metabolic Panel      2. Chronic hyponatremia  Comprehensive Metabolic Panel    Comprehensive Metabolic Panel    Comprehensive Metabolic Panel      3. Stage 3a chronic kidney disease  Comprehensive Metabolic Panel    Comprehensive Metabolic Panel    Comprehensive Metabolic Panel      4. Hepatic cirrhosis, unspecified hepatic cirrhosis type, unspecified whether ascites present  Comprehensive Metabolic Panel    Comprehensive Metabolic Panel    Comprehensive Metabolic Panel    Ambulatory Referral to Gastroenterology          acute on chronic moderate systolic and diastolic heart failure. CHF. Etiology: Unclear with current refusal of ischemic work-up    NYHA stage C;FC-III. NYHA FC change: improved by one grades.     Today, Patient is approaching euvolemiaand with  Moderate perfusion. The patient's hemodynamics are currently acceptable. HR is: normal and is at goal. BP/MAP was reviewed and there is notroom for medication up-titration.  Clinical trajectory was assessed and hasimproved.     CHF GOAL DIRECTED MEDICAL THERAPY FOR PATIENT ADDRESSED/ADJUSTED:       GDMT: Chronic combined systolic & diastolic HF with improved EF    Drug Class   Drug   Dose Last Dose Adjustment Additional Titration   Notes   ACEi/ARB/ARNI xxx xxx BP low with Lisinopril previously discontinued.      Beta Blocker Coreg 3.125mg BID      MRA Spironolactone 12.5mg Daily     SGLT2i Jardiance 10mg  N/A      Secondary pharmacological therapies: Patient does not meet EF criteria for Verquevo.        -CHF Specific BB:   Continue Coreg to 3.125mg BID.    We discussed monitoring with pulse oximetry and both room air and ambulatory evaluations.      -ACE/ARB/ARNi:   Stopped Lisinopril 2.5mg in prior visits 2/2 hypoTN.    BPs have been too low to add ARB thus far.     -MRA:   Continue Spironolactone 12.5mg  & 25mg in alternating doses on daily basis  We have tried adjusting previously but  patient had ongoing swelling with adjustments.  Unfortunately, eGFR has not tolerated attempts with prior attempts at increasing her MRA dosing.   Patient was advised to not use potassium products.  Quarterly monitoring of potassium and renal function is currently recommended    -SGLT2 inhibitor therapy:   The patient has HFimpEF with LV EF 46-50%, is NYHA FC-II-IV, HF hospitilization within the past 12 months, and abnormal BNP consistent with EMPEROR-Reduced trial. The pt does not have exclusion criteria: no ACS/TIA within 90 days; no AHF consideration; no severe VHD; no ICD/CRT in the past 90 days; not in ADHF. Empagliflozin was recommended at 10mg a day to reduce CV death and/or HF hospitlization. The fact this is typically used to treat DM2 was discussed. The benefit of the trial extended to patients without DM, so this information was conveyed to the pt.   Pt was advised side effects, some severe, including hypersensitivity and Drew's; in addition to common side effects of UTIs and female genital mycotic infections were discussed.  Continue  Jardiance (empagliflozin) 10mg during last visit with quarterly assessment of GFR.    Denies  side effects.   BMP acceptable.  Creatinine is within her baseline of 1.3-1.6.     -Diuretic regimen:   ReDs Vest reading for 04/09/24 was 25 and WNL;  reviewed reading with patient.   ProBNP is chronically elevated, but is is more so elevated today than usual.   Discussed 2mg BID.  She is taking 1mg BID most days.    Discussed as well hold parameters for hypotension given prior hx of hypotension.   BMP, Mag, & ProBNP reviewed with patient.     -Fluid restriction/Sodium restriction:   Patient has been asked to weigh daily and was provided with a printed diuretic strategy.  1,500 mg Na restriction was discussed.    -Acute vs. Chronic underlying conditions other than HF addressed during visit:     Hepatic Cirrhosis:   Repeat paracentesis ordered.   Advised patient to monitor  "abdominal circumference.    GI referral as she needs more input than just Heart Failure given chronicity of issue ad recurrent paracentesis.       CKD, stage 3b, stable:   Continue current medications.   BMP reviewed with creatinine within baseline.      Tricuspid valve regurgitation and elevated pulmonary pressures:  Refer to interventional cardiology for further evaluation for left and right heart cath previously placed.   Patient previously and continuing to voice that she does not currently want \"more tests or workups\" at this time.      Chronic hyponatremia:  Likely 2/2 underlying Cirrhosis.    Prior sodium values reviewed within chart.         ------------------------------------------------------------------  -Iron/Anemia in CHF:  -Chronic appearing macrocytic anemia  -Thrombocytopenia likely 2/2 splenic sequestration in setting of Cirrhosis  CBC checked on 07/07/23 and Hgb is stable; recheck next visit.          --------------------------------------------------------------------------------      Class 3 Severe Obesity (BMI >=40). Obesity-related health conditions include the following: hypertension and osteoarthritis. Obesity is unchanged. BMI is is above average; BMI management plan is completed. We discussed portion control.               >45minutes out of 60 minutes face to face spent counseling patient extensively on dietary Na+ intake, importance of activity, weight monitoring, compliance with medications in addition to importance of titration with goal directed medical therapy and follow up appointments.            This document has been electronically signed by Hilary Smith PA-C  April 9, 2024 11:06 EDT      Dictated Utilizing Dragon Dictation: Part of this note may be an electronic transcription/translation of spoken language to printed text using the Dragon Dictation System.    Follow-up appointment and medication changes provided in hand delivered After Visit Summary as well as reviewed " in the room.

## 2024-04-23 ENCOUNTER — APPOINTMENT (OUTPATIENT)
Dept: CT IMAGING | Facility: HOSPITAL | Age: 64
End: 2024-04-23
Payer: MEDICAID

## 2024-04-23 ENCOUNTER — HOSPITAL ENCOUNTER (EMERGENCY)
Facility: HOSPITAL | Age: 64
Discharge: HOME | End: 2024-04-23
Attending: EMERGENCY MEDICINE | Admitting: EMERGENCY MEDICINE
Payer: MEDICAID

## 2024-04-23 ENCOUNTER — APPOINTMENT (OUTPATIENT)
Dept: ULTRASOUND IMAGING | Facility: HOSPITAL | Age: 64
End: 2024-04-23
Payer: MEDICAID

## 2024-04-23 VITALS
TEMPERATURE: 98.4 F | BODY MASS INDEX: 36.29 KG/M2 | HEIGHT: 59 IN | DIASTOLIC BLOOD PRESSURE: 86 MMHG | OXYGEN SATURATION: 90 % | HEART RATE: 79 BPM | WEIGHT: 180 LBS | SYSTOLIC BLOOD PRESSURE: 124 MMHG | RESPIRATION RATE: 17 BRPM

## 2024-04-23 DIAGNOSIS — R18.8 CIRRHOSIS OF LIVER WITH ASCITES, UNSPECIFIED HEPATIC CIRRHOSIS TYPE: Primary | ICD-10-CM

## 2024-04-23 DIAGNOSIS — K74.60 CIRRHOSIS OF LIVER WITH ASCITES, UNSPECIFIED HEPATIC CIRRHOSIS TYPE: Primary | ICD-10-CM

## 2024-04-23 LAB
ALBUMIN SERPL-MCNC: 3.3 G/DL (ref 3.5–5.2)
ALBUMIN/GLOB SERPL: 0.7 G/DL
ALP SERPL-CCNC: 113 U/L (ref 39–117)
ALT SERPL W P-5'-P-CCNC: 5 U/L (ref 1–33)
ANION GAP SERPL CALCULATED.3IONS-SCNC: 11.9 MMOL/L (ref 5–15)
APPEARANCE FLD: ABNORMAL
APTT PPP: 41.4 SECONDS (ref 26.5–34.5)
AST SERPL-CCNC: 22 U/L (ref 1–32)
BASOPHILS # BLD AUTO: 0.07 10*3/MM3 (ref 0–0.2)
BASOPHILS NFR BLD AUTO: 1.3 % (ref 0–1.5)
BILIRUB SERPL-MCNC: 1.8 MG/DL (ref 0–1.2)
BUN SERPL-MCNC: 25 MG/DL (ref 8–23)
BUN/CREAT SERPL: 18.1 (ref 7–25)
CALCIUM SPEC-SCNC: 9.3 MG/DL (ref 8.6–10.5)
CHLORIDE SERPL-SCNC: 92 MMOL/L (ref 98–107)
CO2 SERPL-SCNC: 25.1 MMOL/L (ref 22–29)
COLOR FLD: YELLOW
CREAT SERPL-MCNC: 1.38 MG/DL (ref 0.57–1)
CRP SERPL-MCNC: 2.93 MG/DL (ref 0–0.5)
DEPRECATED RDW RBC AUTO: 54.4 FL (ref 37–54)
EGFRCR SERPLBLD CKD-EPI 2021: 43.1 ML/MIN/1.73
EOSINOPHIL # BLD AUTO: 0.04 10*3/MM3 (ref 0–0.4)
EOSINOPHIL NFR BLD AUTO: 0.8 % (ref 0.3–6.2)
ERYTHROCYTE [DISTWIDTH] IN BLOOD BY AUTOMATED COUNT: 16.5 % (ref 12.3–15.4)
ERYTHROCYTE [SEDIMENTATION RATE] IN BLOOD: 41 MM/HR (ref 0–30)
GLOBULIN UR ELPH-MCNC: 4.5 GM/DL
GLUCOSE SERPL-MCNC: 103 MG/DL (ref 65–99)
HCT VFR BLD AUTO: 38.8 % (ref 34–46.6)
HGB BLD-MCNC: 12.7 G/DL (ref 12–15.9)
HOLD SPECIMEN: NORMAL
HOLD SPECIMEN: NORMAL
IMM GRANULOCYTES # BLD AUTO: 0.02 10*3/MM3 (ref 0–0.05)
IMM GRANULOCYTES NFR BLD AUTO: 0.4 % (ref 0–0.5)
INR PPP: 1.35 (ref 0.9–1.1)
LYMPHOCYTES # BLD AUTO: 0.28 10*3/MM3 (ref 0.7–3.1)
LYMPHOCYTES NFR BLD AUTO: 5.4 % (ref 19.6–45.3)
LYMPHOCYTES NFR FLD MANUAL: 36 %
MCH RBC QN AUTO: 29.7 PG (ref 26.6–33)
MCHC RBC AUTO-ENTMCNC: 32.7 G/DL (ref 31.5–35.7)
MCV RBC AUTO: 90.7 FL (ref 79–97)
MONOCYTES # BLD AUTO: 0.64 10*3/MM3 (ref 0.1–0.9)
MONOCYTES NFR BLD AUTO: 12.3 % (ref 5–12)
MONOS+MACROS NFR FLD: 60 %
NEUTROPHILS NFR BLD AUTO: 4.15 10*3/MM3 (ref 1.7–7)
NEUTROPHILS NFR BLD AUTO: 79.8 % (ref 42.7–76)
NEUTROPHILS NFR FLD MANUAL: 4 %
NRBC BLD AUTO-RTO: 0 /100 WBC (ref 0–0.2)
NUC CELL # FLD: 68 /MM3
PLATELET # BLD AUTO: 175 10*3/MM3 (ref 140–450)
PMV BLD AUTO: 9.2 FL (ref 6–12)
POTASSIUM SERPL-SCNC: 3.7 MMOL/L (ref 3.5–5.2)
PROT SERPL-MCNC: 7.8 G/DL (ref 6–8.5)
PROTHROMBIN TIME: 17.3 SECONDS (ref 12.1–14.7)
RBC # BLD AUTO: 4.28 10*6/MM3 (ref 3.77–5.28)
RBC # FLD AUTO: ABNORMAL 10*3/UL
SODIUM SERPL-SCNC: 129 MMOL/L (ref 136–145)
WBC NRBC COR # BLD AUTO: 5.2 10*3/MM3 (ref 3.4–10.8)
WHOLE BLOOD HOLD COAG: NORMAL
WHOLE BLOOD HOLD SPECIMEN: NORMAL

## 2024-04-23 PROCEDURE — 80053 COMPREHEN METABOLIC PANEL: CPT | Performed by: NURSE PRACTITIONER

## 2024-04-23 PROCEDURE — 87205 SMEAR GRAM STAIN: CPT | Performed by: NURSE PRACTITIONER

## 2024-04-23 PROCEDURE — 49083 ABD PARACENTESIS W/IMAGING: CPT | Performed by: RADIOLOGY

## 2024-04-23 PROCEDURE — 89051 BODY FLUID CELL COUNT: CPT | Performed by: NURSE PRACTITIONER

## 2024-04-23 PROCEDURE — P9047 ALBUMIN (HUMAN), 25%, 50ML: HCPCS | Performed by: EMERGENCY MEDICINE

## 2024-04-23 PROCEDURE — 87070 CULTURE OTHR SPECIMN AEROBIC: CPT | Performed by: NURSE PRACTITIONER

## 2024-04-23 PROCEDURE — 85025 COMPLETE CBC W/AUTO DIFF WBC: CPT | Performed by: NURSE PRACTITIONER

## 2024-04-23 PROCEDURE — 83615 LACTATE (LD) (LDH) ENZYME: CPT | Performed by: NURSE PRACTITIONER

## 2024-04-23 PROCEDURE — 85730 THROMBOPLASTIN TIME PARTIAL: CPT | Performed by: NURSE PRACTITIONER

## 2024-04-23 PROCEDURE — 85652 RBC SED RATE AUTOMATED: CPT | Performed by: NURSE PRACTITIONER

## 2024-04-23 PROCEDURE — 87075 CULTR BACTERIA EXCEPT BLOOD: CPT | Performed by: NURSE PRACTITIONER

## 2024-04-23 PROCEDURE — 84157 ASSAY OF PROTEIN OTHER: CPT | Performed by: NURSE PRACTITIONER

## 2024-04-23 PROCEDURE — 82945 GLUCOSE OTHER FLUID: CPT | Performed by: NURSE PRACTITIONER

## 2024-04-23 PROCEDURE — 82247 BILIRUBIN TOTAL: CPT | Performed by: NURSE PRACTITIONER

## 2024-04-23 PROCEDURE — 85610 PROTHROMBIN TIME: CPT | Performed by: NURSE PRACTITIONER

## 2024-04-23 PROCEDURE — 36415 COLL VENOUS BLD VENIPUNCTURE: CPT

## 2024-04-23 PROCEDURE — 25010000002 ALBUMIN HUMAN 25% PER 50 ML: Performed by: EMERGENCY MEDICINE

## 2024-04-23 PROCEDURE — 99284 EMERGENCY DEPT VISIT MOD MDM: CPT

## 2024-04-23 PROCEDURE — 82042 OTHER SOURCE ALBUMIN QUAN EA: CPT | Performed by: NURSE PRACTITIONER

## 2024-04-23 PROCEDURE — 87015 SPECIMEN INFECT AGNT CONCNTJ: CPT | Performed by: NURSE PRACTITIONER

## 2024-04-23 PROCEDURE — 82150 ASSAY OF AMYLASE: CPT | Performed by: NURSE PRACTITIONER

## 2024-04-23 PROCEDURE — 76942 ECHO GUIDE FOR BIOPSY: CPT

## 2024-04-23 PROCEDURE — 86140 C-REACTIVE PROTEIN: CPT | Performed by: NURSE PRACTITIONER

## 2024-04-23 RX ORDER — ALBUMIN (HUMAN) 12.5 G/50ML
50 SOLUTION INTRAVENOUS ONCE
Status: DISCONTINUED | OUTPATIENT
Start: 2024-04-23 | End: 2024-04-23

## 2024-04-23 RX ORDER — ALBUMIN (HUMAN) 12.5 G/50ML
62.5 SOLUTION INTRAVENOUS ONCE
Status: DISCONTINUED | OUTPATIENT
Start: 2024-04-23 | End: 2024-04-23

## 2024-04-23 RX ORDER — ALBUMIN (HUMAN) 12.5 G/50ML
37.5 SOLUTION INTRAVENOUS ONCE
Status: DISCONTINUED | OUTPATIENT
Start: 2024-04-23 | End: 2024-04-23

## 2024-04-23 RX ORDER — ALBUMIN (HUMAN) 12.5 G/50ML
100 SOLUTION INTRAVENOUS ONCE
Status: DISCONTINUED | OUTPATIENT
Start: 2024-04-23 | End: 2024-04-23

## 2024-04-23 RX ORDER — ALBUMIN (HUMAN) 12.5 G/50ML
112.5 SOLUTION INTRAVENOUS ONCE
Status: DISCONTINUED | OUTPATIENT
Start: 2024-04-23 | End: 2024-04-23

## 2024-04-23 RX ORDER — SODIUM CHLORIDE 0.9 % (FLUSH) 0.9 %
10 SYRINGE (ML) INJECTION AS NEEDED
Status: DISCONTINUED | OUTPATIENT
Start: 2024-04-23 | End: 2024-04-23 | Stop reason: HOSPADM

## 2024-04-23 RX ORDER — ALBUMIN (HUMAN) 12.5 G/50ML
87.5 SOLUTION INTRAVENOUS ONCE
Status: DISCONTINUED | OUTPATIENT
Start: 2024-04-23 | End: 2024-04-23

## 2024-04-23 RX ORDER — ALBUMIN (HUMAN) 12.5 G/50ML
25 SOLUTION INTRAVENOUS
Qty: 300 ML | Refills: 0 | Status: COMPLETED | OUTPATIENT
Start: 2024-04-23 | End: 2024-04-23

## 2024-04-23 RX ADMIN — ALBUMIN (HUMAN) 25 G: 0.25 INJECTION, SOLUTION INTRAVENOUS at 17:11

## 2024-04-23 RX ADMIN — ALBUMIN (HUMAN) 25 G: 0.25 INJECTION, SOLUTION INTRAVENOUS at 16:39

## 2024-04-23 RX ADMIN — ALBUMIN (HUMAN) 25 G: 0.25 INJECTION, SOLUTION INTRAVENOUS at 16:02

## 2024-04-23 NOTE — ED NOTES
Informed consent for paracentesis procedure signed by pt, witnessed by TIERA Quinn RN, placed on chart.

## 2024-04-23 NOTE — ED PROVIDER NOTES
Subjective   History of Present Illness  Patient is a 63-year-old female with significant past medical history positive for liver cirrhosis, ascites, congestive heart failure, hypertension presenting to the ER complaints of abdominal swelling, weight gain, shortness of air.  Patient reports that she is scheduled for a paracentesis regularly.  Patient reports that her next paracentesis is May 2nd but at this time she cannot wait due to uncomfortable swelling.  Patient denies fever, nausea, vomiting, cough, diarrhea, known sick contacts or recent foreign travel.  Patient denies any additional symptoms at this time.  Patient takes Bumex at home and has not missed any doses.    History provided by:  Patient   used: No        Review of Systems   Constitutional: Negative.  Negative for fever.   HENT: Negative.     Respiratory:  Positive for shortness of breath.    Cardiovascular: Negative.  Negative for chest pain.   Gastrointestinal:  Positive for abdominal distention and abdominal pain.   Endocrine: Negative.    Genitourinary: Negative.  Negative for dysuria.   Skin: Negative.    Neurological: Negative.    Psychiatric/Behavioral: Negative.     All other systems reviewed and are negative.      Past Medical History:   Diagnosis Date    Cirrhosis     Congestive heart failure (CHF)     Hypertension        Allergies   Allergen Reactions    Codeine Hallucinations    Keflex [Cephalexin] Hives       Past Surgical History:   Procedure Laterality Date    CHOLECYSTECTOMY      HYSTERECTOMY      partial, still has ovaries       Family History   Problem Relation Age of Onset    Heart disease Mother     Diabetes Sister        Social History     Socioeconomic History    Marital status:    Tobacco Use    Smoking status: Never    Smokeless tobacco: Never   Vaping Use    Vaping status: Never Used   Substance and Sexual Activity    Alcohol use: Never    Drug use: Never    Sexual activity: Defer            Objective   Physical Exam  Vitals and nursing note reviewed.   Constitutional:       General: She is not in acute distress.     Appearance: She is well-developed. She is obese. She is not diaphoretic.   HENT:      Head: Normocephalic and atraumatic.      Right Ear: External ear normal.      Left Ear: External ear normal.      Nose: Nose normal.   Eyes:      Conjunctiva/sclera: Conjunctivae normal.      Pupils: Pupils are equal, round, and reactive to light.   Neck:      Vascular: No JVD.      Trachea: No tracheal deviation.   Cardiovascular:      Rate and Rhythm: Normal rate and regular rhythm.      Heart sounds: Normal heart sounds. No murmur heard.  Pulmonary:      Effort: Pulmonary effort is normal. No respiratory distress.      Breath sounds: Normal breath sounds. No wheezing.   Abdominal:      General: Bowel sounds are normal. There is distension.      Palpations: Abdomen is soft.      Tenderness: There is abdominal tenderness.   Musculoskeletal:         General: No deformity. Normal range of motion.      Cervical back: Normal range of motion and neck supple.   Skin:     General: Skin is warm and dry.      Capillary Refill: Capillary refill takes 2 to 3 seconds.      Coloration: Skin is not pale.      Findings: No erythema or rash.   Neurological:      Mental Status: She is alert and oriented to person, place, and time.      Cranial Nerves: No cranial nerve deficit.   Psychiatric:         Behavior: Behavior normal.         Thought Content: Thought content normal.         Procedures       Results for orders placed or performed during the hospital encounter of 04/23/24   Body Fluid Culture - Body Fluid, Peritoneum    Specimen: Peritoneum; Body Fluid   Result Value Ref Range    Gram Stain WBCs seen     Gram Stain No organisms seen    Comprehensive Metabolic Panel    Specimen: Arm, Left; Blood   Result Value Ref Range    Glucose 103 (H) 65 - 99 mg/dL    BUN 25 (H) 8 - 23 mg/dL    Creatinine 1.38 (H) 0.57  - 1.00 mg/dL    Sodium 129 (L) 136 - 145 mmol/L    Potassium 3.7 3.5 - 5.2 mmol/L    Chloride 92 (L) 98 - 107 mmol/L    CO2 25.1 22.0 - 29.0 mmol/L    Calcium 9.3 8.6 - 10.5 mg/dL    Total Protein 7.8 6.0 - 8.5 g/dL    Albumin 3.3 (L) 3.5 - 5.2 g/dL    ALT (SGPT) 5 1 - 33 U/L    AST (SGOT) 22 1 - 32 U/L    Alkaline Phosphatase 113 39 - 117 U/L    Total Bilirubin 1.8 (H) 0.0 - 1.2 mg/dL    Globulin 4.5 gm/dL    A/G Ratio 0.7 g/dL    BUN/Creatinine Ratio 18.1 7.0 - 25.0    Anion Gap 11.9 5.0 - 15.0 mmol/L    eGFR 43.1 (L) >60.0 mL/min/1.73   Protime-INR    Specimen: Arm, Left; Blood   Result Value Ref Range    Protime 17.3 (H) 12.1 - 14.7 Seconds    INR 1.35 (H) 0.90 - 1.10   aPTT    Specimen: Arm, Left; Blood   Result Value Ref Range    PTT 41.4 (H) 26.5 - 34.5 seconds   C-reactive Protein    Specimen: Arm, Left; Blood   Result Value Ref Range    C-Reactive Protein 2.93 (H) 0.00 - 0.50 mg/dL   Sedimentation Rate    Specimen: Arm, Left; Blood   Result Value Ref Range    Sed Rate 41 (H) 0 - 30 mm/hr   CBC Auto Differential    Specimen: Arm, Left; Blood   Result Value Ref Range    WBC 5.20 3.40 - 10.80 10*3/mm3    RBC 4.28 3.77 - 5.28 10*6/mm3    Hemoglobin 12.7 12.0 - 15.9 g/dL    Hematocrit 38.8 34.0 - 46.6 %    MCV 90.7 79.0 - 97.0 fL    MCH 29.7 26.6 - 33.0 pg    MCHC 32.7 31.5 - 35.7 g/dL    RDW 16.5 (H) 12.3 - 15.4 %    RDW-SD 54.4 (H) 37.0 - 54.0 fl    MPV 9.2 6.0 - 12.0 fL    Platelets 175 140 - 450 10*3/mm3    Neutrophil % 79.8 (H) 42.7 - 76.0 %    Lymphocyte % 5.4 (L) 19.6 - 45.3 %    Monocyte % 12.3 (H) 5.0 - 12.0 %    Eosinophil % 0.8 0.3 - 6.2 %    Basophil % 1.3 0.0 - 1.5 %    Immature Grans % 0.4 0.0 - 0.5 %    Neutrophils, Absolute 4.15 1.70 - 7.00 10*3/mm3    Lymphocytes, Absolute 0.28 (L) 0.70 - 3.10 10*3/mm3    Monocytes, Absolute 0.64 0.10 - 0.90 10*3/mm3    Eosinophils, Absolute 0.04 0.00 - 0.40 10*3/mm3    Basophils, Absolute 0.07 0.00 - 0.20 10*3/mm3    Immature Grans, Absolute 0.02 0.00 - 0.05  10*3/mm3    nRBC 0.0 0.0 - 0.2 /100 WBC   Body fluid cell count - Body Fluid, Peritoneum    Specimen: Peritoneum; Body Fluid   Result Value Ref Range    Color, Fluid Yellow     Appearance, Fluid Slightly Hazy (A) Clear    RBC, Fluid      Nucleated Cells, Fluid 68 /mm3   Body fluid differential - Body Fluid, Peritoneum    Specimen: Peritoneum; Body Fluid   Result Value Ref Range    Neutrophils, Fluid 4 %    Lymphocytes, Fluid 36 %    Mononuclear, Fluid 60 %   Green Top (Gel)   Result Value Ref Range    Extra Tube Hold for add-ons.    Lavender Top   Result Value Ref Range    Extra Tube hold for add-on    Gold Top - SST   Result Value Ref Range    Extra Tube Hold for add-ons.    Light Blue Top   Result Value Ref Range    Extra Tube Hold for add-ons.         ED Course  ED Course as of 04/23/24 1716   Tue Apr 23, 2024   1401 Discussed with radiology department. Taking patient for IR US paracentesis at this time.  [SM]   1715 C-Reactive Protein(!): 2.93 [SM]   1715 Sed Rate(!): 41 [SM]      ED Course User Index  [SM] Eleanor Garcia, APRN                                             Medical Decision Making  Patient is a 63-year-old female with significant past medical history positive for liver cirrhosis, ascites, congestive heart failure, hypertension presenting to the ER complaints of abdominal swelling, weight gain, shortness of air.  Patient reports that she is scheduled for a paracentesis regularly.  Patient reports that her next paracentesis is May 2nd but at this time she cannot wait due to uncomfortable swelling.  Patient denies fever, nausea, vomiting, cough, diarrhea, known sick contacts or recent foreign travel.  Patient denies any additional symptoms at this time.  Patient takes Bumex at home and has not missed any doses.    Advised patient to return to the ER with new or worsening symptoms.  Advised patient to follow-up with PCP.  Patient verbalized understanding and agrees.  Vital signs are stable at  discharge.  Patient is in no acute distress.    Problems Addressed:  Cirrhosis of liver with ascites, unspecified hepatic cirrhosis type: complicated acute illness or injury    Amount and/or Complexity of Data Reviewed  Labs: ordered. Decision-making details documented in ED Course.  Radiology: ordered.    Risk  Prescription drug management.        Final diagnoses:   Cirrhosis of liver with ascites, unspecified hepatic cirrhosis type       ED Disposition  ED Disposition       ED Disposition   Discharge    Condition   Stable    Comment   --               Sebastián Dougherty MD  [address] [facility] [address]  [phone]    Schedule an appointment as soon as possible for a visit   As needed         Medication List      No changes were made to your prescriptions during this visit.            Eleanor Garcia, APRN  04/23/24 1624       Eleanor Garcia, APRN  04/23/24 1713

## 2024-04-23 NOTE — NURSING NOTE
Procedure complete and patient tolerated well. Approximately 10,600ml dark yellow fluid drained. Skin adhesive and dressing applied. Patient to receive albumin in infusion.

## 2024-04-24 LAB
ALBUMIN FLD-MCNC: 2.1 G/DL
AMYLASE FLD-CCNC: 17 U/L
GLUCOSE FLD-MCNC: 100 MG/DL
LDH FLD-CCNC: 63 U/L
PROT FLD-MCNC: 3.7 G/DL

## 2024-04-25 LAB — REF LAB TEST METHOD: NORMAL

## 2024-04-26 LAB — BILIRUB FLD-MCNC: 0.9 MG/DL

## 2024-04-28 LAB
BACTERIA FLD CULT: NORMAL
GRAM STN SPEC: NORMAL
GRAM STN SPEC: NORMAL

## 2024-04-29 LAB — BACTERIA SPEC ANAEROBE CULT: NORMAL

## 2024-05-04 ENCOUNTER — APPOINTMENT (OUTPATIENT)
Dept: CT IMAGING | Facility: HOSPITAL | Age: 64
DRG: 871 | End: 2024-05-04
Payer: MEDICAID

## 2024-05-04 ENCOUNTER — APPOINTMENT (OUTPATIENT)
Dept: CARDIOLOGY | Facility: HOSPITAL | Age: 64
DRG: 871 | End: 2024-05-04
Payer: MEDICAID

## 2024-05-04 ENCOUNTER — HOSPITAL ENCOUNTER (INPATIENT)
Facility: HOSPITAL | Age: 64
LOS: 5 days | Discharge: HOSPICE | DRG: 871 | End: 2024-05-09
Attending: EMERGENCY MEDICINE | Admitting: STUDENT IN AN ORGANIZED HEALTH CARE EDUCATION/TRAINING PROGRAM
Payer: MEDICAID

## 2024-05-04 ENCOUNTER — APPOINTMENT (OUTPATIENT)
Dept: GENERAL RADIOLOGY | Facility: HOSPITAL | Age: 64
DRG: 871 | End: 2024-05-04
Payer: MEDICAID

## 2024-05-04 DIAGNOSIS — A41.9 SEPSIS WITH ACUTE RENAL FAILURE WITHOUT SEPTIC SHOCK, DUE TO UNSPECIFIED ORGANISM, UNSPECIFIED ACUTE RENAL FAILURE TYPE: Primary | ICD-10-CM

## 2024-05-04 DIAGNOSIS — K74.60 DECOMPENSATED HEPATIC CIRRHOSIS: ICD-10-CM

## 2024-05-04 DIAGNOSIS — N17.9 SEPSIS WITH ACUTE RENAL FAILURE WITHOUT SEPTIC SHOCK, DUE TO UNSPECIFIED ORGANISM, UNSPECIFIED ACUTE RENAL FAILURE TYPE: Primary | ICD-10-CM

## 2024-05-04 DIAGNOSIS — R65.20 SEPSIS WITH ACUTE RENAL FAILURE WITHOUT SEPTIC SHOCK, DUE TO UNSPECIFIED ORGANISM, UNSPECIFIED ACUTE RENAL FAILURE TYPE: Primary | ICD-10-CM

## 2024-05-04 DIAGNOSIS — K72.90 DECOMPENSATED HEPATIC CIRRHOSIS: ICD-10-CM

## 2024-05-04 DIAGNOSIS — S81.802A WOUND OF LEFT LOWER EXTREMITY, INITIAL ENCOUNTER: ICD-10-CM

## 2024-05-04 LAB
ABO GROUP BLD: NORMAL
ABO GROUP BLD: NORMAL
ALBUMIN SERPL-MCNC: 2.8 G/DL (ref 3.5–5.2)
ALBUMIN SERPL-MCNC: 3.3 G/DL (ref 3.5–5.2)
ALBUMIN/GLOB SERPL: 0.8 G/DL
ALBUMIN/GLOB SERPL: 1.3 G/DL
ALP SERPL-CCNC: 72 U/L (ref 39–117)
ALP SERPL-CCNC: 91 U/L (ref 39–117)
ALT SERPL W P-5'-P-CCNC: 6 U/L (ref 1–33)
ALT SERPL W P-5'-P-CCNC: 8 U/L (ref 1–33)
AMMONIA BLD-SCNC: 17 UMOL/L (ref 11–51)
ANION GAP SERPL CALCULATED.3IONS-SCNC: 14.7 MMOL/L (ref 5–15)
ANION GAP SERPL CALCULATED.3IONS-SCNC: 15.7 MMOL/L (ref 5–15)
ANISOCYTOSIS BLD QL: ABNORMAL
APPEARANCE FLD: ABNORMAL
AST SERPL-CCNC: 20 U/L (ref 1–32)
AST SERPL-CCNC: 22 U/L (ref 1–32)
BACTERIA BLD CULT: ABNORMAL
BACTERIA UR QL AUTO: NORMAL /HPF
BH CV ECHO MEAS - AO MAX PG: 6.4 MMHG
BH CV ECHO MEAS - AO MEAN PG: 4 MMHG
BH CV ECHO MEAS - AO ROOT DIAM: 2.7 CM
BH CV ECHO MEAS - AO V2 MAX: 126 CM/SEC
BH CV ECHO MEAS - AO V2 VTI: 24.1 CM
BH CV ECHO MEAS - AVA(I,D): 2.32 CM2
BH CV ECHO MEAS - EDV(CUBED): 132.7 ML
BH CV ECHO MEAS - EDV(MOD-SP2): 66.6 ML
BH CV ECHO MEAS - EDV(MOD-SP4): 70.3 ML
BH CV ECHO MEAS - EF(MOD-BP): 33.7 %
BH CV ECHO MEAS - EF(MOD-SP2): 39.2 %
BH CV ECHO MEAS - EF(MOD-SP4): 31.6 %
BH CV ECHO MEAS - ESV(CUBED): 76.2 ML
BH CV ECHO MEAS - ESV(MOD-SP2): 40.5 ML
BH CV ECHO MEAS - ESV(MOD-SP4): 48.1 ML
BH CV ECHO MEAS - FS: 16.9 %
BH CV ECHO MEAS - IVS/LVPW: 1.17 CM
BH CV ECHO MEAS - IVSD: 0.8 CM
BH CV ECHO MEAS - LA DIMENSION: 4.5 CM
BH CV ECHO MEAS - LAT PEAK E' VEL: 11 CM/SEC
BH CV ECHO MEAS - LV DIASTOLIC VOL/BSA (35-75): 41.2 CM2
BH CV ECHO MEAS - LV MASS(C)D: 127.8 GRAMS
BH CV ECHO MEAS - LV MAX PG: 4.1 MMHG
BH CV ECHO MEAS - LV MEAN PG: 2 MMHG
BH CV ECHO MEAS - LV SYSTOLIC VOL/BSA (12-30): 28.2 CM2
BH CV ECHO MEAS - LV V1 MAX: 101 CM/SEC
BH CV ECHO MEAS - LV V1 VTI: 19.7 CM
BH CV ECHO MEAS - LVIDD: 5.1 CM
BH CV ECHO MEAS - LVIDS: 4.2 CM
BH CV ECHO MEAS - LVOT AREA: 2.8 CM2
BH CV ECHO MEAS - LVOT DIAM: 1.9 CM
BH CV ECHO MEAS - LVPWD: 0.69 CM
BH CV ECHO MEAS - MED PEAK E' VEL: 3.3 CM/SEC
BH CV ECHO MEAS - MV A MAX VEL: 42.8 CM/SEC
BH CV ECHO MEAS - MV DEC SLOPE: 799 CM/SEC2
BH CV ECHO MEAS - MV DEC TIME: 0.11 SEC
BH CV ECHO MEAS - MV E MAX VEL: 85.3 CM/SEC
BH CV ECHO MEAS - MV E/A: 1.99
BH CV ECHO MEAS - PA ACC TIME: 0.12 SEC
BH CV ECHO MEAS - PI END-D VEL: 160 CM/SEC
BH CV ECHO MEAS - SV(LVOT): 55.9 ML
BH CV ECHO MEAS - SV(MOD-SP2): 26.1 ML
BH CV ECHO MEAS - SV(MOD-SP4): 22.2 ML
BH CV ECHO MEAS - SVI(LVOT): 32.7 ML/M2
BH CV ECHO MEAS - SVI(MOD-SP2): 15.3 ML/M2
BH CV ECHO MEAS - SVI(MOD-SP4): 13 ML/M2
BH CV ECHO MEAS - TAPSE (>1.6): 0.45 CM
BH CV ECHO MEAS - TR MAX PG: 9.7 MMHG
BH CV ECHO MEAS - TR MAX VEL: 156 CM/SEC
BH CV ECHO MEASUREMENTS AVERAGE E/E' RATIO: 11.93
BH CV XLRA - RV BASE: 4.6 CM
BH CV XLRA - RV MID: 3.9 CM
BILIRUB SERPL-MCNC: 5 MG/DL (ref 0–1.2)
BILIRUB SERPL-MCNC: 5.2 MG/DL (ref 0–1.2)
BILIRUB UR QL STRIP: ABNORMAL
BLD GP AB SCN SERPL QL: NEGATIVE
BOTTLE TYPE: ABNORMAL
BUN SERPL-MCNC: 29 MG/DL (ref 8–23)
BUN SERPL-MCNC: 32 MG/DL (ref 8–23)
BUN/CREAT SERPL: 14.6 (ref 7–25)
BUN/CREAT SERPL: 15.7 (ref 7–25)
BURR CELLS BLD QL SMEAR: ABNORMAL
CALCIUM SPEC-SCNC: 8.8 MG/DL (ref 8.6–10.5)
CALCIUM SPEC-SCNC: 8.8 MG/DL (ref 8.6–10.5)
CHLORIDE SERPL-SCNC: 88 MMOL/L (ref 98–107)
CHLORIDE SERPL-SCNC: 89 MMOL/L (ref 98–107)
CLARITY UR: CLEAR
CO2 SERPL-SCNC: 20.3 MMOL/L (ref 22–29)
CO2 SERPL-SCNC: 21.3 MMOL/L (ref 22–29)
COLOR FLD: YELLOW
COLOR UR: ABNORMAL
CREAT SERPL-MCNC: 1.99 MG/DL (ref 0.57–1)
CREAT SERPL-MCNC: 2.04 MG/DL (ref 0.57–1)
CRP SERPL-MCNC: 16.99 MG/DL (ref 0–0.5)
D-LACTATE SERPL-SCNC: 1.8 MMOL/L (ref 0.5–2)
D-LACTATE SERPL-SCNC: 3.1 MMOL/L (ref 0.5–2)
D-LACTATE SERPL-SCNC: 3.2 MMOL/L (ref 0.5–2)
DEPRECATED RDW RBC AUTO: 54.5 FL (ref 37–54)
DEPRECATED RDW RBC AUTO: 58.7 FL (ref 37–54)
EGFRCR SERPLBLD CKD-EPI 2021: 27 ML/MIN/1.73
EGFRCR SERPLBLD CKD-EPI 2021: 27.8 ML/MIN/1.73
ELLIPTOCYTES BLD QL SMEAR: ABNORMAL
ERYTHROCYTE [DISTWIDTH] IN BLOOD BY AUTOMATED COUNT: 16.9 % (ref 12.3–15.4)
ERYTHROCYTE [DISTWIDTH] IN BLOOD BY AUTOMATED COUNT: 17.2 % (ref 12.3–15.4)
GEN 5 2HR TROPONIN T REFLEX: 74 NG/L
GLOBULIN UR ELPH-MCNC: 2.6 GM/DL
GLOBULIN UR ELPH-MCNC: 3.4 GM/DL
GLUCOSE BLDC GLUCOMTR-MCNC: 94 MG/DL (ref 70–130)
GLUCOSE SERPL-MCNC: 84 MG/DL (ref 65–99)
GLUCOSE SERPL-MCNC: 97 MG/DL (ref 65–99)
GLUCOSE UR STRIP-MCNC: NEGATIVE MG/DL
HCT VFR BLD AUTO: 33.9 % (ref 34–46.6)
HCT VFR BLD AUTO: 34.2 % (ref 34–46.6)
HGB BLD-MCNC: 11 G/DL (ref 12–15.9)
HGB BLD-MCNC: 11.6 G/DL (ref 12–15.9)
HGB UR QL STRIP.AUTO: NEGATIVE
HOLD SPECIMEN: NORMAL
HOLD SPECIMEN: NORMAL
HYALINE CASTS UR QL AUTO: NORMAL /LPF
INR PPP: 1.87 (ref 0.9–1.1)
KETONES UR QL STRIP: NEGATIVE
LARGE PLATELETS: ABNORMAL
LEFT ATRIUM VOLUME INDEX: 45.2 ML/M2
LEUKOCYTE ESTERASE UR QL STRIP.AUTO: ABNORMAL
LYMPHOCYTES # BLD MANUAL: 0.42 10*3/MM3 (ref 0.7–3.1)
LYMPHOCYTES NFR BLD MANUAL: 2 % (ref 5–12)
LYMPHOCYTES NFR FLD MANUAL: 58 %
MCH RBC QN AUTO: 30.3 PG (ref 26.6–33)
MCH RBC QN AUTO: 30.6 PG (ref 26.6–33)
MCHC RBC AUTO-ENTMCNC: 32.2 G/DL (ref 31.5–35.7)
MCHC RBC AUTO-ENTMCNC: 34.2 G/DL (ref 31.5–35.7)
MCV RBC AUTO: 89.4 FL (ref 79–97)
MCV RBC AUTO: 94.2 FL (ref 79–97)
METAMYELOCYTES NFR BLD MANUAL: 2 % (ref 0–0)
MONOCYTES # BLD: 0.21 10*3/MM3 (ref 0.1–0.9)
MONOS+MACROS NFR FLD: 21 %
NEUTROPHILS # BLD AUTO: 9.72 10*3/MM3 (ref 1.7–7)
NEUTROPHILS NFR BLD MANUAL: 70 % (ref 42.7–76)
NEUTROPHILS NFR FLD MANUAL: 21 %
NEUTS BAND NFR BLD MANUAL: 22 % (ref 0–5)
NITRITE UR QL STRIP: POSITIVE
NT-PROBNP SERPL-MCNC: ABNORMAL PG/ML (ref 0–900)
NUC CELL # FLD: 43 /MM3
PH UR STRIP.AUTO: <=5 [PH] (ref 5–8)
PLATELET # BLD AUTO: 101 10*3/MM3 (ref 140–450)
PLATELET # BLD AUTO: 141 10*3/MM3 (ref 140–450)
PMV BLD AUTO: 10.4 FL (ref 6–12)
PMV BLD AUTO: 9.4 FL (ref 6–12)
POTASSIUM SERPL-SCNC: 4.2 MMOL/L (ref 3.5–5.2)
POTASSIUM SERPL-SCNC: 4.3 MMOL/L (ref 3.5–5.2)
PROCALCITONIN SERPL-MCNC: 2.97 NG/ML (ref 0–0.25)
PROT SERPL-MCNC: 5.9 G/DL (ref 6–8.5)
PROT SERPL-MCNC: 6.2 G/DL (ref 6–8.5)
PROT UR QL STRIP: ABNORMAL
PROTHROMBIN TIME: 21.6 SECONDS (ref 12.1–14.7)
QT INTERVAL: 370 MS
QTC INTERVAL: 464 MS
RBC # BLD AUTO: 3.63 10*6/MM3 (ref 3.77–5.28)
RBC # BLD AUTO: 3.79 10*6/MM3 (ref 3.77–5.28)
RBC # FLD AUTO: ABNORMAL 10*3/UL
RBC # UR STRIP: NORMAL /HPF
REF LAB TEST METHOD: NORMAL
RH BLD: NEGATIVE
RH BLD: NEGATIVE
SODIUM SERPL-SCNC: 124 MMOL/L (ref 136–145)
SODIUM SERPL-SCNC: 125 MMOL/L (ref 136–145)
SP GR UR STRIP: 1.02 (ref 1–1.03)
SQUAMOUS #/AREA URNS HPF: NORMAL /HPF
T&S EXPIRATION DATE: NORMAL
TOXIC GRANULATION: ABNORMAL
TROPONIN T DELTA: -2 NG/L
TROPONIN T SERPL HS-MCNC: 76 NG/L
UROBILINOGEN UR QL STRIP: ABNORMAL
VARIANT LYMPHS NFR BLD MANUAL: 4 % (ref 19.6–45.3)
WBC # UR STRIP: NORMAL /HPF
WBC NRBC COR # BLD AUTO: 10.57 10*3/MM3 (ref 3.4–10.8)
WBC NRBC COR # BLD AUTO: 13.57 10*3/MM3 (ref 3.4–10.8)
WHOLE BLOOD HOLD COAG: NORMAL
WHOLE BLOOD HOLD SPECIMEN: NORMAL

## 2024-05-04 PROCEDURE — 71250 CT THORAX DX C-: CPT

## 2024-05-04 PROCEDURE — 25810000003 SODIUM CHLORIDE 0.9 % SOLUTION: Performed by: EMERGENCY MEDICINE

## 2024-05-04 PROCEDURE — 80053 COMPREHEN METABOLIC PANEL: CPT | Performed by: STUDENT IN AN ORGANIZED HEALTH CARE EDUCATION/TRAINING PROGRAM

## 2024-05-04 PROCEDURE — P9612 CATHETERIZE FOR URINE SPEC: HCPCS

## 2024-05-04 PROCEDURE — 86900 BLOOD TYPING SEROLOGIC ABO: CPT

## 2024-05-04 PROCEDURE — 85610 PROTHROMBIN TIME: CPT | Performed by: PHYSICIAN ASSISTANT

## 2024-05-04 PROCEDURE — 87077 CULTURE AEROBIC IDENTIFY: CPT | Performed by: PHYSICIAN ASSISTANT

## 2024-05-04 PROCEDURE — 99285 EMERGENCY DEPT VISIT HI MDM: CPT

## 2024-05-04 PROCEDURE — 71045 X-RAY EXAM CHEST 1 VIEW: CPT | Performed by: RADIOLOGY

## 2024-05-04 PROCEDURE — 88112 CYTOPATH CELL ENHANCE TECH: CPT

## 2024-05-04 PROCEDURE — 89051 BODY FLUID CELL COUNT: CPT | Performed by: STUDENT IN AN ORGANIZED HEALTH CARE EDUCATION/TRAINING PROGRAM

## 2024-05-04 PROCEDURE — 99223 1ST HOSP IP/OBS HIGH 75: CPT | Performed by: STUDENT IN AN ORGANIZED HEALTH CARE EDUCATION/TRAINING PROGRAM

## 2024-05-04 PROCEDURE — 36415 COLL VENOUS BLD VENIPUNCTURE: CPT | Performed by: PHYSICIAN ASSISTANT

## 2024-05-04 PROCEDURE — 84484 ASSAY OF TROPONIN QUANT: CPT | Performed by: PHYSICIAN ASSISTANT

## 2024-05-04 PROCEDURE — 87075 CULTR BACTERIA EXCEPT BLOOD: CPT | Performed by: STUDENT IN AN ORGANIZED HEALTH CARE EDUCATION/TRAINING PROGRAM

## 2024-05-04 PROCEDURE — 93010 ELECTROCARDIOGRAM REPORT: CPT | Performed by: INTERNAL MEDICINE

## 2024-05-04 PROCEDURE — 87205 SMEAR GRAM STAIN: CPT | Performed by: STUDENT IN AN ORGANIZED HEALTH CARE EDUCATION/TRAINING PROGRAM

## 2024-05-04 PROCEDURE — 85025 COMPLETE CBC W/AUTO DIFF WBC: CPT | Performed by: PHYSICIAN ASSISTANT

## 2024-05-04 PROCEDURE — 83605 ASSAY OF LACTIC ACID: CPT | Performed by: STUDENT IN AN ORGANIZED HEALTH CARE EDUCATION/TRAINING PROGRAM

## 2024-05-04 PROCEDURE — 25010000002 BUMETANIDE PER 0.5 MG: Performed by: PHYSICIAN ASSISTANT

## 2024-05-04 PROCEDURE — 86140 C-REACTIVE PROTEIN: CPT | Performed by: PHYSICIAN ASSISTANT

## 2024-05-04 PROCEDURE — 0W9G3ZZ DRAINAGE OF PERITONEAL CAVITY, PERCUTANEOUS APPROACH: ICD-10-PCS | Performed by: STUDENT IN AN ORGANIZED HEALTH CARE EDUCATION/TRAINING PROGRAM

## 2024-05-04 PROCEDURE — 87040 BLOOD CULTURE FOR BACTERIA: CPT | Performed by: PHYSICIAN ASSISTANT

## 2024-05-04 PROCEDURE — 25010000002 HEPARIN (PORCINE) PER 1000 UNITS: Performed by: STUDENT IN AN ORGANIZED HEALTH CARE EDUCATION/TRAINING PROGRAM

## 2024-05-04 PROCEDURE — 87186 SC STD MICRODIL/AGAR DIL: CPT | Performed by: PHYSICIAN ASSISTANT

## 2024-05-04 PROCEDURE — 25010000002 ONDANSETRON PER 1 MG: Performed by: EMERGENCY MEDICINE

## 2024-05-04 PROCEDURE — 25010000002 MEROPENEM PER 100 MG: Performed by: PHYSICIAN ASSISTANT

## 2024-05-04 PROCEDURE — 85007 BL SMEAR W/DIFF WBC COUNT: CPT | Performed by: PHYSICIAN ASSISTANT

## 2024-05-04 PROCEDURE — 87070 CULTURE OTHR SPECIMN AEROBIC: CPT | Performed by: STUDENT IN AN ORGANIZED HEALTH CARE EDUCATION/TRAINING PROGRAM

## 2024-05-04 PROCEDURE — 83880 ASSAY OF NATRIURETIC PEPTIDE: CPT | Performed by: PHYSICIAN ASSISTANT

## 2024-05-04 PROCEDURE — 25010000002 ONDANSETRON PER 1 MG: Performed by: PHYSICIAN ASSISTANT

## 2024-05-04 PROCEDURE — 88305 TISSUE EXAM BY PATHOLOGIST: CPT

## 2024-05-04 PROCEDURE — 25010000002 ALBUMIN HUMAN 25% PER 50 ML: Performed by: STUDENT IN AN ORGANIZED HEALTH CARE EDUCATION/TRAINING PROGRAM

## 2024-05-04 PROCEDURE — 49083 ABD PARACENTESIS W/IMAGING: CPT | Performed by: STUDENT IN AN ORGANIZED HEALTH CARE EDUCATION/TRAINING PROGRAM

## 2024-05-04 PROCEDURE — 71045 X-RAY EXAM CHEST 1 VIEW: CPT

## 2024-05-04 PROCEDURE — 86901 BLOOD TYPING SEROLOGIC RH(D): CPT

## 2024-05-04 PROCEDURE — 86901 BLOOD TYPING SEROLOGIC RH(D): CPT | Performed by: PHYSICIAN ASSISTANT

## 2024-05-04 PROCEDURE — 86900 BLOOD TYPING SEROLOGIC ABO: CPT | Performed by: PHYSICIAN ASSISTANT

## 2024-05-04 PROCEDURE — 74176 CT ABD & PELVIS W/O CONTRAST: CPT | Performed by: RADIOLOGY

## 2024-05-04 PROCEDURE — 83605 ASSAY OF LACTIC ACID: CPT | Performed by: PHYSICIAN ASSISTANT

## 2024-05-04 PROCEDURE — 81001 URINALYSIS AUTO W/SCOPE: CPT | Performed by: PHYSICIAN ASSISTANT

## 2024-05-04 PROCEDURE — 84145 PROCALCITONIN (PCT): CPT | Performed by: PHYSICIAN ASSISTANT

## 2024-05-04 PROCEDURE — 93306 TTE W/DOPPLER COMPLETE: CPT | Performed by: INTERNAL MEDICINE

## 2024-05-04 PROCEDURE — 25010000002 OCTREOTIDE PER 25 MCG: Performed by: STUDENT IN AN ORGANIZED HEALTH CARE EDUCATION/TRAINING PROGRAM

## 2024-05-04 PROCEDURE — 80053 COMPREHEN METABOLIC PANEL: CPT | Performed by: PHYSICIAN ASSISTANT

## 2024-05-04 PROCEDURE — 93005 ELECTROCARDIOGRAM TRACING: CPT | Performed by: STUDENT IN AN ORGANIZED HEALTH CARE EDUCATION/TRAINING PROGRAM

## 2024-05-04 PROCEDURE — P9047 ALBUMIN (HUMAN), 25%, 50ML: HCPCS | Performed by: STUDENT IN AN ORGANIZED HEALTH CARE EDUCATION/TRAINING PROGRAM

## 2024-05-04 PROCEDURE — 87154 CUL TYP ID BLD PTHGN 6+ TRGT: CPT | Performed by: PHYSICIAN ASSISTANT

## 2024-05-04 PROCEDURE — 82948 REAGENT STRIP/BLOOD GLUCOSE: CPT

## 2024-05-04 PROCEDURE — 85027 COMPLETE CBC AUTOMATED: CPT | Performed by: STUDENT IN AN ORGANIZED HEALTH CARE EDUCATION/TRAINING PROGRAM

## 2024-05-04 PROCEDURE — 86850 RBC ANTIBODY SCREEN: CPT | Performed by: PHYSICIAN ASSISTANT

## 2024-05-04 PROCEDURE — 74176 CT ABD & PELVIS W/O CONTRAST: CPT

## 2024-05-04 PROCEDURE — 71250 CT THORAX DX C-: CPT | Performed by: RADIOLOGY

## 2024-05-04 PROCEDURE — 25010000002 MEPERIDINE PER 100 MG: Performed by: EMERGENCY MEDICINE

## 2024-05-04 PROCEDURE — 82140 ASSAY OF AMMONIA: CPT | Performed by: PHYSICIAN ASSISTANT

## 2024-05-04 PROCEDURE — 93306 TTE W/DOPPLER COMPLETE: CPT

## 2024-05-04 PROCEDURE — 25810000003 SODIUM CHLORIDE 0.9 % SOLUTION: Performed by: PHYSICIAN ASSISTANT

## 2024-05-04 PROCEDURE — 25010000002 MEROPENEM PER 100 MG: Performed by: STUDENT IN AN ORGANIZED HEALTH CARE EDUCATION/TRAINING PROGRAM

## 2024-05-04 RX ORDER — TRAMADOL HYDROCHLORIDE 50 MG/1
50 TABLET ORAL EVERY 8 HOURS PRN
Status: CANCELLED | OUTPATIENT
Start: 2024-05-04

## 2024-05-04 RX ORDER — MEPERIDINE HYDROCHLORIDE 25 MG/ML
12.5 INJECTION INTRAMUSCULAR; INTRAVENOUS; SUBCUTANEOUS ONCE
Status: COMPLETED | OUTPATIENT
Start: 2024-05-04 | End: 2024-05-04

## 2024-05-04 RX ORDER — HEPARIN SODIUM 5000 [USP'U]/ML
5000 INJECTION, SOLUTION INTRAVENOUS; SUBCUTANEOUS EVERY 8 HOURS SCHEDULED
Status: DISCONTINUED | OUTPATIENT
Start: 2024-05-04 | End: 2024-05-09 | Stop reason: HOSPADM

## 2024-05-04 RX ORDER — CARBOXYMETHYLCELLULOSE SODIUM 5 MG/ML
2 SOLUTION/ DROPS OPHTHALMIC 3 TIMES DAILY PRN
Status: DISCONTINUED | OUTPATIENT
Start: 2024-05-04 | End: 2024-05-04

## 2024-05-04 RX ORDER — NYSTATIN 100000 [USP'U]/G
1 POWDER TOPICAL 2 TIMES DAILY
COMMUNITY

## 2024-05-04 RX ORDER — CARVEDILOL 3.12 MG/1
3.12 TABLET ORAL 2 TIMES DAILY WITH MEALS
Status: CANCELLED | OUTPATIENT
Start: 2024-05-04

## 2024-05-04 RX ORDER — OCTREOTIDE ACETATE 100 UG/ML
100 INJECTION, SOLUTION INTRAVENOUS; SUBCUTANEOUS 3 TIMES DAILY
Status: DISCONTINUED | OUTPATIENT
Start: 2024-05-04 | End: 2024-05-09 | Stop reason: HOSPADM

## 2024-05-04 RX ORDER — BUMETANIDE 0.25 MG/ML
2 INJECTION INTRAMUSCULAR; INTRAVENOUS ONCE
Status: COMPLETED | OUTPATIENT
Start: 2024-05-04 | End: 2024-05-04

## 2024-05-04 RX ORDER — LUBIPROSTONE 8 UG/1
8 CAPSULE ORAL 2 TIMES DAILY
Status: DISCONTINUED | OUTPATIENT
Start: 2024-05-04 | End: 2024-05-08

## 2024-05-04 RX ORDER — BUMETANIDE 1 MG/1
2 TABLET ORAL 2 TIMES DAILY
Status: CANCELLED | OUTPATIENT
Start: 2024-05-04

## 2024-05-04 RX ORDER — BUMETANIDE 1 MG/1
2 TABLET ORAL 2 TIMES DAILY
COMMUNITY

## 2024-05-04 RX ORDER — ALBUMIN (HUMAN) 12.5 G/50ML
50 SOLUTION INTRAVENOUS ONCE
Status: COMPLETED | OUTPATIENT
Start: 2024-05-04 | End: 2024-05-04

## 2024-05-04 RX ORDER — ONDANSETRON 2 MG/ML
4 INJECTION INTRAMUSCULAR; INTRAVENOUS ONCE
Status: COMPLETED | OUTPATIENT
Start: 2024-05-04 | End: 2024-05-04

## 2024-05-04 RX ORDER — SPIRONOLACTONE 25 MG/1
25 TABLET ORAL DAILY
COMMUNITY
End: 2024-05-09 | Stop reason: HOSPADM

## 2024-05-04 RX ORDER — SPIRONOLACTONE 25 MG/1
25 TABLET ORAL DAILY
Status: CANCELLED | OUTPATIENT
Start: 2024-05-04

## 2024-05-04 RX ORDER — MIDODRINE HYDROCHLORIDE 2.5 MG/1
10 TABLET ORAL
Status: DISCONTINUED | OUTPATIENT
Start: 2024-05-04 | End: 2024-05-05

## 2024-05-04 RX ORDER — NYSTATIN 100000 [USP'U]/G
1 POWDER TOPICAL 2 TIMES DAILY
Status: DISCONTINUED | OUTPATIENT
Start: 2024-05-04 | End: 2024-05-09 | Stop reason: HOSPADM

## 2024-05-04 RX ORDER — SODIUM CHLORIDE 0.9 % (FLUSH) 0.9 %
10 SYRINGE (ML) INJECTION EVERY 12 HOURS SCHEDULED
Status: DISCONTINUED | OUTPATIENT
Start: 2024-05-04 | End: 2024-05-09 | Stop reason: HOSPADM

## 2024-05-04 RX ORDER — MIDODRINE HYDROCHLORIDE 2.5 MG/1
5 TABLET ORAL
Status: DISCONTINUED | OUTPATIENT
Start: 2024-05-04 | End: 2024-05-04

## 2024-05-04 RX ORDER — DOXYCYCLINE 100 MG/1
100 CAPSULE ORAL 2 TIMES DAILY
Status: DISCONTINUED | OUTPATIENT
Start: 2024-05-04 | End: 2024-05-09

## 2024-05-04 RX ORDER — SODIUM CHLORIDE 9 MG/ML
40 INJECTION, SOLUTION INTRAVENOUS AS NEEDED
Status: DISCONTINUED | OUTPATIENT
Start: 2024-05-04 | End: 2024-05-09 | Stop reason: HOSPADM

## 2024-05-04 RX ORDER — CARVEDILOL 3.12 MG/1
3.12 TABLET ORAL 2 TIMES DAILY WITH MEALS
COMMUNITY
End: 2024-05-09 | Stop reason: HOSPADM

## 2024-05-04 RX ORDER — MIDODRINE HYDROCHLORIDE 2.5 MG/1
5 TABLET ORAL ONCE
Qty: 2 TABLET | Refills: 0 | Status: COMPLETED | OUTPATIENT
Start: 2024-05-04 | End: 2024-05-04

## 2024-05-04 RX ORDER — SODIUM CHLORIDE 0.9 % (FLUSH) 0.9 %
10 SYRINGE (ML) INJECTION AS NEEDED
Status: DISCONTINUED | OUTPATIENT
Start: 2024-05-04 | End: 2024-05-09 | Stop reason: HOSPADM

## 2024-05-04 RX ORDER — DOXYCYCLINE HYCLATE 100 MG/1
100 CAPSULE ORAL 2 TIMES DAILY
COMMUNITY
End: 2024-05-09 | Stop reason: HOSPADM

## 2024-05-04 RX ORDER — BETAMETHASONE DIPROPIONATE 0.05 %
OINTMENT (GRAM) TOPICAL DAILY
Status: DISCONTINUED | OUTPATIENT
Start: 2024-05-04 | End: 2024-05-09 | Stop reason: HOSPADM

## 2024-05-04 RX ORDER — NOREPINEPHRINE BITARTRATE 0.03 MG/ML
.02-.3 INJECTION, SOLUTION INTRAVENOUS
Status: DISCONTINUED | OUTPATIENT
Start: 2024-05-04 | End: 2024-05-07

## 2024-05-04 RX ADMIN — CARBIDOPA AND LEVODOPA 5 MG: 50; 200 TABLET, EXTENDED RELEASE ORAL at 12:49

## 2024-05-04 RX ADMIN — EMPAGLIFLOZIN 10 MG: 10 TABLET, FILM COATED ORAL at 18:20

## 2024-05-04 RX ADMIN — CARBIDOPA AND LEVODOPA 10 MG: 50; 200 TABLET, EXTENDED RELEASE ORAL at 18:20

## 2024-05-04 RX ADMIN — BUMETANIDE 1 MG: 0.25 INJECTION INTRAMUSCULAR; INTRAVENOUS at 05:35

## 2024-05-04 RX ADMIN — MEPERIDINE HYDROCHLORIDE 12.5 MG: 25 INJECTION INTRAMUSCULAR; INTRAVENOUS; SUBCUTANEOUS at 01:31

## 2024-05-04 RX ADMIN — MEROPENEM 1000 MG: 1 INJECTION, POWDER, FOR SOLUTION INTRAVENOUS at 05:36

## 2024-05-04 RX ADMIN — CARBIDOPA AND LEVODOPA 5 MG: 50; 200 TABLET, EXTENDED RELEASE ORAL at 08:27

## 2024-05-04 RX ADMIN — DOXYCYCLINE 100 MG: 100 CAPSULE ORAL at 21:50

## 2024-05-04 RX ADMIN — OCTREOTIDE ACETATE 100 MCG: 100 INJECTION, SOLUTION INTRAVENOUS; SUBCUTANEOUS at 16:02

## 2024-05-04 RX ADMIN — CARBIDOPA AND LEVODOPA 5 MG: 50; 200 TABLET, EXTENDED RELEASE ORAL at 14:55

## 2024-05-04 RX ADMIN — ALBUMIN (HUMAN) 50 G: 0.25 INJECTION, SOLUTION INTRAVENOUS at 14:28

## 2024-05-04 RX ADMIN — DOXYCYCLINE 100 MG: 100 INJECTION, POWDER, LYOPHILIZED, FOR SOLUTION INTRAVENOUS at 01:34

## 2024-05-04 RX ADMIN — SODIUM CHLORIDE 500 ML: 9 INJECTION, SOLUTION INTRAVENOUS at 01:32

## 2024-05-04 RX ADMIN — NYSTATIN 1 APPLICATION: 100000 POWDER TOPICAL at 21:50

## 2024-05-04 RX ADMIN — Medication 10 ML: at 21:51

## 2024-05-04 RX ADMIN — HEPARIN SODIUM 5000 UNITS: 5000 INJECTION INTRAVENOUS; SUBCUTANEOUS at 18:20

## 2024-05-04 RX ADMIN — MEROPENEM 500 MG: 500 INJECTION, POWDER, FOR SOLUTION INTRAVENOUS at 14:55

## 2024-05-04 RX ADMIN — ONDANSETRON 4 MG: 2 INJECTION INTRAMUSCULAR; INTRAVENOUS at 06:16

## 2024-05-04 RX ADMIN — SODIUM CHLORIDE 1000 ML: 9 INJECTION, SOLUTION INTRAVENOUS at 05:51

## 2024-05-04 RX ADMIN — ONDANSETRON 4 MG: 2 INJECTION INTRAMUSCULAR; INTRAVENOUS at 01:30

## 2024-05-04 RX ADMIN — LUBIPROSTONE 8 MCG: 8 CAPSULE, GELATIN COATED ORAL at 21:50

## 2024-05-04 RX ADMIN — OCTREOTIDE ACETATE 100 MCG: 100 INJECTION, SOLUTION INTRAVENOUS; SUBCUTANEOUS at 08:27

## 2024-05-04 RX ADMIN — NOREPINEPHRINE BITARTRATE 0.02 MCG/KG/MIN: 0.03 INJECTION, SOLUTION INTRAVENOUS at 16:12

## 2024-05-04 NOTE — PLAN OF CARE
Problem: Adult Inpatient Plan of Care  Goal: Plan of Care Review  Outcome: Ongoing, Not Progressing  Flowsheets (Taken 5/4/2024 1738)  Progress: declining  Plan of Care Reviewed With:   patient   spouse  Outcome Evaluation: VSS on 0.1 of levophed. Patient remains on 3L NC. Patient reports constant pain in BLE and BUE due to swelling. Paracentesis was performed at bedside today, 7650 ML removed. Call light within reach, bed in the lowest position.  Goal: Patient-Specific Goal (Individualized)  Outcome: Ongoing, Not Progressing  Goal: Absence of Hospital-Acquired Illness or Injury  Outcome: Ongoing, Not Progressing  Intervention: Identify and Manage Fall Risk  Recent Flowsheet Documentation  Taken 5/4/2024 1700 by Margaret Mancuso RN  Safety Promotion/Fall Prevention: safety round/check completed  Taken 5/4/2024 1500 by Margaret Mancuso RN  Safety Promotion/Fall Prevention: safety round/check completed  Taken 5/4/2024 1300 by Margaret Mancuso RN  Safety Promotion/Fall Prevention: safety round/check completed  Taken 5/4/2024 1100 by Margaret Mancuso RN  Safety Promotion/Fall Prevention: safety round/check completed  Intervention: Prevent Skin Injury  Recent Flowsheet Documentation  Taken 5/4/2024 1000 by Margaret Mancuso RN  Skin Protection: adhesive use limited  Intervention: Prevent and Manage VTE (Venous Thromboembolism) Risk  Recent Flowsheet Documentation  Taken 5/4/2024 1500 by Margaret Mancuso RN  VTE Prevention/Management: (heparin subcutaneous) other (see comments)  Taken 5/4/2024 1000 by Margaret Mancuso RN  VTE Prevention/Management: (Heparin subcutaneous) other (see comments)  Intervention: Prevent Infection  Recent Flowsheet Documentation  Taken 5/4/2024 1700 by Margaret Mancuso RN  Infection Prevention:   rest/sleep promoted   single patient room provided  Taken 5/4/2024 1500 by Margaret Mancuso RN  Infection Prevention:   rest/sleep promoted   single patient room provided  Taken 5/4/2024 1300 by Margaret Mancuso  RN  Infection Prevention:   rest/sleep promoted   single patient room provided  Taken 5/4/2024 1100 by Margaret Mancuso RN  Infection Prevention:   rest/sleep promoted   single patient room provided  Goal: Optimal Comfort and Wellbeing  Outcome: Ongoing, Not Progressing  Intervention: Monitor Pain and Promote Comfort  Recent Flowsheet Documentation  Taken 5/4/2024 1500 by Margaret Mancuso RN  Pain Management Interventions:   pillow support provided   position adjusted  Intervention: Provide Person-Centered Care  Recent Flowsheet Documentation  Taken 5/4/2024 1500 by Margaret Mancuso RN  Trust Relationship/Rapport:   care explained   choices provided   emotional support provided   empathic listening provided   reassurance provided   thoughts/feelings acknowledged   questions encouraged   questions answered  Taken 5/4/2024 1000 by Margaret Mancuso RN  Trust Relationship/Rapport:   care explained   choices provided   emotional support provided   empathic listening provided   questions answered   questions encouraged   reassurance provided   thoughts/feelings acknowledged  Goal: Readiness for Transition of Care  Outcome: Ongoing, Not Progressing     Problem: Asthma Comorbidity  Goal: Maintenance of Asthma Control  Outcome: Ongoing, Not Progressing  Intervention: Maintain Asthma Symptom Control  Recent Flowsheet Documentation  Taken 5/4/2024 1700 by Margaret Mancuso RN  Medication Review/Management: medications reviewed  Taken 5/4/2024 1500 by Margaret Mancuso RN  Medication Review/Management: medications reviewed  Taken 5/4/2024 1300 by Margaret Mancuso RN  Medication Review/Management: medications reviewed  Taken 5/4/2024 1100 by Margaret Mancuso RN  Medication Review/Management: medications reviewed     Problem: Behavioral Health Comorbidity  Goal: Maintenance of Behavioral Health Symptom Control  Outcome: Ongoing, Not Progressing  Intervention: Maintain Behavioral Health Symptom Control  Recent Flowsheet Documentation  Taken  5/4/2024 1700 by Margaret Mancuso RN  Medication Review/Management: medications reviewed  Taken 5/4/2024 1500 by Margaret Mancuso RN  Medication Review/Management: medications reviewed  Taken 5/4/2024 1300 by Margaret Mancuso RN  Medication Review/Management: medications reviewed  Taken 5/4/2024 1100 by Margaret Mancuso RN  Medication Review/Management: medications reviewed     Problem: COPD (Chronic Obstructive Pulmonary Disease) Comorbidity  Goal: Maintenance of COPD Symptom Control  Outcome: Ongoing, Not Progressing  Intervention: Maintain COPD-Symptom Control  Recent Flowsheet Documentation  Taken 5/4/2024 1700 by Margaret Mancuso RN  Medication Review/Management: medications reviewed  Taken 5/4/2024 1500 by Margaret Mancuso RN  Medication Review/Management: medications reviewed  Taken 5/4/2024 1300 by Margaret Mancuso RN  Medication Review/Management: medications reviewed  Taken 5/4/2024 1100 by Margaret Mancuso RN  Medication Review/Management: medications reviewed  Taken 5/4/2024 1000 by Margaret Mancuso RN  Supportive Measures: self-care encouraged     Problem: Diabetes Comorbidity  Goal: Blood Glucose Level Within Targeted Range  Outcome: Ongoing, Not Progressing     Problem: Heart Failure Comorbidity  Goal: Maintenance of Heart Failure Symptom Control  Outcome: Ongoing, Not Progressing  Intervention: Maintain Heart Failure-Management  Recent Flowsheet Documentation  Taken 5/4/2024 1700 by Margaret Mancuso RN  Medication Review/Management: medications reviewed  Taken 5/4/2024 1500 by Margaret Mancuso RN  Medication Review/Management: medications reviewed  Taken 5/4/2024 1300 by Margaret Mancuso RN  Medication Review/Management: medications reviewed  Taken 5/4/2024 1100 by Margaret Mancuso RN  Medication Review/Management: medications reviewed     Problem: Hypertension Comorbidity  Goal: Blood Pressure in Desired Range  Outcome: Ongoing, Not Progressing  Intervention: Maintain Blood Pressure Management  Recent Flowsheet  Documentation  Taken 5/4/2024 1700 by Margaret Mancuso RN  Medication Review/Management: medications reviewed  Taken 5/4/2024 1500 by Margaret Mancuso RN  Medication Review/Management: medications reviewed  Taken 5/4/2024 1300 by Margaret Mancuso RN  Medication Review/Management: medications reviewed  Taken 5/4/2024 1100 by Margaret Mancuso RN  Medication Review/Management: medications reviewed     Problem: Obstructive Sleep Apnea Risk or Actual Comorbidity Management  Goal: Unobstructed Breathing During Sleep  Outcome: Ongoing, Not Progressing     Problem: Osteoarthritis Comorbidity  Goal: Maintenance of Osteoarthritis Symptom Control  Outcome: Ongoing, Not Progressing  Intervention: Maintain Osteoarthritis Symptom Control  Recent Flowsheet Documentation  Taken 5/4/2024 1700 by Margaret Mancuso RN  Medication Review/Management: medications reviewed  Taken 5/4/2024 1500 by Margaret Mancuso RN  Medication Review/Management: medications reviewed  Taken 5/4/2024 1300 by Margaret Mancuso RN  Medication Review/Management: medications reviewed  Taken 5/4/2024 1100 by Margaret Mancuso RN  Medication Review/Management: medications reviewed     Problem: Pain Chronic (Persistent) (Comorbidity Management)  Goal: Acceptable Pain Control and Functional Ability  Outcome: Ongoing, Not Progressing  Intervention: Manage Persistent Pain  Recent Flowsheet Documentation  Taken 5/4/2024 1700 by Margaret Mancuso RN  Medication Review/Management: medications reviewed  Taken 5/4/2024 1500 by Margaret Mancuso RN  Medication Review/Management: medications reviewed  Taken 5/4/2024 1300 by Margaret Mancuso RN  Medication Review/Management: medications reviewed  Taken 5/4/2024 1100 by Margaret Mancuso RN  Medication Review/Management: medications reviewed  Taken 5/4/2024 1000 by Margaret Mancuso RN  Sleep/Rest Enhancement: relaxation techniques promoted  Intervention: Develop Pain Management Plan  Recent Flowsheet Documentation  Taken 5/4/2024 1500 by Margaret Mancuso  RN  Pain Management Interventions:   pillow support provided   position adjusted  Intervention: Optimize Psychosocial Wellbeing  Recent Flowsheet Documentation  Taken 5/4/2024 1500 by Margaret Mancuso RN  Diversional Activities: television  Family/Support System Care: presence promoted  Taken 5/4/2024 1000 by Margaret Mancuso RN  Supportive Measures: self-care encouraged  Diversional Activities: television  Family/Support System Care: presence promoted     Problem: Seizure Disorder Comorbidity  Goal: Maintenance of Seizure Control  Outcome: Ongoing, Not Progressing     Problem: Skin Injury Risk Increased  Goal: Skin Health and Integrity  Outcome: Ongoing, Not Progressing  Intervention: Optimize Skin Protection  Recent Flowsheet Documentation  Taken 5/4/2024 1500 by Margaret Mancuso RN  Pressure Reduction Techniques: weight shift assistance provided  Pressure Reduction Devices: pressure-redistributing mattress utilized  Taken 5/4/2024 1000 by Margaret Mancuso RN  Skin Protection: adhesive use limited     Problem: Adjustment to Illness (Sepsis/Septic Shock)  Goal: Optimal Coping  Outcome: Ongoing, Not Progressing  Intervention: Optimize Psychosocial Adjustment to Illness  Recent Flowsheet Documentation  Taken 5/4/2024 1500 by Margaret Mancuso RN  Family/Support System Care: presence promoted  Taken 5/4/2024 1000 by Margaret Mancuso RN  Supportive Measures: self-care encouraged  Family/Support System Care: presence promoted     Problem: Bleeding (Sepsis/Septic Shock)  Goal: Absence of Bleeding  Outcome: Ongoing, Not Progressing     Problem: Glycemic Control Impaired (Sepsis/Septic Shock)  Goal: Blood Glucose Level Within Desired Range  Outcome: Ongoing, Not Progressing     Problem: Infection Progression (Sepsis/Septic Shock)  Goal: Absence of Infection Signs and Symptoms  Outcome: Ongoing, Not Progressing  Intervention: Initiate Sepsis Management  Recent Flowsheet Documentation  Taken 5/4/2024 1700 by Margaret Mancuso RN  Infection  Prevention:   rest/sleep promoted   single patient room provided  Taken 5/4/2024 1500 by Margaret Mancuso RN  Infection Prevention:   rest/sleep promoted   single patient room provided  Taken 5/4/2024 1300 by Margaret Mancuso RN  Infection Prevention:   rest/sleep promoted   single patient room provided  Taken 5/4/2024 1100 by Margaret Mancuso RN  Infection Prevention:   rest/sleep promoted   single patient room provided  Intervention: Promote Recovery  Recent Flowsheet Documentation  Taken 5/4/2024 1000 by Margaret Mancuso RN  Sleep/Rest Enhancement: relaxation techniques promoted     Problem: Nutrition Impaired (Sepsis/Septic Shock)  Goal: Optimal Nutrition Intake  Outcome: Ongoing, Not Progressing   Goal Outcome Evaluation:  Plan of Care Reviewed With: patient, spouse        Progress: declining  Outcome Evaluation: VSS on 0.1 of levophed. Patient remains on 3L NC. Patient reports constant pain in BLE and BUE due to swelling. Paracentesis was performed at bedside today, 7650 ML removed. Call light within reach, bed in the lowest position.

## 2024-05-04 NOTE — H&P
[facility] HOSPITALIST HISTORY AND PHYSICAL    Patient Identification:  Name:  Della Jorge  Age:  63 y.o.  Sex:  female  :  1960  MRN:  [id number]   Visit Number:  [id number]  Admit Date: 2024   Room number:  103/03  Primary Care Physician:  Sebastián Dougherty MD     Subjective     Chief complaint:    Chief Complaint   Patient presents with    Fall       History of presenting illness:  63 y.o. female who presents from home after fall.  Patient with past medical history significant for combined systolic and diastolic heart failure with known liver cirrhosis and severe tricuspid valve regurg, chronic hypoxemic respiratory failure on 2 L nasal cannula with history of hypertension as well as irritable bowel syndrome and generalized anxiety disorder.  Patient states that she has been feeling a little bit weak and felt that her legs were going to give that underneath her and eased herself to the floor in the bathroom and actually denies any significant fall and was able to control her lowering to the floor.  However she was resting on hardwood floor and she has significantly severely edematous lower extremities and did have skin tears and a large hematoma that the emergency department evacuated.  Patient does report some nausea and a one-time episode of small emesis yesterday but reports that she has chronic stomach issues which does result in her sometimes taking reduced dose of her Bumex for which she takes two 1 mg tablets twice daily but states that sometimes she does take less depending on how her stomach feels and whether or not she can get to the bathroom quick enough.  She denies feeling short of breath or having any chest pain.  She does state that her belly feels very tight and sore specially in the lower regions and does note that she has had reduced urinary output over the last several days since being in the emergency department on 2024 where she had 10 L of volume  removed.  Patient does reiterate that she does not want any invasive procedures done and still has no desire or plans to proceed with prior recommended right heart cath and given her significant heart failure would likely also not be a candidate for TIPS nor would she want.  Patient without fever in the emergency department but notable for soft blood pressures primarily 80s over 50s which patient does report that typically her blood pressures is 90s over 60s at home on her antihypertensive regiment.  Initially ER had hoped to let the patient return home however given her significant ascitic abdomen as well as her soft blood pressures concern for the possibility of SBP and patient called to the hospitalist service for admission for rule out and treatment.  Patient will be admitted to the progressive care unit for planned paracentesis and until then we will proceed with empiric antibiotic coverage for SBP until ruled out.  Suspect a large component of patient's blood pressures are due to her volume overload and decompensation relative to her cirrhosis from this and will proceed with paracentesis.  ---------------------------------------------------------------------------------------------------------------------   Review of Systems a 14 system review of systems was performed with pertinent positives and negatives as above in HPI  ---------------------------------------------------------------------------------------------------------------------   Past Medical History:   Diagnosis Date    Cirrhosis     Congestive heart failure (CHF)     Hypertension      Past Surgical History:   Procedure Laterality Date    CHOLECYSTECTOMY      HYSTERECTOMY      partial, still has ovaries     Family History   Problem Relation Age of Onset    Heart disease Mother     Diabetes Sister      Social History     Socioeconomic History    Marital status:    Tobacco Use    Smoking status: Never    Smokeless tobacco: Never   Vaping Use     Vaping status: Never Used   Substance and Sexual Activity    Alcohol use: Never    Drug use: Never    Sexual activity: Defer     ---------------------------------------------------------------------------------------------------------------------   Allergies:  Codeine and Keflex [cephalexin]  ---------------------------------------------------------------------------------------------------------------------   Medications below are reported home medications pulling from within the system; at this time, these medications have not been reconciled unless otherwise specified and are in the verification process for further verifcation as current home medications.    Prior to Admission Medications       Prescriptions Last Dose Informant Patient Reported? Taking?    bumetanide (Bumex) 1 MG tablet   No No    Take 1 tablet by mouth See Admin Instructions for 30 days. 2mg in the morning and 2mg at lunch.    carvedilol (Coreg) 3.125 MG tablet   No No    Take 1 tablet by mouth 2 (Two) Times a Day With Meals for 90 days.    fluocinonide 0.1 % cream cream   Yes No    Apply 1 Application topically to the appropriate area as directed 2 (Two) Times a Day As Needed.    linaclotide (Linzess) 72 MCG capsule capsule   Yes No    Take 1 capsule by mouth Daily As Needed.    nystatin (MYCOSTATIN) 152431 UNIT/GM powder   No No    Apply  topically to the appropriate area as directed 2 (Two) Times a Day for 30 days.    ondansetron (ZOFRAN) 4 MG tablet  Self Yes No    Take 1 tablet by mouth Every 8 (Eight) Hours As Needed for Nausea or Vomiting.    spironolactone (ALDACTONE) 25 MG tablet   No No    Take 0.5 tablets by mouth Daily for 90 days.    traMADol (ULTRAM) 50 MG tablet  Self Yes No    Take 1 tablet by mouth Every 8 (Eight) Hours As Needed for Moderate Pain.          Objective     Vital Signs:  Temp:  [98 °F (36.7 °C)] 98 °F (36.7 °C)  Heart Rate:  [] 96  Resp:  [18] 18  BP: (78-97)/(40-62) 86/53    Mean Arterial Pressure  (Non-Invasive) for the past 24 hrs (Last 3 readings):   Noninvasive MAP (mmHg)   05/04/24 0800 64   05/04/24 0750 65   05/04/24 0740 60     SpO2:  [95 %-100 %] 97 %  on  Flow (L/min):  [3] 3;   Device (Oxygen Therapy): nasal cannula  Body mass index is 36.33 kg/m².    Wt Readings from Last 3 Encounters:   05/04/24 81.6 kg (179 lb 14.3 oz)   04/23/24 81.6 kg (180 lb)   04/09/24 80.5 kg (177 lb 6.4 oz)      ---------------------------------------------------------------------------------------------------------------------   Physical Exam:  Constitutional: Chronically ill adult female who appears much older than stated age.HENT:  Head: Normocephalic and atraumatic.  Mouth:  Moist mucous membranes.  Poor dentition.  Eyes:  Conjunctivae and EOM are normal.  Pupils are equal, round, and reactive to light.  No scleral icterus.  Neck:  Neck supple.  There is JVD present with enlarged vasculature in the anterior chest.  Cardiovascular:  Normal rate, regular rhythm with systolic murmur consistent with tricuspid regurg.    Pulmonary/Chest:  No respiratory distress, no wheezes, no crackles, with normal breath sounds and some diminishment at the bases.  Abdominal: Distended and taut..  Bowel sounds are normal.  There is tenderness to palpation in the bilateral lower quadrants.    Musculoskeletal: Tenderness to palpation of the lower extremities with significant edema present..    Neurological:  Alert and oriented to person, place, and time.  No cranial nerve deficit or other focal neurological deficits.  Skin:  Skin is warm and dry.  No rash noted.  No pallor.   Peripheral vascular: Severe 3+ pitting edema of the bilateral lower extremities with associated pedal edema with blistering and weeping of the skin.  ---------------------------------------------------------------------------------------------------------------------  EKG:    Ordered and pending at this time      Last echocardiogram:  Results for orders placed during  "the hospital encounter of 08/04/22    Adult Transthoracic Echo Complete w/ Color, Spectral and Contrast if necessary per protocol    Interpretation Summary  · Left ventricular ejection fraction appears to be 46 - 50%. Left ventricular systolic function is mildly decreased.  · Left ventricular diastolic function is consistent with (grade Ia w/high LAP) impaired relaxation.  · The right ventricular cavity is moderately dilated.  · Mildly reduced right ventricular systolic function noted.  · The right atrial cavity is severely dilated.  · Severe tricuspid valve regurgitation is present.  · Estimated right ventricular systolic pressure from tricuspid regurgitation is normal (<35 mmHg).    --------------------------------------------------------------------------------------------------------------------  Labs:  Results from last 7 days   Lab Units 05/04/24  0631 05/04/24  0333 05/04/24  0133   PROCALCITONIN ng/mL  --   --  2.97*   LACTATE mmol/L 1.8 3.2*  --    CRP mg/dL  --   --  16.99*   WBC 10*3/mm3  --   --  10.57   HEMOGLOBIN g/dL  --   --  11.6*   HEMATOCRIT %  --   --  33.9*   MCV fL  --   --  89.4   MCHC g/dL  --   --  34.2   PLATELETS 10*3/mm3  --   --  141   INR   --   --  1.87*         Results from last 7 days   Lab Units 05/04/24  0133   SODIUM mmol/L 125*   POTASSIUM mmol/L 4.3   CHLORIDE mmol/L 88*   CO2 mmol/L 21.3*   BUN mg/dL 29*   CREATININE mg/dL 1.99*   CALCIUM mg/dL 8.8   GLUCOSE mg/dL 84   ALBUMIN g/dL 2.8*   BILIRUBIN mg/dL 5.0*   ALK PHOS U/L 91   AST (SGOT) U/L 22   ALT (SGPT) U/L 8   Estimated Creatinine Clearance: 28.3 mL/min (A) (by C-G formula based on SCr of 1.99 mg/dL (H)).    Ammonia   Date Value Ref Range Status   05/04/2024 17 11 - 51 umol/L Final     Results from last 7 days   Lab Units 05/04/24  0324 05/04/24  0133   HSTROP T ng/L 74* 76*   PROBNP pg/mL  --  14,440.0*         No results found for: \"HGBA1C\", \"POCGLU\"  Lab Results   Component Value Date    TSH 6.250 (H) 08/05/2022    " "FREET4 1.29 08/05/2022     No results found for: \"PREGTESTUR\", \"PREGSERUM\", \"HCG\", \"HCGQUANT\"  Pain Management Panel           No data to display              Brief Urine Lab Results  (Last result in the past 365 days)        Color   Clarity   Blood   Leuk Est   Nitrite   Protein   CREAT   Urine HCG        05/04/24 0657 Orange   Clear   Negative   Small (1+)   Positive   Trace                 No results found for: \"BLOODCX\"  No results found for: \"URINECX\"  No results found for: \"WOUNDCX\"  No results found for: \"STOOLCX\"    I have personally looked at the labs and they are summarized above.  ----------------------------------------------------------------------------------------------------------------------  Detailed radiology reports for the last 24 hours:    Imaging Results (Last 24 Hours)       Procedure Component Value Units Date/Time    CT Chest Without Contrast Diagnostic [[id number]] Collected: 05/04/24 0622     Updated: 05/04/24 0631    Narrative:      CLINICAL HISTORY: CHF, edema, cirrhosis.     COMPARISON: None available.     TECHNIQUE: Noncontrast CT of the chest, abdomen and pelvis was obtained.   Multiplanar reformats were generated.  Limited exposure control,  adjustment of the mA and/or KV according to patient size or use of  iterative reconstruction technique was utilized.     FINDINGS:     CT CHEST:     Pulmonary arteries: normal.      Heart and pericardium: Cardiomegaly.  Coronary calcifications.  Normal  pericardium.     Aorta: normal.     Esophagus: normal.     Lungs and airways: Mild linear bibasilar atelectasis.     Pleura: normal.     Lymph nodes: normal.     Musculoskeletal and chest wall: no acute abnormality.     Other: Fluid from the abdominal cavity extends into the lower  mediastinum        CT ABDOMEN AND PELVIS:     Hepatobiliary: Cirrhotic liver morphology.  Status post cholecystectomy.     Pancreas: normal.     Spleen: normal.     Adrenals: normal.      Kidneys, ureters, bladder: " normal.     Reproductive: normal.     GI tract/Bowel: Moderate amount of stool throughout the colon.  The  small bowel is normal in caliber..     Appendix: Not well seen.  No findings for acute appendicitis.     Peritoneum: Large volume ascites     Vascular: normal.     Lymph nodes: normal.     Musculoskeletal and abdominal wall: Anasarca.       Impression:         CT CHEST:  CARDIOMEGALY.     CT ABDOMEN AND PELVIS:  LARGE VOLUME ASCITES.  CIRRHOTIC LIVER.           This report was finalized on 5/4/2024 6:29 AM by Hilary Astudillo MD.       CT Abdomen Pelvis Without Contrast [[id number]] Collected: 05/04/24 0622     Updated: 05/04/24 0631    Narrative:      CLINICAL HISTORY: CHF, edema, cirrhosis.     COMPARISON: None available.     TECHNIQUE: Noncontrast CT of the chest, abdomen and pelvis was obtained.   Multiplanar reformats were generated.  Limited exposure control,  adjustment of the mA and/or KV according to patient size or use of  iterative reconstruction technique was utilized.     FINDINGS:     CT CHEST:     Pulmonary arteries: normal.      Heart and pericardium: Cardiomegaly.  Coronary calcifications.  Normal  pericardium.     Aorta: normal.     Esophagus: normal.     Lungs and airways: Mild linear bibasilar atelectasis.     Pleura: normal.     Lymph nodes: normal.     Musculoskeletal and chest wall: no acute abnormality.     Other: Fluid from the abdominal cavity extends into the lower  mediastinum        CT ABDOMEN AND PELVIS:     Hepatobiliary: Cirrhotic liver morphology.  Status post cholecystectomy.     Pancreas: normal.     Spleen: normal.     Adrenals: normal.      Kidneys, ureters, bladder: normal.     Reproductive: normal.     GI tract/Bowel: Moderate amount of stool throughout the colon.  The  small bowel is normal in caliber..     Appendix: Not well seen.  No findings for acute appendicitis.     Peritoneum: Large volume ascites     Vascular: normal.     Lymph nodes: normal.     Musculoskeletal and  abdominal wall: Anasarca.       Impression:         CT CHEST:  CARDIOMEGALY.     CT ABDOMEN AND PELVIS:  LARGE VOLUME ASCITES.  CIRRHOTIC LIVER.           This report was finalized on 5/4/2024 6:29 AM by Hilary Astudillo MD.       XR Chest 1 View [[id number]] Collected: 05/04/24 0448     Updated: 05/04/24 0452    Narrative:      PROCEDURE: Portable chest x-ray examination performed on May 4, 2024.  Single view. Supine position.     HISTORY: CHF.     COMPARISON: None.     FINDINGS:     Mild enlarged heart size  Possible underlying pericardial fluid.  No lobar consolidation or edema.  Hypoinflated lungs with slightly elevated left hemidiaphragm.  No pleural effusion or pneumothorax.  No fracture or foreign body.  Cholecystectomy clips in the right upper quadrant.       Impression:         1.  Mild enlarged heart size  2.  Mild hypoinflated lungs.  3.  No lobar consolidation or edema.  4.  No pleural effusion or pneumothorax.     This report was finalized on 5/4/2024 4:49 AM by Marcellus Patel MD.             Final impressions for the last 30 days of radiology reports:    CT Chest Without Contrast Diagnostic    Result Date: 5/4/2024   CT CHEST: CARDIOMEGALY.  CT ABDOMEN AND PELVIS: LARGE VOLUME ASCITES. CIRRHOTIC LIVER.    This report was finalized on 5/4/2024 6:29 AM by Hilary Astudillo MD.      CT Abdomen Pelvis Without Contrast    Result Date: 5/4/2024   CT CHEST: CARDIOMEGALY.  CT ABDOMEN AND PELVIS: LARGE VOLUME ASCITES. CIRRHOTIC LIVER.    This report was finalized on 5/4/2024 6:29 AM by Hilary Astudillo MD.      XR Chest 1 View    Result Date: 5/4/2024   1.  Mild enlarged heart size 2.  Mild hypoinflated lungs. 3.  No lobar consolidation or edema. 4.  No pleural effusion or pneumothorax.  This report was finalized on 5/4/2024 4:49 AM by Marcellus Patel MD.      US Paracentesis    Result Date: 4/23/2024  Ultrasound guided paracentesis.   This report was finalized on 4/23/2024 5:15 PM by Dr. Zackery Mcgee MD.     I have  personally looked at the radiology images and read the final radiology report.    Assessment & Plan       Anasarca  Decompensated cirrhosis  Congestive hepatopathy  Acute on chronic hyponatremia  Lactic acidemia, POA, resolved  Elevated troponin 2/2 cirrhosis and heart failure  Acute on chronic combined systolic and diastolic heart failure    -Patient to be admitted to the progressive care unit in case of need of vasopressors given soft blood pressures.  However given patient's for history of blood pressures at home suspect she is not far off from her baseline despite pressures recorded in ER.  Will give her midodrine and subcu octreotide for now to cover for the possibility of hepatorenal syndrome as patient does have acute kidney injury and decompensated cirrhosis at the time.    -Will plan for paracentesis at bedside with albumin administration afterwards.    -Will send ascitic fluid for SBP rule out in the interim we will cover with meropenem given patient's history of allergy secondary to cephalosporins.    -Discussed at bedside about patient's long-term prognosis briefly however patient does not want to talk about it and states that God has her hand on her and that she is not going wear anytime soon.  However she does confirm that she would not want CPR or to be intubated with  present at bedside.    -Will hold any beta-blockers, diuretics, or antihypertensives at this time given ELEUTERIO as well as her blood pressures.    -Will obtain updated transthoracic echocardiogram as patient has not had 1 since 2022 and is intermittently compliant with medicines at home and has had obvious progression of her liver disease since that time to evaluate if worsening heart failure also contributing.    -MELD score based on current laboratory evaluation emergency department is 34 consistent with a 52.3% estimated 90-day survival.    History of hypertension    -Patient on Aldactone for heart failure and cirrhosis and will  hold at this time.  Patient appears to have had normotension versus borderline hypotension for quite some time and history of hypertension is likely prior to development of cirrhosis and heart failure.    Generalized anxiety disorder    -Supportive care    Chronic nausea    -Zofran as needed    VTE Prophylaxis:   Mechanical Order History:       None          Pharmalogical Order History:        Ordered     Dose Route Frequency Stop    Signed and Held  heparin (porcine) 5000 UNIT/ML injection 5,000 Units         5,000 Units SC Every 8 Hours Scheduled --                    The patient is high risk due to the following diagnoses/reasons: 63-year-old female with advanced liver cirrhosis and known history of heart failure who comes in with decompensation and anasarca and significant volume overload in need of paracentesis and rule out for SBP given blood pressure and reports of subjective fever at home.        Adán Medina DO  [facility] Hospitalist  05/04/24  08:22 EDT

## 2024-05-04 NOTE — ED NOTES
Informed Consent obtained at this time for incision and drainage procedure. Adwoa mendoza up. Provider at bedside.

## 2024-05-04 NOTE — ED NOTES
Procedure complete. Steri strips and glue applied to wounds. Nonadherent dressing applied and covered with ace bandages. Bed low and locked. Side rails up x2. Call light in reach.

## 2024-05-04 NOTE — ED PROVIDER NOTES
Subjective   History of Present Illness  63-year-old female presents to the ER via EMS chief complaint of fall.  Patient was at home when her legs gave out her  did try to catch her.  Patient fell down towards her knees causing skin tears to both her lower legs.  Patient does have past medical history of congestive heart failure as well as cirrhosis.  Patient is currently on on Bumex as a diuretic.  Family member state that the patient has had increasing weakness over the last few days.        Review of Systems   Constitutional:  Positive for fatigue. Negative for fever.   HENT: Negative.     Respiratory: Negative.     Cardiovascular:  Positive for leg swelling. Negative for chest pain.   Gastrointestinal:  Positive for abdominal distention. Negative for abdominal pain.   Endocrine: Negative.    Genitourinary: Negative.  Negative for dysuria.   Skin:  Positive for color change and wound.   Neurological:  Positive for weakness.   Psychiatric/Behavioral: Negative.     All other systems reviewed and are negative.      Past Medical History:   Diagnosis Date    Cirrhosis     Congestive heart failure (CHF)     Hypertension        Allergies   Allergen Reactions    Codeine Hallucinations    Keflex [Cephalexin] Hives       Past Surgical History:   Procedure Laterality Date    CHOLECYSTECTOMY      HYSTERECTOMY      partial, still has ovaries       Family History   Problem Relation Age of Onset    Heart disease Mother     Diabetes Sister        Social History     Socioeconomic History    Marital status:    Tobacco Use    Smoking status: Never    Smokeless tobacco: Never   Vaping Use    Vaping status: Never Used   Substance and Sexual Activity    Alcohol use: Never    Drug use: Never    Sexual activity: Defer           Objective   Physical Exam  Vitals and nursing note reviewed.   Constitutional:       General: She is not in acute distress.     Appearance: She is well-developed. She is ill-appearing. She is not  diaphoretic.   HENT:      Head: Normocephalic and atraumatic.      Right Ear: External ear normal.      Left Ear: External ear normal.      Nose: Nose normal.   Eyes:      General: Scleral icterus present.      Conjunctiva/sclera: Conjunctivae normal.      Pupils: Pupils are equal, round, and reactive to light.   Neck:      Vascular: No JVD.      Trachea: No tracheal deviation.   Cardiovascular:      Rate and Rhythm: Normal rate and regular rhythm.      Heart sounds: Normal heart sounds. No murmur heard.  Pulmonary:      Effort: Pulmonary effort is normal. No respiratory distress.      Breath sounds: Normal breath sounds. No wheezing.   Abdominal:      General: There is distension.      Palpations: Abdomen is soft.      Tenderness: There is no abdominal tenderness.   Musculoskeletal:         General: No deformity. Normal range of motion.      Cervical back: Normal range of motion and neck supple.   Skin:     General: Skin is warm and dry.      Coloration: Skin is not pale.      Findings: Erythema and lesion present. No rash.      Comments: Large skin tear to the anterior tibial crest of the left lower extremity.  This is causing a large hematoma.  Hematoma does appear to be fluid-filled secondary to severe edema within the lower extremities.   Neurological:      Mental Status: She is alert and oriented to person, place, and time.      Cranial Nerves: No cranial nerve deficit.   Psychiatric:         Behavior: Behavior normal.         Thought Content: Thought content normal.         Incision & Drainage    Date/Time: 5/4/2024 3:05 AM    Performed by: Darryl Alba II, PA  Authorized by: Joe Flores MD    Consent:     Consent obtained:  Verbal    Consent given by:  Patient    Risks, benefits, and alternatives were discussed: yes      Risks discussed:  Bleeding and pain  Universal protocol:     Patient identity confirmed:  Verbally with patient  Location:     Type:  Hematoma    Size:  8cm x 10cm    Location:   Lower extremity    Lower extremity location:  Leg    Leg location:  L lower leg  Pre-procedure details:     Skin preparation:  Povidone-iodine  Sedation:     Sedation type:  None  Anesthesia:     Anesthesia method:  None  Procedure type:     Complexity:  Complex  Procedure details:     Ultrasound guidance: no      Needle aspiration: no      Incision types:  Single straight    Incision depth:  Dermal    Wound management:  Probed and deloculated and extensive cleaning    Drainage:  Bloody    Drainage amount:  Copious    Packing materials:  None  Post-procedure details:     Procedure completion:  Tolerated well, no immediate complications             ED Course  ED Course as of 05/04/24 0759   Sat May 04, 2024   0208 Patient has a large anterior hematoma to the tibial crest of her left lower extremity.  Photograph of this was sent to general surgeon Dr. Yen.  Dr. Yen requested to evacuate the clot.  Clot was evacuated. [RB]   0304 Discussed with Dr. Yen who requests that the patient come to the wound care clinic on Monday.  [RB]   0502 XR chest rad interpreted:  1.  Mild enlarged heart size  2.  Mild hypoinflated lungs.  3.  No lobar consolidation or edema.  4.  No pleural effusion or pneumothorax.   [RB]   0633 CT Chest / CT abd Pelvis rad interpreted:  CT CHEST:  CARDIOMEGALY.     CT ABDOMEN AND PELVIS:  LARGE VOLUME ASCITES.  CIRRHOTIC LIVER.   [RB]   0746 Discussed with Dr. Medina who is agreeable to admit [RB]      ED Course User Index  [RB] Darryl Alba II, PA                                             Medical Decision Making  63-year-old female with chief complaint of fall.  Known history of cirrhosis.  Known history of CHF.    Problems Addressed:  Sepsis with acute renal failure without septic shock, due to unspecified organism, unspecified acute renal failure type: acute illness or injury     Details: Patient is chronically ill.  This appears to be acute on chronic in nature.  Patient received  IV antibiotics upon arrival.  Doxycycline this was primarily for the leg wound in which she was already taken doxycycline for.  Incision and drainage of the large hematoma, per discussion with general surgeon Dr. Yen.  Patient does have known cirrhosis with ascites.  She was last paracentesis was on April 23.  Patient had 10 L taken off.  Have to suspect SBP.  Merrem was started.  Discussed with hospitalist Dr. Medina who is agreeable to admission.  Wound of left lower extremity, initial encounter: acute illness or injury    Amount and/or Complexity of Data Reviewed  Labs: ordered.  Radiology: ordered.    Risk  Prescription drug management.  Decision regarding hospitalization.        Final diagnoses:   Sepsis with acute renal failure without septic shock, due to unspecified organism, unspecified acute renal failure type   Wound of left lower extremity, initial encounter       ED Disposition  ED Disposition       ED Disposition   Decision to Admit    Condition   --    Comment   Level of Care: Progressive Care [20]   Diagnosis: Decompensated hepatic cirrhosis [[id number]]   Certification: I Certify That Inpatient Hospital Services Are Medically Necessary For Greater Than 2 Midnights                 No follow-up provider specified.       Medication List      No changes were made to your prescriptions during this visit.            Darryl Alba II, PA  05/04/24 0759

## 2024-05-04 NOTE — PROCEDURES
"Therapeutic, Diagnostic Bedside Paracentesis Without  Radiology    Date/Time: 5/4/2024 2:16 PM    Performed by: Adán Medina DO  Authorized by: Adán Medina DO  Consent: Verbal consent obtained. Written consent obtained.  Risks and benefits: risks, benefits and alternatives were discussed  Consent given by: patient  Patient understanding: patient states understanding of the procedure being performed  Patient consent: the patient's understanding of the procedure matches consent given  Procedure consent: procedure consent matches procedure scheduled  Relevant documents: relevant documents present and verified  Test results: test results available and properly labeled  Site marked: the operative site was marked  Imaging studies: imaging studies available  Required items: required blood products, implants, devices, and special equipment available  Patient identity confirmed: verbally with patient and arm band  Time out: Immediately prior to procedure a \"time out\" was called to verify the correct patient, procedure, equipment, support staff and site/side marked as required.  Initial or subsequent exam: initial  Procedure purpose: diagnostic and therapeutic  Indications: abdominal discomfort secondary to ascites    Anesthesia:  Local Anesthetic: lidocaine 1% without epinephrine    Sedation:  Patient sedated: no    Preparation: Patient was prepped and draped in the usual sterile fashion.  Needle gauge: 20  Ultrasound guidance: yes  Puncture site: right lower quadrant  Fluid removed: 7650(ml)  Fluid appearance: serous  Dressing: 4x4 sterile gauze  Patient tolerance: patient tolerated the procedure well with no immediate complications        "

## 2024-05-05 ENCOUNTER — APPOINTMENT (OUTPATIENT)
Dept: ULTRASOUND IMAGING | Facility: HOSPITAL | Age: 64
DRG: 871 | End: 2024-05-05
Payer: MEDICAID

## 2024-05-05 ENCOUNTER — APPOINTMENT (OUTPATIENT)
Dept: GENERAL RADIOLOGY | Facility: HOSPITAL | Age: 64
DRG: 871 | End: 2024-05-05
Payer: MEDICAID

## 2024-05-05 LAB
ALBUMIN SERPL-MCNC: 2.9 G/DL (ref 3.5–5.2)
ALP SERPL-CCNC: 75 U/L (ref 39–117)
ALT SERPL W P-5'-P-CCNC: 6 U/L (ref 1–33)
ANION GAP SERPL CALCULATED.3IONS-SCNC: 11.8 MMOL/L (ref 5–15)
ANISOCYTOSIS BLD QL: ABNORMAL
AST SERPL-CCNC: 16 U/L (ref 1–32)
BILIRUB CONJ SERPL-MCNC: 3.3 MG/DL (ref 0–0.3)
BILIRUB INDIRECT SERPL-MCNC: 1.7 MG/DL
BILIRUB SERPL-MCNC: 5 MG/DL (ref 0–1.2)
BUN SERPL-MCNC: 35 MG/DL (ref 8–23)
BUN/CREAT SERPL: 16.1 (ref 7–25)
BURR CELLS BLD QL SMEAR: ABNORMAL
BURR CELLS BLD QL SMEAR: ABNORMAL
CALCIUM SPEC-SCNC: 8.6 MG/DL (ref 8.6–10.5)
CHLORIDE SERPL-SCNC: 91 MMOL/L (ref 98–107)
CO2 SERPL-SCNC: 21.2 MMOL/L (ref 22–29)
CREAT SERPL-MCNC: 2.17 MG/DL (ref 0.57–1)
D-LACTATE SERPL-SCNC: 1.9 MMOL/L (ref 0.5–2)
DEPRECATED RDW RBC AUTO: 55.5 FL (ref 37–54)
DEPRECATED RDW RBC AUTO: 55.5 FL (ref 37–54)
EGFRCR SERPLBLD CKD-EPI 2021: 25 ML/MIN/1.73
ERYTHROCYTE [DISTWIDTH] IN BLOOD BY AUTOMATED COUNT: 17.1 % (ref 12.3–15.4)
ERYTHROCYTE [DISTWIDTH] IN BLOOD BY AUTOMATED COUNT: 17.1 % (ref 12.3–15.4)
GLUCOSE SERPL-MCNC: 93 MG/DL (ref 65–99)
HCT VFR BLD AUTO: 30.6 % (ref 34–46.6)
HCT VFR BLD AUTO: 30.6 % (ref 34–46.6)
HGB BLD-MCNC: 10.2 G/DL (ref 12–15.9)
HGB BLD-MCNC: 10.2 G/DL (ref 12–15.9)
INR PPP: 1.92 (ref 0.9–1.1)
LYMPHOCYTES # BLD MANUAL: 0.33 10*3/MM3 (ref 0.7–3.1)
MCH RBC QN AUTO: 30 PG (ref 26.6–33)
MCH RBC QN AUTO: 30 PG (ref 26.6–33)
MCHC RBC AUTO-ENTMCNC: 33.3 G/DL (ref 31.5–35.7)
MCHC RBC AUTO-ENTMCNC: 33.3 G/DL (ref 31.5–35.7)
MCV RBC AUTO: 90 FL (ref 79–97)
MCV RBC AUTO: 90 FL (ref 79–97)
NEUTROPHILS # BLD AUTO: 16.16 10*3/MM3 (ref 1.7–7)
NEUTROPHILS NFR BLD MANUAL: 96 % (ref 42.7–76)
NEUTS BAND NFR BLD MANUAL: 2 % (ref 0–5)
PLATELET # BLD AUTO: 126 10*3/MM3 (ref 140–450)
PLATELET # BLD AUTO: 126 10*3/MM3 (ref 140–450)
PMV BLD AUTO: 9.2 FL (ref 6–12)
PMV BLD AUTO: 9.2 FL (ref 6–12)
POTASSIUM SERPL-SCNC: 4 MMOL/L (ref 3.5–5.2)
PROT SERPL-MCNC: 5.3 G/DL (ref 6–8.5)
PROTHROMBIN TIME: 22 SECONDS (ref 12.1–14.7)
RBC # BLD AUTO: 3.4 10*6/MM3 (ref 3.77–5.28)
RBC # BLD AUTO: 3.4 10*6/MM3 (ref 3.77–5.28)
SCAN SLIDE: NORMAL
SMALL PLATELETS BLD QL SMEAR: ABNORMAL
SODIUM SERPL-SCNC: 124 MMOL/L (ref 136–145)
VARIANT LYMPHS NFR BLD MANUAL: 2 % (ref 19.6–45.3)
WBC NRBC COR # BLD AUTO: 16.49 10*3/MM3 (ref 3.4–10.8)
WBC NRBC COR # BLD AUTO: 16.49 10*3/MM3 (ref 3.4–10.8)

## 2024-05-05 PROCEDURE — 76705 ECHO EXAM OF ABDOMEN: CPT

## 2024-05-05 PROCEDURE — 25010000002 OCTREOTIDE PER 25 MCG: Performed by: STUDENT IN AN ORGANIZED HEALTH CARE EDUCATION/TRAINING PROGRAM

## 2024-05-05 PROCEDURE — 25010000002 ONDANSETRON PER 1 MG: Performed by: STUDENT IN AN ORGANIZED HEALTH CARE EDUCATION/TRAINING PROGRAM

## 2024-05-05 PROCEDURE — 80076 HEPATIC FUNCTION PANEL: CPT | Performed by: STUDENT IN AN ORGANIZED HEALTH CARE EDUCATION/TRAINING PROGRAM

## 2024-05-05 PROCEDURE — 25010000002 MEROPENEM PER 100 MG: Performed by: STUDENT IN AN ORGANIZED HEALTH CARE EDUCATION/TRAINING PROGRAM

## 2024-05-05 PROCEDURE — 83605 ASSAY OF LACTIC ACID: CPT | Performed by: STUDENT IN AN ORGANIZED HEALTH CARE EDUCATION/TRAINING PROGRAM

## 2024-05-05 PROCEDURE — 99253 IP/OBS CNSLTJ NEW/EST LOW 45: CPT | Performed by: SURGERY

## 2024-05-05 PROCEDURE — 25810000003 LACTATED RINGERS SOLUTION: Performed by: STUDENT IN AN ORGANIZED HEALTH CARE EDUCATION/TRAINING PROGRAM

## 2024-05-05 PROCEDURE — 25010000002 HEPARIN (PORCINE) PER 1000 UNITS: Performed by: STUDENT IN AN ORGANIZED HEALTH CARE EDUCATION/TRAINING PROGRAM

## 2024-05-05 PROCEDURE — 71045 X-RAY EXAM CHEST 1 VIEW: CPT | Performed by: RADIOLOGY

## 2024-05-05 PROCEDURE — 99232 SBSQ HOSP IP/OBS MODERATE 35: CPT | Performed by: STUDENT IN AN ORGANIZED HEALTH CARE EDUCATION/TRAINING PROGRAM

## 2024-05-05 PROCEDURE — 71045 X-RAY EXAM CHEST 1 VIEW: CPT

## 2024-05-05 PROCEDURE — 85610 PROTHROMBIN TIME: CPT | Performed by: STUDENT IN AN ORGANIZED HEALTH CARE EDUCATION/TRAINING PROGRAM

## 2024-05-05 PROCEDURE — 76705 ECHO EXAM OF ABDOMEN: CPT | Performed by: RADIOLOGY

## 2024-05-05 PROCEDURE — 85025 COMPLETE CBC W/AUTO DIFF WBC: CPT | Performed by: STUDENT IN AN ORGANIZED HEALTH CARE EDUCATION/TRAINING PROGRAM

## 2024-05-05 PROCEDURE — 87040 BLOOD CULTURE FOR BACTERIA: CPT | Performed by: STUDENT IN AN ORGANIZED HEALTH CARE EDUCATION/TRAINING PROGRAM

## 2024-05-05 PROCEDURE — 36556 INSERT NON-TUNNEL CV CATH: CPT | Performed by: SURGERY

## 2024-05-05 PROCEDURE — 02HV33Z INSERTION OF INFUSION DEVICE INTO SUPERIOR VENA CAVA, PERCUTANEOUS APPROACH: ICD-10-PCS | Performed by: STUDENT IN AN ORGANIZED HEALTH CARE EDUCATION/TRAINING PROGRAM

## 2024-05-05 PROCEDURE — 80048 BASIC METABOLIC PNL TOTAL CA: CPT | Performed by: STUDENT IN AN ORGANIZED HEALTH CARE EDUCATION/TRAINING PROGRAM

## 2024-05-05 PROCEDURE — 76937 US GUIDE VASCULAR ACCESS: CPT | Performed by: SURGERY

## 2024-05-05 PROCEDURE — 85007 BL SMEAR W/DIFF WBC COUNT: CPT | Performed by: STUDENT IN AN ORGANIZED HEALTH CARE EDUCATION/TRAINING PROGRAM

## 2024-05-05 RX ORDER — ONDANSETRON 2 MG/ML
4 INJECTION INTRAMUSCULAR; INTRAVENOUS EVERY 6 HOURS PRN
Status: DISCONTINUED | OUTPATIENT
Start: 2024-05-05 | End: 2024-05-09 | Stop reason: HOSPADM

## 2024-05-05 RX ORDER — MIDODRINE HYDROCHLORIDE 2.5 MG/1
15 TABLET ORAL ONCE
Status: COMPLETED | OUTPATIENT
Start: 2024-05-05 | End: 2024-05-05

## 2024-05-05 RX ORDER — SODIUM CHLORIDE 0.9 % (FLUSH) 0.9 %
10 SYRINGE (ML) INJECTION EVERY 12 HOURS SCHEDULED
Status: DISCONTINUED | OUTPATIENT
Start: 2024-05-05 | End: 2024-05-09 | Stop reason: HOSPADM

## 2024-05-05 RX ORDER — SODIUM CHLORIDE 0.9 % (FLUSH) 0.9 %
20 SYRINGE (ML) INJECTION AS NEEDED
Status: DISCONTINUED | OUTPATIENT
Start: 2024-05-05 | End: 2024-05-09 | Stop reason: HOSPADM

## 2024-05-05 RX ORDER — MIDODRINE HYDROCHLORIDE 2.5 MG/1
10 TABLET ORAL
Status: DISCONTINUED | OUTPATIENT
Start: 2024-05-06 | End: 2024-05-09 | Stop reason: HOSPADM

## 2024-05-05 RX ORDER — SODIUM CHLORIDE 0.9 % (FLUSH) 0.9 %
10 SYRINGE (ML) INJECTION AS NEEDED
Status: DISCONTINUED | OUTPATIENT
Start: 2024-05-05 | End: 2024-05-09 | Stop reason: HOSPADM

## 2024-05-05 RX ORDER — MIDODRINE HYDROCHLORIDE 2.5 MG/1
15 TABLET ORAL
Status: DISCONTINUED | OUTPATIENT
Start: 2024-05-05 | End: 2024-05-05

## 2024-05-05 RX ADMIN — HEPARIN SODIUM 5000 UNITS: 5000 INJECTION INTRAVENOUS; SUBCUTANEOUS at 13:30

## 2024-05-05 RX ADMIN — EMPAGLIFLOZIN 10 MG: 10 TABLET, FILM COATED ORAL at 09:00

## 2024-05-05 RX ADMIN — Medication 10 ML: at 21:28

## 2024-05-05 RX ADMIN — NOREPINEPHRINE BITARTRATE 0.06 MCG/KG/MIN: 0.03 INJECTION, SOLUTION INTRAVENOUS at 17:35

## 2024-05-05 RX ADMIN — HEPARIN SODIUM 5000 UNITS: 5000 INJECTION INTRAVENOUS; SUBCUTANEOUS at 06:15

## 2024-05-05 RX ADMIN — LUBIPROSTONE 8 MCG: 8 CAPSULE, GELATIN COATED ORAL at 09:00

## 2024-05-05 RX ADMIN — OCTREOTIDE ACETATE 100 MCG: 100 INJECTION, SOLUTION INTRAVENOUS; SUBCUTANEOUS at 17:35

## 2024-05-05 RX ADMIN — DOXYCYCLINE 100 MG: 100 CAPSULE ORAL at 21:27

## 2024-05-05 RX ADMIN — ONDANSETRON 4 MG: 2 INJECTION INTRAMUSCULAR; INTRAVENOUS at 13:29

## 2024-05-05 RX ADMIN — NYSTATIN 1 APPLICATION: 100000 POWDER TOPICAL at 21:27

## 2024-05-05 RX ADMIN — Medication 10 ML: at 09:01

## 2024-05-05 RX ADMIN — SODIUM CHLORIDE, POTASSIUM CHLORIDE, SODIUM LACTATE AND CALCIUM CHLORIDE 500 ML: 600; 310; 30; 20 INJECTION, SOLUTION INTRAVENOUS at 09:00

## 2024-05-05 RX ADMIN — MIDODRINE HYDROCHLORIDE 15 MG: 2.5 TABLET ORAL at 17:39

## 2024-05-05 RX ADMIN — MEROPENEM 500 MG: 500 INJECTION, POWDER, FOR SOLUTION INTRAVENOUS at 00:22

## 2024-05-05 RX ADMIN — HEPARIN SODIUM 5000 UNITS: 5000 INJECTION INTRAVENOUS; SUBCUTANEOUS at 21:27

## 2024-05-05 RX ADMIN — DOXYCYCLINE 100 MG: 100 CAPSULE ORAL at 09:00

## 2024-05-05 RX ADMIN — MIDODRINE HYDROCHLORIDE 15 MG: 2.5 TABLET ORAL at 08:59

## 2024-05-05 RX ADMIN — MEROPENEM 500 MG: 500 INJECTION, POWDER, FOR SOLUTION INTRAVENOUS at 06:16

## 2024-05-05 RX ADMIN — CARBIDOPA AND LEVODOPA 15 MG: 50; 200 TABLET, EXTENDED RELEASE ORAL at 04:52

## 2024-05-05 RX ADMIN — Medication 10 ML: at 14:17

## 2024-05-05 RX ADMIN — LUBIPROSTONE 8 MCG: 8 CAPSULE, GELATIN COATED ORAL at 21:27

## 2024-05-05 RX ADMIN — MIDODRINE HYDROCHLORIDE 15 MG: 2.5 TABLET ORAL at 13:29

## 2024-05-05 RX ADMIN — Medication 10 ML: at 14:18

## 2024-05-05 RX ADMIN — OCTREOTIDE ACETATE 100 MCG: 100 INJECTION, SOLUTION INTRAVENOUS; SUBCUTANEOUS at 09:00

## 2024-05-05 RX ADMIN — MEROPENEM 500 MG: 500 INJECTION, POWDER, FOR SOLUTION INTRAVENOUS at 17:35

## 2024-05-05 RX ADMIN — NYSTATIN 1 APPLICATION: 100000 POWDER TOPICAL at 09:01

## 2024-05-05 NOTE — PROCEDURES
PROCEDURE NOTE    Patient Name:  Della Jorge  YOB: 1960  [id number]    5/5/2024      PREOPERATIVE DIAGNOSIS: Decompensated cirrhosis, hypotension      POSTOPERATIVE DIAGNOSIS: Same       PROCEDURE PERFORMED: Right internal jugular triple-lumen central line placement with ultrasound guidance       SURGEON: Jeremiah Yen MD        SPECIMENS: None       ANESTHESIA: Local      Grafts/Implants: 7 Luxembourger triple lumen central line catheter       FINDINGS:   1.  7 [place]bourger triple-lumen catheter placed in the right internal jugular vein        INDICATIONS: The patient is a 63 y.o. female who presented with a fall and an injury to her left lower extremity.  She has decompensated cirrhosis. They are in need of central venous access. The risks and benefits of central line placement were discussed at length with the patient and their family, and they agree to proceed.       DESCRIPTION OF PROCEDURE:      After obtaining informed consent, the patient remained in their room and was placed in the Trendelenburg position.  The right neck and chest were prepped and draped in standard sterile fashion.  Ultrasound was used to identify the right internal jugular vein and carotid artery.  The patient an extremely large external jugular vein and the internal jugular was extremely small and diminutive.  There were multiple collaterals in the area.  Local anesthetic was administered to the skin.  I attempted to access the right internal jugular vein in the mid neck but the wire would not advance.  Several other attempts at access were performed.  I found the more suitable location low in the neck but this was near the confluence with the external jugular vein.  Finder needle advanced  into the right internal jugular vein with ultrasound guidance.  A guidewire was placed through the needle.  Stab incision was made in the skin with an 11 blade scalpel. The needle was removed over the guidewire and the tract dilated.   Using the Seldinger technique a preflushed 7 Icelandic triple  lumen catheter was placed. The guidewire was removed.  All 3 ports baudilio blood easily and were flushed with normal saline.  The catheter was sutured to the skin, dressed in standard sterile fashion and covered with a dry sterile dressing.  There were no  immediate complications.      A postprocedure chest x-ray is pending at the time of this dictation.             Jeremiah Yen MD  5/5/2024  12:47 EDT

## 2024-05-05 NOTE — PROGRESS NOTES
[facility] HOSPITALIST PROGRESS NOTE     Patient Identification:  Name:  Della Jorge  Age:  63 y.o.  Sex:  female  :  1960  MRN:  [id number]  Visit Number:  [id number]  ROOM: [address]     Primary Care Provider:  Sebastián Dougherty MD    Length of stay in inpatient status:  1    Subjective     Chief Compliant:    Chief Complaint   Patient presents with    Fall       History of Presenting Illness: Patient seen and follow-up for anasarca with decompensated cirrhosis with history of congestive hepatopathy with acute on chronic hyponatremia with development of hypotension consistent with septic shock now with blood culture positive for Pseudomonas.  Patient time evaluation without complaints other than hurting all over but agreeable to central line ending without significant ascites after paracentesis.    Objective     Current Hospital Meds:  betamethasone dipropionate, , Topical, Daily  doxycycline, 100 mg, Oral, BID  empagliflozin, 10 mg, Oral, Daily  heparin (porcine), 5,000 Units, Subcutaneous, Q8H  lubiprostone, 8 mcg, Oral, BID  meropenem, 500 mg, Intravenous, Q12H  midodrine, 15 mg, Oral, TID AC  nystatin, 1 Application, Topical, BID  octreotide, 100 mcg, Intravenous, TID  sodium chloride, 10 mL, Intravenous, Q12H  sodium chloride, 10 mL, Intravenous, Q12H  sodium chloride, 10 mL, Intravenous, Q12H  sodium chloride, 10 mL, Intravenous, Q12H      norepinephrine, 0.02-0.3 mcg/kg/min, Last Rate: 0.06 mcg/kg/min (24 [facility])      ----------------------------------------------------------------------------------------------------------------------  Vital Signs:  Temp:  [94.7 °F (34.8 °C)-99.2 °F (37.3 °C)] 98.4 °F (36.9 °C)  Heart Rate:  [] 99  Resp:  [12-20] 20  BP: ()/(38-77) 101/57  SpO2:  [92 %-100 %] 94 %  on  Flow (L/min):  [3] 3;   Device (Oxygen Therapy): room air  Body mass index is 33.73 kg/m².      Intake/Output Summary (Last 24 hours) at 2024 1327  Last data  filed at 5/5/2024 1140  Gross per 24 hour   Intake 491 ml   Output --   Net 491 ml      ----------------------------------------------------------------------------------------------------------------------  Physical exam:  Constitutional: Chronically ill adult female who appears much older than stated age.  HENT:  Head: Normocephalic and atraumatic.  Mouth:  Moist mucous membranes.  Poor dentition.  Eyes:  Conjunctivae and EOM are normal.  Pupils are equal, round, and reactive to light.  No scleral icterus.  Neck:  Neck supple.  There is JVD present with enlarged vasculature in the anterior chest but improved from prior.  Cardiovascular:  Normal rate, regular rhythm with systolic murmur consistent with tricuspid regurg.    Pulmonary/Chest:  No respiratory distress, no wheezes, no crackles, with normal breath sounds and some diminishment at the bases.  Abdominal: Abdomen no longer distended, some wrinkling in the lower quadrants.  Bowel sounds are normal.  There is tenderness to palpation in the bilateral lower quadrants.    Musculoskeletal: Tenderness to palpation of the lower extremities with significant edema present with areas of skin breakdown and weeping..    Neurological:  Alert and oriented to person, place, and time.  No cranial nerve deficit or other focal neurological deficits.  Skin:  Skin is warm and dry.  No rash noted.  No pallor.   Peripheral vascular: Severe 3+ pitting edema of the bilateral lower extremities with associated pedal edema with blistering and weeping of the skin and recently evacuated hematoma of the left shin region with clean intact dressing in place.  ----------------------------------------------------------------------------------------------------------------------  WBC/HGB/HCT/PLT   16.49, 16.49/10.2, 10.2/30.6, 30.6/126, 126 (05/05 0158)  BUN/CREAT/GLUC/ALT/AST/YANA/LIP    35/2.17/93/6/16/--/-- (05/05 0158)  LYTES - Na/K/Cl/CO2: 124*/4.0/91*/21.2* (05/05 0158)  COAG -  "PT/INR/PTT: 22.0*/1.92*/-- (05/05 0158)     No results found for: \"URINECX\"  Blood Culture   Date Value Ref Range Status   05/04/2024 Abnormal Stain (C)  Preliminary   05/04/2024 No growth at 24 hours  Preliminary       I have personally looked at the labs and they are summarized above.  ----------------------------------------------------------------------------------------------------------------------  Detailed radiology reports for the last 24 hours:  CT Chest Without Contrast Diagnostic    Result Date: 5/4/2024   CT CHEST: CARDIOMEGALY.  CT ABDOMEN AND PELVIS: LARGE VOLUME ASCITES. CIRRHOTIC LIVER.    This report was finalized on 5/4/2024 6:29 AM by Hilary Astudillo MD.      CT Abdomen Pelvis Without Contrast    Result Date: 5/4/2024   CT CHEST: CARDIOMEGALY.  CT ABDOMEN AND PELVIS: LARGE VOLUME ASCITES. CIRRHOTIC LIVER.    This report was finalized on 5/4/2024 6:29 AM by Hilary Astudillo MD.      XR Chest 1 View    Result Date: 5/4/2024   1.  Mild enlarged heart size 2.  Mild hypoinflated lungs. 3.  No lobar consolidation or edema. 4.  No pleural effusion or pneumothorax.  This report was finalized on 5/4/2024 4:49 AM by Marcellus Patel MD.     Assessment & Plan      Septic shock  Pseudomonas bacteremia  Decompensated cirrhosis  Congestive hepatopathy  Acute on chronic hyponatremia  Lactic acidemia, recurrent 2/2 hypertension  Elevated troponin 2/2 cirrhosis and heart failure  Acute on chronic combined systolic and diastolic heart failure    -Patient to be admitted to the progressive care unit in case of need of vasopressors given soft blood pressures.  Unfortunately patient with some improvement in blood pressures initially but post paracentesis and through the night with low blood pressures requiring initiation of Levophed through peripheral IV.  Central line placed today by general surgery.  Concern for hepatorenal syndrome although patient with 1 of 2 blood cultures returning with Pseudomonas today and currently " on meropenem and will proceed with treatment for septic shock.  Possibility of some component of cardiogenic as well due to patient's decreased EF but Levophed will provide adequate coverage for cardiogenic source as well    -Given positive blood cultures will consult infectious disease.    -Status post paracentesis with 7650 mL of ascitic fluid removed at bedside and status post 50 g of albumin.    -Ascitic fluid thus far with no evidence of SBP per cell count and cultures and having no growth to date.    -Will hold any beta-blockers, diuretics, or antihypertensives at this time given ELEUTERIO as well as her persistent shock.    -Will obtain updated transthoracic echocardiogram as patient has not had 1 since 2022 and is intermittently compliant with medicines at home and has had obvious progression of her liver disease since that time to evaluate if worsening heart failure also contributing.  TTE showed decrease in EF from 46 to 50% down to 36 to 40%.  Severe tricuspid valve regurg still present.  No comments on any vegetation.    -MELD score based on current laboratory evaluation emergency department is 34 consistent with a 52.3% estimated 90-day survival.     History of hypertension    -Patient on Aldactone for heart failure and cirrhosis and will hold at this time.  Patient appears to have had normotension versus borderline hypotension for quite some time and history of hypertension is likely prior to development of cirrhosis and heart failure.     Generalized anxiety disorder    -Supportive care     Chronic nausea    -Zofran as needed    Copied text in portions of the note has been reviewed and is accurate as of .TODAYDATE    VTE Prophylaxis:   Mechanical Order History:       None          Pharmalogical Order History:        Ordered     Dose Route Frequency Stop    05/04/24 0957  heparin (porcine) 5000 UNIT/ML injection 5,000 Units         5,000 Units SC Every 8 Hours Scheduled --                    Disposition  pending clinical course but likely to require significant amount of time in hospital after there is further recovery.    Adán Medina DO  [facility] Hospitalist  05/05/24  13:27 EDT

## 2024-05-05 NOTE — PLAN OF CARE
Goal Outcome Evaluation:              Outcome Evaluation: Patient recieved a x1 dose of midodrine. Patients levo has been off since 0300. BP is stable at this time. Wound care complete. Patient has complaints of pain. Unable to medicate due to hypotension. stimuli reduced. Bed alarm set. Spouce remains at bedside. call light in reach.

## 2024-05-05 NOTE — PLAN OF CARE
Problem: Adult Inpatient Plan of Care  Goal: Plan of Care Review  Outcome: Ongoing, Progressing  Flowsheets  Taken 5/5/2024 1620  Progress: no change  Outcome Evaluation: VSS on 0.06 of Levophed. Patient is on room air. Central line placed for vasopressor infusion. Wound care provided. Call light within reach, bed in the lowest position.  Taken 5/4/2024 1738  Plan of Care Reviewed With:   patient   spouse  Goal: Patient-Specific Goal (Individualized)  Outcome: Ongoing, Progressing  Goal: Absence of Hospital-Acquired Illness or Injury  Outcome: Ongoing, Progressing  Intervention: Identify and Manage Fall Risk  Recent Flowsheet Documentation  Taken 5/5/2024 1500 by Margaret Mancuso RN  Safety Promotion/Fall Prevention: safety round/check completed  Taken 5/5/2024 1300 by Margaret Mancuso RN  Safety Promotion/Fall Prevention: safety round/check completed  Taken 5/5/2024 1100 by Margaret Mancuso RN  Safety Promotion/Fall Prevention: safety round/check completed  Taken 5/5/2024 0900 by Margaret Mancuso RN  Safety Promotion/Fall Prevention: safety round/check completed  Taken 5/5/2024 0700 by Margaret Mancuso RN  Safety Promotion/Fall Prevention: safety round/check completed  Intervention: Prevent Skin Injury  Recent Flowsheet Documentation  Taken 5/5/2024 1500 by Margaret Mancuso RN  Skin Protection:   adhesive use limited   tubing/devices free from skin contact  Taken 5/5/2024 0850 by Margaret Mancuso RN  Skin Protection:   adhesive use limited   tubing/devices free from skin contact  Intervention: Prevent and Manage VTE (Venous Thromboembolism) Risk  Recent Flowsheet Documentation  Taken 5/5/2024 1500 by Margaret Mancuso RN  VTE Prevention/Management: (Heparin) other (see comments)  Taken 5/5/2024 0850 by Margaret Mancuso RN  VTE Prevention/Management: (Heparin subcutaneous) other (see comments)  Intervention: Prevent Infection  Recent Flowsheet Documentation  Taken 5/5/2024 1500 by Margaret Mancuso RN  Infection Prevention:    rest/sleep promoted   single patient room provided  Taken 5/5/2024 1300 by Margaret Mancuso RN  Infection Prevention:   rest/sleep promoted   single patient room provided  Taken 5/5/2024 1100 by Margaret Mancuso RN  Infection Prevention:   rest/sleep promoted   single patient room provided  Taken 5/5/2024 0900 by Margaret Mancuso RN  Infection Prevention:   rest/sleep promoted   single patient room provided  Taken 5/5/2024 0700 by Margaret Mancuso RN  Infection Prevention:   rest/sleep promoted   single patient room provided  Goal: Optimal Comfort and Wellbeing  Outcome: Ongoing, Progressing  Intervention: Provide Person-Centered Care  Recent Flowsheet Documentation  Taken 5/5/2024 1500 by Margaret Mancuso RN  Trust Relationship/Rapport:   care explained   choices provided   emotional support provided   empathic listening provided   questions answered   reassurance provided   questions encouraged   thoughts/feelings acknowledged  Taken 5/5/2024 0850 by Margaret Mancuso RN  Trust Relationship/Rapport:   choices provided   care explained   emotional support provided   empathic listening provided   questions answered   questions encouraged   reassurance provided   thoughts/feelings acknowledged  Goal: Readiness for Transition of Care  Outcome: Ongoing, Progressing     Problem: Asthma Comorbidity  Goal: Maintenance of Asthma Control  Outcome: Ongoing, Progressing  Intervention: Maintain Asthma Symptom Control  Recent Flowsheet Documentation  Taken 5/5/2024 1500 by Margaret Mancuso RN  Medication Review/Management: medications reviewed  Taken 5/5/2024 1300 by Margaret Mancuso RN  Medication Review/Management: medications reviewed  Taken 5/5/2024 1100 by Margaret Mancuso RN  Medication Review/Management: medications reviewed  Taken 5/5/2024 0900 by Margaret Mancuso RN  Medication Review/Management: medications reviewed  Taken 5/5/2024 0700 by Margaret Mancuso RN  Medication Review/Management: medications reviewed     Problem: Behavioral Health  Comorbidity  Goal: Maintenance of Behavioral Health Symptom Control  Outcome: Ongoing, Progressing  Intervention: Maintain Behavioral Health Symptom Control  Recent Flowsheet Documentation  Taken 5/5/2024 1500 by Margaret Mancuso RN  Medication Review/Management: medications reviewed  Taken 5/5/2024 1300 by Margaret Mancuso RN  Medication Review/Management: medications reviewed  Taken 5/5/2024 1100 by Margaret Mancuso RN  Medication Review/Management: medications reviewed  Taken 5/5/2024 0900 by Margaret Mancuso RN  Medication Review/Management: medications reviewed  Taken 5/5/2024 0700 by Margaret Mancuso RN  Medication Review/Management: medications reviewed     Problem: COPD (Chronic Obstructive Pulmonary Disease) Comorbidity  Goal: Maintenance of COPD Symptom Control  Outcome: Ongoing, Progressing  Intervention: Maintain COPD-Symptom Control  Recent Flowsheet Documentation  Taken 5/5/2024 1500 by Margaret Mancuso RN  Medication Review/Management: medications reviewed  Taken 5/5/2024 1300 by Margaret Mancuso RN  Medication Review/Management: medications reviewed  Taken 5/5/2024 1100 by Margaret Mancuso RN  Medication Review/Management: medications reviewed  Taken 5/5/2024 0900 by Margaret Mancuso RN  Medication Review/Management: medications reviewed  Taken 5/5/2024 0850 by Margaret Mancuso RN  Supportive Measures: self-care encouraged  Taken 5/5/2024 0700 by Margaret Mancuso RN  Medication Review/Management: medications reviewed     Problem: Diabetes Comorbidity  Goal: Blood Glucose Level Within Targeted Range  Outcome: Ongoing, Progressing     Problem: Heart Failure Comorbidity  Goal: Maintenance of Heart Failure Symptom Control  Outcome: Ongoing, Progressing  Intervention: Maintain Heart Failure-Management  Recent Flowsheet Documentation  Taken 5/5/2024 1500 by Margaret Mancuso RN  Medication Review/Management: medications reviewed  Taken 5/5/2024 1300 by Margaret Mancuso RN  Medication Review/Management: medications reviewed  Taken  5/5/2024 1100 by Margaret Mancuso RN  Medication Review/Management: medications reviewed  Taken 5/5/2024 0900 by Margaret Mancuso RN  Medication Review/Management: medications reviewed  Taken 5/5/2024 0700 by Margaret Mancuso RN  Medication Review/Management: medications reviewed     Problem: Hypertension Comorbidity  Goal: Blood Pressure in Desired Range  Outcome: Ongoing, Progressing  Intervention: Maintain Blood Pressure Management  Recent Flowsheet Documentation  Taken 5/5/2024 1500 by Margaret Mancuso RN  Medication Review/Management: medications reviewed  Taken 5/5/2024 1300 by Margaret Mancuso RN  Medication Review/Management: medications reviewed  Taken 5/5/2024 1100 by Margaret Mancuso RN  Medication Review/Management: medications reviewed  Taken 5/5/2024 0900 by Margaret Mancuso RN  Medication Review/Management: medications reviewed  Taken 5/5/2024 0700 by Margaret Mancuso RN  Medication Review/Management: medications reviewed     Problem: Obstructive Sleep Apnea Risk or Actual Comorbidity Management  Goal: Unobstructed Breathing During Sleep  Outcome: Ongoing, Progressing     Problem: Osteoarthritis Comorbidity  Goal: Maintenance of Osteoarthritis Symptom Control  Outcome: Ongoing, Progressing  Intervention: Maintain Osteoarthritis Symptom Control  Recent Flowsheet Documentation  Taken 5/5/2024 1500 by Margaret Mancuso RN  Medication Review/Management: medications reviewed  Taken 5/5/2024 1300 by Margaret Mancuso RN  Medication Review/Management: medications reviewed  Taken 5/5/2024 1100 by Margaret Mancuso RN  Medication Review/Management: medications reviewed  Taken 5/5/2024 0900 by Margaret Mancuso RN  Medication Review/Management: medications reviewed  Taken 5/5/2024 0700 by Margaret Mancuso RN  Medication Review/Management: medications reviewed     Problem: Pain Chronic (Persistent) (Comorbidity Management)  Goal: Acceptable Pain Control and Functional Ability  Outcome: Ongoing, Progressing  Intervention: Manage Persistent  Pain  Recent Flowsheet Documentation  Taken 5/5/2024 1500 by Margaret Mancuso RN  Medication Review/Management: medications reviewed  Taken 5/5/2024 1300 by Margaret Mancuso RN  Medication Review/Management: medications reviewed  Taken 5/5/2024 1100 by Margaret Mancuso RN  Medication Review/Management: medications reviewed  Taken 5/5/2024 0900 by Margaret Mancuso RN  Medication Review/Management: medications reviewed  Taken 5/5/2024 0700 by Margaret Mancuso RN  Medication Review/Management: medications reviewed  Intervention: Optimize Psychosocial Wellbeing  Recent Flowsheet Documentation  Taken 5/5/2024 1500 by Margaret Mancuso RN  Diversional Activities: [employer]  Family/Support System Care: self-care encouraged  Taken 5/5/2024 0850 by Margaret Mancuso RN  Supportive Measures: self-care encouraged  Diversional Activities: television  Family/Support System Care: self-care encouraged     Problem: Seizure Disorder Comorbidity  Goal: Maintenance of Seizure Control  Outcome: Ongoing, Progressing     Problem: Skin Injury Risk Increased  Goal: Skin Health and Integrity  Outcome: Ongoing, Progressing  Intervention: Optimize Skin Protection  Recent Flowsheet Documentation  Taken 5/5/2024 1500 by Margaret Mancuso RN  Pressure Reduction Techniques: weight shift assistance provided  Pressure Reduction Devices: pressure-redistributing mattress utilized  Skin Protection:   adhesive use limited   tubing/devices free from skin contact  Taken 5/5/2024 0850 by Margaret Mancuso RN  Pressure Reduction Techniques: weight shift assistance provided  Pressure Reduction Devices: pressure-redistributing mattress utilized  Skin Protection:   adhesive use limited   tubing/devices free from skin contact     Problem: Adjustment to Illness (Sepsis/Septic Shock)  Goal: Optimal Coping  Outcome: Ongoing, Progressing  Intervention: Optimize Psychosocial Adjustment to Illness  Recent Flowsheet Documentation  Taken 5/5/2024 1500 by Margaret Mancuso RN  Family/Support  System Care: self-care encouraged  Taken 5/5/2024 0850 by Margaret Mancuso RN  Supportive Measures: self-care encouraged  Family/Support System Care: self-care encouraged     Problem: Bleeding (Sepsis/Septic Shock)  Goal: Absence of Bleeding  Outcome: Ongoing, Progressing     Problem: Glycemic Control Impaired (Sepsis/Septic Shock)  Goal: Blood Glucose Level Within Desired Range  Outcome: Ongoing, Progressing     Problem: Infection Progression (Sepsis/Septic Shock)  Goal: Absence of Infection Signs and Symptoms  Outcome: Ongoing, Progressing  Intervention: Initiate Sepsis Management  Recent Flowsheet Documentation  Taken 5/5/2024 1500 by Margaret Mancuso RN  Infection Prevention:   rest/sleep promoted   single patient room provided  Taken 5/5/2024 1300 by Margaret Mancuso RN  Infection Prevention:   rest/sleep promoted   single patient room provided  Taken 5/5/2024 1100 by Margaret Mancuso RN  Infection Prevention:   rest/sleep promoted   single patient room provided  Taken 5/5/2024 0900 by Margaret Mancuso RN  Infection Prevention:   rest/sleep promoted   single patient room provided  Taken 5/5/2024 0700 by Margaret Mancuso RN  Infection Prevention:   rest/sleep promoted   single patient room provided  Intervention: Promote Stabilization  Recent Flowsheet Documentation  Taken 5/5/2024 1500 by Margaret Mancuso RN  Fluid/Electrolyte Management: fluids provided  Taken 5/5/2024 0850 by Margaret Mancuso RN  Fluid/Electrolyte Management: fluids provided     Problem: Nutrition Impaired (Sepsis/Septic Shock)  Goal: Optimal Nutrition Intake  Outcome: Ongoing, Progressing     Problem: Fall Injury Risk  Goal: Absence of Fall and Fall-Related Injury  Outcome: Ongoing, Progressing  Intervention: Identify and Manage Contributors  Recent Flowsheet Documentation  Taken 5/5/2024 1500 by Margaret Mancuso RN  Medication Review/Management: medications reviewed  Taken 5/5/2024 1300 by Magraret Mancuso RN  Medication Review/Management: medications  reviewed  Taken 5/5/2024 1100 by Margaret Mancuso RN  Medication Review/Management: medications reviewed  Taken 5/5/2024 0900 by Margaret Mancuso RN  Medication Review/Management: medications reviewed  Taken 5/5/2024 0700 by Margaret Mancuso RN  Medication Review/Management: medications reviewed  Intervention: Promote Injury-Free Environment  Recent Flowsheet Documentation  Taken 5/5/2024 1500 by Margaret Mancuso RN  Safety Promotion/Fall Prevention: safety round/check completed  Taken 5/5/2024 1300 by Margaret Mancuso RN  Safety Promotion/Fall Prevention: safety round/check completed  Taken 5/5/2024 1100 by Margaret Mancuso RN  Safety Promotion/Fall Prevention: safety round/check completed  Taken 5/5/2024 0900 by Margaret Mancuso RN  Safety Promotion/Fall Prevention: safety round/check completed  Taken 5/5/2024 0700 by Margaret Mancuso RN  Safety Promotion/Fall Prevention: safety round/check completed   Goal Outcome Evaluation:  Plan of Care Reviewed With: patient, spouse        Progress: no change  Outcome Evaluation: VSS on 0.06 of Levophed. Patient is on room air. Central line placed for vasopressor infusion. Wound care provided. Call light within reach, bed in the lowest position.

## 2024-05-05 NOTE — CONSULTS
Patient Name:  Della Jorge  YOB: 1960  [id number]       Patient Care Team:  Sebastián Dougherty MD as PCP - General (Internal Medicine)      General Surgery Consult Note     Date of Consultation: 05/05/24    Consulting Physician - Adán Medina DO    Reason for Consult - LLE wound    Subjective     I have been asked to see  Della Jorge , a 63 y.o. female cirrhotic in consultation for LLE wound. Patient fell onto her hardwood floor resulting in large subcutaneous hematoma s/p evacuation in the ED.   Patient is only on an ASA          Allergy:   Allergies   Allergen Reactions    Codeine Hallucinations    Keflex [Cephalexin] Hives       Medications:  betamethasone dipropionate, , Topical, Daily  doxycycline, 100 mg, Oral, BID  empagliflozin, 10 mg, Oral, Daily  heparin (porcine), 5,000 Units, Subcutaneous, Q8H  lactated ringers, 500 mL, Intravenous, Once  lubiprostone, 8 mcg, Oral, BID  meropenem, 500 mg, Intravenous, Q8H  midodrine, 15 mg, Oral, TID AC  nystatin, 1 Application, Topical, BID  octreotide, 100 mcg, Intravenous, TID  sodium chloride, 10 mL, Intravenous, Q12H      norepinephrine, 0.02-0.3 mcg/kg/min, Last Rate: 0.04 mcg/kg/min (05/05/24 0631)      No current facility-administered medications on file prior to encounter.     Current Outpatient Medications on File Prior to Encounter   Medication Sig    betamethasone valerate (VALISONE) 0.1 % cream Apply 1 Application topically to the appropriate area as directed Daily.    bumetanide (BUMEX) 1 MG tablet Take 2 tablets by mouth 2 (Two) Times a Day.    carvedilol (COREG) 3.125 MG tablet Take 1 tablet by mouth 2 (Two) Times a Day With Meals.    doxycycline (VIBRAMYCIN) 100 MG capsule Take 1 capsule by mouth 2 (Two) Times a Day.    empagliflozin (JARDIANCE) 10 MG tablet tablet Take 1 tablet by mouth Daily.    linaclotide (Linzess) 72 MCG capsule capsule Take 1 capsule by mouth Daily As Needed (constipation).    nystatin (MYCOSTATIN)  "[id number] UNIT/GM powder Apply 1 Application topically to the appropriate area as directed 2 (Two) Times a Day.    spironolactone (ALDACTONE) 25 MG tablet Take 1 tablet by mouth Daily.    traMADol (ULTRAM) 50 MG tablet Take 1 tablet by mouth Every 8 (Eight) Hours As Needed for Moderate Pain.       PMHx:   Past Medical History:   Diagnosis Date    Cirrhosis     Congestive heart failure (CHF)     Hypertension          Past Surgical History:  Cholecystectomy, hysterectomy    Family History: no family history of cirrhosis    Social History: no alcohol     Review of Systems  14 pt ROS negatve             Objective     Physical Exam:      Vital Signs  BP 97/55   Pulse 97   Temp 97.5 °F (36.4 °C) (Oral)   Resp 18   Ht 149.9 cm (59.02\")   Wt 75.8 kg (167 lb 1.7 oz)   SpO2 94%   BMI 33.73 kg/m²     Intake/Output Summary (Last 24 hours) at 5/5/2024 0958  Last data filed at 5/5/2024 0830  Gross per 24 hour   Intake 260 ml   Output --   Net 260 ml         Physical Exam:      05/04/24  0950 05/05/24  0400   Weight: 75.8 kg (167 lb 1.7 oz) 75.8 kg (167 lb 1.7 oz)    Body mass index is 33.73 kg/m².  Constitution: No acute distress. Obese. Jaundice  Head: Normocephalic, atraumatic.   Eyes: Aligned without strabismus. Conjunctiva noninjected. Sclera icteric  Ears, Nose, Mouth: Poor dentition, No lesions appreciated   Respiratory: Symmetric chest expansion. No respiratory distress.   Skin:  Lower extremity edema bilateral. Wound of left lower leg with areas of ischemic skin   Neurologic: No gross deficits.  Alert and oriented x3  Psychiatric: Normal mood        Results Review: I have personally reviewed all of the recent lab and imaging results available at this time.             Assessment and Plan:    63 y.o. female cirrhotic with LLE hematoma s/p evacuation    -Wound care orders placed for adaptic over areas not covered by skin, kerlix and ACE. Change daily  -Wound APRN consult        Jeremiah Yen MD  [facility] " [facility]  05/05/24  09:58 EDT

## 2024-05-05 NOTE — PROGRESS NOTES
[facility]    Patient Identification:  Name:  Della Jorge  Age:  63 y.o.  Sex:  female  :  1960  MRN:  [id number]  Visit number:  [id number]  Primary Care Provider:  Sebastián Dougherty MD    Length of stay in inpatient status:  1    Brief Update     I was called about BP lowing since the Levophed drip was stopped at 03:04 am.  The patient has Midodrine 10 mg PO TID, with the last dose given at 18:20 pm on 2024.  Thus, will give a one time dose of 15 mg of Midodrine to see if this will help keep the Levophed off, as the patient does not want a central line and we only have one hour left that we can use the peripheral IV per our vasopressor protocol.     Date/Time Pulse BP   24 0304 96 97/55   24 0300 94 97/55   24 0245 95 90/59   24 0230 100 90/53   24 0215 95 93/54   24 0200 96 86/52 Abnormal    24 0145 96 94/57   24 0130 96 91/52   24 0115 96 93/55   24 0100 96 91/53   24 0045 96 88/50 Abnormal    24 0030 98 106/65   24 0015 96 91/53   24 0000 96 84/48 Abnormal        Deshawn Su MD  [facility] Hospital[facility]  24  04:14 EDT    ----------------------------------------------------------------------------------------------------------------------     Update:    The patient was given midodrine 15 mg at 04:52 am and the MAPs are still not at goal.  Thus, will restart the Levophed.    Deshawn Su MD  [facility] Hospitalist  24  06:51 EDT

## 2024-05-06 LAB
ALBUMIN SERPL-MCNC: 2.7 G/DL (ref 3.5–5.2)
ALBUMIN/GLOB SERPL: 1 G/DL
ALP SERPL-CCNC: 86 U/L (ref 39–117)
ALT SERPL W P-5'-P-CCNC: 7 U/L (ref 1–33)
ANION GAP SERPL CALCULATED.3IONS-SCNC: 11.7 MMOL/L (ref 5–15)
AST SERPL-CCNC: 17 U/L (ref 1–32)
BASOPHILS # BLD AUTO: 0.06 10*3/MM3 (ref 0–0.2)
BASOPHILS NFR BLD AUTO: 0.3 % (ref 0–1.5)
BILIRUB SERPL-MCNC: 4.8 MG/DL (ref 0–1.2)
BUN SERPL-MCNC: 37 MG/DL (ref 8–23)
BUN/CREAT SERPL: 18.8 (ref 7–25)
CALCIUM SPEC-SCNC: 8.4 MG/DL (ref 8.6–10.5)
CHLORIDE SERPL-SCNC: 92 MMOL/L (ref 98–107)
CO2 SERPL-SCNC: 20.3 MMOL/L (ref 22–29)
CREAT SERPL-MCNC: 1.97 MG/DL (ref 0.57–1)
DEPRECATED RDW RBC AUTO: 54.6 FL (ref 37–54)
EGFRCR SERPLBLD CKD-EPI 2021: 28.1 ML/MIN/1.73
EOSINOPHIL # BLD AUTO: 0.08 10*3/MM3 (ref 0–0.4)
EOSINOPHIL NFR BLD AUTO: 0.3 % (ref 0.3–6.2)
ERYTHROCYTE [DISTWIDTH] IN BLOOD BY AUTOMATED COUNT: 17 % (ref 12.3–15.4)
GLOBULIN UR ELPH-MCNC: 2.6 GM/DL
GLUCOSE SERPL-MCNC: 101 MG/DL (ref 65–99)
HCT VFR BLD AUTO: 32.9 % (ref 34–46.6)
HGB BLD-MCNC: 11 G/DL (ref 12–15.9)
IMM GRANULOCYTES # BLD AUTO: 0.22 10*3/MM3 (ref 0–0.05)
IMM GRANULOCYTES NFR BLD AUTO: 0.9 % (ref 0–0.5)
LYMPHOCYTES # BLD AUTO: 0.39 10*3/MM3 (ref 0.7–3.1)
LYMPHOCYTES NFR BLD AUTO: 1.7 % (ref 19.6–45.3)
MCH RBC QN AUTO: 29.5 PG (ref 26.6–33)
MCHC RBC AUTO-ENTMCNC: 33.4 G/DL (ref 31.5–35.7)
MCV RBC AUTO: 88.2 FL (ref 79–97)
MONOCYTES # BLD AUTO: 1.57 10*3/MM3 (ref 0.1–0.9)
MONOCYTES NFR BLD AUTO: 6.7 % (ref 5–12)
NEUTROPHILS NFR BLD AUTO: 21 10*3/MM3 (ref 1.7–7)
NEUTROPHILS NFR BLD AUTO: 90.1 % (ref 42.7–76)
NRBC BLD AUTO-RTO: 0 /100 WBC (ref 0–0.2)
PLATELET # BLD AUTO: 152 10*3/MM3 (ref 140–450)
PMV BLD AUTO: 8.9 FL (ref 6–12)
POTASSIUM SERPL-SCNC: 3.9 MMOL/L (ref 3.5–5.2)
PROT SERPL-MCNC: 5.3 G/DL (ref 6–8.5)
RBC # BLD AUTO: 3.73 10*6/MM3 (ref 3.77–5.28)
SODIUM SERPL-SCNC: 124 MMOL/L (ref 136–145)
WBC NRBC COR # BLD AUTO: 23.32 10*3/MM3 (ref 3.4–10.8)

## 2024-05-06 PROCEDURE — 25010000002 HEPARIN (PORCINE) PER 1000 UNITS: Performed by: STUDENT IN AN ORGANIZED HEALTH CARE EDUCATION/TRAINING PROGRAM

## 2024-05-06 PROCEDURE — 25010000002 ONDANSETRON PER 1 MG: Performed by: STUDENT IN AN ORGANIZED HEALTH CARE EDUCATION/TRAINING PROGRAM

## 2024-05-06 PROCEDURE — 25010000002 LEVOFLOXACIN PER 250 MG: Performed by: NURSE PRACTITIONER

## 2024-05-06 PROCEDURE — 25010000002 MEROPENEM PER 100 MG: Performed by: STUDENT IN AN ORGANIZED HEALTH CARE EDUCATION/TRAINING PROGRAM

## 2024-05-06 PROCEDURE — 85025 COMPLETE CBC W/AUTO DIFF WBC: CPT | Performed by: STUDENT IN AN ORGANIZED HEALTH CARE EDUCATION/TRAINING PROGRAM

## 2024-05-06 PROCEDURE — 80053 COMPREHEN METABOLIC PANEL: CPT | Performed by: STUDENT IN AN ORGANIZED HEALTH CARE EDUCATION/TRAINING PROGRAM

## 2024-05-06 PROCEDURE — 25010000002 OCTREOTIDE PER 25 MCG: Performed by: STUDENT IN AN ORGANIZED HEALTH CARE EDUCATION/TRAINING PROGRAM

## 2024-05-06 PROCEDURE — 25010000002 ALBUMIN HUMAN 25% PER 50 ML: Performed by: STUDENT IN AN ORGANIZED HEALTH CARE EDUCATION/TRAINING PROGRAM

## 2024-05-06 PROCEDURE — 99232 SBSQ HOSP IP/OBS MODERATE 35: CPT | Performed by: STUDENT IN AN ORGANIZED HEALTH CARE EDUCATION/TRAINING PROGRAM

## 2024-05-06 PROCEDURE — 99254 IP/OBS CNSLTJ NEW/EST MOD 60: CPT | Performed by: NURSE PRACTITIONER

## 2024-05-06 PROCEDURE — P9047 ALBUMIN (HUMAN), 25%, 50ML: HCPCS | Performed by: STUDENT IN AN ORGANIZED HEALTH CARE EDUCATION/TRAINING PROGRAM

## 2024-05-06 RX ORDER — LEVOFLOXACIN 5 MG/ML
750 INJECTION, SOLUTION INTRAVENOUS
Status: DISCONTINUED | OUTPATIENT
Start: 2024-05-06 | End: 2024-05-07

## 2024-05-06 RX ORDER — ALBUMIN (HUMAN) 12.5 G/50ML
25 SOLUTION INTRAVENOUS
Qty: 200 ML | Refills: 0 | Status: COMPLETED | OUTPATIENT
Start: 2024-05-06 | End: 2024-05-06

## 2024-05-06 RX ORDER — LACTULOSE 10 G/15ML
20 SOLUTION ORAL 3 TIMES DAILY PRN
Status: DISCONTINUED | OUTPATIENT
Start: 2024-05-06 | End: 2024-05-09 | Stop reason: HOSPADM

## 2024-05-06 RX ADMIN — Medication 10 ML: at 21:48

## 2024-05-06 RX ADMIN — MEROPENEM 500 MG: 500 INJECTION, POWDER, FOR SOLUTION INTRAVENOUS at 17:34

## 2024-05-06 RX ADMIN — OCTREOTIDE ACETATE 100 MCG: 100 INJECTION, SOLUTION INTRAVENOUS; SUBCUTANEOUS at 03:41

## 2024-05-06 RX ADMIN — ALBUMIN (HUMAN) 25 G: 0.25 INJECTION, SOLUTION INTRAVENOUS at 10:49

## 2024-05-06 RX ADMIN — Medication 10 ML: at 08:45

## 2024-05-06 RX ADMIN — HEPARIN SODIUM 5000 UNITS: 5000 INJECTION INTRAVENOUS; SUBCUTANEOUS at 05:45

## 2024-05-06 RX ADMIN — MEROPENEM 500 MG: 500 INJECTION, POWDER, FOR SOLUTION INTRAVENOUS at 05:45

## 2024-05-06 RX ADMIN — OCTREOTIDE ACETATE 100 MCG: 100 INJECTION, SOLUTION INTRAVENOUS; SUBCUTANEOUS at 21:48

## 2024-05-06 RX ADMIN — OCTREOTIDE ACETATE 100 MCG: 100 INJECTION, SOLUTION INTRAVENOUS; SUBCUTANEOUS at 10:14

## 2024-05-06 RX ADMIN — ALBUMIN (HUMAN) 25 G: 0.25 INJECTION, SOLUTION INTRAVENOUS at 10:12

## 2024-05-06 RX ADMIN — LUBIPROSTONE 8 MCG: 8 CAPSULE, GELATIN COATED ORAL at 08:44

## 2024-05-06 RX ADMIN — HEPARIN SODIUM 5000 UNITS: 5000 INJECTION INTRAVENOUS; SUBCUTANEOUS at 21:47

## 2024-05-06 RX ADMIN — NYSTATIN 1 APPLICATION: 100000 POWDER TOPICAL at 21:48

## 2024-05-06 RX ADMIN — DOXYCYCLINE 100 MG: 100 CAPSULE ORAL at 21:47

## 2024-05-06 RX ADMIN — CARBIDOPA AND LEVODOPA 10 MG: 50; 200 TABLET, EXTENDED RELEASE ORAL at 16:51

## 2024-05-06 RX ADMIN — DOXYCYCLINE 100 MG: 100 CAPSULE ORAL at 08:44

## 2024-05-06 RX ADMIN — LUBIPROSTONE 8 MCG: 8 CAPSULE, GELATIN COATED ORAL at 21:47

## 2024-05-06 RX ADMIN — EMPAGLIFLOZIN 10 MG: 10 TABLET, FILM COATED ORAL at 08:44

## 2024-05-06 RX ADMIN — HEPARIN SODIUM 5000 UNITS: 5000 INJECTION INTRAVENOUS; SUBCUTANEOUS at 13:20

## 2024-05-06 RX ADMIN — OCTREOTIDE ACETATE 100 MCG: 100 INJECTION, SOLUTION INTRAVENOUS; SUBCUTANEOUS at 16:53

## 2024-05-06 RX ADMIN — MUPIROCIN 1 APPLICATION: 20 OINTMENT TOPICAL at 21:48

## 2024-05-06 RX ADMIN — Medication 10 ML: at 08:44

## 2024-05-06 RX ADMIN — NYSTATIN 1 APPLICATION: 100000 POWDER TOPICAL at 08:44

## 2024-05-06 RX ADMIN — LEVOFLOXACIN 750 MG: 750 INJECTION, SOLUTION INTRAVENOUS at 10:52

## 2024-05-06 RX ADMIN — ONDANSETRON 4 MG: 2 INJECTION INTRAMUSCULAR; INTRAVENOUS at 10:54

## 2024-05-06 RX ADMIN — CARBIDOPA AND LEVODOPA 10 MG: 50; 200 TABLET, EXTENDED RELEASE ORAL at 10:51

## 2024-05-06 RX ADMIN — BETAMETHASONE DIPROPIONATE: 0.5 OINTMENT TOPICAL at 10:13

## 2024-05-06 RX ADMIN — LACTULOSE 20 G: 20 SOLUTION ORAL at 10:13

## 2024-05-06 RX ADMIN — CARBIDOPA AND LEVODOPA 10 MG: 50; 200 TABLET, EXTENDED RELEASE ORAL at 06:36

## 2024-05-06 RX ADMIN — MUPIROCIN 1 APPLICATION: 20 OINTMENT TOPICAL at 08:44

## 2024-05-06 NOTE — PLAN OF CARE
Problem: Adult Inpatient Plan of Care  Goal: Plan of Care Review  Outcome: Ongoing, Not Progressing  Flowsheets (Taken 5/6/2024 1508)  Outcome Evaluation: Patient resting in bed. Room air. Hypotensive at times otherwise VSS. Levophed titrated off at thism time. Hopeful to be discharged soon with hospice. No needs noted at this time.  Goal: Patient-Specific Goal (Individualized)  Outcome: Ongoing, Not Progressing  Goal: Absence of Hospital-Acquired Illness or Injury  Outcome: Ongoing, Not Progressing  Intervention: Identify and Manage Fall Risk  Recent Flowsheet Documentation  Taken 5/6/2024 1500 by Tatiana Montilla, RN  Safety Promotion/Fall Prevention:   safety round/check completed   room organization consistent   fall prevention program maintained   clutter free environment maintained   assistive device/personal items within reach   activity supervised  Taken 5/6/2024 1419 by Tatiana Montilla RN  Safety Promotion/Fall Prevention: safety round/check completed  Taken 5/6/2024 1300 by Tatiana Montilla RN  Safety Promotion/Fall Prevention:   safety round/check completed   room organization consistent   fall prevention program maintained   clutter free environment maintained   assistive device/personal items within reach   activity supervised  Taken 5/6/2024 1100 by Tatiana Montilla RN  Safety Promotion/Fall Prevention:   safety round/check completed   room organization consistent   fall prevention program maintained   clutter free environment maintained   assistive device/personal items within reach   activity supervised  Taken 5/6/2024 0900 by Tatiana Montilla, RN  Safety Promotion/Fall Prevention:   safety round/check completed   room organization consistent   fall prevention program maintained   clutter free environment maintained   assistive device/personal items within reach   activity supervised  Taken 5/6/2024 0844 by Tatiana Montilla, RN  Safety Promotion/Fall Prevention: safety round/check completed  Taken 5/6/2024 0700  by Roldan, Tatiana J, RN  Safety Promotion/Fall Prevention:   safety round/check completed   room organization consistent   fall prevention program maintained   clutter free environment maintained   assistive device/personal items within reach   activity supervised  Intervention: Prevent Skin Injury  Recent Flowsheet Documentation  Taken 5/6/2024 1419 by Tatiana Montilla RN  Skin Protection: adhesive use limited  Taken 5/6/2024 0844 by Tatiana Montilla RN  Skin Protection: adhesive use limited  Intervention: Prevent and Manage VTE (Venous Thromboembolism) Risk  Recent Flowsheet Documentation  Taken 5/6/2024 0844 by Tatiana Montilla RN  Activity Management: activity encouraged  VTE Prevention/Management: (See MAR) other (see comments)  Intervention: Prevent Infection  Recent Flowsheet Documentation  Taken 5/6/2024 1500 by Tatiana Montilla RN  Infection Prevention: rest/sleep promoted  Taken 5/6/2024 1419 by Tatiana Montilla RN  Infection Prevention: rest/sleep promoted  Taken 5/6/2024 1300 by Tatiana Montilla RN  Infection Prevention: rest/sleep promoted  Taken 5/6/2024 1100 by Tatiana Montilla RN  Infection Prevention: rest/sleep promoted  Taken 5/6/2024 0900 by Tatiana Montilla RN  Infection Prevention: rest/sleep promoted  Taken 5/6/2024 0844 by Tatiana Montilla RN  Infection Prevention: rest/sleep promoted  Taken 5/6/2024 0700 by Tatiana Montilla RN  Infection Prevention: rest/sleep promoted  Goal: Optimal Comfort and Wellbeing  Outcome: Ongoing, Not Progressing  Intervention: Provide Person-Centered Care  Recent Flowsheet Documentation  Taken 5/6/2024 1419 by Tatiana Montilla RN  Trust Relationship/Rapport:   care explained   choices provided  Taken 5/6/2024 0844 by Tatiana Montilla RN  Trust Relationship/Rapport:   care explained   choices provided  Goal: Readiness for Transition of Care  Outcome: Ongoing, Not Progressing     Problem: Asthma Comorbidity  Goal: Maintenance of Asthma Control  Outcome: Ongoing, Not Progressing  Intervention:  Maintain Asthma Symptom Control  Recent Flowsheet Documentation  Taken 5/6/2024 1500 by Tatiana Montilla RN  Medication Review/Management: medications reviewed  Taken 5/6/2024 1419 by Tatiana Montilla RN  Medication Review/Management: medications reviewed  Taken 5/6/2024 1300 by Tatiana Montilla RN  Medication Review/Management: medications reviewed  Taken 5/6/2024 1100 by Tatiana Montilla RN  Medication Review/Management: medications reviewed  Taken 5/6/2024 0900 by Tatiana Montilla RN  Medication Review/Management: medications reviewed  Taken 5/6/2024 0844 by Tatiana Montilla RN  Medication Review/Management: medications reviewed  Taken 5/6/2024 0700 by Tatiana Montilla RN  Medication Review/Management: medications reviewed     Problem: Behavioral Health Comorbidity  Goal: Maintenance of Behavioral Health Symptom Control  Outcome: Ongoing, Not Progressing  Intervention: Maintain Behavioral Health Symptom Control  Recent Flowsheet Documentation  Taken 5/6/2024 1500 by Tatiana Montilla RN  Medication Review/Management: medications reviewed  Taken 5/6/2024 1419 by Tatiana Montilla RN  Medication Review/Management: medications reviewed  Taken 5/6/2024 1300 by Tatiana Montilla RN  Medication Review/Management: medications reviewed  Taken 5/6/2024 1100 by Tatiana Montilla RN  Medication Review/Management: medications reviewed  Taken 5/6/2024 0900 by Tatiana Montilla RN  Medication Review/Management: medications reviewed  Taken 5/6/2024 0844 by Tatiana Montilla RN  Medication Review/Management: medications reviewed  Taken 5/6/2024 0700 by Tatiana Montilla RN  Medication Review/Management: medications reviewed     Problem: COPD (Chronic Obstructive Pulmonary Disease) Comorbidity  Goal: Maintenance of COPD Symptom Control  Outcome: Ongoing, Not Progressing  Intervention: Maintain COPD-Symptom Control  Recent Flowsheet Documentation  Taken 5/6/2024 1500 by Tatiana Montilla RN  Medication Review/Management: medications reviewed  Taken 5/6/2024 1419 by  Tatiana Montilla RN  Medication Review/Management: medications reviewed  Taken 5/6/2024 1300 by Tatiana Montilla RN  Medication Review/Management: medications reviewed  Taken 5/6/2024 1100 by Tatiana Montilla RN  Medication Review/Management: medications reviewed  Taken 5/6/2024 0900 by Tatiana Montilla RN  Medication Review/Management: medications reviewed  Taken 5/6/2024 0844 by Tatiana Montilla RN  Supportive Measures: active listening utilized  Medication Review/Management: medications reviewed  Taken 5/6/2024 0700 by Tatiana Montilla RN  Medication Review/Management: medications reviewed     Problem: Diabetes Comorbidity  Goal: Blood Glucose Level Within Targeted Range  Outcome: Ongoing, Not Progressing     Problem: Heart Failure Comorbidity  Goal: Maintenance of Heart Failure Symptom Control  Outcome: Ongoing, Not Progressing  Intervention: Maintain Heart Failure-Management  Recent Flowsheet Documentation  Taken 5/6/2024 1500 by Tatiana Montilla RN  Medication Review/Management: medications reviewed  Taken 5/6/2024 1419 by Tatiana Montilla RN  Medication Review/Management: medications reviewed  Taken 5/6/2024 1300 by Tatiana Montilla RN  Medication Review/Management: medications reviewed  Taken 5/6/2024 1100 by Tatiana Montilla RN  Medication Review/Management: medications reviewed  Taken 5/6/2024 0900 by Tatiana Montilla RN  Medication Review/Management: medications reviewed  Taken 5/6/2024 0844 by Tatiana Montilla RN  Medication Review/Management: medications reviewed  Taken 5/6/2024 0700 by Tatiana Montilla RN  Medication Review/Management: medications reviewed     Problem: Hypertension Comorbidity  Goal: Blood Pressure in Desired Range  Outcome: Ongoing, Not Progressing  Intervention: Maintain Blood Pressure Management  Recent Flowsheet Documentation  Taken 5/6/2024 1500 by Tatiana Montilla RN  Medication Review/Management: medications reviewed  Taken 5/6/2024 1419 by Tatiana Montilla RN  Medication Review/Management: medications  reviewed  Taken 5/6/2024 1300 by Tatiana Montilla RN  Medication Review/Management: medications reviewed  Taken 5/6/2024 1100 by Tatiana Montilla RN  Medication Review/Management: medications reviewed  Taken 5/6/2024 0900 by Tatiana Montilla RN  Medication Review/Management: medications reviewed  Taken 5/6/2024 0844 by Tatiana Montilla RN  Medication Review/Management: medications reviewed  Taken 5/6/2024 0700 by Tatiana Montilla RN  Medication Review/Management: medications reviewed     Problem: Obstructive Sleep Apnea Risk or Actual Comorbidity Management  Goal: Unobstructed Breathing During Sleep  Outcome: Ongoing, Not Progressing     Problem: Osteoarthritis Comorbidity  Goal: Maintenance of Osteoarthritis Symptom Control  Outcome: Ongoing, Not Progressing  Intervention: Maintain Osteoarthritis Symptom Control  Recent Flowsheet Documentation  Taken 5/6/2024 1500 by Tatiana Montilla RN  Medication Review/Management: medications reviewed  Taken 5/6/2024 1419 by Tatiana Montilla RN  Medication Review/Management: medications reviewed  Taken 5/6/2024 1300 by Tatiana Montilla RN  Medication Review/Management: medications reviewed  Taken 5/6/2024 1100 by Tatiana Montilla RN  Medication Review/Management: medications reviewed  Taken 5/6/2024 0900 by Tatiana Montilla RN  Medication Review/Management: medications reviewed  Taken 5/6/2024 0844 by Tatiana Montilla RN  Activity Management: activity encouraged  Medication Review/Management: medications reviewed  Taken 5/6/2024 0700 by Tatiana Montilla RN  Medication Review/Management: medications reviewed     Problem: Pain Chronic (Persistent) (Comorbidity Management)  Goal: Acceptable Pain Control and Functional Ability  Outcome: Ongoing, Not Progressing  Intervention: Manage Persistent Pain  Recent Flowsheet Documentation  Taken 5/6/2024 1500 by Tatiana Montilla RN  Medication Review/Management: medications reviewed  Taken 5/6/2024 1419 by Tatiana Montilla RN  Sleep/Rest Enhancement: regular sleep/rest  pattern promoted  Medication Review/Management: medications reviewed  Taken 5/6/2024 1300 by Tatiana Montilla RN  Medication Review/Management: medications reviewed  Taken 5/6/2024 1100 by Tatiana Montilla RN  Medication Review/Management: medications reviewed  Taken 5/6/2024 0900 by Tatiana Montilla RN  Medication Review/Management: medications reviewed  Taken 5/6/2024 0844 by Tatiana Montilla RN  Sleep/Rest Enhancement: regular sleep/rest pattern promoted  Medication Review/Management: medications reviewed  Taken 5/6/2024 0700 by Tatiana Montilla RN  Medication Review/Management: medications reviewed  Intervention: Optimize Psychosocial Wellbeing  Recent Flowsheet Documentation  Taken 5/6/2024 1419 by Tatiana Montilla RN  Diversional Activities: television  Family/Support System Care: self-care encouraged  Taken 5/6/2024 0844 by Tatiana Montilla RN  Supportive Measures: active listening utilized  Diversional Activities: television  Family/Support System Care:   self-care encouraged   caregiver stress acknowledged     Problem: Seizure Disorder Comorbidity  Goal: Maintenance of Seizure Control  Outcome: Ongoing, Not Progressing     Problem: Skin Injury Risk Increased  Goal: Skin Health and Integrity  Outcome: Ongoing, Not Progressing  Intervention: Optimize Skin Protection  Recent Flowsheet Documentation  Taken 5/6/2024 1419 by Tatiana Montilla RN  Pressure Reduction Techniques: weight shift assistance provided  Pressure Reduction Devices: pressure-redistributing mattress utilized  Skin Protection: adhesive use limited  Taken 5/6/2024 0844 by Tatiana Montilla RN  Pressure Reduction Techniques: weight shift assistance provided  Pressure Reduction Devices: pressure-redistributing mattress utilized  Skin Protection: adhesive use limited     Problem: Adjustment to Illness (Sepsis/Septic Shock)  Goal: Optimal Coping  Outcome: Ongoing, Not Progressing  Intervention: Optimize Psychosocial Adjustment to Illness  Recent Flowsheet  Documentation  Taken 5/6/2024 1419 by Tatiana Montilla RN  Family/Support System Care: self-care encouraged  Taken 5/6/2024 0844 by Tatiana Montilla RN  Supportive Measures: active listening utilized  Family/Support System Care:   self-care encouraged   caregiver stress acknowledged     Problem: Bleeding (Sepsis/Septic Shock)  Goal: Absence of Bleeding  Outcome: Ongoing, Not Progressing     Problem: Glycemic Control Impaired (Sepsis/Septic Shock)  Goal: Blood Glucose Level Within Desired Range  Outcome: Ongoing, Not Progressing     Problem: Infection Progression (Sepsis/Septic Shock)  Goal: Absence of Infection Signs and Symptoms  Outcome: Ongoing, Not Progressing  Intervention: Initiate Sepsis Management  Recent Flowsheet Documentation  Taken 5/6/2024 1500 by Tatiana Montilla RN  Infection Prevention: rest/sleep promoted  Taken 5/6/2024 1419 by Tatiana Montilla RN  Infection Prevention: rest/sleep promoted  Taken 5/6/2024 1300 by Tatiana Montilla RN  Infection Prevention: rest/sleep promoted  Taken 5/6/2024 1100 by Tatiana Montilla RN  Infection Prevention: rest/sleep promoted  Taken 5/6/2024 0900 by Tatiana Montilla RN  Infection Prevention: rest/sleep promoted  Taken 5/6/2024 0844 by Tatiana Montilla RN  Infection Prevention: rest/sleep promoted  Taken 5/6/2024 0700 by Tatiana Montilla RN  Infection Prevention: rest/sleep promoted  Intervention: Promote Recovery  Recent Flowsheet Documentation  Taken 5/6/2024 1419 by Tatiana Montilla RN  Sleep/Rest Enhancement: regular sleep/rest pattern promoted  Taken 5/6/2024 0844 by Tatiana Montilla RN  Activity Management: activity encouraged  Sleep/Rest Enhancement: regular sleep/rest pattern promoted     Problem: Nutrition Impaired (Sepsis/Septic Shock)  Goal: Optimal Nutrition Intake  Outcome: Ongoing, Not Progressing     Problem: Fall Injury Risk  Goal: Absence of Fall and Fall-Related Injury  Outcome: Ongoing, Not Progressing  Intervention: Identify and Manage Contributors  Recent Flowsheet  Documentation  Taken 5/6/2024 1500 by Tatiana Montilla RN  Medication Review/Management: medications reviewed  Taken 5/6/2024 1419 by Tatiana Montilla RN  Medication Review/Management: medications reviewed  Taken 5/6/2024 1300 by Tatiana Montilla RN  Medication Review/Management: medications reviewed  Taken 5/6/2024 1100 by Tatiana Montilla RN  Medication Review/Management: medications reviewed  Taken 5/6/2024 0900 by Tatiana Montilla RN  Medication Review/Management: medications reviewed  Taken 5/6/2024 0844 by Tatiana Montilla RN  Medication Review/Management: medications reviewed  Taken 5/6/2024 0700 by Tatiana Montilla RN  Medication Review/Management: medications reviewed  Intervention: Promote Injury-Free Environment  Recent Flowsheet Documentation  Taken 5/6/2024 1500 by Tatiana Montilla RN  Safety Promotion/Fall Prevention:   safety round/check completed   room organization consistent   fall prevention program maintained   clutter free environment maintained   assistive device/personal items within reach   activity supervised  Taken 5/6/2024 1419 by Tatiana Montilla RN  Safety Promotion/Fall Prevention: safety round/check completed  Taken 5/6/2024 1300 by Tatiana Montilla RN  Safety Promotion/Fall Prevention:   safety round/check completed   room organization consistent   fall prevention program maintained   clutter free environment maintained   assistive device/personal items within reach   activity supervised  Taken 5/6/2024 1100 by Tatiana Montilla RN  Safety Promotion/Fall Prevention:   safety round/check completed   room organization consistent   fall prevention program maintained   clutter free environment maintained   assistive device/personal items within reach   activity supervised  Taken 5/6/2024 0900 by Tatiana Montilla RN  Safety Promotion/Fall Prevention:   safety round/check completed   room organization consistent   fall prevention program maintained   clutter free environment maintained   assistive device/personal  items within reach   activity supervised  Taken 5/6/2024 0844 by Tatiana Montilla, RN  Safety Promotion/Fall Prevention: safety round/check completed  Taken 5/6/2024 0700 by Tatiana Montilla, RN  Safety Promotion/Fall Prevention:   safety round/check completed   room organization consistent   fall prevention program maintained   clutter free environment maintained   assistive device/personal items within reach   activity supervised   Goal Outcome Evaluation:              Outcome Evaluation: Patient resting in bed. Room air. Hypotensive at times otherwise VSS. Levophed titrated off at thism time. Hopeful to be discharged soon with hospice. No needs noted at this time.

## 2024-05-06 NOTE — CONSULTS
INFECTIOUS DISEASE CONSULTATION REPORT        Patient Identification:  Name:  Della Jorge  Age:  63 y.o.  Sex:  female  :  1960  MRN:  [id number]   Visit Number:  [id number]  Primary Care Physician:  Sebastián Dougherty MD        Referring Provider: Dr. Erwin    Reason for consult: Pseudomonas bacteremia       LOS: 2 days        Subjective       Subjective     History of present illness:      Thank you Dr. Erwin for allowing us to participate in the care of your patient.  As you well know, Ms. Della Jorge is a 63 y.o. female with past medical history significant for combined systolic and diastolic heart failure with known liver cirrhosis and severe tricuspid valve regurg, chronic hypoxemic respiratory failure on 2 L nasal cannula with history of hypertension as well as irritable bowel syndrome generalized anxiety disorder, who presented to [facility] Emergency Department on 2024 for fall.  The patient reported she had been feeling weak and felt that her legs were going to give underneath her, eased herself to the floor in the bathroom and actually denies any significant fall but was able to control her lowering to the floor.  However she was resting on hardwood floor and has significantly severely edematous lower extremities, did have skin tears and a large hematoma that the emergency department evacuated.  The patient did have some nausea and report of small emesis the day prior to presentation but reported she has chronic stomach issues.  The patient denied feeling short of breath or having any chest pain.  She reported her belly feels very tight and sore, especially in the lower region and did note that she had reduced urine output over the last several days since being in the emergency department on  where she had 10 L of volume removed.  The patient reiterated that she did not want any invasive procedures done and has no desire or plans to proceed with prior  recommended right heart cath and given significant heart failure with likely also not be candidate for TIPS.  Patient was afebrile in the ED but notable for soft blood pressures, primarily 80s over 50s.  Patient reported her blood pressure at home is typically 90s over 60s on antihypertensive regimen.  Initially ED had hoped to let the patient return home, however given significant ascitic abdomen as well as soft blood pressures, concern for possibility of SBP.  The patient was admitted to progressive care unit for planned paracentesis and empiric antibiotic coverage until SBP ruled out.  On admission, the patient underwent planned paracentesis with 7650 mL ascitic fluid removed at bedside.    Fluid analysis from ascitic fluid reported 21% neutrophils with 43 nucleated cells.  Anaerobic body fluid from culture preliminarily reports no anaerobes isolated 24 hours.  Peritoneal fluid culture preliminarily reports no growth at 24 hours.  1 of 2 blood cultures from 5/4 preliminarily reports Pseudomonas species.  Repeat blood cultures pending x 2.  CRP was elevated at 16.99.  Lactate elevated on admission at 3.2, improved to 1.8.  Procalcitonin 2.97.  WBC was 10.57.  Platelet count 141.  Urinalysis was unremarkable with 0-2 WBCs and no bacteria.  CT chest without contrast reported cardiomegaly.  CT abdomen pelvis reported large volume ascites.  Cirrhotic liver. Chest x-ray from 5/5 reports mild enlarged heart size.  Hypoinflated lungs.  No edema, pneumonia, pleural effusion, pneumothorax.  Right neck central vascular catheter with the tip to the SVC.  Transthoracic echo from 5/4 did not report any evidence of vegetation.    The patient is resting comfortably in bed, on room air with no apparent distress.  Afebrile.  Reports constipation.  Levophed is infusing.  Has been is at the bedside.  Lung exam is clear with diminished bases bilaterally to auscultation..  Abdomen is soft and rounded, nontender with hypoactive bowel  sounds.  3+ bilateral lower extremity edema.  Bilateral lower extremities are dressed with Ace wrap and gauze wrap, very edematous, very erythematous and the patient reports very tender.  Leukocytosis worsened to 23.32.  Platelet count 152.          Infectious Disease consultation was requested for antimicrobial management.      ---------------------------------------------------------------------------------------------------------------------     Review Of Systems:    Constitutional: no fever, chills and night sweats. No appetite change or unexpected weight change. No fatigue.  Eyes: no eye drainage, itching or redness.  HEENT: no mouth sores, dysphagia or nose bleed.  Respiratory: no for shortness of breath, cough or production of sputum.  Cardiovascular: no chest pain, no palpitations, no orthopnea.  Gastrointestinal: no nausea, vomiting or diarrhea. No abdominal pain, hematemesis or rectal bleeding.  Genitourinary: no dysuria or polyuria.  Hematologic/lymphatic: no lymph node abnormalities, no easy bruising or easy bleeding.  Musculoskeletal: no muscle or joint pain.  BLE edema, wounds  Skin: No rash and no itching.  Neurological: no loss of consciousness, no seizure, no headache.  Behavioral/Psych: no depression or suicidal ideation.  Endocrine: no hot flashes.  Immunologic: negative.    ---------------------------------------------------------------------------------------------------------------------     Past Medical History    Past Medical History:   Diagnosis Date    Cirrhosis     Congestive heart failure (CHF)     Hypertension        Past Surgical History    Past Surgical History:   Procedure Laterality Date    CHOLECYSTECTOMY      HYSTERECTOMY      partial, still has ovaries       Family History    Family History   Problem Relation Age of Onset    Heart disease Mother     Diabetes Sister        Social History    Social History     Tobacco Use    Smoking status: Never    Smokeless tobacco: Never   Vaping  Use    Vaping status: Never Used   Substance Use Topics    Alcohol use: Never    Drug use: Never       Allergies    Codeine and Keflex [cephalexin]  ---------------------------------------------------------------------------------------------------------------------     Home Medications:    Prior to Admission Medications       Prescriptions Last Dose Informant Patient Reported? Taking?    betamethasone valerate (VALISONE) 0.1 % cream 5/3/2024 Pharmacy Yes Yes    Apply 1 Application topically to the appropriate area as directed Daily.    bumetanide (BUMEX) 1 MG tablet 5/3/2024 Pharmacy Yes Yes    Take 2 tablets by mouth 2 (Two) Times a Day.    carvedilol (COREG) 3.125 MG tablet 5/3/2024 Pharmacy Yes Yes    Take 1 tablet by mouth 2 (Two) Times a Day With Meals.    doxycycline (VIBRAMYCIN) 100 MG capsule 5/3/2024 Pharmacy Yes Yes    Take 1 capsule by mouth 2 (Two) Times a Day.    empagliflozin (JARDIANCE) 10 MG tablet tablet 5/3/2024 Pharmacy Yes Yes    Take 1 tablet by mouth Daily.    linaclotide (Linzess) 72 MCG capsule capsule Past Week Pharmacy Yes Yes    Take 1 capsule by mouth Daily As Needed (constipation).    nystatin (MYCOSTATIN) 776865 UNIT/GM powder 5/3/2024 Pharmacy Yes Yes    Apply 1 Application topically to the appropriate area as directed 2 (Two) Times a Day.    spironolactone (ALDACTONE) 25 MG tablet 5/3/2024 Pharmacy Yes Yes    Take 1 tablet by mouth Daily.    traMADol (ULTRAM) 50 MG tablet 5/3/2024 Pharmacy Yes Yes    Take 1 tablet by mouth Every 8 (Eight) Hours As Needed for Moderate Pain.          ---------------------------------------------------------------------------------------------------------------------    Objective       Objective     Hospital Scheduled Meds:  albumin human, 25 g, Intravenous, Q30 Min  betamethasone dipropionate, , Topical, Daily  doxycycline, 100 mg, Oral, BID  empagliflozin, 10 mg, Oral, Daily  heparin (porcine), 5,000 Units, Subcutaneous, Q8H  levoFLOXacin, 750 mg,  Intravenous, Q48H  lubiprostone, 8 mcg, Oral, BID  meropenem, 500 mg, Intravenous, Q12H  midodrine, 10 mg, Oral, TID AC  mupirocin, 1 application , Each Nare, BID  nystatin, 1 Application, Topical, BID  octreotide, 100 mcg, Intravenous, TID  sodium chloride, 10 mL, Intravenous, Q12H  sodium chloride, 10 mL, Intravenous, Q12H  sodium chloride, 10 mL, Intravenous, Q12H  sodium chloride, 10 mL, Intravenous, Q12H      norepinephrine, 0.02-0.3 mcg/kg/min, Last Rate: 0.02 mcg/kg/min (05/06/24 1011)      ---------------------------------------------------------------------------------------------------------------------   Vital Signs:  Temp:  [95.7 °F (35.4 °C)-98.7 °F (37.1 °C)] 98.7 °F (37.1 °C)  Heart Rate:  [] 96  Resp:  [10-28] 17  BP: ()/(54-84) 99/63  Mean Arterial Pressure (Non-Invasive) for the past 24 hrs (Last 3 readings):   Noninvasive MAP (mmHg)   05/06/24 1011 83   05/06/24 0858 88   05/06/24 0600 95     SpO2 Percentage    05/06/24 0530 05/06/24 0545 05/06/24 0600   SpO2: 94% 93% 94%     SpO2:  [90 %-98 %] 94 %  on   ;   Device (Oxygen Therapy): room air    Body mass index is 32.84 kg/m².  Wt Readings from Last 3 Encounters:   05/06/24 73.8 kg (162 lb 11.2 oz)   04/23/24 81.6 kg (180 lb)   04/09/24 80.5 kg (177 lb 6.4 oz)     ---------------------------------------------------------------------------------------------------------------------     Physical Exam:    Constitutional: Chronically ill-appearing elderly female.  Resting comfortably in bed on room air with no apparent distress.   at the bedside.  Levophed infusing.  HENT:  Head: Normocephalic and atraumatic.  Mouth:  Moist mucous membranes.    Eyes:  Conjunctivae and EOM are normal.  No scleral icterus.  Neck:  Neck supple.  No JVD present.    Cardiovascular:  Normal rate, regular rhythm and normal heart sounds with no murmur. No edema.  Pulmonary/Chest:  No respiratory distress, no wheezes, no crackles, with normal breath sounds  and good air movement.  Abdominal:  Soft.  Bowel sounds are normal.  No distension and no tenderness.   Musculoskeletal:  No edema, no tenderness, and no deformity.  No swelling or redness of joints.  Neurological:  Alert and oriented to person, place, and time.  No facial droop.  No slurred speech.   Skin:  Skin is warm and dry.  No rash noted.  No pallor.  Bilateral lower extremity 3+ edema, severe erythema.  Bilateral lower extremities are dressed with Ace wrap, severely tender to palpation  Psychiatric:  Normal mood and affect.  Behavior is normal.    ---------------------------------------------------------------------------------------------------------------------    Results from last 7 days   Lab Units 05/04/24  0324 05/04/24  0133   HSTROP T ng/L 74* 76*     Results from last 7 days   Lab Units 05/04/24  0133   PROBNP pg/mL 14,440.0*         Results from last 7 days   Lab Units 05/06/24  0037 05/05/24  0909 05/05/24  0158 05/04/24  1643 05/04/24  0631 05/04/24  0333 05/04/24  0133   CRP mg/dL  --   --   --   --   --   --  16.99*   LACTATE mmol/L  --  1.9  --  3.1* 1.8   < >  --    WBC 10*3/mm3 23.32*  --  16.49*  16.49* 13.57*  --   --  10.57   HEMOGLOBIN g/dL 11.0*  --  10.2*  10.2* 11.0*  --   --  11.6*   HEMATOCRIT % 32.9*  --  30.6*  30.6* 34.2  --   --  33.9*   MCV fL 88.2  --  90.0  90.0 94.2  --   --  89.4   MCHC g/dL 33.4  --  33.3  33.3 32.2  --   --  34.2   PLATELETS 10*3/mm3 152  --  126*  126* 101*  --   --  141   INR   --   --  1.92*  --   --   --  1.87*    < > = values in this interval not displayed.     Results from last 7 days   Lab Units 05/06/24  0037 05/05/24  0158 05/04/24  1747   SODIUM mmol/L 124* 124* 124*   POTASSIUM mmol/L 3.9 4.0 4.2   CHLORIDE mmol/L 92* 91* 89*   CO2 mmol/L 20.3* 21.2* 20.3*   BUN mg/dL 37* 35* 32*   CREATININE mg/dL 1.97* 2.17* 2.04*   CALCIUM mg/dL 8.4* 8.6 8.8   GLUCOSE mg/dL 101* 93 97   ALBUMIN g/dL 2.7* 2.9* 3.3*   BILIRUBIN mg/dL 4.8* 5.0* 5.2*   ALK  "PHOS U/L 86 75 72   AST (SGOT) U/L 17 16 20   ALT (SGPT) U/L 7 6 6   Estimated Creatinine Clearance: 27.1 mL/min (A) (by C-G formula based on SCr of 1.97 mg/dL (H)).  Ammonia   Date Value Ref Range Status   05/04/2024 17 11 - 51 umol/L Final       Glucose   Date/Time Value Ref Range Status   05/04/2024 1634 94 70 - 130 mg/dL Final     Lab Results   Component Value Date    HGBA1C 4.80 08/04/2022     Lab Results   Component Value Date    TSH 6.250 (H) 08/05/2022    FREET4 1.29 08/05/2022       Blood Culture   Date Value Ref Range Status   05/04/2024 Pseudomonas species (C)  Preliminary   05/04/2024 No growth at 2 days  Preliminary     No results found for: \"URINECX\"  No results found for: \"WOUNDCX\"  No results found for: \"STOOLCX\"  No results found for: \"RESPCX\"  Pain Management Panel           No data to display              I have personally reviewed the above laboratory results.   ---------------------------------------------------------------------------------------------------------------------  Imaging Results (Last 7 Days)       Procedure Component Value Units Date/Time    US Liver [[id number]] Collected: 05/05/24 2325     Updated: 05/05/24 2329    Narrative:      PROCEDURE: Hepatic ultrasound evaluation performed on May 5, 2024. Color  flow imaging performed.     HISTORY: Hepatic cirrhosis. Ascites. Paracentesis 2 days prior. Evaluate  liver.     COMPARISON: None.     FINDINGS:     Patent IVC.  Small to moderate volume of ascites noted to surround the liver.  Small liver size with nodular surface contour consistent with underlying  hepatocellular disease and cirrhosis.  Patent hepatic veins with appropriate direction of flow.  The common bile duct measures 3.9 mm in diameter and appears  unremarkable.  Patent main portal vein with appropriate direction of flow.       Impression:      Impression:     1.  Small liver size with nodular surface contour consistent with  hepatocellular disease.  2.  Small to moderate " volume of free fluid in the right upper quadrant  consistent with ascites.  3.  Normal common bile duct.  4.  Patent main portal vein with appropriate direction of flow.  5.  Patent IVC.  6.  Patent hepatic veins.     This report was finalized on 5/5/2024 11:27 PM by Marcellus Patel MD.       XR Chest 1 View [[id number]] Collected: 05/05/24 1346     Updated: 05/05/24 1353    Narrative:      PROCEDURE: Portable chest x-ray examination performed on May 5, 2024.  Single view. Upright position.     HISTORY: Cardiovascular disease. Chest pain.     COMPARISON: None.     FINDINGS:     Enlarged heart size  Right neck central vascular catheter with tip to the SVC.  Right lower lobe and left lower lobe atelectasis.  Mild hypoinflated lungs.  Mild coarsened bronchovascular pattern to the lungs.  No pleural effusion or pneumothorax.  No fracture or foreign body.  Slight levoconvex curvature at the mid thoracic spine.  No free air in the upper abdomen.       Impression:         1.  Mild enlarged heart size.  2.  Hypoinflated lungs.  3.  No edema, pneumonia, pleural effusion, or pneumothorax.  4.  Right neck central vascular catheter with tip to the SVC.     This report was finalized on 5/5/2024 1:47 PM by Marcellus Patel MD.       CT Chest Without Contrast Diagnostic [[id number]] Collected: 05/04/24 0622     Updated: 05/04/24 0631    Narrative:      CLINICAL HISTORY: CHF, edema, cirrhosis.     COMPARISON: None available.     TECHNIQUE: Noncontrast CT of the chest, abdomen and pelvis was obtained.   Multiplanar reformats were generated.  Limited exposure control,  adjustment of the mA and/or KV according to patient size or use of  iterative reconstruction technique was utilized.     FINDINGS:     CT CHEST:     Pulmonary arteries: normal.      Heart and pericardium: Cardiomegaly.  Coronary calcifications.  Normal  pericardium.     Aorta: normal.     Esophagus: normal.     Lungs and airways: Mild linear bibasilar atelectasis.      Pleura: normal.     Lymph nodes: normal.     Musculoskeletal and chest wall: no acute abnormality.     Other: Fluid from the abdominal cavity extends into the lower  mediastinum        CT ABDOMEN AND PELVIS:     Hepatobiliary: Cirrhotic liver morphology.  Status post cholecystectomy.     Pancreas: normal.     Spleen: normal.     Adrenals: normal.      Kidneys, ureters, bladder: normal.     Reproductive: normal.     GI tract/Bowel: Moderate amount of stool throughout the colon.  The  small bowel is normal in caliber..     Appendix: Not well seen.  No findings for acute appendicitis.     Peritoneum: Large volume ascites     Vascular: normal.     Lymph nodes: normal.     Musculoskeletal and abdominal wall: Anasarca.       Impression:         CT CHEST:  CARDIOMEGALY.     CT ABDOMEN AND PELVIS:  LARGE VOLUME ASCITES.  CIRRHOTIC LIVER.           This report was finalized on 5/4/2024 6:29 AM by Hilary Astudillo MD.       CT Abdomen Pelvis Without Contrast [[id number]] Collected: 05/04/24 0622     Updated: 05/04/24 0631    Narrative:      CLINICAL HISTORY: CHF, edema, cirrhosis.     COMPARISON: None available.     TECHNIQUE: Noncontrast CT of the chest, abdomen and pelvis was obtained.   Multiplanar reformats were generated.  Limited exposure control,  adjustment of the mA and/or KV according to patient size or use of  iterative reconstruction technique was utilized.     FINDINGS:     CT CHEST:     Pulmonary arteries: normal.      Heart and pericardium: Cardiomegaly.  Coronary calcifications.  Normal  pericardium.     Aorta: normal.     Esophagus: normal.     Lungs and airways: Mild linear bibasilar atelectasis.     Pleura: normal.     Lymph nodes: normal.     Musculoskeletal and chest wall: no acute abnormality.     Other: Fluid from the abdominal cavity extends into the lower  mediastinum        CT ABDOMEN AND PELVIS:     Hepatobiliary: Cirrhotic liver morphology.  Status post cholecystectomy.     Pancreas: normal.      Spleen: normal.     Adrenals: normal.      Kidneys, ureters, bladder: normal.     Reproductive: normal.     GI tract/Bowel: Moderate amount of stool throughout the colon.  The  small bowel is normal in caliber..     Appendix: Not well seen.  No findings for acute appendicitis.     Peritoneum: Large volume ascites     Vascular: normal.     Lymph nodes: normal.     Musculoskeletal and abdominal wall: Anasarca.       Impression:         CT CHEST:  CARDIOMEGALY.     CT ABDOMEN AND PELVIS:  LARGE VOLUME ASCITES.  CIRRHOTIC LIVER.           This report was finalized on 5/4/2024 6:29 AM by Hilary Astudillo MD.       XR Chest 1 View [[id number]] Collected: 05/04/24 0448     Updated: 05/04/24 0452    Narrative:      PROCEDURE: Portable chest x-ray examination performed on May 4, 2024.  Single view. Supine position.     HISTORY: CHF.     COMPARISON: None.     FINDINGS:     Mild enlarged heart size  Possible underlying pericardial fluid.  No lobar consolidation or edema.  Hypoinflated lungs with slightly elevated left hemidiaphragm.  No pleural effusion or pneumothorax.  No fracture or foreign body.  Cholecystectomy clips in the right upper quadrant.       Impression:         1.  Mild enlarged heart size  2.  Mild hypoinflated lungs.  3.  No lobar consolidation or edema.  4.  No pleural effusion or pneumothorax.     This report was finalized on 5/4/2024 4:49 AM by Marcellus Patel MD.             I have personally reviewed the above radiology results.   ---------------------------------------------------------------------------------------------------------------------      Pertinent Infectious Disease Results          Assessment & Plan      Assessment      Pseudomonas bacteremia  Left lower extremity wound        Plan      Ms. Della Jorge is a 63 y.o. female with past medical history significant for combined systolic and diastolic heart failure with known liver cirrhosis and severe tricuspid valve regurg, chronic hypoxemic  respiratory failure on 2 L nasal cannula with history of hypertension as well as irritable bowel syndrome generalized anxiety disorder, who presented to [facility] Emergency Department on 5/4/2024 for fall.  The patient reported she had been feeling weak and felt that her legs were going to give underneath her, eased herself to the floor in the bathroom and actually denies any significant fall but was able to control her lowering to the floor.  However she was resting on hardwood floor and has significantly severely edematous lower extremities, did have skin tears and a large hematoma that the emergency department evacuated.  The patient did have some nausea and report of small emesis the day prior to presentation but reported she has chronic stomach issues.  The patient denied feeling short of breath or having any chest pain.  She reported her belly feels very tight and sore, especially in the lower region and did note that she had reduced urine output over the last several days since being in the emergency department on 4/23 where she had 10 L of volume removed.  The patient reiterated that she did not want any invasive procedures done and has no desire or plans to proceed with prior recommended right heart cath and given significant heart failure with likely also not be candidate for TIPS.  Patient was afebrile in the ED but notable for soft blood pressures, primarily 80s over 50s.  Patient reported her blood pressure at home is typically 90s over 60s on antihypertensive regimen.  Initially ED had hoped to let the patient return home, however given significant ascitic abdomen as well as soft blood pressures, concern for possibility of SBP.  The patient was admitted to progressive care unit for planned paracentesis and empiric antibiotic coverage until SBP ruled out.  On admission, the patient underwent planned paracentesis with 7650 mL ascitic fluid removed at bedside.    Fluid analysis from ascitic  fluid reported 21% neutrophils with 43 nucleated cells.  Anaerobic body fluid from culture preliminarily reports no anaerobes isolated 24 hours.  Peritoneal fluid culture preliminarily reports no growth at 24 hours.  1 of 2 blood cultures from 5/4 preliminarily reports Pseudomonas species.  Repeat blood cultures pending x 2.  CRP was elevated at 16.99.  Lactate elevated on admission at 3.2, improved to 1.8.  Procalcitonin 2.97.  WBC was 10.57.  Platelet count 141.  Urinalysis was unremarkable with 0-2 WBCs and no bacteria.  CT chest without contrast reported cardiomegaly.  CT abdomen pelvis reported large volume ascites.  Cirrhotic liver. Chest x-ray from 5/5 reports mild enlarged heart size.  Hypoinflated lungs.  No edema, pneumonia, pleural effusion, pneumothorax.  Right neck central vascular catheter with the tip to the SVC.  Transthoracic echo from 5/4 did not report any evidence of vegetation.    The patient is resting comfortably in bed, on room air with no apparent distress.  Afebrile.  Reports constipation.  Levophed is infusing.  Has been is at the bedside.  Lung exam is clear with diminished bases bilaterally to auscultation..  Abdomen is soft and rounded, nontender with hypoactive bowel sounds.  3+ bilateral lower extremity edema.  Bilateral lower extremities are dressed with Ace wrap and gauze wrap, very edematous, very erythematous and the patient reports very tender.  Leukocytosis worsened to 23.32.  Platelet count 152.      The patient was empirically initiated on doxycycline and meropenem for both bacteremia and skin and soft tissue infection.  In the setting of 1 of 2 blood cultures preliminarily reporting Pseudomonas we will initiate pharmacy dosing Levaquin for dual pseudomonal coverage.  Awaiting repeat blood cultures to finalize.  We will continue to monitor closely and adjust antibiotic coverage as appropriate.        ANTIMICROBIAL THERAPY    doxycycline - 100 MG, 100 MG  levoFLOXacin  (LEVAQUIN) 750 mg/150 mL D5W (premix) - 750 MG/150ML  meropenem (MERREM) 500 mg IVPB in 100 mL NS (VTB)       Again, thank you Dr. Erwin for allowing us to participate in the care of your patient and please feel free to call for any questions you may have.        Code Status:     Code Status and Medical Interventions:   Ordered at: 05/04/24 0826     Medical Intervention Limits:    NO intubation (DNI)     Code Status (Patient has no pulse and is not breathing):    No CPR (Do Not Attempt to Resuscitate)     Medical Interventions (Patient has pulse or is breathing):    Limited Support         Alina Vieira, CHARY  05/06/24  10:59 EDT

## 2024-05-06 NOTE — PLAN OF CARE
Goal Outcome Evaluation:  Plan of Care Reviewed With: patient        Progress: improving  Outcome Evaluation: Patient resting calmly with  at bedside. On RA. NSR. VSS. Levophed continued this shift. No new needs noted at this time.         Problem: Adult Inpatient Plan of Care  Goal: Plan of Care Review  Outcome: Ongoing, Progressing  Flowsheets (Taken 5/6/2024 0412)  Progress: improving  Plan of Care Reviewed With: patient  Outcome Evaluation: Patient resting calmly with  at bedside. On RA. NSR. VSS. Levophed continued this shift. No new needs noted at this time.  Goal: Patient-Specific Goal (Individualized)  Outcome: Ongoing, Progressing  Goal: Absence of Hospital-Acquired Illness or Injury  Outcome: Ongoing, Progressing  Intervention: Identify and Manage Fall Risk  Recent Flowsheet Documentation  Taken 5/6/2024 0300 by Huong Portillo RN  Safety Promotion/Fall Prevention:   safety round/check completed   room organization consistent   nonskid shoes/slippers when out of bed   lighting adjusted   fall prevention program maintained   clutter free environment maintained   assistive device/personal items within reach   activity supervised  Taken 5/5/2024 2300 by Huong Portillo, RN  Safety Promotion/Fall Prevention:   safety round/check completed   room organization consistent   nonskid shoes/slippers when out of bed   lighting adjusted   fall prevention program maintained   clutter free environment maintained   assistive device/personal items within reach   activity supervised  Taken 5/5/2024 2100 by Huong Portillo, RN  Safety Promotion/Fall Prevention:   safety round/check completed   room organization consistent   nonskid shoes/slippers when out of bed   lighting adjusted   fall prevention program maintained   clutter free environment maintained   assistive device/personal items within reach   activity supervised  Taken 5/5/2024 1900 by Huong Portillo, RN  Safety Promotion/Fall Prevention:    safety round/check completed   room organization consistent   nonskid shoes/slippers when out of bed   lighting adjusted   fall prevention program maintained   clutter free environment maintained   assistive device/personal items within reach   activity supervised  Intervention: Prevent Skin Injury  Recent Flowsheet Documentation  Taken 5/6/2024 0200 by Huong Portillo RN  Skin Protection:   adhesive use limited   pulse oximeter probe site changed   tubing/devices free from skin contact   transparent dressing maintained  Taken 5/5/2024 2127 by Huong Portillo RN  Skin Protection:   adhesive use limited   pulse oximeter probe site changed   tubing/devices free from skin contact   transparent dressing maintained  Intervention: Prevent and Manage VTE (Venous Thromboembolism) Risk  Recent Flowsheet Documentation  Taken 5/6/2024 0200 by Huong Portillo RN  VTE Prevention/Management: (see MAR) other (see comments)  Range of Motion: active ROM (range of motion) encouraged  Taken 5/5/2024 2127 by Huong Portillo RN  VTE Prevention/Management: (see MAR) other (see comments)  Range of Motion: active ROM (range of motion) encouraged  Intervention: Prevent Infection  Recent Flowsheet Documentation  Taken 5/6/2024 0300 by Huong Portillo RN  Infection Prevention:   hand hygiene promoted   rest/sleep promoted   single patient room provided  Taken 5/5/2024 2300 by Huong Portillo RN  Infection Prevention:   hand hygiene promoted   rest/sleep promoted   single patient room provided  Taken 5/5/2024 2100 by Huong Portillo RN  Infection Prevention:   hand hygiene promoted   rest/sleep promoted   single patient room provided  Taken 5/5/2024 1900 by Huong Portillo RN  Infection Prevention:   hand hygiene promoted   rest/sleep promoted   single patient room provided  Goal: Optimal Comfort and Wellbeing  Outcome: Ongoing, Progressing  Intervention: Provide Person-Centered Care  Recent Flowsheet Documentation  Taken  5/6/2024 0200 by Huong Portillo RN  Trust Relationship/Rapport:   care explained   choices provided   thoughts/feelings acknowledged   reassurance provided  Taken 5/5/2024 2127 by Huong Portillo RN  Trust Relationship/Rapport:   care explained   choices provided   thoughts/feelings acknowledged   reassurance provided  Goal: Readiness for Transition of Care  Outcome: Ongoing, Progressing     Problem: Asthma Comorbidity  Goal: Maintenance of Asthma Control  Outcome: Ongoing, Progressing     Problem: Behavioral Health Comorbidity  Goal: Maintenance of Behavioral Health Symptom Control  Outcome: Ongoing, Progressing     Problem: COPD (Chronic Obstructive Pulmonary Disease) Comorbidity  Goal: Maintenance of COPD Symptom Control  Outcome: Ongoing, Progressing     Problem: Diabetes Comorbidity  Goal: Blood Glucose Level Within Targeted Range  Outcome: Ongoing, Progressing     Problem: Heart Failure Comorbidity  Goal: Maintenance of Heart Failure Symptom Control  Outcome: Ongoing, Progressing     Problem: Hypertension Comorbidity  Goal: Blood Pressure in Desired Range  Outcome: Ongoing, Progressing     Problem: Obstructive Sleep Apnea Risk or Actual Comorbidity Management  Goal: Unobstructed Breathing During Sleep  Outcome: Ongoing, Progressing     Problem: Osteoarthritis Comorbidity  Goal: Maintenance of Osteoarthritis Symptom Control  Outcome: Ongoing, Progressing     Problem: Pain Chronic (Persistent) (Comorbidity Management)  Goal: Acceptable Pain Control and Functional Ability  Outcome: Ongoing, Progressing  Intervention: Optimize Psychosocial Wellbeing  Recent Flowsheet Documentation  Taken 5/6/2024 0200 by Huong Portillo RN  Diversional Activities: television  Family/Support System Care: support provided  Taken 5/5/2024 2127 by Huong Portillo RN  Diversional Activities: television  Family/Support System Care: support provided     Problem: Seizure Disorder Comorbidity  Goal: Maintenance of Seizure  Control  Outcome: Ongoing, Progressing     Problem: Skin Injury Risk Increased  Goal: Skin Health and Integrity  Outcome: Ongoing, Progressing  Intervention: Optimize Skin Protection  Recent Flowsheet Documentation  Taken 5/6/2024 0200 by Huong Portillo RN  Pressure Reduction Techniques:   weight shift assistance provided   heels elevated off bed   positioned off wounds  Pressure Reduction Devices: pressure-redistributing mattress utilized  Skin Protection:   adhesive use limited   pulse oximeter probe site changed   tubing/devices free from skin contact   transparent dressing maintained  Taken 5/5/2024 2127 by Huong Portillo RN  Pressure Reduction Techniques:   weight shift assistance provided   heels elevated off bed   positioned off wounds   pressure points protected  Pressure Reduction Devices:   pressure-redistributing mattress utilized   positioning supports utilized  Skin Protection:   adhesive use limited   pulse oximeter probe site changed   tubing/devices free from skin contact   transparent dressing maintained     Problem: Adjustment to Illness (Sepsis/Septic Shock)  Goal: Optimal Coping  Outcome: Ongoing, Progressing  Intervention: Optimize Psychosocial Adjustment to Illness  Recent Flowsheet Documentation  Taken 5/6/2024 0200 by Huong Portillo RN  Family/Support System Care: support provided  Taken 5/5/2024 2127 by Huong Portillo RN  Family/Support System Care: support provided     Problem: Bleeding (Sepsis/Septic Shock)  Goal: Absence of Bleeding  Outcome: Ongoing, Progressing     Problem: Glycemic Control Impaired (Sepsis/Septic Shock)  Goal: Blood Glucose Level Within Desired Range  Outcome: Ongoing, Progressing     Problem: Infection Progression (Sepsis/Septic Shock)  Goal: Absence of Infection Signs and Symptoms  Outcome: Ongoing, Progressing  Intervention: Initiate Sepsis Management  Recent Flowsheet Documentation  Taken 5/6/2024 0300 by Huong Portillo RN  Infection Prevention:    hand hygiene promoted   rest/sleep promoted   single patient room provided  Taken 5/5/2024 2300 by Huong Portillo RN  Infection Prevention:   hand hygiene promoted   rest/sleep promoted   single patient room provided  Taken 5/5/2024 2100 by Huong Portillo RN  Infection Prevention:   hand hygiene promoted   rest/sleep promoted   single patient room provided  Taken 5/5/2024 1900 by Huong Portillo RN  Infection Prevention:   hand hygiene promoted   rest/sleep promoted   single patient room provided     Problem: Nutrition Impaired (Sepsis/Septic Shock)  Goal: Optimal Nutrition Intake  Outcome: Ongoing, Progressing     Problem: Fall Injury Risk  Goal: Absence of Fall and Fall-Related Injury  Outcome: Ongoing, Progressing  Intervention: Promote Injury-Free Environment  Recent Flowsheet Documentation  Taken 5/6/2024 0300 by Huong Portillo RN  Safety Promotion/Fall Prevention:   safety round/check completed   room organization consistent   nonskid shoes/slippers when out of bed   lighting adjusted   fall prevention program maintained   clutter free environment maintained   assistive device/personal items within reach   activity supervised  Taken 5/5/2024 2300 by Huong Portillo RN  Safety Promotion/Fall Prevention:   safety round/check completed   room organization consistent   nonskid shoes/slippers when out of bed   lighting adjusted   fall prevention program maintained   clutter free environment maintained   assistive device/personal items within reach   activity supervised  Taken 5/5/2024 2100 by Huong Portillo RN  Safety Promotion/Fall Prevention:   safety round/check completed   room organization consistent   nonskid shoes/slippers when out of bed   lighting adjusted   fall prevention program maintained   clutter free environment maintained   assistive device/personal items within reach   activity supervised  Taken 5/5/2024 1900 by Huong Portillo RN  Safety Promotion/Fall Prevention:   safety  round/check completed   room organization consistent   nonskid shoes/slippers when out of bed   lighting adjusted   fall prevention program maintained   clutter free environment maintained   assistive device/personal items within reach   activity supervised

## 2024-05-06 NOTE — PROGRESS NOTES
[facility]     Patient Identification:  Name:  Della Jorge  Age:  63 y.o.  Sex:  female  :  1960  MRN:  [id number]  Visit Number:  [id number]  ROOM: [address]     Primary Care Provider:  Sebastián Dougherty MD     Date of Admission: 2024    Length of stay in inpatient status:  2    Subjective     Chief Compliant:    Chief Complaint   Patient presents with    Fall       Patient somewhat confused and no BM reported this am. BP somewhat responsive to albumin today. Had long GOC discussion without patient voicing exactly what GOC were or clear understanding of dx but after palliative care visit did express desire to dc with hospice.        Objective       Vital Signs:  Temp:  [95.7 °F (35.4 °C)-98.9 °F (37.2 °C)] 98.9 °F (37.2 °C)  Heart Rate:  [] 97  Resp:  [10-28] 21  BP: ()/(49-89) 92/53  SpO2:  [92 %-100 %] 97 %  on   ;   Device (Oxygen Therapy): room air  Body mass index is 32.84 kg/m².      ----------------------------------------------------------------------------------------------------------------------  Physical Exam  Vitals and nursing note reviewed.   Constitutional:       Appearance: She is ill-appearing.   HENT:      Head: Normocephalic and atraumatic.   Eyes:      General: Scleral icterus present.      Extraocular Movements: Extraocular movements intact.   Cardiovascular:      Rate and Rhythm: Normal rate.   Pulmonary:      Effort: Pulmonary effort is normal. No respiratory distress.   Abdominal:      General: There is distension.      Palpations: Abdomen is soft.      Tenderness: There is no abdominal tenderness.   Musculoskeletal:      Right lower leg: Edema present.      Left lower leg: Edema present.   Skin:     Comments: B/l LE erythematous and significant edematous but appear slightly improved given some wrinkling   Neurological:      Mental Status: She is alert.      Cranial Nerves: No cranial nerve deficit.      Comments:  Oriented but tangential at times       ----------------------------------------------------------------------------------------------------------------------          Assessment & Plan      Septic shock  Pseudomonas bacteremia  Decompensated cirrhosis  Congestive hepatopathy  Acute on chronic hyponatremia  Lactic acidemia, recurrent 2/2 hypertension  Elevated troponin 2/2 cirrhosis and heart failure  Acute on chronic combined systolic and diastolic heart failure  HTN hx  ROBEL    -Patient with decompensated cirrhosis of unclear primary etiology likely decompensated by acute bacteremia from LE hematoma/abrasion with cellulitis in background setting of HRS hx. Has very significantly elevated 90-day mortality and prognosis remains poor.   -ID consulted and added levaquin while awaiting repeat bcx but after discussion with palliative patient expressed desire to dc with hospice. Suspect her encephalopathy is sepsis related given normal ammonia thus will treat her acute inf while attempting to wean off vasopressors with midodrine and albumin but if no success after next 48hrs will revisit GOC to decide if patient wishes to cont current IV abx course or dc home with hospice with anticipated much sooner demise.      Code Status and Medical Interventions:   Ordered at: 05/04/24 0826     Medical Intervention Limits:    NO intubation (DNI)     Code Status (Patient has no pulse and is not breathing):    No CPR (Do Not Attempt to Resuscitate)     Medical Interventions (Patient has pulse or is breathing):    Limited Support         Disposition: Cont PCU, prognosis poor    I have reviewed any copied/forwarded text or data, verified its accuracy, and updated as necessary above.    Vu Erwin MD  [facility]  05/06/24  19:00 EDT

## 2024-05-06 NOTE — CASE MANAGEMENT/SOCIAL WORK
Discharge Planning Assessment  [facility]     Patient Name: Della Jorge  MRN: [id number]  Today's Date: 5/6/2024    Admit Date: 5/4/2024     Discharge Plan       Row Name 05/06/24 1219       Plan    Plan SS received consult to arrange hospice with [facility]. SS attempted to contact pt spouse without success. SS contacted [facility] [phone] and made referral per Alicia. SS faxed pt clinical to [phone]. SS to follow.               LIDA GordilloW

## 2024-05-06 NOTE — CASE MANAGEMENT/SOCIAL WORK
Discharge Planning Assessment   [place]     Patient Name: Della Jorge  MRN: [id number]  Today's Date: 5/6/2024    Admit Date: 5/4/2024    Plan: [facility]- Home with hospice today, bed/bedside table/02@2lpm n/c, BSC, wound care supplies. SS spoke with pt and spouse at bedside. Pt lives with spouse Triston and plans to return home with hospice  services. Pt does not utilize  services at this time. Pt PCP Sebastián Dougherty. Pt utilizes (w) walker, hospital bed, wheelchair and home oxygen PRN via [employer]. Pt does not have POA/living will. Pt states to being ambulatory until friday when she hurt her leg.  Pt states she will likely need ambulance arranged at discharge. SS contacted [facility] [phone] and made referral per Alicia. SS faxed pt clinical to [phone]. SS received call from N per Donal who states he's been trying to get in touch with spouse to arrange DME delivery. SS spoke with spouse at bedside and provided spouse with Donal lynn to speak about DME being set up. SS to follow.   Discharge Needs Assessment       Row Name 05/06/24 1308       Living Environment    People in Home spouse    Name(s) of People in Home spouse Triston    Current Living Arrangements home    Potentially Unsafe Housing Conditions none    Primary Care Provided by self    Provides Primary Care For no one    Family Caregiver if Needed spouse    Family Caregiver Names Triston Spouse    Quality of Family Relationships helpful;involved;supportive    Able to Return to Prior Arrangements yes       Transition Planning    Transportation Anticipated family or friend will provide       Discharge Needs Assessment    Equipment Currently Used at Home walker, rolling;hospital bed;oxygen    Concerns to be Addressed --  [facility] Home with hospice today, bed/bedside table/02@2lpm n/c, BSC, wound care supplies                   Discharge Plan       Row Name 05/06/24 1310       Plan     [facility] Home with hospice today, bed/bedside table/02@2lpm n/c, BSC, wound care supplies. SS spoke with pt and spouse at bedside. Pt lives with spouse Triston and plans to return home with hospice  services. Pt does not utilize HH services at this time. Pt PCP Sebastián Dougherty. Pt utilizes (w) walker, hospital bed, wheelchair and home oxygen PRN via [employer]. Pt does not have POA/living will. Pt states to being ambulatory until friday when she hurt her leg.  Pt states she will likely need ambulance arranged at discharge. SS contacted [facility] [phone] and made referral per Alicia. SS faxed pt clinical to [phone]. SS received call from BGN per Donal who states he's been trying to get in touch with spouse to arrange DME delivery. SS spoke with spouse at bedside and provided spouse with Donal number to speak about DME being set up. SS to follow.              Expected Discharge Date and Time       Expected Discharge Date Expected Discharge Time    May 8, 2024            Demographic Summary       Row Name 05/06/24 1302       General Information    Admission Type inpatient    Arrived From emergency department    Referral Source nursing    [facility] Home with hosp[facility] today, bed/bedside table/02@2lpm n/c, BSC, wound care supplies    Preferred Language English               ASIF Gordillo

## 2024-05-06 NOTE — DISCHARGE PLACEMENT REQUEST
"Della Chavira (63 y.o. Female)       Date of Birth   1960    Social Security Number       Address   [address] [address]    Home Phone   [phone]    MRN   [id number]       [facility]    Marital Status                               Admission Date   5/4/24    Admission Type   Emergency    Admitting Provider   Adán Medina DO    Attending Provider   Vu Erwin MD    Department, Room/Bed   [facility] PROGRESS CARE, P220/1P       Discharge Date       Discharge Disposition       Discharge Destination                                 Attending Provider: Vu Erwin MD    Allergies: Codeine, Keflex [Cephalexin]    Isolation: None   Infection: None   Code Status: No CPR    Ht: 149.9 cm (59.02\")   Wt: 73.8 kg (162 lb 11.2 oz)    Admission Cmt: None   Principal Problem: Decompensated hepatic cirrhosis [K72.90,K74.60]                   Active Insurance as of 5/4/2024       Primary Coverage       Payor Plan Insurance Group Employer/Plan Group    [facility] PLAN [facility] [place]       Payor Plan Address Payor Plan Phone Number Payor Plan Fax Number Effective Dates    [address]   7/1/2022 - None Entered    [facility] [id number]         Subscriber Name Subscriber Birth Date Member ID       DELLA CHAVIRA 1960 [id number]                     Emergency Contacts        (Rel.) Home Phone Work Phone Mobile Phone    LuisitoTriston (Spouse) [phone] -- --    Junior Chavira (Son) -- -- [phone]    FRANCHESKA LYNN (Sister) [phone] -- --              Insurance Information                  [facility] Phone: --    Subscriber: Della Chavira Subscriber#: [id number]    Group#: [id number] Precert#: [id number]             History & Physical        Adán Medina DO at 05/04/24 0822              [facility] HOSPITALIST HISTORY AND PHYSICAL    Patient " Identification:  Name:  Della Jorge  Age:  63 y.o.  Sex:  female  :  1960  MRN:  [id number]   Visit Number:  [id number]  Admit Date: 2024   Room number:  103/03  Primary Care Physician:  Sebastián Dougherty MD     Subjective     Chief complaint:    Chief Complaint   Patient presents with    Fall       History of presenting illness:  63 y.o. female who presents from home after fall.  Patient with past medical history significant for combined systolic and diastolic heart failure with known liver cirrhosis and severe tricuspid valve regurg, chronic hypoxemic respiratory failure on 2 L nasal cannula with history of hypertension as well as irritable bowel syndrome and generalized anxiety disorder.  Patient states that she has been feeling a little bit weak and felt that her legs were going to give that underneath her and eased herself to the floor in the bathroom and actually denies any significant fall and was able to control her lowering to the floor.  However she was resting on hardwood floor and she has significantly severely edematous lower extremities and did have skin tears and a large hematoma that the emergency department evacuated.  Patient does report some nausea and a one-time episode of small emesis yesterday but reports that she has chronic stomach issues which does result in her sometimes taking reduced dose of her Bumex for which she takes two 1 mg tablets twice daily but states that sometimes she does take less depending on how her stomach feels and whether or not she can get to the bathroom quick enough.  She denies feeling short of breath or having any chest pain.  She does state that her belly feels very tight and sore specially in the lower regions and does note that she has had reduced urinary output over the last several days since being in the emergency department on 2024 where she had 10 L of volume removed.  Patient does reiterate that she does not want any invasive  procedures done and still has no desire or plans to proceed with prior recommended right heart cath and given her significant heart failure would likely also not be a candidate for TIPS nor would she want.  Patient without fever in the emergency department but notable for soft blood pressures primarily 80s over 50s which patient does report that typically her blood pressures is 90s over 60s at home on her antihypertensive regiment.  Initially ER had hoped to let the patient return home however given her significant ascitic abdomen as well as her soft blood pressures concern for the possibility of SBP and patient called to the hospitalist service for admission for rule out and treatment.  Patient will be admitted to the progressive care unit for planned paracentesis and until then we will proceed with empiric antibiotic coverage for SBP until ruled out.  Suspect a large component of patient's blood pressures are due to her volume overload and decompensation relative to her cirrhosis from this and will proceed with paracentesis.  ---------------------------------------------------------------------------------------------------------------------   Review of Systems a 14 system review of systems was performed with pertinent positives and negatives as above in HPI  ---------------------------------------------------------------------------------------------------------------------   Past Medical History:   Diagnosis Date    Cirrhosis     Congestive heart failure (CHF)     Hypertension      Past Surgical History:   Procedure Laterality Date    CHOLECYSTECTOMY      HYSTERECTOMY      partial, still has ovaries     Family History   Problem Relation Age of Onset    Heart disease Mother     Diabetes Sister      Social History     Socioeconomic History    Marital status:    Tobacco Use    Smoking status: Never    Smokeless tobacco: Never   Vaping Use    Vaping status: Never Used   Substance and Sexual Activity     Alcohol use: Never    Drug use: Never    Sexual activity: Defer     ---------------------------------------------------------------------------------------------------------------------   Allergies:  Codeine and Keflex [cephalexin]  ---------------------------------------------------------------------------------------------------------------------   Medications below are reported home medications pulling from within the system; at this time, these medications have not been reconciled unless otherwise specified and are in the verification process for further verifcation as current home medications.    Prior to Admission Medications       Prescriptions Last Dose Informant Patient Reported? Taking?    bumetanide (Bumex) 1 MG tablet   No No    Take 1 tablet by mouth See Admin Instructions for 30 days. 2mg in the morning and 2mg at lunch.    carvedilol (Coreg) 3.125 MG tablet   No No    Take 1 tablet by mouth 2 (Two) Times a Day With Meals for 90 days.    fluocinonide 0.1 % cream cream   Yes No    Apply 1 Application topically to the appropriate area as directed 2 (Two) Times a Day As Needed.    linaclotide (Linzess) 72 MCG capsule capsule   Yes No    Take 1 capsule by mouth Daily As Needed.    nystatin (MYCOSTATIN) 257590 UNIT/GM powder   No No    Apply  topically to the appropriate area as directed 2 (Two) Times a Day for 30 days.    ondansetron (ZOFRAN) 4 MG tablet  Self Yes No    Take 1 tablet by mouth Every 8 (Eight) Hours As Needed for Nausea or Vomiting.    spironolactone (ALDACTONE) 25 MG tablet   No No    Take 0.5 tablets by mouth Daily for 90 days.    traMADol (ULTRAM) 50 MG tablet  Self Yes No    Take 1 tablet by mouth Every 8 (Eight) Hours As Needed for Moderate Pain.          Objective     Vital Signs:  Temp:  [98 °F (36.7 °C)] 98 °F (36.7 °C)  Heart Rate:  [] 96  Resp:  [18] 18  BP: (78-97)/(40-62) 86/53    Mean Arterial Pressure (Non-Invasive) for the past 24 hrs (Last 3 readings):   Noninvasive MAP  (mmHg)   05/04/24 0800 64   05/04/24 0750 65   05/04/24 0740 60     SpO2:  [95 %-100 %] 97 %  on  Flow (L/min):  [3] 3;   Device (Oxygen Therapy): nasal cannula  Body mass index is 36.33 kg/m².    Wt Readings from Last 3 Encounters:   05/04/24 81.6 kg (179 lb 14.3 oz)   04/23/24 81.6 kg (180 lb)   04/09/24 80.5 kg (177 lb 6.4 oz)      ---------------------------------------------------------------------------------------------------------------------   Physical Exam:  Constitutional: Chronically ill adult female who appears much older than stated age.HENT:  Head: Normocephalic and atraumatic.  Mouth:  Moist mucous membranes.  Poor dentition.  Eyes:  Conjunctivae and EOM are normal.  Pupils are equal, round, and reactive to light.  No scleral icterus.  Neck:  Neck supple.  There is JVD present with enlarged vasculature in the anterior chest.  Cardiovascular:  Normal rate, regular rhythm with systolic murmur consistent with tricuspid regurg.    Pulmonary/Chest:  No respiratory distress, no wheezes, no crackles, with normal breath sounds and some diminishment at the bases.  Abdominal: Distended and taut..  Bowel sounds are normal.  There is tenderness to palpation in the bilateral lower quadrants.    Musculoskeletal: Tenderness to palpation of the lower extremities with significant edema present..    Neurological:  Alert and oriented to person, place, and time.  No cranial nerve deficit or other focal neurological deficits.  Skin:  Skin is warm and dry.  No rash noted.  No pallor.   Peripheral vascular: Severe 3+ pitting edema of the bilateral lower extremities with associated pedal edema with blistering and weeping of the skin.  ---------------------------------------------------------------------------------------------------------------------  EKG:    Ordered and pending at this time      Last echocardiogram:  Results for orders placed during the hospital encounter of 08/04/22    Adult Transthoracic Echo Complete w/  "Color, Spectral and Contrast if necessary per protocol    Interpretation Summary  · Left ventricular ejection fraction appears to be 46 - 50%. Left ventricular systolic function is mildly decreased.  · Left ventricular diastolic function is consistent with (grade Ia w/high LAP) impaired relaxation.  · The right ventricular cavity is moderately dilated.  · Mildly reduced right ventricular systolic function noted.  · The right atrial cavity is severely dilated.  · Severe tricuspid valve regurgitation is present.  · Estimated right ventricular systolic pressure from tricuspid regurgitation is normal (<35 mmHg).    --------------------------------------------------------------------------------------------------------------------  Labs:  Results from last 7 days   Lab Units 05/04/24  0631 05/04/24  0333 05/04/24  0133   PROCALCITONIN ng/mL  --   --  2.97*   LACTATE mmol/L 1.8 3.2*  --    CRP mg/dL  --   --  16.99*   WBC 10*3/mm3  --   --  10.57   HEMOGLOBIN g/dL  --   --  11.6*   HEMATOCRIT %  --   --  33.9*   MCV fL  --   --  89.4   MCHC g/dL  --   --  34.2   PLATELETS 10*3/mm3  --   --  141   INR   --   --  1.87*         Results from last 7 days   Lab Units 05/04/24  0133   SODIUM mmol/L 125*   POTASSIUM mmol/L 4.3   CHLORIDE mmol/L 88*   CO2 mmol/L 21.3*   BUN mg/dL 29*   CREATININE mg/dL 1.99*   CALCIUM mg/dL 8.8   GLUCOSE mg/dL 84   ALBUMIN g/dL 2.8*   BILIRUBIN mg/dL 5.0*   ALK PHOS U/L 91   AST (SGOT) U/L 22   ALT (SGPT) U/L 8   Estimated Creatinine Clearance: 28.3 mL/min (A) (by C-G formula based on SCr of 1.99 mg/dL (H)).    Ammonia   Date Value Ref Range Status   05/04/2024 17 11 - 51 umol/L Final     Results from last 7 days   Lab Units 05/04/24  0324 05/04/24  0133   HSTROP T ng/L 74* 76*   PROBNP pg/mL  --  14,440.0*         No results found for: \"HGBA1C\", \"POCGLU\"  Lab Results   Component Value Date    TSH 6.250 (H) 08/05/2022    FREET4 1.29 08/05/2022     No results found for: \"PREGTESTUR\", \"PREGSERUM\", " "\"HCG\", \"HCGQUANT\"  Pain Management Panel           No data to display              Brief Urine Lab Results  (Last result in the past 365 days)        Color   Clarity   Blood   Leuk Est   Nitrite   Protein   CREAT   Urine HCG        05/04/24 0657 Orange   Clear   Negative   Small (1+)   Positive   Trace                 No results found for: \"BLOODCX\"  No results found for: \"URINECX\"  No results found for: \"WOUNDCX\"  No results found for: \"STOOLCX\"    I have personally looked at the labs and they are summarized above.  ----------------------------------------------------------------------------------------------------------------------  Detailed radiology reports for the last 24 hours:    Imaging Results (Last 24 Hours)       Procedure Component Value Units Date/Time    CT Chest Without Contrast Diagnostic [[id number]] Collected: 05/04/24 0622     Updated: 05/04/24 0631    Narrative:      CLINICAL HISTORY: CHF, edema, cirrhosis.     COMPARISON: None available.     TECHNIQUE: Noncontrast CT of the chest, abdomen and pelvis was obtained.   Multiplanar reformats were generated.  Limited exposure control,  adjustment of the mA and/or KV according to patient size or use of  iterative reconstruction technique was utilized.     FINDINGS:     CT CHEST:     Pulmonary arteries: normal.      Heart and pericardium: Cardiomegaly.  Coronary calcifications.  Normal  pericardium.     Aorta: normal.     Esophagus: normal.     Lungs and airways: Mild linear bibasilar atelectasis.     Pleura: normal.     Lymph nodes: normal.     Musculoskeletal and chest wall: no acute abnormality.     Other: Fluid from the abdominal cavity extends into the lower  mediastinum        CT ABDOMEN AND PELVIS:     Hepatobiliary: Cirrhotic liver morphology.  Status post cholecystectomy.     Pancreas: normal.     Spleen: normal.     Adrenals: normal.      Kidneys, ureters, bladder: normal.     Reproductive: normal.     GI tract/Bowel: Moderate amount of stool " throughout the colon.  The  small bowel is normal in caliber..     Appendix: Not well seen.  No findings for acute appendicitis.     Peritoneum: Large volume ascites     Vascular: normal.     Lymph nodes: normal.     Musculoskeletal and abdominal wall: Anasarca.       Impression:         CT CHEST:  CARDIOMEGALY.     CT ABDOMEN AND PELVIS:  LARGE VOLUME ASCITES.  CIRRHOTIC LIVER.           This report was finalized on 5/4/2024 6:29 AM by Hilary Astudillo MD.       CT Abdomen Pelvis Without Contrast [[id number]] Collected: 05/04/24 0622     Updated: 05/04/24 0631    Narrative:      CLINICAL HISTORY: CHF, edema, cirrhosis.     COMPARISON: None available.     TECHNIQUE: Noncontrast CT of the chest, abdomen and pelvis was obtained.   Multiplanar reformats were generated.  Limited exposure control,  adjustment of the mA and/or KV according to patient size or use of  iterative reconstruction technique was utilized.     FINDINGS:     CT CHEST:     Pulmonary arteries: normal.      Heart and pericardium: Cardiomegaly.  Coronary calcifications.  Normal  pericardium.     Aorta: normal.     Esophagus: normal.     Lungs and airways: Mild linear bibasilar atelectasis.     Pleura: normal.     Lymph nodes: normal.     Musculoskeletal and chest wall: no acute abnormality.     Other: Fluid from the abdominal cavity extends into the lower  mediastinum        CT ABDOMEN AND PELVIS:     Hepatobiliary: Cirrhotic liver morphology.  Status post cholecystectomy.     Pancreas: normal.     Spleen: normal.     Adrenals: normal.      Kidneys, ureters, bladder: normal.     Reproductive: normal.     GI tract/Bowel: Moderate amount of stool throughout the colon.  The  small bowel is normal in caliber..     Appendix: Not well seen.  No findings for acute appendicitis.     Peritoneum: Large volume ascites     Vascular: normal.     Lymph nodes: normal.     Musculoskeletal and abdominal wall: Anasarca.       Impression:         CT CHEST:  CARDIOMEGALY.      CT ABDOMEN AND PELVIS:  LARGE VOLUME ASCITES.  CIRRHOTIC LIVER.           This report was finalized on 5/4/2024 6:29 AM by Hilary Astudillo MD.       XR Chest 1 View [[id number]] Collected: 05/04/24 0448     Updated: 05/04/24 0452    Narrative:      PROCEDURE: Portable chest x-ray examination performed on May 4, 2024.  Single view. Supine position.     HISTORY: CHF.     COMPARISON: None.     FINDINGS:     Mild enlarged heart size  Possible underlying pericardial fluid.  No lobar consolidation or edema.  Hypoinflated lungs with slightly elevated left hemidiaphragm.  No pleural effusion or pneumothorax.  No fracture or foreign body.  Cholecystectomy clips in the right upper quadrant.       Impression:         1.  Mild enlarged heart size  2.  Mild hypoinflated lungs.  3.  No lobar consolidation or edema.  4.  No pleural effusion or pneumothorax.     This report was finalized on 5/4/2024 4:49 AM by Marcellus Patel MD.             Final impressions for the last 30 days of radiology reports:    CT Chest Without Contrast Diagnostic    Result Date: 5/4/2024   CT CHEST: CARDIOMEGALY.  CT ABDOMEN AND PELVIS: LARGE VOLUME ASCITES. CIRRHOTIC LIVER.    This report was finalized on 5/4/2024 6:29 AM by Hilary Astudillo MD.      CT Abdomen Pelvis Without Contrast    Result Date: 5/4/2024   CT CHEST: CARDIOMEGALY.  CT ABDOMEN AND PELVIS: LARGE VOLUME ASCITES. CIRRHOTIC LIVER.    This report was finalized on 5/4/2024 6:29 AM by Hilary Astudillo MD.      XR Chest 1 View    Result Date: 5/4/2024   1.  Mild enlarged heart size 2.  Mild hypoinflated lungs. 3.  No lobar consolidation or edema. 4.  No pleural effusion or pneumothorax.  This report was finalized on 5/4/2024 4:49 AM by Marcellus Patel MD.      US Paracentesis    Result Date: 4/23/2024  Ultrasound guided paracentesis.   This report was finalized on 4/23/2024 5:15 PM by Dr. Zackery Mcgee MD.     I have personally looked at the radiology images and read the final radiology  report.    Assessment & Plan       Anasarca  Decompensated cirrhosis  Congestive hepatopathy  Acute on chronic hyponatremia  Lactic acidemia, POA, resolved  Elevated troponin 2/2 cirrhosis and heart failure  Acute on chronic combined systolic and diastolic heart failure    -Patient to be admitted to the progressive care unit in case of need of vasopressors given soft blood pressures.  However given patient's for history of blood pressures at home suspect she is not far off from her baseline despite pressures recorded in ER.  Will give her midodrine and subcu octreotide for now to cover for the possibility of hepatorenal syndrome as patient does have acute kidney injury and decompensated cirrhosis at the time.    -Will plan for paracentesis at bedside with albumin administration afterwards.    -Will send ascitic fluid for SBP rule out in the interim we will cover with meropenem given patient's history of allergy secondary to cephalosporins.    -Discussed at bedside about patient's long-term prognosis briefly however patient does not want to talk about it and states that God has her hand on her and that she is not going wear anytime soon.  However she does confirm that she would not want CPR or to be intubated with  present at bedside.    -Will hold any beta-blockers, diuretics, or antihypertensives at this time given ELEUTERIO as well as her blood pressures.    -Will obtain updated transthoracic echocardiogram as patient has not had 1 since 2022 and is intermittently compliant with medicines at home and has had obvious progression of her liver disease since that time to evaluate if worsening heart failure also contributing.    -MELD score based on current laboratory evaluation emergency department is 34 consistent with a 52.3% estimated 90-day survival.    History of hypertension    -Patient on Aldactone for heart failure and cirrhosis and will hold at this time.  Patient appears to have had normotension versus  borderline hypotension for quite some time and history of hypertension is likely prior to development of cirrhosis and heart failure.    Generalized anxiety disorder    -Supportive care    Chronic nausea    -Zofran as needed    VTE Prophylaxis:   Mechanical Order History:       None          Pharmalogical Order History:        Ordered     Dose Route Frequency Stop    Signed and Held  heparin (porcine) 5000 UNIT/ML injection 5,000 Units         5,000 Units SC Every 8 Hours Scheduled --                    The patient is high risk due to the following diagnoses/reasons: 63-year-old female with advanced liver cirrhosis and known history of heart failure who comes in with decompensation and anasarca and significant volume overload in need of paracentesis and rule out for SBP given blood pressure and reports of subjective fever at home.        Adán Medina DO  [facility]  05/04/24  08:22 EDT      Electronically signed by Adán Medina DO at 05/04/24 0837       Vital Signs (last day)       Date/Time Temp Temp src Pulse Resp BP Patient Position SpO2    05/06/24 1200 -- -- 96 13 99/67 -- 97    05/06/24 1145 -- -- 99 -- 92/55 -- 94    05/06/24 1130 -- -- 96 -- 96/61 -- 100    05/06/24 1115 -- -- 98 -- 97/61 -- 96    05/06/24 1100 -- -- 97 16 102/61 -- 95    05/06/24 1051 -- -- 96 -- 99/63 -- --    05/06/24 1045 -- -- 98 -- 99/63 -- 95    05/06/24 1030 -- -- 98 -- -- -- 95    05/06/24 1015 -- -- 96 -- 103/57 -- 96    05/06/24 1011 -- -- 96 -- 108/68 -- --    05/06/24 1000 -- -- 98 13 108/68 -- 96    05/06/24 0945 -- -- 98 -- 114/76 -- 95    05/06/24 0930 -- -- 96 -- 112/73 -- 96    05/06/24 0915 -- -- 98 -- 100/65 -- 95    05/06/24 0900 -- -- 98 18 108/74 -- 94    05/06/24 0858 -- -- 96 -- 112/78 -- --    05/06/24 0845 -- -- 100 -- 112/78 -- 96    05/06/24 0830 -- -- 110 -- 124/83 -- 92    05/06/24 0815 -- -- 101 -- 112/72 -- 94    05/06/24 0800 98.7 (37.1) Oral 104 24 115/76 -- 97    05/06/24 0745  -- -- 103 -- 113/74 -- 94    05/06/24 0730 -- -- 101 -- 110/72 -- 92    05/06/24 0715 -- -- 101 -- 120/74 -- 93    05/06/24 0700 -- -- 101 18 118/75 -- 93    05/06/24 0636 -- -- 101 -- 117/76 -- --    05/06/24 0600 -- -- 101 17 117/79 -- 94    05/06/24 0545 -- -- 101 23 112/77 -- 93    05/06/24 0530 -- -- 93 -- -- -- 94    05/06/24 0500 -- -- 101 -- -- -- 94    05/06/24 0445 -- -- 98 -- -- -- 94    05/06/24 0430 -- -- 97 15 97/66 -- 94    05/06/24 0415 -- -- 94 19 102/61 -- 97    05/06/24 0400 96.4 (35.8) -- 96 17 103/60 -- 95    05/06/24 0345 -- -- 93 18 107/65 -- 93    05/06/24 0330 -- -- 95 19 101/59 -- 93    05/06/24 0315 -- -- 94 19 100/61 -- 94    05/06/24 0300 -- -- 94 18 101/63 -- 93    05/06/24 0245 -- -- 96 16 96/65 -- 93    05/06/24 0230 -- -- 93 19 97/67 -- 93    05/06/24 0215 -- -- 96 16 91/59 -- 94    05/06/24 0200 -- -- 93 18 97/59 -- 94    05/06/24 0145 -- -- 93 19 94/61 -- 93    05/06/24 0130 -- -- 92 16 91/55 -- 93    05/06/24 0115 -- -- 94 17 94/56 -- 93    05/06/24 0100 -- -- 92 16 97/54 -- 95    05/06/24 0045 -- -- 93 17 112/70 -- 92    05/06/24 0030 -- -- 94 17 107/69 -- 95    05/06/24 0015 -- -- 93 20 114/72 -- 93    05/06/24 0000 97.1 (36.2) Oral 94 21 110/66 -- 93    05/05/24 2345 -- -- 93 12 117/67 -- 98    05/05/24 2330 -- -- 94 28 116/67 -- 93    05/05/24 2315 -- -- 95 17 119/69 -- 94    05/05/24 2300 -- -- 92 26 110/70 -- 95    05/05/24 2245 -- -- 93 15 123/77 -- 94    05/05/24 2230 -- -- 91 16 115/73 -- 94    05/05/24 2215 -- -- 93 22 119/73 -- 94    05/05/24 2200 -- -- 93 25 123/76 -- 94    05/05/24 2145 -- -- 92 25 120/68 -- 94    05/05/24 2130 -- -- 93 17 117/78 -- 95    05/05/24 2115 -- -- 92 18 112/73 -- 95    05/05/24 2100 -- -- 92 18 118/72 -- 95    05/05/24 2045 -- -- 93 18 118/84 -- 97    05/05/24 2030 -- -- 92 24 112/71 -- 94    05/05/24 2015 -- -- 92 15 113/70 -- 95    05/05/24 2000 95.7 (35.4) -- 90 16 113/72 -- 94    05/05/24 1945 -- -- 93 18 118/78 -- 96    05/05/24 1930  -- -- 91 13 118/77 -- 96    05/05/24 1924 -- -- 92 10 112/69 -- 95    05/05/24 1845 -- -- 93 -- -- -- 94    05/05/24 1840 -- -- -- -- -- -- 90    05/05/24 1830 -- -- -- 20 102/63 -- --    05/05/24 1800 -- -- 93 -- -- -- 94    05/05/24 1745 -- -- 93 -- 89/66 -- 94    05/05/24 1739 -- -- 92 -- 102/59 -- --    05/05/24 1730 -- -- 92 -- 102/59 -- 95    05/05/24 1715 -- -- 91 -- 94/65 -- 93    05/05/24 1700 -- -- 93 20 96/68 -- 94    05/05/24 1645 -- -- 94 -- 96/61 -- 93    05/05/24 1630 -- -- 92 -- 107/67 -- 96    05/05/24 1615 -- -- 91 -- 95/60 -- 92    05/05/24 1600 -- -- 93 18 103/62 -- 96    05/05/24 1530 -- -- 95 -- 100/70 -- 94    05/05/24 1515 -- -- 93 -- 104/66 -- 94    05/05/24 1500 -- -- 96 18 104/66 -- 94    05/05/24 1445 -- -- 94 -- 106/67 -- 95    05/05/24 1430 -- -- 96 -- 101/62 -- 95    05/05/24 1415 -- -- 95 -- 100/58 -- 94    05/05/24 1400 -- -- 98 18 103/63 -- 94    05/05/24 1345 -- -- 97 -- 93/68 -- 93    05/05/24 1330 -- -- 98 -- 105/60 -- 94    05/05/24 1329 -- -- 98 -- 97/58 -- --    05/05/24 1315 -- -- 98 -- 97/58 -- 95    05/05/24 1300 -- -- 99 20 101/57 -- 94    05/05/24 1245 -- -- 98 -- 99/64 -- 93    05/05/24 1230 -- -- 96 -- 91/57 -- 93    05/05/24 1220 98.4 (36.9) -- 96 20 88/64 -- 93    05/05/24 1215 -- -- 98 -- 88/64 -- 95    05/05/24 1200 -- -- 96 -- 106/60 -- 94    05/05/24 1145 -- -- 98 -- 96/55 -- 96    05/05/24 1130 -- -- 97 -- 88/75 -- 95    05/05/24 1115 -- -- 96 -- 97/54 -- 95    05/05/24 1100 -- -- 96 18 102/55 -- 94    05/05/24 1045 -- -- 93 -- 94/57 -- 93    05/05/24 1030 -- -- 94 -- 91/54 -- 93    05/05/24 1015 -- -- 94 -- 82/43 -- 92    05/05/24 1000 -- -- 94 -- 96/52 -- 94    05/05/24 0945 -- -- 95 -- 92/52 -- 94    05/05/24 0930 -- -- 95 -- 85/56 -- 95    05/05/24 0915 -- -- 91 -- 86/50 -- 94    05/05/24 0900 -- -- 97 16 97/55 -- 94    05/05/24 0859 -- -- 97 -- 97/55 -- --    05/05/24 0845 -- -- 96 -- 98/50 -- 95    05/05/24 0830 97.5 (36.4) Oral 97 -- 98/55 -- 94     05/05/24 0815 -- -- 96 -- 96/64 -- 95    05/05/24 0800 -- -- 95 -- 99/49 -- 95    05/05/24 0745 -- -- 93 -- 90/53 -- 93    05/05/24 0730 -- -- 96 -- 96/54 -- 95    05/05/24 0715 -- -- 93 -- 80/46 -- 97    05/05/24 0700 -- -- 93 18 98/57 -- 97    05/05/24 0631 -- -- 93 -- 90/53 -- --    05/05/24 0617 -- -- 93 -- 80/46 -- --    05/05/24 0615 -- -- 91 -- 84/48 -- 97    05/05/24 0600 -- -- 92 17 80/46 -- 98    05/05/24 0545 -- -- 92 -- 82/47 -- 98    05/05/24 0530 -- -- 93 -- 81/52 -- --    05/05/24 0515 -- -- 93 -- 91/54 -- 98    05/05/24 0500 -- -- 95 15 93/56 -- 97    05/05/24 0452 -- -- 96 -- 97/57 -- --    05/05/24 0445 -- -- 101 -- 97/57 -- 95    05/05/24 0430 -- -- 100 -- 81/45 -- 97    05/05/24 0415 -- -- 101 -- 90/61 -- 96    05/05/24 0400 96.2 (35.7) -- 99 18 83/47 -- 98    05/05/24 0345 -- -- 101 -- 83/49 -- 97    05/05/24 0330 -- -- 101 -- 86/52 -- 97    05/05/24 0315 -- -- 94 -- 84/45 -- 98    05/05/24 0304 -- -- 96 -- 97/55 -- --    05/05/24 0300 -- -- 94 16 97/55 -- 97    05/05/24 0245 -- -- 95 -- 90/59 -- 97    05/05/24 0230 -- -- 100 -- 90/53 -- 97    05/05/24 0215 -- -- 95 -- 93/54 -- 97    05/05/24 0200 -- -- 96 15 86/52 -- 95    05/05/24 0145 -- -- 96 -- 94/57 -- 96    05/05/24 0130 -- -- 96 -- 91/52 -- 96    05/05/24 0115 -- -- 96 -- 93/55 -- 96    05/05/24 0100 -- -- 96 18 91/53 -- 96    05/05/24 0045 -- -- 96 -- 88/50 -- 96    05/05/24 0030 -- -- 98 -- 106/65 -- 96    05/05/24 0015 -- -- 96 -- 91/53 -- 96    05/05/24 0000 99.2 (37.3) Oral 96 12 84/48 -- 96          Current Facility-Administered Medications   Medication Dose Route Frequency Provider Last Rate Last Admin    betamethasone dipropionate (DIPROLENE) 0.05 % ointment   Topical Daily Adán Medina DO   Given at 05/06/24 1013    doxycycline (MONODOX) capsule 100 mg  100 mg Oral BID Adán Medina DO   100 mg at 05/06/24 0844    empagliflozin (JARDIANCE) tablet 10 mg  10 mg Oral Daily Adán Medina DO   10 mg at 05/06/24 0844    heparin  (porcine) 5000 UNIT/ML injection 5,000 Units  5,000 Units Subcutaneous Q8H Adán Medina DO   5,000 Units at 05/06/24 0545    lactulose (CHRONULAC) 10 GM/15ML solution 20 g  20 g Oral TID PRN Vu Erwin MD   20 g at 05/06/24 1013    levoFLOXacin (LEVAQUIN) 750 mg/150 mL D5W (premix) (LEVAQUIN) 750 mg  750 mg Intravenous Q48H Alina Vieira APRN 100 mL/hr at 05/06/24 1052 750 mg at 05/06/24 1052    lubiprostone (AMITIZA) capsule 8 mcg  8 mcg Oral BID Adán Medina DO   8 mcg at 05/06/24 0844    meropenem (MERREM) 500 mg in sodium chloride 0.9 % 100 mL IVPB-VTB  500 mg Intravenous Q12H Adán Medina DO   500 mg at 05/06/24 0545    midodrine (PROAMATINE) tablet 10 mg  10 mg Oral TID AC Adán Medina DO   10 mg at 05/06/24 1051    mupirocin (BACTROBAN) 2 % nasal ointment 1 Application  1 application  Each Nare BID Vu Erwin MD   1 Application at 05/06/24 0844    norepinephrine (LEVOPHED) 8 mg in 250 mL NS infusion (premix)  0.02-0.3 mcg/kg/min Intravenous Titrated Adán Medina DO 2.84 mL/hr at 05/06/24 1011 0.02 mcg/kg/min at 05/06/24 1011    nystatin (MYCOSTATIN) powder 1 Application  1 Application Topical BID Adán Medina DO   1 Application at 05/06/24 0844    octreotide (sandoSTATIN) injection 100 mcg  100 mcg Intravenous TID Adán Medina DO   100 mcg at 05/06/24 1014    ondansetron (ZOFRAN) injection 4 mg  4 mg Intravenous Q6H PRN Adán Medina DO   4 mg at 05/06/24 1054    sodium chloride 0.9 % flush 10 mL  10 mL Intravenous Q12H Adán Medina DO   10 mL at 05/06/24 0845    sodium chloride 0.9 % flush 10 mL  10 mL Intravenous PRN Carol, Adán, DO        sodium chloride 0.9 % flush 10 mL  10 mL Intravenous Q12H Adán Medina DO   10 mL at 05/06/24 0845    sodium chloride 0.9 % flush 10 mL  10 mL Intravenous Q12H Adán Medina DO   10 mL at 05/06/24 0844    sodium chloride 0.9 % flush 10 mL  10 mL Intravenous Q12H Adán Medina DO   10 mL at 05/06/24 0844    sodium  chloride 0.9 % flush 10 mL  10 mL Intravenous PRN Adán Medina,         sodium chloride 0.9 % flush 20 mL  20 mL Intravenous PRN Adán Medina,         sodium chloride 0.9 % infusion 40 mL  40 mL Intravenous PRN Adán Medina DO            Operative/Procedure Notes (most recent note)        Jeremiah Yen MD at 05/05/24 1247          PROCEDURE NOTE    Patient Name:  Della Jorge  YOB: 1960  [id number]    5/5/2024      PREOPERATIVE DIAGNOSIS: Decompensated cirrhosis, hypotension      POSTOPERATIVE DIAGNOSIS: Same       PROCEDURE PERFORMED: Right internal jugular triple-lumen central line placement with ultrasound guidance       SURGEON: Jeremiah Yen MD        SPECIMENS: None       ANESTHESIA: Local      Grafts/Implants: 7 Albanian triple lumen central line catheter       FINDINGS:   1.  7 Albanian triple-lumen catheter placed in the right internal jugular vein        INDICATIONS: The patient is a 63 y.o. female who presented with a fall and an injury to her left lower extremity.  She has decompensated cirrhosis. They are in need of central venous access. The risks and benefits of central line placement were discussed at length with the patient and their family, and they agree to proceed.       DESCRIPTION OF PROCEDURE:      After obtaining informed consent, the patient remained in their room and was placed in the Trendelenburg position.  The right neck and chest were prepped and draped in standard sterile fashion.  Ultrasound was used to identify the right internal jugular vein and carotid artery.  The patient an extremely large external jugular vein and the internal jugular was extremely small and diminutive.  There were multiple collaterals in the area.  Local anesthetic was administered to the skin.  I attempted to access the right internal jugular vein in the mid neck but the wire would not advance.  Several other attempts at access were performed.  I found the more suitable  location low in the neck but this was near the confluence with the external jugular vein.  Finder needle advanced  into the right internal jugular vein with ultrasound guidance.  A guidewire was placed through the needle.  Stab incision was made in the skin with an 11 blade scalpel. The needle was removed over the guidewire and the tract dilated.  Using the Seldinger technique a preflushed 7 Hungarian triple  lumen catheter was placed. The guidewire was removed.  All 3 ports baudilio blood easily and were flushed with normal saline.  The catheter was sutured to the skin, dressed in standard sterile fashion and covered with a dry sterile dressing.  There were no  immediate complications.      A postprocedure chest x-ray is pending at the time of this dictation.             Jeremiah Yen MD  2024  12:47 EDT     Electronically signed by Jeremiah Yen MD at 24 1249          Physician Progress Notes (most recent note)        Adán Medina DO at 24 1326              [facility]     Patient Identification:  Name:  Della Jorge  Age:  63 y.o.  Sex:  female  :  1960  MRN:  [id number]  Visit Number:  [id number]  ROOM: [address]     Primary Care Provider:  Sebastián Dougherty MD    Length of stay in inpatient status:  1    Subjective     Chief Compliant:    Chief Complaint   Patient presents with    Fall       History of Presenting Illness: Patient seen and follow-up for anasarca with decompensated cirrhosis with history of congestive hepatopathy with acute on chronic hyponatremia with development of hypotension consistent with septic shock now with blood culture positive for Pseudomonas.  Patient time evaluation without complaints other than hurting all over but agreeable to central line ending without significant ascites after paracentesis.    Objective     Current Hospital Meds:  betamethasone dipropionate, , Topical, Daily  doxycycline, 100 mg, Oral,  BID  empagliflozin, 10 mg, Oral, Daily  heparin (porcine), 5,000 Units, Subcutaneous, Q8H  lubiprostone, 8 mcg, Oral, BID  meropenem, 500 mg, Intravenous, Q12H  midodrine, 15 mg, Oral, TID AC  nystatin, 1 Application, Topical, BID  octreotide, 100 mcg, Intravenous, TID  sodium chloride, 10 mL, Intravenous, Q12H  sodium chloride, 10 mL, Intravenous, Q12H  sodium chloride, 10 mL, Intravenous, Q12H  sodium chloride, 10 mL, Intravenous, Q12H      norepinephrine, 0.02-0.3 mcg/kg/min, Last Rate: 0.06 mcg/kg/min (05/05/24 1024)      ----------------------------------------------------------------------------------------------------------------------  Vital Signs:  Temp:  [94.7 °F (34.8 °C)-99.2 °F (37.3 °C)] 98.4 °F (36.9 °C)  Heart Rate:  [] 99  Resp:  [12-20] 20  BP: ()/(38-77) 101/57  SpO2:  [92 %-100 %] 94 %  on  Flow (L/min):  [3] 3;   Device (Oxygen Therapy): room air  Body mass index is 33.73 kg/m².      Intake/Output Summary (Last 24 hours) at 5/5/2024 1327  Last data filed at 5/5/2024 1140  Gross per 24 hour   Intake 491 ml   Output --   Net 491 ml      ----------------------------------------------------------------------------------------------------------------------  Physical exam:  Constitutional: Chronically ill adult female who appears much older than stated age.  HENT:  Head: Normocephalic and atraumatic.  Mouth:  Moist mucous membranes.  Poor dentition.  Eyes:  Conjunctivae and EOM are normal.  Pupils are equal, round, and reactive to light.  No scleral icterus.  Neck:  Neck supple.  There is JVD present with enlarged vasculature in the anterior chest but improved from prior.  Cardiovascular:  Normal rate, regular rhythm with systolic murmur consistent with tricuspid regurg.    Pulmonary/Chest:  No respiratory distress, no wheezes, no crackles, with normal breath sounds and some diminishment at the bases.  Abdominal: Abdomen no longer distended, some wrinkling in the lower quadrants.  Bowel  "sounds are normal.  There is tenderness to palpation in the bilateral lower quadrants.    Musculoskeletal: Tenderness to palpation of the lower extremities with significant edema present with areas of skin breakdown and weeping..    Neurological:  Alert and oriented to person, place, and time.  No cranial nerve deficit or other focal neurological deficits.  Skin:  Skin is warm and dry.  No rash noted.  No pallor.   Peripheral vascular: Severe 3+ pitting edema of the bilateral lower extremities with associated pedal edema with blistering and weeping of the skin and recently evacuated hematoma of the left shin region with clean intact dressing in place.  ----------------------------------------------------------------------------------------------------------------------  WBC/HGB/HCT/PLT   16.49, 16.49/10.2, 10.2/30.6, 30.6/126, 126 (05/05 0158)  BUN/CREAT/GLUC/ALT/AST/YANA/LIP    35/2.17/93/6/16/--/-- (05/05 0158)  LYTES - Na/K/Cl/CO2: 124*/4.0/91*/21.2* (05/05 0158)  COAG - PT/INR/PTT: 22.0*/1.92*/-- (05/05 0158)     No results found for: \"URINECX\"  Blood Culture   Date Value Ref Range Status   05/04/2024 Abnormal Stain (C)  Preliminary   05/04/2024 No growth at 24 hours  Preliminary       I have personally looked at the labs and they are summarized above.  ----------------------------------------------------------------------------------------------------------------------  Detailed radiology reports for the last 24 hours:  CT Chest Without Contrast Diagnostic    Result Date: 5/4/2024   CT CHEST: CARDIOMEGALY.  CT ABDOMEN AND PELVIS: LARGE VOLUME ASCITES. CIRRHOTIC LIVER.    This report was finalized on 5/4/2024 6:29 AM by Hilary Astudillo MD.      CT Abdomen Pelvis Without Contrast    Result Date: 5/4/2024   CT CHEST: CARDIOMEGALY.  CT ABDOMEN AND PELVIS: LARGE VOLUME ASCITES. CIRRHOTIC LIVER.    This report was finalized on 5/4/2024 6:29 AM by Hilary Astudillo MD.      XR Chest 1 View    Result Date: 5/4/2024   1.  Mild " enlarged heart size 2.  Mild hypoinflated lungs. 3.  No lobar consolidation or edema. 4.  No pleural effusion or pneumothorax.  This report was finalized on 5/4/2024 4:49 AM by Marcellus Patel MD.     Assessment & Plan      Septic shock  Pseudomonas bacteremia  Decompensated cirrhosis  Congestive hepatopathy  Acute on chronic hyponatremia  Lactic acidemia, recurrent 2/2 hypertension  Elevated troponin 2/2 cirrhosis and heart failure  Acute on chronic combined systolic and diastolic heart failure    -Patient to be admitted to the progressive care unit in case of need of vasopressors given soft blood pressures.  Unfortunately patient with some improvement in blood pressures initially but post paracentesis and through the night with low blood pressures requiring initiation of Levophed through peripheral IV.  Central line placed today by general surgery.  Concern for hepatorenal syndrome although patient with 1 of 2 blood cultures returning with Pseudomonas today and currently on meropenem and will proceed with treatment for septic shock.  Possibility of some component of cardiogenic as well due to patient's decreased EF but Levophed will provide adequate coverage for cardiogenic source as well    -Given positive blood cultures will consult infectious disease.    -Status post paracentesis with 7650 mL of ascitic fluid removed at bedside and status post 50 g of albumin.    -Ascitic fluid thus far with no evidence of SBP per cell count and cultures and having no growth to date.    -Will hold any beta-blockers, diuretics, or antihypertensives at this time given ELEUTERIO as well as her persistent shock.    -Will obtain updated transthoracic echocardiogram as patient has not had 1 since 2022 and is intermittently compliant with medicines at home and has had obvious progression of her liver disease since that time to evaluate if worsening heart failure also contributing.  TTE showed decrease in EF from 46 to 50% down to 36 to 40%.   Severe tricuspid valve regurg still present.  No comments on any vegetation.    -MELD score based on current laboratory evaluation emergency department is 34 consistent with a 52.3% estimated 90-day survival.     History of hypertension    -Patient on Aldactone for heart failure and cirrhosis and will hold at this time.  Patient appears to have had normotension versus borderline hypotension for quite some time and history of hypertension is likely prior to development of cirrhosis and heart failure.     Generalized anxiety disorder    -Supportive care     Chronic nausea    -Zofran as needed    Copied text in portions of the note has been reviewed and is accurate as of .TODAYDATE    VTE Prophylaxis:   Mechanical Order History:       None          Pharmalogical Order History:        Ordered     Dose Route Frequency Stop    24 0957  heparin (porcine) 5000 UNIT/ML injection 5,000 Units         5,000 Units SC Every 8 Hours Scheduled --                    Disposition pending clinical course but likely to require significant amount of time in hospital after there is further recovery.    Adán Medina DO  [facility]  24  13:27 EDT      Electronically signed by Adán Medina DO at 24 1354          Consult Notes (most recent note)        Alina Vieira, CHARY at 24 1059        Consult Orders    1. Inpatient Infectious Diseases Consult [[id number]] ordered by Adán Medina DO at 24 1932                         INFECTIOUS DISEASE CONSULTATION REPORT        Patient Identification:  Name:  Della Jorge  Age:  63 y.o.  Sex:  female  :  1960  MRN:  [id number]   Visit Number:  [id number]  Primary Care Physician:  Sebastián Dougherty MD        Referring Provider: Dr. Erwin    Reason for consult: Pseudomonas bacteremia       LOS: 2 days        Subjective       Subjective     History of present illness:      Thank you Dr. Erwin for allowing us to participate in  the care of your patient.  As you well know, Ms. Della Jorge is a 63 y.o. female with past medical history significant for combined systolic and diastolic heart failure with known liver cirrhosis and severe tricuspid valve regurg, chronic hypoxemic respiratory failure on 2 L nasal cannula with history of hypertension as well as irritable bowel syndrome generalized anxiety disorder, who presented to [facility] on 5/4/2024 for fall.  The patient reported she had been feeling weak and felt that her legs were going to give underneath her, eased herself to the floor in the bathroom and actually denies any significant fall but was able to control her lowering to the floor.  However she was resting on hardwood floor and has significantly severely edematous lower extremities, did have skin tears and a large hematoma that the emergency department evacuated.  The patient did have some nausea and report of small emesis the day prior to presentation but reported she has chronic stomach issues.  The patient denied feeling short of breath or having any chest pain.  She reported her belly feels very tight and sore, especially in the lower region and did note that she had reduced urine output over the last several days since being in the emergency department on 4/23 where she had 10 L of volume removed.  The patient reiterated that she did not want any invasive procedures done and has no desire or plans to proceed with prior recommended right heart cath and given significant heart failure with likely also not be candidate for TIPS.  Patient was afebrile in the ED but notable for soft blood pressures, primarily 80s over 50s.  Patient reported her blood pressure at home is typically 90s over 60s on antihypertensive regimen.  Initially ED had hoped to let the patient return home, however given significant ascitic abdomen as well as soft blood pressures, concern for possibility of SBP.  The patient  was admitted to progressive care unit for planned paracentesis and empiric antibiotic coverage until SBP ruled out.  On admission, the patient underwent planned paracentesis with 7650 mL ascitic fluid removed at bedside.    Fluid analysis from ascitic fluid reported 21% neutrophils with 43 nucleated cells.  Anaerobic body fluid from culture preliminarily reports no anaerobes isolated 24 hours.  Peritoneal fluid culture preliminarily reports no growth at 24 hours.  1 of 2 blood cultures from 5/4 preliminarily reports Pseudomonas species.  Repeat blood cultures pending x 2.  CRP was elevated at 16.99.  Lactate elevated on admission at 3.2, improved to 1.8.  Procalcitonin 2.97.  WBC was 10.57.  Platelet count 141.  Urinalysis was unremarkable with 0-2 WBCs and no bacteria.  CT chest without contrast reported cardiomegaly.  CT abdomen pelvis reported large volume ascites.  Cirrhotic liver. Chest x-ray from 5/5 reports mild enlarged heart size.  Hypoinflated lungs.  No edema, pneumonia, pleural effusion, pneumothorax.  Right neck central vascular catheter with the tip to the SVC.  Transthoracic echo from 5/4 did not report any evidence of vegetation.    The patient is resting comfortably in bed, on room air with no apparent distress.  Afebrile.  Reports constipation.  Levophed is infusing.  Has been is at the bedside.  Lung exam is clear with diminished bases bilaterally to auscultation..  Abdomen is soft and rounded, nontender with hypoactive bowel sounds.  3+ bilateral lower extremity edema.  Bilateral lower extremities are dressed with Ace wrap and gauze wrap, very edematous, very erythematous and the patient reports very tender.  Leukocytosis worsened to 23.32.  Platelet count 152.          Infectious Disease consultation was requested for antimicrobial management.      ---------------------------------------------------------------------------------------------------------------------     Review Of  Systems:    Constitutional: no fever, chills and night sweats. No appetite change or unexpected weight change. No fatigue.  Eyes: no eye drainage, itching or redness.  HEENT: no mouth sores, dysphagia or nose bleed.  Respiratory: no for shortness of breath, cough or production of sputum.  Cardiovascular: no chest pain, no palpitations, no orthopnea.  Gastrointestinal: no nausea, vomiting or diarrhea. No abdominal pain, hematemesis or rectal bleeding.  Genitourinary: no dysuria or polyuria.  Hematologic/lymphatic: no lymph node abnormalities, no easy bruising or easy bleeding.  Musculoskeletal: no muscle or joint pain.  BLE edema, wounds  Skin: No rash and no itching.  Neurological: no loss of consciousness, no seizure, no headache.  Behavioral/Psych: no depression or suicidal ideation.  Endocrine: no hot flashes.  Immunologic: negative.    ---------------------------------------------------------------------------------------------------------------------     Past Medical History    Past Medical History:   Diagnosis Date    Cirrhosis     Congestive heart failure (CHF)     Hypertension        Past Surgical History    Past Surgical History:   Procedure Laterality Date    CHOLECYSTECTOMY      HYSTERECTOMY      partial, still has ovaries       Family History    Family History   Problem Relation Age of Onset    Heart disease Mother     Diabetes Sister        Social History    Social History     Tobacco Use    Smoking status: Never    Smokeless tobacco: Never   Vaping Use    Vaping status: Never Used   Substance Use Topics    Alcohol use: Never    Drug use: Never       Allergies    Codeine and Keflex [cephalexin]  ---------------------------------------------------------------------------------------------------------------------     Home Medications:    Prior to Admission Medications       Prescriptions Last Dose Informant Patient Reported? Taking?    betamethasone valerate (VALISONE) 0.1 % cream 5/3/2024 Pharmacy Yes  Yes    Apply 1 Application topically to the appropriate area as directed Daily.    bumetanide (BUMEX) 1 MG tablet 5/3/2024 Pharmacy Yes Yes    Take 2 tablets by mouth 2 (Two) Times a Day.    carvedilol (COREG) 3.125 MG tablet 5/3/2024 Pharmacy Yes Yes    Take 1 tablet by mouth 2 (Two) Times a Day With Meals.    doxycycline (VIBRAMYCIN) 100 MG capsule 5/3/2024 Pharmacy Yes Yes    Take 1 capsule by mouth 2 (Two) Times a Day.    empagliflozin (JARDIANCE) 10 MG tablet tablet 5/3/2024 Pharmacy Yes Yes    Take 1 tablet by mouth Daily.    linaclotide (Linzess) 72 MCG capsule capsule Past Week Pharmacy Yes Yes    Take 1 capsule by mouth Daily As Needed (constipation).    nystatin (MYCOSTATIN) 113774 UNIT/GM powder 5/3/2024 Pharmacy Yes Yes    Apply 1 Application topically to the appropriate area as directed 2 (Two) Times a Day.    spironolactone (ALDACTONE) 25 MG tablet 5/3/2024 Pharmacy Yes Yes    Take 1 tablet by mouth Daily.    traMADol (ULTRAM) 50 MG tablet 5/3/2024 Pharmacy Yes Yes    Take 1 tablet by mouth Every 8 (Eight) Hours As Needed for Moderate Pain.          ---------------------------------------------------------------------------------------------------------------------    Objective       Objective     Hospital Scheduled Meds:  albumin human, 25 g, Intravenous, Q30 Min  betamethasone dipropionate, , Topical, Daily  doxycycline, 100 mg, Oral, BID  empagliflozin, 10 mg, Oral, Daily  heparin (porcine), 5,000 Units, Subcutaneous, Q8H  levoFLOXacin, 750 mg, Intravenous, Q48H  lubiprostone, 8 mcg, Oral, BID  meropenem, 500 mg, Intravenous, Q12H  midodrine, 10 mg, Oral, TID AC  mupirocin, 1 application , Each Nare, BID  nystatin, 1 Application, Topical, BID  octreotide, 100 mcg, Intravenous, TID  sodium chloride, 10 mL, Intravenous, Q12H  sodium chloride, 10 mL, Intravenous, Q12H  sodium chloride, 10 mL, Intravenous, Q12H  sodium chloride, 10 mL, Intravenous, Q12H      norepinephrine, 0.02-0.3 mcg/kg/min, Last  Rate: 0.02 mcg/kg/min (05/06/24 1011)      ---------------------------------------------------------------------------------------------------------------------   Vital Signs:  Temp:  [95.7 °F (35.4 °C)-98.7 °F (37.1 °C)] 98.7 °F (37.1 °C)  Heart Rate:  [] 96  Resp:  [10-28] 17  BP: ()/(54-84) 99/63  Mean Arterial Pressure (Non-Invasive) for the past 24 hrs (Last 3 readings):   Noninvasive MAP (mmHg)   05/06/24 1011 83   05/06/24 0858 88   05/06/24 0600 95     SpO2 Percentage    05/06/24 0530 05/06/24 0545 05/06/24 0600   SpO2: 94% 93% 94%     SpO2:  [90 %-98 %] 94 %  on   ;   Device (Oxygen Therapy): room air    Body mass index is 32.84 kg/m².  Wt Readings from Last 3 Encounters:   05/06/24 73.8 kg (162 lb 11.2 oz)   04/23/24 81.6 kg (180 lb)   04/09/24 80.5 kg (177 lb 6.4 oz)     ---------------------------------------------------------------------------------------------------------------------     Physical Exam:    Constitutional: Chronically ill-appearing elderly female.  Resting comfortably in bed on room air with no apparent distress.   at the bedside.  Levophed infusing.  HENT:  Head: Normocephalic and atraumatic.  Mouth:  Moist mucous membranes.    Eyes:  Conjunctivae and EOM are normal.  No scleral icterus.  Neck:  Neck supple.  No JVD present.    Cardiovascular:  Normal rate, regular rhythm and normal heart sounds with no murmur. No edema.  Pulmonary/Chest:  No respiratory distress, no wheezes, no crackles, with normal breath sounds and good air movement.  Abdominal:  Soft.  Bowel sounds are normal.  No distension and no tenderness.   Musculoskeletal:  No edema, no tenderness, and no deformity.  No swelling or redness of joints.  Neurological:  Alert and oriented to person, place, and time.  No facial droop.  No slurred speech.   Skin:  Skin is warm and dry.  No rash noted.  No pallor.  Bilateral lower extremity 3+ edema, severe erythema.  Bilateral lower extremities are dressed with  Ace wrap, severely tender to palpation  Psychiatric:  Normal mood and affect.  Behavior is normal.    ---------------------------------------------------------------------------------------------------------------------    Results from last 7 days   Lab Units 05/04/24  0324 05/04/24 0133   HSTROP T ng/L 74* 76*     Results from last 7 days   Lab Units 05/04/24 0133   PROBNP pg/mL 14,440.0*         Results from last 7 days   Lab Units 05/06/24  0037 05/05/24  0909 05/05/24  0158 05/04/24  1643 05/04/24  0631 05/04/24  0333 05/04/24 0133   CRP mg/dL  --   --   --   --   --   --  16.99*   LACTATE mmol/L  --  1.9  --  3.1* 1.8   < >  --    WBC 10*3/mm3 23.32*  --  16.49*  16.49* 13.57*  --   --  10.57   HEMOGLOBIN g/dL 11.0*  --  10.2*  10.2* 11.0*  --   --  11.6*   HEMATOCRIT % 32.9*  --  30.6*  30.6* 34.2  --   --  33.9*   MCV fL 88.2  --  90.0  90.0 94.2  --   --  89.4   MCHC g/dL 33.4  --  33.3  33.3 32.2  --   --  34.2   PLATELETS 10*3/mm3 152  --  126*  126* 101*  --   --  141   INR   --   --  1.92*  --   --   --  1.87*    < > = values in this interval not displayed.     Results from last 7 days   Lab Units 05/06/24  0037 05/05/24  0158 05/04/24  1747   SODIUM mmol/L 124* 124* 124*   POTASSIUM mmol/L 3.9 4.0 4.2   CHLORIDE mmol/L 92* 91* 89*   CO2 mmol/L 20.3* 21.2* 20.3*   BUN mg/dL 37* 35* 32*   CREATININE mg/dL 1.97* 2.17* 2.04*   CALCIUM mg/dL 8.4* 8.6 8.8   GLUCOSE mg/dL 101* 93 97   ALBUMIN g/dL 2.7* 2.9* 3.3*   BILIRUBIN mg/dL 4.8* 5.0* 5.2*   ALK PHOS U/L 86 75 72   AST (SGOT) U/L 17 16 20   ALT (SGPT) U/L 7 6 6   Estimated Creatinine Clearance: 27.1 mL/min (A) (by C-G formula based on SCr of 1.97 mg/dL (H)).  Ammonia   Date Value Ref Range Status   05/04/2024 17 11 - 51 umol/L Final       Glucose   Date/Time Value Ref Range Status   05/04/2024 1634 94 70 - 130 mg/dL Final     Lab Results   Component Value Date    HGBA1C 4.80 08/04/2022     Lab Results   Component Value Date    TSH 6.250 (H)  "08/05/2022    FREET4 1.29 08/05/2022       Blood Culture   Date Value Ref Range Status   05/04/2024 Pseudomonas species (C)  Preliminary   05/04/2024 No growth at 2 days  Preliminary     No results found for: \"URINECX\"  No results found for: \"WOUNDCX\"  No results found for: \"STOOLCX\"  No results found for: \"RESPCX\"  Pain Management Panel           No data to display              I have personally reviewed the above laboratory results.   ---------------------------------------------------------------------------------------------------------------------  Imaging Results (Last 7 Days)       Procedure Component Value Units Date/Time    US Liver [[id number]] Collected: 05/05/24 2325     Updated: 05/05/24 2329    Narrative:      PROCEDURE: Hepatic ultrasound evaluation performed on May 5, 2024. Color  flow imaging performed.     HISTORY: Hepatic cirrhosis. Ascites. Paracentesis 2 days prior. Evaluate  liver.     COMPARISON: None.     FINDINGS:     Patent IVC.  Small to moderate volume of ascites noted to surround the liver.  Small liver size with nodular surface contour consistent with underlying  hepatocellular disease and cirrhosis.  Patent hepatic veins with appropriate direction of flow.  The common bile duct measures 3.9 mm in diameter and appears  unremarkable.  Patent main portal vein with appropriate direction of flow.       Impression:      Impression:     1.  Small liver size with nodular surface contour consistent with  hepatocellular disease.  2.  Small to moderate volume of free fluid in the right upper quadrant  consistent with ascites.  3.  Normal common bile duct.  4.  Patent main portal vein with appropriate direction of flow.  5.  Patent IVC.  6.  Patent hepatic veins.     This report was finalized on 5/5/2024 11:27 PM by Marcellus aPtel MD.       XR Chest 1 View [[id number]] Collected: 05/05/24 1346     Updated: 05/05/24 1353    Narrative:      PROCEDURE: Portable chest x-ray examination performed on May " 5, 2024.  Single view. Upright position.     HISTORY: Cardiovascular disease. Chest pain.     COMPARISON: None.     FINDINGS:     Enlarged heart size  Right neck central vascular catheter with tip to the SVC.  Right lower lobe and left lower lobe atelectasis.  Mild hypoinflated lungs.  Mild coarsened bronchovascular pattern to the lungs.  No pleural effusion or pneumothorax.  No fracture or foreign body.  Slight levoconvex curvature at the mid thoracic spine.  No free air in the upper abdomen.       Impression:         1.  Mild enlarged heart size.  2.  Hypoinflated lungs.  3.  No edema, pneumonia, pleural effusion, or pneumothorax.  4.  Right neck central vascular catheter with tip to the SVC.     This report was finalized on 5/5/2024 1:47 PM by Marcellus Patel MD.       CT Chest Without Contrast Diagnostic [[id number]] Collected: 05/04/24 0622     Updated: 05/04/24 0631    Narrative:      CLINICAL HISTORY: CHF, edema, cirrhosis.     COMPARISON: None available.     TECHNIQUE: Noncontrast CT of the chest, abdomen and pelvis was obtained.   Multiplanar reformats were generated.  Limited exposure control,  adjustment of the mA and/or KV according to patient size or use of  iterative reconstruction technique was utilized.     FINDINGS:     CT CHEST:     Pulmonary arteries: normal.      Heart and pericardium: Cardiomegaly.  Coronary calcifications.  Normal  pericardium.     Aorta: normal.     Esophagus: normal.     Lungs and airways: Mild linear bibasilar atelectasis.     Pleura: normal.     Lymph nodes: normal.     Musculoskeletal and chest wall: no acute abnormality.     Other: Fluid from the abdominal cavity extends into the lower  mediastinum        CT ABDOMEN AND PELVIS:     Hepatobiliary: Cirrhotic liver morphology.  Status post cholecystectomy.     Pancreas: normal.     Spleen: normal.     Adrenals: normal.      Kidneys, ureters, bladder: normal.     Reproductive: normal.     GI tract/Bowel: Moderate amount of  stool throughout the colon.  The  small bowel is normal in caliber..     Appendix: Not well seen.  No findings for acute appendicitis.     Peritoneum: Large volume ascites     Vascular: normal.     Lymph nodes: normal.     Musculoskeletal and abdominal wall: Anasarca.       Impression:         CT CHEST:  CARDIOMEGALY.     CT ABDOMEN AND PELVIS:  LARGE VOLUME ASCITES.  CIRRHOTIC LIVER.           This report was finalized on 5/4/2024 6:29 AM by Hilary Astudillo MD.       CT Abdomen Pelvis Without Contrast [[id number]] Collected: 05/04/24 0622     Updated: 05/04/24 0631    Narrative:      CLINICAL HISTORY: CHF, edema, cirrhosis.     COMPARISON: None available.     TECHNIQUE: Noncontrast CT of the chest, abdomen and pelvis was obtained.   Multiplanar reformats were generated.  Limited exposure control,  adjustment of the mA and/or KV according to patient size or use of  iterative reconstruction technique was utilized.     FINDINGS:     CT CHEST:     Pulmonary arteries: normal.      Heart and pericardium: Cardiomegaly.  Coronary calcifications.  Normal  pericardium.     Aorta: normal.     Esophagus: normal.     Lungs and airways: Mild linear bibasilar atelectasis.     Pleura: normal.     Lymph nodes: normal.     Musculoskeletal and chest wall: no acute abnormality.     Other: Fluid from the abdominal cavity extends into the lower  mediastinum        CT ABDOMEN AND PELVIS:     Hepatobiliary: Cirrhotic liver morphology.  Status post cholecystectomy.     Pancreas: normal.     Spleen: normal.     Adrenals: normal.      Kidneys, ureters, bladder: normal.     Reproductive: normal.     GI tract/Bowel: Moderate amount of stool throughout the colon.  The  small bowel is normal in caliber..     Appendix: Not well seen.  No findings for acute appendicitis.     Peritoneum: Large volume ascites     Vascular: normal.     Lymph nodes: normal.     Musculoskeletal and abdominal wall: Anasarca.       Impression:         CT  CHEST:  CARDIOMEGALY.     CT ABDOMEN AND PELVIS:  LARGE VOLUME ASCITES.  CIRRHOTIC LIVER.           This report was finalized on 5/4/2024 6:29 AM by Hilary Astudillo MD.       XR Chest 1 View [[id number]] Collected: 05/04/24 0448     Updated: 05/04/24 0452    Narrative:      PROCEDURE: Portable chest x-ray examination performed on May 4, 2024.  Single view. Supine position.     HISTORY: CHF.     COMPARISON: None.     FINDINGS:     Mild enlarged heart size  Possible underlying pericardial fluid.  No lobar consolidation or edema.  Hypoinflated lungs with slightly elevated left hemidiaphragm.  No pleural effusion or pneumothorax.  No fracture or foreign body.  Cholecystectomy clips in the right upper quadrant.       Impression:         1.  Mild enlarged heart size  2.  Mild hypoinflated lungs.  3.  No lobar consolidation or edema.  4.  No pleural effusion or pneumothorax.     This report was finalized on 5/4/2024 4:49 AM by Marcellus Patel MD.             I have personally reviewed the above radiology results.   ---------------------------------------------------------------------------------------------------------------------      Pertinent Infectious Disease Results          Assessment & Plan      Assessment      Pseudomonas bacteremia  Left lower extremity wound        Plan      Ms. Della Jorge is a 63 y.o. female with past medical history significant for combined systolic and diastolic heart failure with known liver cirrhosis and severe tricuspid valve regurg, chronic hypoxemic respiratory failure on 2 L nasal cannula with history of hypertension as well as irritable bowel syndrome generalized anxiety disorder, who presented to  Emergency Department on 5/4/2024 for fall.  The patient reported she had been feeling weak and felt that her legs were going to give underneath her, eased herself to the floor in the bathroom and actually denies any significant fall but was able to control her  lowering to the floor.  However she was resting on hardwood floor and has significantly severely edematous lower extremities, did have skin tears and a large hematoma that the emergency department evacuated.  The patient did have some nausea and report of small emesis the day prior to presentation but reported she has chronic stomach issues.  The patient denied feeling short of breath or having any chest pain.  She reported her belly feels very tight and sore, especially in the lower region and did note that she had reduced urine output over the last several days since being in the emergency department on 4/23 where she had 10 L of volume removed.  The patient reiterated that she did not want any invasive procedures done and has no desire or plans to proceed with prior recommended right heart cath and given significant heart failure with likely also not be candidate for TIPS.  Patient was afebrile in the ED but notable for soft blood pressures, primarily 80s over 50s.  Patient reported her blood pressure at home is typically 90s over 60s on antihypertensive regimen.  Initially ED had hoped to let the patient return home, however given significant ascitic abdomen as well as soft blood pressures, concern for possibility of SBP.  The patient was admitted to progressive care unit for planned paracentesis and empiric antibiotic coverage until SBP ruled out.  On admission, the patient underwent planned paracentesis with 7650 mL ascitic fluid removed at bedside.    Fluid analysis from ascitic fluid reported 21% neutrophils with 43 nucleated cells.  Anaerobic body fluid from culture preliminarily reports no anaerobes isolated 24 hours.  Peritoneal fluid culture preliminarily reports no growth at 24 hours.  1 of 2 blood cultures from 5/4 preliminarily reports Pseudomonas species.  Repeat blood cultures pending x 2.  CRP was elevated at 16.99.  Lactate elevated on admission at 3.2, improved to 1.8.  Procalcitonin 2.97.  WBC  was 10.57.  Platelet count 141.  Urinalysis was unremarkable with 0-2 WBCs and no bacteria.  CT chest without contrast reported cardiomegaly.  CT abdomen pelvis reported large volume ascites.  Cirrhotic liver. Chest x-ray from 5/5 reports mild enlarged heart size.  Hypoinflated lungs.  No edema, pneumonia, pleural effusion, pneumothorax.  Right neck central vascular catheter with the tip to the SVC.  Transthoracic echo from 5/4 did not report any evidence of vegetation.    The patient is resting comfortably in bed, on room air with no apparent distress.  Afebrile.  Reports constipation.  Levophed is infusing.  Has been is at the bedside.  Lung exam is clear with diminished bases bilaterally to auscultation..  Abdomen is soft and rounded, nontender with hypoactive bowel sounds.  3+ bilateral lower extremity edema.  Bilateral lower extremities are dressed with Ace wrap and gauze wrap, very edematous, very erythematous and the patient reports very tender.  Leukocytosis worsened to 23.32.  Platelet count 152.      The patient was empirically initiated on doxycycline and meropenem for both bacteremia and skin and soft tissue infection.  In the setting of 1 of 2 blood cultures preliminarily reporting Pseudomonas we will initiate pharmacy dosing Levaquin for dual pseudomonal coverage.  Awaiting repeat blood cultures to finalize.  We will continue to monitor closely and adjust antibiotic coverage as appropriate.        ANTIMICROBIAL THERAPY    doxycycline - 100 MG, 100 MG  levoFLOXacin (LEVAQUIN) 750 mg/150 mL D5W (premix) - 750 MG/150ML  meropenem (MERREM) 500 mg IVPB in 100 mL NS (VTB)       Again, thank you Dr. Erwin for allowing us to participate in the care of your patient and please feel free to call for any questions you may have.        Code Status:     Code Status and Medical Interventions:   Ordered at: 05/04/24 0826     Medical Intervention Limits:    NO intubation (DNI)     Code Status (Patient has no pulse and  is not breathing):    No CPR (Do Not Attempt to Resuscitate)     Medical Interventions (Patient has pulse or is breathing):    Limited Support         CHARY Chun  24  10:59 EDT      Electronically signed by Alina Vieira APRN at 24 1136       Nutrition Notes (most recent note)    No notes exist for this encounter.       Physical Therapy Notes (most recent note)    No notes exist for this encounter.       Occupational Therapy Notes (most recent note)    No notes exist for this encounter.     [facility]  [address] [place] [id number]  Phone:  [phone]  Fax:  [phone]        Patient: ROOM: [address]   Della Jorge MRN:  [id number]   [address] [facility] [address] :  1960  SSN:    Phone: [phone] Sex:  F   PCP: Sebastián Dougherty                 Emergency Contact Information       Name Relation Home Work Mobile     Triston Jorge Spouse [phone]         Junior Jorge Son     [phone]     FRANCHESKA LYNN Sister [phone]              INSURANCE PA[employer] # SUBSCRIBER ID   Primary:    [facility] [employer] [place] [id number] [facility] [id number]   Admitting Diagnosis: Decompensated hepatic cirrhosis [K72.90, K74.60]  Order Date:  May 6, 2024        Case Management  Consult       (Order ID: [id number])     Diagnosis:         Priority:  Routine Expected Date:   Expiration Date:        Interval:   Count:    Is discharge planning needed? If yes, who is requesting discharge planning? Patient  Reason for Consult: [facility]- Home with hospice today, bed/bedside table/02@2lpm n/c, BSC, wound care supplies        Authorizing Provider:Samantha Tran APRN  Authorizing Provider's NPI: [id number]  Order Entered By: Samantha Tran APRN 2024 10:19 AM     Electronically signed by: Samantha Tran APRN 2024 10:19 AM

## 2024-05-06 NOTE — CONSULTS
"Palliative Care Initial Consult     Attending Physician: Vu Erwin MD  Referring Provider: Dr. Vu Erwin    assistance with advance directives, assistance with clarification of goals of care, and hospice referral or discussion/ [facility]  Code Status: DNR/DNI  Code Status and Medical Interventions:   Ordered at: 05/04/24 0826     Medical Intervention Limits:    NO intubation (DNI)     Code Status (Patient has no pulse and is not breathing):    No CPR (Do Not Attempt to Resuscitate)     Medical Interventions (Patient has pulse or is breathing):    Limited Support      Advanced Directives: Advance Directive Status: Patient does not have advance directive   Healthcare surrogate: Triston Jorge- spouse   Goals of Care: Home with hospice care     HPI:  Della Jorge is a 63 y.o. female admitted on 5/4/2024 with decompensated hepatic cirrhosis. She has a past medical history of cirrhosis, HTN and CHF. She also has severe tricuspid valve regurgication, chronic hypoxemica respiratory failure , on 02@2lpm n/c, anxiety disorder, and IBS. She presented to the ED for chief complaints of falls. She reports that she was in her bathroom and her legs \"gave out\". She has extensive bilaterally lower extremity leg swelling, ascites, jaundiced and increased weakness and fatigue. Her lower extremities are 4+ pitting edema with blistered/weeping skin that required a recent evacuation of hematoma to the left shin region. She requires frequent paracentesis due to cirrhosis, her last paracentesis was yesterday and they were able to remove 7.5 liters pt reports. Pt's labs when admitted to the ED, troponin T 76, troponin T delta -2, probnp 07807, sodium 125, BUN 29, creat 1.99, eGFR 27.8, albumin 2.8, total bilirubin 5.0  c reactive protein 16.99, procalcitonin 2.97, H&H 11.6/33.9, lactate 3.2, ammonia 17. Her most recent labs today sodium 124, co2 20.3, bun 37, creat 1.97, egfr 28.1, calcium 8.4, total protein 5.3, albumin " 2.7, total bilirubin 4.8, wbc 23.32, H&H 11.0/32.9 Mild enlarged heart size, Hypoinflated lung, No edema, pneumonia, pleural effusion, or pneumothorax and Right neck central vascular catheter with tip to the SVC. She remains of levophed drip, current bp 101/66 hr 98 rr 25 sat 94%. She complains of increased pain (upset because she is not allowed to currently have any pain medications due to hypotension) anxiety and constipation. She is also upset that she had to have a central line placed. She wants central line removed and want midodrine started. She reports that she can take a pill at home and that is where she wants to be. We discussed goals of care and advanced care planning. Her  is at bedside. Pt is worried about care at home because of extensive wounds to BLE.  reports that he can dress her legs and with the help of hospice he would be able to care for her at home. Pt refuses to go to rehabilitation, NH placement for short term or LTC placement with or without hospice , hospice care center or long term hospice. She tells me that she is going home where she wants to be. I explained that given her prognosis and current states that she would likely required short term rehabilitation for therapy and she refused as well. She wants to go home with hospice care, preferably today. She is alert and oriented x 4, able to follow all commands and is very knowledgeable about her care.         ROS: Negative except as above in HPI.     Past Medical History:   Diagnosis Date    Cirrhosis     Congestive heart failure (CHF)     Hypertension      Past Surgical History:   Procedure Laterality Date    CHOLECYSTECTOMY      HYSTERECTOMY      partial, still has ovaries     Social History     Socioeconomic History    Marital status:    Tobacco Use    Smoking status: Never    Smokeless tobacco: Never   Vaping Use    Vaping status: Never Used   Substance and Sexual Activity    Alcohol use: Never    Drug use: Never     Sexual activity: Defer     Family History   Problem Relation Age of Onset    Heart disease Mother     Diabetes Sister        Allergies   Allergen Reactions    Codeine Hallucinations    Keflex [Cephalexin] Hives       Current Facility-Administered Medications   Medication Dose Route Frequency Provider Last Rate Last Admin    albumin human 25 % IV SOLN 25 g  25 g Intravenous Q30 Min Vu Erwin MD   25 g at 05/06/24 1012    betamethasone dipropionate (DIPROLENE) 0.05 % ointment   Topical Daily Adán Medina DO   Given at 05/06/24 1013    doxycycline (MONODOX) capsule 100 mg  100 mg Oral BID Adán Medina DO   100 mg at 05/06/24 0844    empagliflozin (JARDIANCE) tablet 10 mg  10 mg Oral Daily Adán Medina DO   10 mg at 05/06/24 0844    heparin (porcine) 5000 UNIT/ML injection 5,000 Units  5,000 Units Subcutaneous Q8H Adán Medina DO   5,000 Units at 05/06/24 0545    lactulose (CHRONULAC) 10 GM/15ML solution 20 g  20 g Oral TID PRN Vu Erwin MD   20 g at 05/06/24 1013    levoFLOXacin (LEVAQUIN) 750 mg/150 mL D5W (premix) (LEVAQUIN) 750 mg  750 mg Intravenous Q48H Alina Vieira N, APRN        lubiprostone (AMITIZA) capsule 8 mcg  8 mcg Oral BID Adán Medina DO   8 mcg at 05/06/24 0844    meropenem (MERREM) 500 mg in sodium chloride 0.9 % 100 mL IVPB-VTB  500 mg Intravenous Q12H Adán Medina DO   500 mg at 05/06/24 0545    midodrine (PROAMATINE) tablet 10 mg  10 mg Oral TID AC Adán Medina DO   10 mg at 05/06/24 0636    mupirocin (BACTROBAN) 2 % nasal ointment 1 Application  1 application  Each Nare BID Vu Erwin MD   1 Application at 05/06/24 0844    norepinephrine (LEVOPHED) 8 mg in 250 mL NS infusion (premix)  0.02-0.3 mcg/kg/min Intravenous Titrated Adán Medina DO 2.84 mL/hr at 05/06/24 1011 0.02 mcg/kg/min at 05/06/24 1011    nystatin (MYCOSTATIN) powder 1 Application  1 Application Topical BID Adán Medina DO   1 Application at 05/06/24 0844    octreotide  "(sandoSTATIN) injection 100 mcg  100 mcg Intravenous TID Adán Medina, DO   100 mcg at 05/06/24 1014    ondansetron (ZOFRAN) injection 4 mg  4 mg Intravenous Q6H PRN Adán Medina, DO   4 mg at 05/05/24 1329    sodium chloride 0.9 % flush 10 mL  10 mL Intravenous Q12H Adán Medina, DO   10 mL at 05/06/24 0845    sodium chloride 0.9 % flush 10 mL  10 mL Intravenous PRN Carol, Adán, DO        sodium chloride 0.9 % flush 10 mL  10 mL Intravenous Q12H Carol, Adán, DO   10 mL at 05/06/24 0845    sodium chloride 0.9 % flush 10 mL  10 mL Intravenous Q12H Carol, Adán, DO   10 mL at 05/06/24 0844    sodium chloride 0.9 % flush 10 mL  10 mL Intravenous Q12H Carol, Adán, DO   10 mL at 05/06/24 0844    sodium chloride 0.9 % flush 10 mL  10 mL Intravenous PRN Adán Medina, DO        sodium chloride 0.9 % flush 20 mL  20 mL Intravenous PRN Carol, Adán, DO        sodium chloride 0.9 % infusion 40 mL  40 mL Intravenous PRN Adán Medina, DO         norepinephrine, 0.02-0.3 mcg/kg/min, Last Rate: 0.02 mcg/kg/min (05/06/24 1011)        lactulose    ondansetron    sodium chloride    sodium chloride    sodium chloride    sodium chloride    Current medication reviewed for route, type, dose and frequency and are current per MAR.    Palliative Performance Scale Score:     /68   Pulse 96   Temp 98.7 °F (37.1 °C) (Oral)   Resp 17   Ht 149.9 cm (59.02\")   Wt 73.8 kg (162 lb 11.2 oz)   SpO2 94%   BMI 32.84 kg/m²     Intake/Output Summary (Last 24 hours) at 5/6/2024 1024  Last data filed at 5/6/2024 0800  Gross per 24 hour   Intake 1162 ml   Output 1500 ml   Net -338 ml       PE:  General Appearance:    Chronically ill appearing, alert, cooperative, NAD   HEENT:    NC/AT, without obvious abnormality, EOMI, anicteric    Neck:   supple, trachea midline, no JVD   Lungs:     CTAB without w/r/r    Heart:    Irregular, tachycardia , normal S1 and S2, no M/R/G   Abdomen:     firm, NT, round, ascites, NABS  "   Extremities:   Moves all extremities, no edema   Pulses:   Pulses palpable and equal bilaterally   Skin:   Warm, dry, extensive wounds to BLE with recent I&D , dressing intact, 3-4+ pitting edema BLE , weeping extensively , jaundiced    Neurologic:   A/Ox3, cooperative   Psych:   Anxious at times      Labs:   Results from last 7 days   Lab Units 05/06/24  0037   WBC 10*3/mm3 23.32*   HEMOGLOBIN g/dL 11.0*   HEMATOCRIT % 32.9*   PLATELETS 10*3/mm3 152     Results from last 7 days   Lab Units 05/06/24  0037   SODIUM mmol/L 124*   POTASSIUM mmol/L 3.9   CHLORIDE mmol/L 92*   CO2 mmol/L 20.3*   BUN mg/dL 37*   CREATININE mg/dL 1.97*   GLUCOSE mg/dL 101*   CALCIUM mg/dL 8.4*     Results from last 7 days   Lab Units 05/06/24  0037   SODIUM mmol/L 124*   POTASSIUM mmol/L 3.9   CHLORIDE mmol/L 92*   CO2 mmol/L 20.3*   BUN mg/dL 37*   CREATININE mg/dL 1.97*   CALCIUM mg/dL 8.4*   BILIRUBIN mg/dL 4.8*   ALK PHOS U/L 86   ALT (SGPT) U/L 7   AST (SGOT) U/L 17   GLUCOSE mg/dL 101*     Imaging Results (Last 72 Hours)       Procedure Component Value Units Date/Time    US Liver [[id number]] Collected: 05/05/24 2325     Updated: 05/05/24 2329    Narrative:      PROCEDURE: Hepatic ultrasound evaluation performed on May 5, 2024. Color  flow imaging performed.     HISTORY: Hepatic cirrhosis. Ascites. Paracentesis 2 days prior. Evaluate  liver.     COMPARISON: None.     FINDINGS:     Patent IVC.  Small to moderate volume of ascites noted to surround the liver.  Small liver size with nodular surface contour consistent with underlying  hepatocellular disease and cirrhosis.  Patent hepatic veins with appropriate direction of flow.  The common bile duct measures 3.9 mm in diameter and appears  unremarkable.  Patent main portal vein with appropriate direction of flow.       Impression:      Impression:     1.  Small liver size with nodular surface contour consistent with  hepatocellular disease.  2.  Small to moderate volume of free fluid  in the right upper quadrant  consistent with ascites.  3.  Normal common bile duct.  4.  Patent main portal vein with appropriate direction of flow.  5.  Patent IVC.  6.  Patent hepatic veins.     This report was finalized on 5/5/2024 11:27 PM by Marcellus Patel MD.       XR Chest 1 View [[id number]] Collected: 05/05/24 1346     Updated: 05/05/24 1353    Narrative:      PROCEDURE: Portable chest x-ray examination performed on May 5, 2024.  Single view. Upright position.     HISTORY: Cardiovascular disease. Chest pain.     COMPARISON: None.     FINDINGS:     Enlarged heart size  Right neck central vascular catheter with tip to the SVC.  Right lower lobe and left lower lobe atelectasis.  Mild hypoinflated lungs.  Mild coarsened bronchovascular pattern to the lungs.  No pleural effusion or pneumothorax.  No fracture or foreign body.  Slight levoconvex curvature at the mid thoracic spine.  No free air in the upper abdomen.       Impression:         1.  Mild enlarged heart size.  2.  Hypoinflated lungs.  3.  No edema, pneumonia, pleural effusion, or pneumothorax.  4.  Right neck central vascular catheter with tip to the SVC.     This report was finalized on 5/5/2024 1:47 PM by Marcellus Patel MD.       CT Chest Without Contrast Diagnostic [[id number]] Collected: 05/04/24 0622     Updated: 05/04/24 0631    Narrative:      CLINICAL HISTORY: CHF, edema, cirrhosis.     COMPARISON: None available.     TECHNIQUE: Noncontrast CT of the chest, abdomen and pelvis was obtained.   Multiplanar reformats were generated.  Limited exposure control,  adjustment of the mA and/or KV according to patient size or use of  iterative reconstruction technique was utilized.     FINDINGS:     CT CHEST:     Pulmonary arteries: normal.      Heart and pericardium: Cardiomegaly.  Coronary calcifications.  Normal  pericardium.     Aorta: normal.     Esophagus: normal.     Lungs and airways: Mild linear bibasilar atelectasis.     Pleura: normal.     Lymph  nodes: normal.     Musculoskeletal and chest wall: no acute abnormality.     Other: Fluid from the abdominal cavity extends into the lower  mediastinum        CT ABDOMEN AND PELVIS:     Hepatobiliary: Cirrhotic liver morphology.  Status post cholecystectomy.     Pancreas: normal.     Spleen: normal.     Adrenals: normal.      Kidneys, ureters, bladder: normal.     Reproductive: normal.     GI tract/Bowel: Moderate amount of stool throughout the colon.  The  small bowel is normal in caliber..     Appendix: Not well seen.  No findings for acute appendicitis.     Peritoneum: Large volume ascites     Vascular: normal.     Lymph nodes: normal.     Musculoskeletal and abdominal wall: Anasarca.       Impression:         CT CHEST:  CARDIOMEGALY.     CT ABDOMEN AND PELVIS:  LARGE VOLUME ASCITES.  CIRRHOTIC LIVER.           This report was finalized on 5/4/2024 6:29 AM by Hilary Astudillo MD.       CT Abdomen Pelvis Without Contrast [[id number]] Collected: 05/04/24 0622     Updated: 05/04/24 0631    Narrative:      CLINICAL HISTORY: CHF, edema, cirrhosis.     COMPARISON: None available.     TECHNIQUE: Noncontrast CT of the chest, abdomen and pelvis was obtained.   Multiplanar reformats were generated.  Limited exposure control,  adjustment of the mA and/or KV according to patient size or use of  iterative reconstruction technique was utilized.     FINDINGS:     CT CHEST:     Pulmonary arteries: normal.      Heart and pericardium: Cardiomegaly.  Coronary calcifications.  Normal  pericardium.     Aorta: normal.     Esophagus: normal.     Lungs and airways: Mild linear bibasilar atelectasis.     Pleura: normal.     Lymph nodes: normal.     Musculoskeletal and chest wall: no acute abnormality.     Other: Fluid from the abdominal cavity extends into the lower  mediastinum        CT ABDOMEN AND PELVIS:     Hepatobiliary: Cirrhotic liver morphology.  Status post cholecystectomy.     Pancreas: normal.     Spleen: normal.     Adrenals:  "normal.      Kidneys, ureters, bladder: normal.     Reproductive: normal.     GI tract/Bowel: Moderate amount of stool throughout the colon.  The  small bowel is normal in caliber..     Appendix: Not well seen.  No findings for acute appendicitis.     Peritoneum: Large volume ascites     Vascular: normal.     Lymph nodes: normal.     Musculoskeletal and abdominal wall: Anasarca.       Impression:         CT CHEST:  CARDIOMEGALY.     CT ABDOMEN AND PELVIS:  LARGE VOLUME ASCITES.  CIRRHOTIC LIVER.           This report was finalized on 5/4/2024 6:29 AM by Hilary Astudillo MD.       XR Chest 1 View [[id number]] Collected: 05/04/24 0448     Updated: 05/04/24 0452    Narrative:      PROCEDURE: Portable chest x-ray examination performed on May 4, 2024.  Single view. Supine position.     HISTORY: CHF.     COMPARISON: None.     FINDINGS:     Mild enlarged heart size  Possible underlying pericardial fluid.  No lobar consolidation or edema.  Hypoinflated lungs with slightly elevated left hemidiaphragm.  No pleural effusion or pneumothorax.  No fracture or foreign body.  Cholecystectomy clips in the right upper quadrant.       Impression:         1.  Mild enlarged heart size  2.  Mild hypoinflated lungs.  3.  No lobar consolidation or edema.  4.  No pleural effusion or pneumothorax.     This report was finalized on 5/4/2024 4:49 AM by Marcellus Patel MD.               Diagnostics: Reviewed    A:Della Jorge is a 63 y.o. female admitted on 5/4/2024 with decompensated hepatic cirrhosis. She has a past medical history of cirrhosis, HTN and CHF. She also has severe tricuspid valve regurgication, chronic hypoxemica respiratory failure , on 02@2lpm n/c, anxiety disorder, and IBS. She presented to the ED for chief complaints of falls. She reports that she was in her bathroom and her legs \"gave out\". She has extensive bilaterally lower extremity leg swelling, ascites, jaundiced and increased weakness and fatigue. Her lower extremities " are 4+ pitting edema with blistered/weeping skin that required a recent evacuation of hematoma to the left shin region. She requires frequent paracentesis due to cirrhosis, her last paracentesis was yesterday and they were able to remove 7.5 liters pt reports. Pt's labs when admitted to the ED, troponin T 76, troponin T delta -2, probnp 60519, sodium 125, BUN 29, creat 1.99, eGFR 27.8, albumin 2.8, total bilirubin 5.0  c reactive protein 16.99, procalcitonin 2.97, H&H 11.6/33.9, lactate 3.2, ammonia 17. Her most recent labs today sodium 124, co2 20.3, bun 37, creat 1.97, egfr 28.1, calcium 8.4, total protein 5.3, albumin 2.7, total bilirubin 4.8, wbc 23.32, H&H 11.0/32.9 Mild enlarged heart size, Hypoinflated lung, No edema, pneumonia, pleural effusion, or pneumothorax and Right neck central vascular catheter with tip to the SVC. She remains of levophed drip, current bp 101/66 hr 98 rr 25 sat 94%. She complains of increased pain (upset because she is not allowed to currently have any pain medications due to hypotension) anxiety and constipation. She is also upset that she had to have a central line placed. She wants central line removed and want midodrine started. She reports that she can take a pill at home and that is where she wants to be. We discussed goals of care and advanced care planning. Her  is at bedside. Pt is worried about care at home because of extensive wounds to BLE.  reports that he can dress her legs and with the help of hospice he would be able to care for her at home. Pt refuses to go to rehabilitation, NH placement for short term or LTC placement with or without hospice , hospice care center or long term hospice. She tells me that she is going home where she wants to be. I explained that given her prognosis and current states that she would likely required short term rehabilitation for therapy and she refused as well. She wants to go home with hospice care, preferably today. She  is alert and oriented x 4, able to follow all commands and is very knowledgeable about her care.          P: After discussing goals of care and advanced care planning, pt has decided that she wants to go home with hospice care. [facility] Navigators and SW made aware for possible discharge today per patients request. She does not want to go to a long term hospice care facility nor nursing home facility. She says that she is going home.  is at bedside and is agreeable with decision as well. Dr. Erwin made aware as well.     We appreciate the consult and the opportunity to participate in Della A Luisito's care. We will continue to follow along. Please do not hesitate to contact us regarding further symptom management or goals of care needs, including after hours or on weekends via our on call provider at [phone].     Time: 70 minutes spent reviewing medical and medication records, assessing and examining patient, discussing with family, answering questions, providing some guidance about a plan and documentation of care, and coordinating care with other healthcare members, with > 50% time spent face to face.     Samantha Tran, APRN    5/6/2024

## 2024-05-06 NOTE — NURSING NOTE
Received consult for traumatic wound to left leg.  Patient with dressings to bilat lower legs.    Left lower leg with large hematoma to anterior shin.  Moderate serous drainage noted to dressing. Steri strips to edges.  Per patient, glue was also used to close wound flap.  Covered hematoma with vaseline gauze and abd pad.  Leg wrapped with Kerlix and ace wrap.  Right lower leg with hematoma just below the knee.  Small amount of serous drainaged noted to dressing.  Covered hematoma with vaseline gauze and abd pad.  Leg wrapped with Kerlix and secured with ace wrap.  Order in place for daily dressing changes.    Patient noted to have bruising to her mid back.  States she has had it for some time.  Recommend leaving open to air.    Ta score 16.  PI prevention orders initiated.       05/06/24 1030   Wound 05/04/24 0105 Left lower leg Traumatic   Placement Date/Time: 05/04/24 0105   Present on Original Admission: Yes  Side: Left  Orientation: lower  Location: leg  Primary Wound Type: Traumatic  Additional Comments: Incision and Drainage to evacuate blood and clots from wound; wound closed with...   Dressing Appearance intact;moist drainage   Closure Steri strips   Base purple;other (see comments)  (hematoma with open areas)   Periwound intact   Periwound Temperature warm   Periwound Skin Turgor soft   Edges irregular   Drainage Characteristics/Odor serous   Drainage Amount moderate   Dressing Care dressing applied   Periwound Care dry periwound area maintained   Wound 05/06/24 1308 Right leg Traumatic   Placement Date/Time: 05/06/24 1308   Side: Right  Location: leg  Primary Wound Type: Traumatic   Dressing Appearance intact;moist drainage   Closure None   Base purple;other (see comments)  (hematoma)   Periwound intact   Periwound Temperature warm   Periwound Skin Turgor soft   Edges irregular   Drainage Characteristics/Odor serous   Drainage Amount small   Dressing Care dressing applied   Wound 05/06/24 1309 medial  back Other (comment)   Placement Date/Time: 05/06/24 9778   Orientation: medial  Location: back  Primary Wound Type: (c) Other (comment)   Pressure Injury Stage O  (Bruise)   Dressing Appearance open to air   Base purple   Periwound intact   Periwound Temperature warm   Periwound Skin Turgor soft   Edges rolled/closed   Drainage Amount none

## 2024-05-07 LAB
ALBUMIN SERPL-MCNC: 2.9 G/DL (ref 3.5–5.2)
ALBUMIN/GLOB SERPL: 1.5 G/DL
ALP SERPL-CCNC: 62 U/L (ref 39–117)
ALT SERPL W P-5'-P-CCNC: 5 U/L (ref 1–33)
ANION GAP SERPL CALCULATED.3IONS-SCNC: 9.3 MMOL/L (ref 5–15)
AST SERPL-CCNC: 16 U/L (ref 1–32)
BACTERIA SPEC AEROBE CULT: ABNORMAL
BILIRUB SERPL-MCNC: 5 MG/DL (ref 0–1.2)
BUN SERPL-MCNC: 36 MG/DL (ref 8–23)
BUN/CREAT SERPL: 20.3 (ref 7–25)
CALCIUM SPEC-SCNC: 8.4 MG/DL (ref 8.6–10.5)
CHLORIDE SERPL-SCNC: 95 MMOL/L (ref 98–107)
CO2 SERPL-SCNC: 22.7 MMOL/L (ref 22–29)
CREAT SERPL-MCNC: 1.77 MG/DL (ref 0.57–1)
DEPRECATED RDW RBC AUTO: 53.9 FL (ref 37–54)
EGFRCR SERPLBLD CKD-EPI 2021: 32 ML/MIN/1.73
ERYTHROCYTE [DISTWIDTH] IN BLOOD BY AUTOMATED COUNT: 16.8 % (ref 12.3–15.4)
GLOBULIN UR ELPH-MCNC: 2 GM/DL
GLUCOSE SERPL-MCNC: 102 MG/DL (ref 65–99)
GRAM STN SPEC: ABNORMAL
HCT VFR BLD AUTO: 28.6 % (ref 34–46.6)
HGB BLD-MCNC: 9.8 G/DL (ref 12–15.9)
INR PPP: 1.72 (ref 0.9–1.1)
ISOLATED FROM: ABNORMAL
MCH RBC QN AUTO: 30.2 PG (ref 26.6–33)
MCHC RBC AUTO-ENTMCNC: 34.3 G/DL (ref 31.5–35.7)
MCV RBC AUTO: 88.3 FL (ref 79–97)
PLATELET # BLD AUTO: 84 10*3/MM3 (ref 140–450)
PMV BLD AUTO: 9.3 FL (ref 6–12)
POTASSIUM SERPL-SCNC: 3.2 MMOL/L (ref 3.5–5.2)
PROT SERPL-MCNC: 4.9 G/DL (ref 6–8.5)
PROTHROMBIN TIME: 20.3 SECONDS (ref 12.1–14.7)
RBC # BLD AUTO: 3.24 10*6/MM3 (ref 3.77–5.28)
REF LAB TEST METHOD: NORMAL
SODIUM SERPL-SCNC: 127 MMOL/L (ref 136–145)
WBC NRBC COR # BLD AUTO: 13.18 10*3/MM3 (ref 3.4–10.8)

## 2024-05-07 PROCEDURE — 25010000002 CEFEPIME PER 500 MG: Performed by: NURSE PRACTITIONER

## 2024-05-07 PROCEDURE — 85027 COMPLETE CBC AUTOMATED: CPT | Performed by: STUDENT IN AN ORGANIZED HEALTH CARE EDUCATION/TRAINING PROGRAM

## 2024-05-07 PROCEDURE — 25010000002 OCTREOTIDE PER 25 MCG: Performed by: STUDENT IN AN ORGANIZED HEALTH CARE EDUCATION/TRAINING PROGRAM

## 2024-05-07 PROCEDURE — 99233 SBSQ HOSP IP/OBS HIGH 50: CPT | Performed by: STUDENT IN AN ORGANIZED HEALTH CARE EDUCATION/TRAINING PROGRAM

## 2024-05-07 PROCEDURE — 25010000002 MEROPENEM PER 100 MG: Performed by: STUDENT IN AN ORGANIZED HEALTH CARE EDUCATION/TRAINING PROGRAM

## 2024-05-07 PROCEDURE — 25010000002 HEPARIN (PORCINE) PER 1000 UNITS: Performed by: STUDENT IN AN ORGANIZED HEALTH CARE EDUCATION/TRAINING PROGRAM

## 2024-05-07 PROCEDURE — 80053 COMPREHEN METABOLIC PANEL: CPT | Performed by: STUDENT IN AN ORGANIZED HEALTH CARE EDUCATION/TRAINING PROGRAM

## 2024-05-07 PROCEDURE — 25010000002 ONDANSETRON PER 1 MG: Performed by: STUDENT IN AN ORGANIZED HEALTH CARE EDUCATION/TRAINING PROGRAM

## 2024-05-07 PROCEDURE — 99232 SBSQ HOSP IP/OBS MODERATE 35: CPT | Performed by: NURSE PRACTITIONER

## 2024-05-07 PROCEDURE — 85610 PROTHROMBIN TIME: CPT | Performed by: STUDENT IN AN ORGANIZED HEALTH CARE EDUCATION/TRAINING PROGRAM

## 2024-05-07 RX ORDER — LIDOCAINE 40 MG/G
1 CREAM TOPICAL AS NEEDED
Status: DISCONTINUED | OUTPATIENT
Start: 2024-05-07 | End: 2024-05-09 | Stop reason: HOSPADM

## 2024-05-07 RX ORDER — POTASSIUM CHLORIDE 20 MEQ/1
40 TABLET, EXTENDED RELEASE ORAL DAILY
Status: DISCONTINUED | OUTPATIENT
Start: 2024-05-07 | End: 2024-05-09 | Stop reason: HOSPADM

## 2024-05-07 RX ORDER — LOPERAMIDE HYDROCHLORIDE 2 MG/1
4 CAPSULE ORAL 4 TIMES DAILY PRN
Status: DISCONTINUED | OUTPATIENT
Start: 2024-05-07 | End: 2024-05-09 | Stop reason: HOSPADM

## 2024-05-07 RX ORDER — CHOLECALCIFEROL (VITAMIN D3) 125 MCG
10 CAPSULE ORAL NIGHTLY PRN
Status: DISCONTINUED | OUTPATIENT
Start: 2024-05-07 | End: 2024-05-09 | Stop reason: HOSPADM

## 2024-05-07 RX ADMIN — BETAMETHASONE DIPROPIONATE: 0.5 OINTMENT TOPICAL at 08:22

## 2024-05-07 RX ADMIN — DOXYCYCLINE 100 MG: 100 CAPSULE ORAL at 21:12

## 2024-05-07 RX ADMIN — CARBIDOPA AND LEVODOPA 10 MG: 50; 200 TABLET, EXTENDED RELEASE ORAL at 16:38

## 2024-05-07 RX ADMIN — NYSTATIN 1 APPLICATION: 100000 POWDER TOPICAL at 21:13

## 2024-05-07 RX ADMIN — Medication 10 ML: at 21:12

## 2024-05-07 RX ADMIN — NYSTATIN 1 APPLICATION: 100000 POWDER TOPICAL at 08:21

## 2024-05-07 RX ADMIN — HEPARIN SODIUM 5000 UNITS: 5000 INJECTION INTRAVENOUS; SUBCUTANEOUS at 06:28

## 2024-05-07 RX ADMIN — CEFEPIME: 2 INJECTION, POWDER, FOR SOLUTION INTRAVENOUS at 18:30

## 2024-05-07 RX ADMIN — CARBIDOPA AND LEVODOPA 10 MG: 50; 200 TABLET, EXTENDED RELEASE ORAL at 06:30

## 2024-05-07 RX ADMIN — Medication 10 ML: at 08:22

## 2024-05-07 RX ADMIN — MUPIROCIN 1 APPLICATION: 20 OINTMENT TOPICAL at 08:21

## 2024-05-07 RX ADMIN — LUBIPROSTONE 8 MCG: 8 CAPSULE, GELATIN COATED ORAL at 21:12

## 2024-05-07 RX ADMIN — LIDOCAINE 4% 1 APPLICATION: 4 CREAM TOPICAL at 21:19

## 2024-05-07 RX ADMIN — LOPERAMIDE HYDROCHLORIDE 4 MG: 2 CAPSULE ORAL at 16:39

## 2024-05-07 RX ADMIN — LUBIPROSTONE 8 MCG: 8 CAPSULE, GELATIN COATED ORAL at 08:21

## 2024-05-07 RX ADMIN — CEFEPIME: 2 INJECTION, POWDER, FOR SOLUTION INTRAVENOUS at 17:59

## 2024-05-07 RX ADMIN — HEPARIN SODIUM 5000 UNITS: 5000 INJECTION INTRAVENOUS; SUBCUTANEOUS at 21:12

## 2024-05-07 RX ADMIN — MUPIROCIN 1 APPLICATION: 20 OINTMENT TOPICAL at 21:13

## 2024-05-07 RX ADMIN — Medication 10 ML: at 21:13

## 2024-05-07 RX ADMIN — MEROPENEM 500 MG: 500 INJECTION, POWDER, FOR SOLUTION INTRAVENOUS at 06:28

## 2024-05-07 RX ADMIN — LIDOCAINE 4% 1 APPLICATION: 4 CREAM TOPICAL at 16:39

## 2024-05-07 RX ADMIN — Medication 10 MG: at 21:45

## 2024-05-07 RX ADMIN — CARBIDOPA AND LEVODOPA 10 MG: 50; 200 TABLET, EXTENDED RELEASE ORAL at 10:30

## 2024-05-07 RX ADMIN — ONDANSETRON 4 MG: 2 INJECTION INTRAMUSCULAR; INTRAVENOUS at 14:10

## 2024-05-07 RX ADMIN — POTASSIUM CHLORIDE 40 MEQ: 1500 TABLET, EXTENDED RELEASE ORAL at 18:29

## 2024-05-07 RX ADMIN — OCTREOTIDE ACETATE 100 MCG: 100 INJECTION, SOLUTION INTRAVENOUS; SUBCUTANEOUS at 16:39

## 2024-05-07 RX ADMIN — LIDOCAINE 4% 1 APPLICATION: 4 CREAM TOPICAL at 10:30

## 2024-05-07 RX ADMIN — VITAMINS A AND D OINTMENT 1 APPLICATION: 15.5; 53.4 OINTMENT TOPICAL at 21:13

## 2024-05-07 RX ADMIN — CEFEPIME: 2 INJECTION, POWDER, FOR SOLUTION INTRAVENOUS at 17:03

## 2024-05-07 RX ADMIN — OCTREOTIDE ACETATE 100 MCG: 100 INJECTION, SOLUTION INTRAVENOUS; SUBCUTANEOUS at 09:45

## 2024-05-07 RX ADMIN — LIDOCAINE 4% 1 APPLICATION: 4 CREAM TOPICAL at 14:11

## 2024-05-07 RX ADMIN — EMPAGLIFLOZIN 10 MG: 10 TABLET, FILM COATED ORAL at 08:21

## 2024-05-07 RX ADMIN — DOXYCYCLINE 100 MG: 100 CAPSULE ORAL at 08:21

## 2024-05-07 RX ADMIN — VITAMINS A AND D OINTMENT 1 APPLICATION: 15.5; 53.4 OINTMENT TOPICAL at 13:06

## 2024-05-07 RX ADMIN — HEPARIN SODIUM 5000 UNITS: 5000 INJECTION INTRAVENOUS; SUBCUTANEOUS at 13:06

## 2024-05-07 NOTE — CONSULTS
Consult Note     Patient Identification:  Name:  Della Jorge  Age:  63 y.o.  Sex:  female  :  1960  MRN:  [id number]   Visit Number:  [id number]  Primary Care Physician:  Sebastián Dougherty MD     Subjective     Chief complaint:   Chief Complaint   Patient presents with    Fall       History of presenting illness:   Patient is a 63 y.o. female with past medical history significant for  that presented to the [facility] Emergency Department for complaints of fall. in consultation for LLE ad RLE wound. Patient fell onto her hardwood floor resulting in large subcutaneous hematoma s/p  LLE evacuation in the ED. Steri-strips in place. Pitting edema present, which is chronic. I have advised she may require further removal of devitalized tissue. She wishes to hold off at this time. Will monitor for significant changes. Denies any fever or chills. Does report pain to lower legs.     ---------------------------------------------------------------------------------------------------------------------   Review of Systems:  Review of Systems   Constitutional:  Negative for chills and fever.   HENT:  Negative for congestion and rhinorrhea.    Eyes:  Negative for pain and redness.   Respiratory:  Negative for cough and shortness of breath.    Cardiovascular:  Positive for leg swelling. Negative for chest pain.   Gastrointestinal:  Negative for nausea and vomiting.   Genitourinary:  Negative for difficulty urinating and frequency.   Musculoskeletal:  Positive for back pain and gait problem.   Skin:  Positive for wound.   Neurological:  Negative for dizziness and weakness.   Hematological:  Does not bruise/bleed easily.   Psychiatric/Behavioral:  Negative for confusion. The patient is not nervous/anxious.       ---------------------------------------------------------------------------------------------------------------------   Past Medical History:   Diagnosis Date    Cirrhosis     Congestive heart failure  (CHF)     Hypertension      Past Surgical History:   Procedure Laterality Date    CHOLECYSTECTOMY      HYSTERECTOMY      partial, still has ovaries     Family History   Problem Relation Age of Onset    Heart disease Mother     Diabetes Sister      Social History     Socioeconomic History    Marital status:    Tobacco Use    Smoking status: Never    Smokeless tobacco: Never   Vaping Use    Vaping status: Never Used   Substance and Sexual Activity    Alcohol use: Never    Drug use: Never    Sexual activity: Defer     ---------------------------------------------------------------------------------------------------------------------   Allergies:  Codeine and Keflex [cephalexin]  ---------------------------------------------------------------------------------------------------------------------   Medications below are reported home medications pulling from within the system; at this time, these medications have not been reconciled unless otherwise specified and are in the verification process for further verifcation as current home medications.    Prior to Admission Medications       Prescriptions Last Dose Informant Patient Reported? Taking?    betamethasone valerate (VALISONE) 0.1 % cream 5/3/2024 Pharmacy Yes Yes    Apply 1 Application topically to the appropriate area as directed Daily.    bumetanide (BUMEX) 1 MG tablet 5/3/2024 Pharmacy Yes Yes    Take 2 tablets by mouth 2 (Two) Times a Day.    carvedilol (COREG) 3.125 MG tablet 5/3/2024 Pharmacy Yes Yes    Take 1 tablet by mouth 2 (Two) Times a Day With Meals.    doxycycline (VIBRAMYCIN) 100 MG capsule 5/3/2024 Pharmacy Yes Yes    Take 1 capsule by mouth 2 (Two) Times a Day.    empagliflozin (JARDIANCE) 10 MG tablet tablet 5/3/2024 Pharmacy Yes Yes    Take 1 tablet by mouth Daily.    linaclotide (Linzess) 72 MCG capsule capsule Past Week Pharmacy Yes Yes    Take 1 capsule by mouth Daily As Needed (constipation).    nystatin (MYCOSTATIN) 577007 UNIT/GM  powder 5/3/2024 Pharmacy Yes Yes    Apply 1 Application topically to the appropriate area as directed 2 (Two) Times a Day.    spironolactone (ALDACTONE) 25 MG tablet 5/3/2024 Pharmacy Yes Yes    Take 1 tablet by mouth Daily.    traMADol (ULTRAM) 50 MG tablet 5/3/2024 Pharmacy Yes Yes    Take 1 tablet by mouth Every 8 (Eight) Hours As Needed for Moderate Pain.          ---------------------------------------------------------------------------------------------------------------------    Objective     Hospital Scheduled Meds:  betamethasone dipropionate, , Topical, Daily  doxycycline, 100 mg, Oral, BID  empagliflozin, 10 mg, Oral, Daily  heparin (porcine), 5,000 Units, Subcutaneous, Q8H  levoFLOXacin, 750 mg, Intravenous, Q48H  lubiprostone, 8 mcg, Oral, BID  meropenem, 500 mg, Intravenous, Q12H  midodrine, 10 mg, Oral, TID AC  mupirocin, 1 application , Each Nare, BID  nystatin, 1 Application, Topical, BID  octreotide, 100 mcg, Intravenous, TID  sodium chloride, 10 mL, Intravenous, Q12H  sodium chloride, 10 mL, Intravenous, Q12H  sodium chloride, 10 mL, Intravenous, Q12H  sodium chloride, 10 mL, Intravenous, Q12H      norepinephrine, 0.02-0.3 mcg/kg/min, Last Rate: Stopped (05/06/24 9464)        Current listed hospital scheduled medications may not yet reflect those currently placed in orders that are signed and held, awaiting patient's arrival to floor/unit.    ---------------------------------------------------------------------------------------------------------------------   Vital Signs:  Temp:  [96.4 °F (35.8 °C)-98.9 °F (37.2 °C)] 98.2 °F (36.8 °C)  Heart Rate:  [] 95  Resp:  [12-35] 16  BP: ()/(49-89) 87/50  Mean Arterial Pressure (Non-Invasive) for the past 24 hrs (Last 3 readings):   Noninvasive MAP (mmHg)   05/06/24 2100 62   05/06/24 2000 71 05/06/24 1945 61     SpO2 Percentage    05/06/24 1945 05/06/24 2000 05/06/24 2100   SpO2: 95% 93% 96%     SpO2:  [92 %-100 %] 96 %  on   ;   Device  (Oxygen Therapy): room air    Body mass index is 32.84 kg/m².  Wt Readings from Last 3 Encounters:   05/06/24 73.8 kg (162 lb 11.2 oz)   04/23/24 81.6 kg (180 lb)   04/09/24 80.5 kg (177 lb 6.4 oz)       ---------------------------------------------------------------------------------------------------------------------   Physical Exam:  Physical Exam  Eyes:      Pupils: Pupils are equal, round, and reactive to light.   Cardiovascular:      Rate and Rhythm: Normal rate.   Abdominal:      General: Abdomen is flat.   Musculoskeletal:      Right lower leg: Edema present.      Left lower leg: Edema present.   Skin:     General: Skin is warm.      Capillary Refill: Capillary refill takes less than 2 seconds.   Neurological:      General: No focal deficit present.      Mental Status: She is alert.   Psychiatric:         Mood and Affect: Mood normal.       LLE- hematome- base dark, steri-strips in place. Edema preent. Erythema is present  RLE- base dark,. Edema preent. Erythema is present    Assessment & Plan      Hemaatoma of left and right lower extremity  -Continue with adaptic over open areas, cover with ABD pad for drainage control and secure with kerlix and ACE to be changed daily   -Let lower site may require removal of devitalized tissue, she wishes to hold off at this time. Will monitor for need for more urgent removal     CHARY Byrnes,CWS  [facility]  05/06/24  21:35 EDT

## 2024-05-07 NOTE — CASE MANAGEMENT/SOCIAL WORK
Discharge Planning Assessment   Yan     Patient Name: Della Jorge  MRN: [id number]  Today's Date: 5/7/2024    Admit Date: 5/4/2024     Discharge Plan       Row Name 05/07/24 0859       Plan    Plan SS spoke with Donal from [facility] who states pt DME has been delivered an set up at pt's home. SS notified provider. SS to follow.                  Continued Care and Services - Admitted Since 5/4/2024       Destination       Service Provider Request Status Selected Services Address Phone Fax Patient Preferred    [facility] [facility] Accepted N/A [id number] [phone] --               ASIF Gordillo

## 2024-05-07 NOTE — PROGRESS NOTES
"Palliative Care Daily Progress Note     S: Medical record reviewed, followed up with Primary RN Margarita and Dr Erwin regarding patient's condition. Discussed with Dr Erwin that he would like to have Della get more IV antibiotics before returning home with hospice, as well as in lieu of pressors if she became hypotensive would give her albumin. When I saw Della today she was awake and alert she stated that someone with wound care was supposed to see her today and to re wrap legs. Della states she is ready to go home and confirms she wants to go home with hospice. She denied pain, nausea, anxiety or shortness of breath at this time.      O:   Palliative Performance Scale Score:     /61   Pulse 100   Temp 95.9 °F (35.5 °C) (Axillary)   Resp 16   Ht 149.9 cm (59.02\")   Wt 48.9 kg (107 lb 12.9 oz)   SpO2 95%   BMI 21.76 kg/m²     Intake/Output Summary (Last 24 hours) at 5/7/2024 1508  Last data filed at 5/7/2024 0800  Gross per 24 hour   Intake 1020.84 ml   Output 800 ml   Net 220.84 ml       PE:  General Appearance:    Chronically ill appearing, alert, cooperative, NAD   HEENT:    NC/AT, without obvious abnormality, EOMI, scleral icterus    Neck:   supple, trachea midline, no JVD   Lungs:     Unlabored respirations, no wheezing, rhonchi or rales noted.    Heart:    Tachycardia , normal S1 and S2, no M/R/G   Abdomen:     soft, NT, round/distension, ascites, NABS    Extremities:   Moves all extremities, BLE edema with ace wraps on both   Pulses:   Pulses palpable and equal bilaterally   Skin:   Warm, dry, extensive wounds to BLE with recent I&D , dressing intact, 3-4+ pitting edema BLE , weeping extensively , jaundiced    Neurologic:   A/Ox3, cooperative   Psych:   Calm and appropriate at this time          Meds: Reviewed and changes noted    Labs:   Results from last 7 days   Lab Units 05/07/24  0038   WBC 10*3/mm3 13.18*   HEMOGLOBIN g/dL 9.8*   HEMATOCRIT % 28.6*   PLATELETS 10*3/mm3 84* "     Results from last 7 days   Lab Units 05/07/24  0038   SODIUM mmol/L 127*   POTASSIUM mmol/L 3.2*   CHLORIDE mmol/L 95*   CO2 mmol/L 22.7   BUN mg/dL 36*   CREATININE mg/dL 1.77*   GLUCOSE mg/dL 102*   CALCIUM mg/dL 8.4*     Results from last 7 days   Lab Units 05/07/24  0038   SODIUM mmol/L 127*   POTASSIUM mmol/L 3.2*   CHLORIDE mmol/L 95*   CO2 mmol/L 22.7   BUN mg/dL 36*   CREATININE mg/dL 1.77*   CALCIUM mg/dL 8.4*   BILIRUBIN mg/dL 5.0*   ALK PHOS U/L 62   ALT (SGPT) U/L 5   AST (SGOT) U/L 16   GLUCOSE mg/dL 102*     Imaging Results (Last 72 Hours)       Procedure Component Value Units Date/Time    US Liver [[id number]] Collected: 05/05/24 2325     Updated: 05/05/24 2329    Narrative:      PROCEDURE: Hepatic ultrasound evaluation performed on May 5, 2024. Color  flow imaging performed.     HISTORY: Hepatic cirrhosis. Ascites. Paracentesis 2 days prior. Evaluate  liver.     COMPARISON: None.     FINDINGS:     Patent IVC.  Small to moderate volume of ascites noted to surround the liver.  Small liver size with nodular surface contour consistent with underlying  hepatocellular disease and cirrhosis.  Patent hepatic veins with appropriate direction of flow.  The common bile duct measures 3.9 mm in diameter and appears  unremarkable.  Patent main portal vein with appropriate direction of flow.       Impression:      Impression:     1.  Small liver size with nodular surface contour consistent with  hepatocellular disease.  2.  Small to moderate volume of free fluid in the right upper quadrant  consistent with ascites.  3.  Normal common bile duct.  4.  Patent main portal vein with appropriate direction of flow.  5.  Patent IVC.  6.  Patent hepatic veins.     This report was finalized on 5/5/2024 11:27 PM by Marcellus Patel MD.       XR Chest 1 View [[id number]] Collected: 05/05/24 1346     Updated: 05/05/24 1353    Narrative:      PROCEDURE: Portable chest x-ray examination performed on May 5, 2024.  Single view.  "Upright position.     HISTORY: Cardiovascular disease. Chest pain.     COMPARISON: None.     FINDINGS:     Enlarged heart size  Right neck central vascular catheter with tip to the SVC.  Right lower lobe and left lower lobe atelectasis.  Mild hypoinflated lungs.  Mild coarsened bronchovascular pattern to the lungs.  No pleural effusion or pneumothorax.  No fracture or foreign body.  Slight levoconvex curvature at the mid thoracic spine.  No free air in the upper abdomen.       Impression:         1.  Mild enlarged heart size.  2.  Hypoinflated lungs.  3.  No edema, pneumonia, pleural effusion, or pneumothorax.  4.  Right neck central vascular catheter with tip to the SVC.     This report was finalized on 5/5/2024 1:47 PM by Marcellus Patel MD.                 Diagnostics: Reviewed    A: Della Jorge is a 63 y.o. female  admitted on 5/4/2024 with decompensated hepatic cirrhosis. She has a past medical history of cirrhosis, HTN and CHF. She also has severe tricuspid valve regurgication, chronic hypoxemica respiratory failure , on 02@2lpm n/c, anxiety disorder, and IBS. She presented to the ED for chief complaints of falls. She reports that she was in her bathroom and her legs \"gave out\". She has extensive bilaterally lower extremity leg swelling, ascites, jaundiced and increased weakness and fatigue. Her lower extremities are 4+ pitting edema with blistered/weeping skin that required a recent evacuation of hematoma to the left shin region. She requires frequent paracentesis due to cirrhosis, her last paracentesis was yesterday and they were able to remove 7.5 liters pt reports.   Today 5/7/2024  T 95.9, , BP of 101/61, RR 16 and was saturating 95% on room air.    P:  I was able to speak with patients  Triston at bedside this morning with Della. They stated that they were agreeable to staying another day to get continued IV antibiotics. Plan is to return home with hospice, referral already in.    We " will continue to follow along. Please do not hesitate to contact us regarding further sx mgmt or GOC needs, including after hours or on weekends via our on call provider at [phone].     Sangeeta De Leon, APRN    5/7/2024

## 2024-05-07 NOTE — PLAN OF CARE
Goal Outcome Evaluation:  Plan of Care Reviewed With: patient        Progress: improving  Outcome Evaluation: Pt agitated this shift but cooperative. Levophed remained on hold this shift. On RA. VSS. No new needs noted at this time.         Problem: Adult Inpatient Plan of Care  Goal: Plan of Care Review  Outcome: Ongoing, Progressing  Flowsheets (Taken 5/7/2024 0417)  Progress: improving  Plan of Care Reviewed With: patient  Outcome Evaluation: Pt agitated this shift but cooperative. Levophed remained on hold this shift. On RA. VSS. No new needs noted at this time.  Goal: Patient-Specific Goal (Individualized)  Outcome: Ongoing, Progressing  Goal: Absence of Hospital-Acquired Illness or Injury  Outcome: Ongoing, Progressing  Intervention: Identify and Manage Fall Risk  Recent Flowsheet Documentation  Taken 5/7/2024 0300 by Huong Portillo RN  Safety Promotion/Fall Prevention:   safety round/check completed   room organization consistent   nonskid shoes/slippers when out of bed   lighting adjusted   fall prevention program maintained   clutter free environment maintained   assistive device/personal items within reach   activity supervised  Taken 5/7/2024 0100 by Huong Portillo, RN  Safety Promotion/Fall Prevention:   safety round/check completed   room organization consistent   nonskid shoes/slippers when out of bed   lighting adjusted   fall prevention program maintained   clutter free environment maintained   assistive device/personal items within reach   activity supervised  Taken 5/6/2024 2300 by Huong Portillo, RN  Safety Promotion/Fall Prevention:   safety round/check completed   room organization consistent   nonskid shoes/slippers when out of bed   lighting adjusted   fall prevention program maintained   clutter free environment maintained   assistive device/personal items within reach   activity supervised  Taken 5/6/2024 2147 by Huong Portillo, RN  Safety Promotion/Fall Prevention:   safety  round/check completed   room organization consistent   nonskid shoes/slippers when out of bed   lighting adjusted   fall prevention program maintained   clutter free environment maintained   assistive device/personal items within reach   activity supervised  Taken 5/6/2024 2100 by Huong Portillo RN  Safety Promotion/Fall Prevention:   safety round/check completed   room organization consistent   nonskid shoes/slippers when out of bed   lighting adjusted   fall prevention program maintained   clutter free environment maintained   assistive device/personal items within reach   activity supervised  Taken 5/6/2024 1900 by Huong Portillo RN  Safety Promotion/Fall Prevention:   safety round/check completed   room organization consistent   nonskid shoes/slippers when out of bed   lighting adjusted   fall prevention program maintained   clutter free environment maintained   assistive device/personal items within reach   activity supervised  Intervention: Prevent Skin Injury  Recent Flowsheet Documentation  Taken 5/7/2024 0200 by Huong Portillo RN  Skin Protection:   adhesive use limited   pulse oximeter probe site changed   tubing/devices free from skin contact   transparent dressing maintained  Taken 5/6/2024 2147 by Huong Portillo RN  Skin Protection:   adhesive use limited   pulse oximeter probe site changed   tubing/devices free from skin contact   transparent dressing maintained  Intervention: Prevent and Manage VTE (Venous Thromboembolism) Risk  Recent Flowsheet Documentation  Taken 5/7/2024 0200 by Huong Portillo RN  VTE Prevention/Management: (see MAR) other (see comments)  Range of Motion: active ROM (range of motion) encouraged  Taken 5/6/2024 2147 by Huong Portillo RN  VTE Prevention/Management: (see MAR) other (see comments)  Range of Motion: active ROM (range of motion) encouraged  Intervention: Prevent Infection  Recent Flowsheet Documentation  Taken 5/7/2024 0300 by Huong Portillo  RN  Infection Prevention:   hand hygiene promoted   rest/sleep promoted   single patient room provided  Taken 5/7/2024 0100 by Huong Portillo RN  Infection Prevention:   hand hygiene promoted   rest/sleep promoted   single patient room provided  Taken 5/6/2024 2300 by Huong Portillo RN  Infection Prevention:   hand hygiene promoted   rest/sleep promoted   single patient room provided  Taken 5/6/2024 2147 by Huong Portillo RN  Infection Prevention:   hand hygiene promoted   rest/sleep promoted   single patient room provided  Taken 5/6/2024 2100 by Huong Portillo RN  Infection Prevention:   hand hygiene promoted   rest/sleep promoted   single patient room provided  Taken 5/6/2024 1900 by Huong Portillo RN  Infection Prevention:   hand hygiene promoted   rest/sleep promoted   single patient room provided  Goal: Optimal Comfort and Wellbeing  Outcome: Ongoing, Progressing  Intervention: Monitor Pain and Promote Comfort  Recent Flowsheet Documentation  Taken 5/7/2024 0200 by Huong Portillo RN  Pain Management Interventions:   position adjusted   pillow support provided  Taken 5/6/2024 2147 by Huong Portillo RN  Pain Management Interventions:   pillow support provided   position adjusted  Intervention: Provide Person-Centered Care  Recent Flowsheet Documentation  Taken 5/7/2024 0200 by Huong Portillo RN  Trust Relationship/Rapport:   care explained   choices provided   thoughts/feelings acknowledged   reassurance provided  Taken 5/6/2024 2147 by Huong Portillo RN  Trust Relationship/Rapport:   care explained   choices provided   thoughts/feelings acknowledged   reassurance provided  Goal: Readiness for Transition of Care  Outcome: Ongoing, Progressing     Problem: Asthma Comorbidity  Goal: Maintenance of Asthma Control  Outcome: Ongoing, Progressing     Problem: Behavioral Health Comorbidity  Goal: Maintenance of Behavioral Health Symptom Control  Outcome: Ongoing, Progressing     Problem: COPD  (Chronic Obstructive Pulmonary Disease) Comorbidity  Goal: Maintenance of COPD Symptom Control  Outcome: Ongoing, Progressing     Problem: Diabetes Comorbidity  Goal: Blood Glucose Level Within Targeted Range  Outcome: Ongoing, Progressing     Problem: Heart Failure Comorbidity  Goal: Maintenance of Heart Failure Symptom Control  Outcome: Ongoing, Progressing     Problem: Hypertension Comorbidity  Goal: Blood Pressure in Desired Range  Outcome: Ongoing, Progressing     Problem: Obstructive Sleep Apnea Risk or Actual Comorbidity Management  Goal: Unobstructed Breathing During Sleep  Outcome: Ongoing, Progressing     Problem: Osteoarthritis Comorbidity  Goal: Maintenance of Osteoarthritis Symptom Control  Outcome: Ongoing, Progressing     Problem: Pain Chronic (Persistent) (Comorbidity Management)  Goal: Acceptable Pain Control and Functional Ability  Outcome: Ongoing, Progressing  Intervention: Develop Pain Management Plan  Recent Flowsheet Documentation  Taken 5/7/2024 0200 by Huong Portillo RN  Pain Management Interventions:   position adjusted   pillow support provided  Taken 5/6/2024 2147 by Huong Portillo RN  Pain Management Interventions:   pillow support provided   position adjusted  Intervention: Optimize Psychosocial Wellbeing  Recent Flowsheet Documentation  Taken 5/7/2024 0200 by Huong Portillo RN  Diversional Activities: television  Family/Support System Care:   support provided   self-care encouraged  Taken 5/6/2024 2147 by Huong Portillo RN  Diversional Activities: television  Family/Support System Care:   support provided   self-care encouraged     Problem: Seizure Disorder Comorbidity  Goal: Maintenance of Seizure Control  Outcome: Ongoing, Progressing     Problem: Skin Injury Risk Increased  Goal: Skin Health and Integrity  Outcome: Ongoing, Progressing  Intervention: Optimize Skin Protection  Recent Flowsheet Documentation  Taken 5/7/2024 0200 by Huong Portillo RN  Pressure Reduction  Techniques:   weight shift assistance provided   heels elevated off bed   positioned off wounds   pressure points protected  Pressure Reduction Devices: pressure-redistributing mattress utilized  Skin Protection:   adhesive use limited   pulse oximeter probe site changed   tubing/devices free from skin contact   transparent dressing maintained  Taken 5/6/2024 2147 by Huong Portillo RN  Pressure Reduction Techniques:   weight shift assistance provided   heels elevated off bed   positioned off wounds   pressure points protected  Pressure Reduction Devices: pressure-redistributing mattress utilized  Skin Protection:   adhesive use limited   pulse oximeter probe site changed   tubing/devices free from skin contact   transparent dressing maintained     Problem: Adjustment to Illness (Sepsis/Septic Shock)  Goal: Optimal Coping  Outcome: Ongoing, Progressing  Intervention: Optimize Psychosocial Adjustment to Illness  Recent Flowsheet Documentation  Taken 5/7/2024 0200 by Huong Portillo RN  Family/Support System Care:   support provided   self-care encouraged  Taken 5/6/2024 2147 by Huong Portillo RN  Family/Support System Care:   support provided   self-care encouraged     Problem: Bleeding (Sepsis/Septic Shock)  Goal: Absence of Bleeding  Outcome: Ongoing, Progressing     Problem: Glycemic Control Impaired (Sepsis/Septic Shock)  Goal: Blood Glucose Level Within Desired Range  Outcome: Ongoing, Progressing     Problem: Infection Progression (Sepsis/Septic Shock)  Goal: Absence of Infection Signs and Symptoms  Outcome: Ongoing, Progressing  Intervention: Initiate Sepsis Management  Recent Flowsheet Documentation  Taken 5/7/2024 0300 by Huong Portillo RN  Infection Prevention:   hand hygiene promoted   rest/sleep promoted   single patient room provided  Taken 5/7/2024 0100 by Huong Portillo RN  Infection Prevention:   hand hygiene promoted   rest/sleep promoted   single patient room provided  Taken 5/6/2024  2300 by Huong Portillo RN  Infection Prevention:   hand hygiene promoted   rest/sleep promoted   single patient room provided  Taken 5/6/2024 2147 by Huong Portillo RN  Infection Prevention:   hand hygiene promoted   rest/sleep promoted   single patient room provided  Taken 5/6/2024 2100 by Huong Portillo RN  Infection Prevention:   hand hygiene promoted   rest/sleep promoted   single patient room provided  Taken 5/6/2024 1900 by Huong Portillo RN  Infection Prevention:   hand hygiene promoted   rest/sleep promoted   single patient room provided     Problem: Nutrition Impaired (Sepsis/Septic Shock)  Goal: Optimal Nutrition Intake  Outcome: Ongoing, Progressing     Problem: Fall Injury Risk  Goal: Absence of Fall and Fall-Related Injury  Outcome: Ongoing, Progressing  Intervention: Promote Injury-Free Environment  Recent Flowsheet Documentation  Taken 5/7/2024 0300 by Huong Portillo RN  Safety Promotion/Fall Prevention:   safety round/check completed   room organization consistent   nonskid shoes/slippers when out of bed   lighting adjusted   fall prevention program maintained   clutter free environment maintained   assistive device/personal items within reach   activity supervised  Taken 5/7/2024 0100 by Huong Portillo RN  Safety Promotion/Fall Prevention:   safety round/check completed   room organization consistent   nonskid shoes/slippers when out of bed   lighting adjusted   fall prevention program maintained   clutter free environment maintained   assistive device/personal items within reach   activity supervised  Taken 5/6/2024 2300 by Huong Portillo RN  Safety Promotion/Fall Prevention:   safety round/check completed   room organization consistent   nonskid shoes/slippers when out of bed   lighting adjusted   fall prevention program maintained   clutter free environment maintained   assistive device/personal items within reach   activity supervised  Taken 5/6/2024 2147 by Lindsey  ABBIE Borja  Safety Promotion/Fall Prevention:   safety round/check completed   room organization consistent   nonskid shoes/slippers when out of bed   lighting adjusted   fall prevention program maintained   clutter free environment maintained   assistive device/personal items within reach   activity supervised  Taken 5/6/2024 2100 by Huong Protillo, RN  Safety Promotion/Fall Prevention:   safety round/check completed   room organization consistent   nonskid shoes/slippers when out of bed   lighting adjusted   fall prevention program maintained   clutter free environment maintained   assistive device/personal items within reach   activity supervised  Taken 5/6/2024 1900 by Huong Portillo, RN  Safety Promotion/Fall Prevention:   safety round/check completed   room organization consistent   nonskid shoes/slippers when out of bed   lighting adjusted   fall prevention program maintained   clutter free environment maintained   assistive device/personal items within reach   activity supervised

## 2024-05-07 NOTE — PLAN OF CARE
Problem: Adult Inpatient Plan of Care  Goal: Plan of Care Review  Outcome: Ongoing, Progressing   Pt resting comfortably. VSS on RA. Spouse at bedside. Updated on plan of care. No concerns at this time.

## 2024-05-07 NOTE — NURSING NOTE
Wound consult for a deep purple discoloration  to the RT gluteal. Area present as a DTI, with the epidermis in tact. Area measures 3 x 1.5  New order for Magic Barrier cream BID and with episodes of incontinence.

## 2024-05-07 NOTE — PROGRESS NOTES
[facility]     Patient Identification:  Name:  Della Jorge  Age:  63 y.o.  Sex:  female  :  1960  MRN:  [id number]  Visit Number:  [id number]  ROOM: [address]     Primary Care Provider:  Sebastián Dougherty MD     Date of Admission: 2024    Length of stay in inpatient status:  3    Subjective     Chief Compliant:    Chief Complaint   Patient presents with    Fall       Patient afebrile overnight and has remained off levophed with Map sustained just over 65 mmHg. Discussed plan with patient and  again this am and again confirm desire to avoid further invasive testing including LHC or RHC but agreeable for vasopressor if needed and to cont IV abx         Objective       Vital Signs:  Temp:  [95.9 °F (35.5 °C)-98.9 °F (37.2 °C)] 95.9 °F (35.5 °C)  Heart Rate:  [] 98  Resp:  [11-35] 18  BP: ()/(49-89) 99/56  SpO2:  [92 %-98 %] 94 %  on   ;   Device (Oxygen Therapy): room air  Body mass index is 21.76 kg/m².      ----------------------------------------------------------------------------------------------------------------------  Physical Exam  Vitals and nursing note reviewed.   Constitutional:       Appearance: She is ill-appearing.   HENT:      Head: Normocephalic and atraumatic.   Eyes:      General: Scleral icterus present.      Extraocular Movements: Extraocular movements intact.   Cardiovascular:      Rate and Rhythm: Normal rate.   Pulmonary:      Effort: Pulmonary effort is normal. No respiratory distress.   Abdominal:      General: There is distension.      Palpations: Abdomen is soft.      Tenderness: There is no abdominal tenderness.   Musculoskeletal:      Right lower leg: Edema present.      Left lower leg: Edema present.   Skin:     Comments: B/l LE erythematous and significant edematous but appear slightly improved given some wrinkling   Neurological:      Mental Status: She is alert.      Cranial Nerves: No cranial nerve  deficit.      Comments: Oriented but tangential at times       ----------------------------------------------------------------------------------------------------------------------          Assessment & Plan      Septic shock  Pseudomonas bacteremia  Decompensated cirrhosis  Congestive hepatopathy  Acute on chronic hyponatremia  Lactic acidemia, recurrent 2/2 hypertension  Elevated troponin 2/2 cirrhosis and heart failure  Acute on chronic combined systolic and diastolic heart failure  HTN hx  ROBEL    -On further chart review cirrhosis believed to be secondary to HF w/severe TVR previously recommended to undergo left and right heart cath but patient has never been agreeable. Discussed at length with patient and family during rounds this am and voiced understanding  -WBC actually improved after ID consulted and added levaquin to meropenem for dual gram negative coverage of pseudomonas bacteremia likely from LE wound. Surgery consulted, s/p hematoma evacuation in ED now with wound care following. Repeat bcx drawn and pend, will cont current abx coverage  -Cr and INR both improved this am with inf treatment, likely cause of decompensation  -Hospice arranged for home once discharged, cont current treatment until medically ready to dc      Code Status and Medical Interventions:   Ordered at: 05/04/24 0826     Medical Intervention Limits:    NO intubation (DNI)     Code Status (Patient has no pulse and is not breathing):    No CPR (Do Not Attempt to Resuscitate)     Medical Interventions (Patient has pulse or is breathing):    Limited Support         Disposition: Cont PCU treatment as above given recent vasopressor requirement    I have reviewed any copied/forwarded text or data, verified its accuracy, and updated as necessary above.    Vu Erwin MD  [facility]  05/07/24  13:43 EDT

## 2024-05-07 NOTE — PROGRESS NOTES
PROGRESS NOTE         Patient Identification:  Name:  Della Jorge  Age:  63 y.o.  Sex:  female  :  1960  MRN:  [id number]  Visit Number:  [id number]  Primary Care Provider:  Sebastián Dougherty MD         LOS: 3 days       ----------------------------------------------------------------------------------------------------------------------  Subjective       Chief Complaints:    Fall        Interval History:      The patient is awake and alert, resting comfortably in bed on room air with no acute distress.   at bedside.  The patient is afebrile.  Denies diarrhea.  Levophed infusing.  Lung exam is clear to auscultation bilaterally.  Abdomen soft and rounded with normoactive bowel sounds.  Bilateral lower extremities remain edematous but are somewhat improving.  Erythema improving.  Leukocytosis improving at 13.18.  Blood cultures from  preliminarily report no growth at 24 hours.    Review of Systems:    Constitutional: no fever, chills and night sweats.  Generalized fatigue.  Eyes: no eye drainage, itching or redness.  HEENT: no mouth sores, dysphagia or nose bleed.  Respiratory: no for shortness of breath, cough or production of sputum.  Cardiovascular: no chest pain, no palpitations, no orthopnea.  Gastrointestinal: no nausea, vomiting or diarrhea. No abdominal pain, hematemesis or rectal bleeding.  Genitourinary: no dysuria or polyuria.  Hematologic/lymphatic: no lymph node abnormalities, no easy bruising or easy bleeding.  Musculoskeletal: no muscle or joint pain.  Skin: No rash and no itching.  Bilateral lower extremity edema.  Left lower extremity wound  Neurological: no loss of consciousness, no seizure, no headache.  Behavioral/Psych: no depression or suicidal ideation.  Endocrine: no hot flashes.  Immunologic: negative.    ----------------------------------------------------------------------------------------------------------------------      Objective       Current  [facility] Meds:  betamethasone dipropionate, , Topical, Daily  doxycycline, 100 mg, Oral, BID  empagliflozin, 10 mg, Oral, Daily  heparin (porcine), 5,000 Units, Subcutaneous, Q8H  levoFLOXacin, 750 mg, Intravenous, Q48H  lubiprostone, 8 mcg, Oral, BID  magic barrier cream, 1 Application, Topical, BID  meropenem, 500 mg, Intravenous, Q12H  midodrine, 10 mg, Oral, TID AC  mupirocin, 1 application , Each Nare, BID  nystatin, 1 Application, Topical, BID  octreotide, 100 mcg, Intravenous, TID  sodium chloride, 10 mL, Intravenous, Q12H  sodium chloride, 10 mL, Intravenous, Q12H  sodium chloride, 10 mL, Intravenous, Q12H  sodium chloride, 10 mL, Intravenous, Q12H         ----------------------------------------------------------------------------------------------------------------------    Vital Signs:  Temp:  [95.9 °F (35.5 °C)-98.9 °F (37.2 °C)] 95.9 °F (35.5 °C)  Heart Rate:  [] 101  Resp:  [11-35] 16  BP: ()/(49-89) 95/62  Mean Arterial Pressure (Non-Invasive) for the past 24 hrs (Last 3 readings):   Noninvasive MAP (mmHg)   05/07/24 1200 81   05/07/24 1100 59   05/07/24 1000 72     SpO2 Percentage    05/07/24 1000 05/07/24 1100 05/07/24 1200   SpO2: 92% 93% 94%     SpO2:  [92 %-98 %] 94 %  on   ;   Device (Oxygen Therapy): room air    Body mass index is 21.76 kg/m².  Wt Readings from Last 3 Encounters:   05/07/24 48.9 kg (107 lb 12.9 oz)   04/23/24 81.6 kg (180 lb)   04/09/24 80.5 kg (177 lb 6.4 oz)        Intake/Output Summary (Last 24 hours) at 5/7/2024 1331  Last data filed at 5/7/2024 0800  Gross per 24 hour   Intake 1020.84 ml   Output 800 ml   Net 220.84 ml     Diet: Cardiac; Low Sodium (2g); Fluid Consistency: Thin (IDDSI 0)  ----------------------------------------------------------------------------------------------------------------------      Physical Exam:    Constitutional: Chronically ill-appearing elderly female.  Resting comfortably in bed on room air with no apparent distress.  HENT:   Head: Normocephalic and atraumatic.  Mouth:  Moist mucous membranes.    Eyes:  Conjunctivae and EOM are normal.  No scleral icterus.  Neck:  Neck supple.  No JVD present.    Cardiovascular:  Normal rate, regular rhythm and normal heart sounds with no murmur. No edema.  Pulmonary/Chest:  No respiratory distress, no wheezes, no crackles, with normal breath sounds and good air movement.  Abdominal:  Soft.  Bowel sounds are normal.  No distension and no tenderness.   Musculoskeletal:  No edema, no tenderness, and no deformity.  No swelling or redness of joints.  Neurological:  Alert and oriented to person, place, and time.  No facial droop.  No slurred speech.   Skin:  Skin is warm and dry.  No rash noted.  No pallor.  Bilateral lower extremity edema improving.  Bilateral lower extremity erythema improving.  Wrapped with Ace wrap, CDI  Psychiatric:  Normal mood and affect.  Behavior is normal.        ----------------------------------------------------------------------------------------------------------------------  Results from last 7 days   Lab Units 05/04/24  0324 05/04/24  0133   HSTROP T ng/L 74* 76*     Results from last 7 days   Lab Units 05/04/24  0133   PROBNP pg/mL 14,440.0*         Results from last 7 days   Lab Units 05/07/24  0038 05/06/24  0037 05/05/24  0909 05/05/24  0158 05/04/24  1643 05/04/24  0631 05/04/24  0333 05/04/24  0133   CRP mg/dL  --   --   --   --   --   --   --  16.99*   LACTATE mmol/L  --   --  1.9  --  3.1* 1.8   < >  --    WBC 10*3/mm3 13.18* 23.32*  --  16.49*  16.49* 13.57*  --   --  10.57   HEMOGLOBIN g/dL 9.8* 11.0*  --  10.2*  10.2* 11.0*  --   --  11.6*   HEMATOCRIT % 28.6* 32.9*  --  30.6*  30.6* 34.2  --   --  33.9*   MCV fL 88.3 88.2  --  90.0  90.0 94.2  --   --  89.4   MCHC g/dL 34.3 33.4  --  33.3  33.3 32.2  --   --  34.2   PLATELETS 10*3/mm3 84* 152  --  126*  126* 101*  --   --  141   INR  1.72*  --   --  1.92*  --   --   --  1.87*    < > = values in this interval  "not displayed.     Results from last 7 days   Lab Units 05/07/24  0038 05/06/24  0037 05/05/24  0158   SODIUM mmol/L 127* 124* 124*   POTASSIUM mmol/L 3.2* 3.9 4.0   CHLORIDE mmol/L 95* 92* 91*   CO2 mmol/L 22.7 20.3* 21.2*   BUN mg/dL 36* 37* 35*   CREATININE mg/dL 1.77* 1.97* 2.17*   CALCIUM mg/dL 8.4* 8.4* 8.6   GLUCOSE mg/dL 102* 101* 93   ALBUMIN g/dL 2.9* 2.7* 2.9*   BILIRUBIN mg/dL 5.0* 4.8* 5.0*   ALK PHOS U/L 62 86 75   AST (SGOT) U/L 16 17 16   ALT (SGPT) U/L 5 7 6   Estimated Creatinine Clearance: 25.1 mL/min (A) (by C-G formula based on SCr of 1.77 mg/dL (H)).  No results found for: \"AMMONIA\"    Glucose   Date/Time Value Ref Range Status   05/04/2024 1634 94 70 - 130 mg/dL Final     Lab Results   Component Value Date    HGBA1C 4.80 08/04/2022     Lab Results   Component Value Date    TSH 6.250 (H) 08/05/2022    FREET4 1.29 08/05/2022       Blood Culture   Date Value Ref Range Status   05/05/2024 No growth at 24 hours  Preliminary   05/05/2024 No growth at 24 hours  Preliminary   05/04/2024 Pseudomonas aeruginosa (C)  Final   05/04/2024 No growth at 3 days  Preliminary     No results found for: \"URINECX\"  No results found for: \"WOUNDCX\"  No results found for: \"STOOLCX\"  No results found for: \"RESPCX\"  Pain Management Panel           No data to display                  ----------------------------------------------------------------------------------------------------------------------  Imaging Results (Last 24 Hours)       ** No results found for the last 24 hours. **            ----------------------------------------------------------------------------------------------------------------------    Pertinent Infectious Disease Results                Assessment/Plan       Assessment     Pseudomonas bacteremia  Left lower extremity wound        Plan      The patient is awake and alert, resting comfortably in bed on room air with no acute distress.   at bedside.  The patient is afebrile.  Denies " diarrhea.  Levophed infusing.  Lung exam is clear to auscultation bilaterally.  Abdomen soft and rounded with normoactive bowel sounds.  Bilateral lower extremities remain edematous but are somewhat improving.  Erythema improving.  Leukocytosis improving at 13.18.  Blood cultures from 5/5 preliminarily report no growth at 24 hours.    The patient had previously been initiated on doxycycline and meropenem empirically in the setting of cellulitis and left lower extremity wound, in the setting of Pseudomonas bacteremia Levaquin was initiated propulse are monotherapy while awaiting susceptibility report.  Susceptibility report from 5/4 blood cultures as finalized and Pseudomonas was found to be pan susceptible.  We will discontinue Levaquin and meropenem course for now, and initiate cefepime graded challenge to continue during hospitalization if tolerates.  Plan to discharge on high-dose oral Levaquin to complete a total of 14-day course from the first set of negative blood cultures, tentatively through 5/19/2024.  Will continue to monitor closely      ANTIMICROBIAL THERAPY    doxycycline - 100 MG, 100 MG  levoFLOXacin (LEVAQUIN) 750 mg/150 mL D5W (premix) - 750 MG/150ML  meropenem (MERREM) 500 mg IVPB in 100 mL NS (VTB)     Code Status:   Code Status and Medical Interventions:   Ordered at: 05/04/24 0826     Medical Intervention Limits:    NO intubation (DNI)     Code Status (Patient has no pulse and is not breathing):    No CPR (Do Not Attempt to Resuscitate)     Medical Interventions (Patient has pulse or is breathing):    Limited Support       CHARY Chun  05/07/24  13:31 EDT

## 2024-05-08 LAB
ANION GAP SERPL CALCULATED.3IONS-SCNC: 12.1 MMOL/L (ref 5–15)
BUN SERPL-MCNC: 36 MG/DL (ref 8–23)
BUN/CREAT SERPL: 24.5 (ref 7–25)
CALCIUM SPEC-SCNC: 8.4 MG/DL (ref 8.6–10.5)
CHLORIDE SERPL-SCNC: 96 MMOL/L (ref 98–107)
CO2 SERPL-SCNC: 20.9 MMOL/L (ref 22–29)
CREAT SERPL-MCNC: 1.47 MG/DL (ref 0.57–1)
DEPRECATED RDW RBC AUTO: 54.1 FL (ref 37–54)
EGFRCR SERPLBLD CKD-EPI 2021: 40 ML/MIN/1.73
ERYTHROCYTE [DISTWIDTH] IN BLOOD BY AUTOMATED COUNT: 16.6 % (ref 12.3–15.4)
GLUCOSE SERPL-MCNC: 102 MG/DL (ref 65–99)
HCT VFR BLD AUTO: 32.2 % (ref 34–46.6)
HGB BLD-MCNC: 10.8 G/DL (ref 12–15.9)
MCH RBC QN AUTO: 29.9 PG (ref 26.6–33)
MCHC RBC AUTO-ENTMCNC: 33.5 G/DL (ref 31.5–35.7)
MCV RBC AUTO: 89.2 FL (ref 79–97)
PLATELET # BLD AUTO: 101 10*3/MM3 (ref 140–450)
PMV BLD AUTO: 9.5 FL (ref 6–12)
POTASSIUM SERPL-SCNC: 3.5 MMOL/L (ref 3.5–5.2)
RBC # BLD AUTO: 3.61 10*6/MM3 (ref 3.77–5.28)
SODIUM SERPL-SCNC: 129 MMOL/L (ref 136–145)
WBC NRBC COR # BLD AUTO: 12.32 10*3/MM3 (ref 3.4–10.8)

## 2024-05-08 PROCEDURE — 25010000002 HEPARIN (PORCINE) PER 1000 UNITS: Performed by: STUDENT IN AN ORGANIZED HEALTH CARE EDUCATION/TRAINING PROGRAM

## 2024-05-08 PROCEDURE — 85027 COMPLETE CBC AUTOMATED: CPT | Performed by: STUDENT IN AN ORGANIZED HEALTH CARE EDUCATION/TRAINING PROGRAM

## 2024-05-08 PROCEDURE — 94761 N-INVAS EAR/PLS OXIMETRY MLT: CPT

## 2024-05-08 PROCEDURE — 25010000002 CEFEPIME PER 500 MG

## 2024-05-08 PROCEDURE — 80048 BASIC METABOLIC PNL TOTAL CA: CPT | Performed by: STUDENT IN AN ORGANIZED HEALTH CARE EDUCATION/TRAINING PROGRAM

## 2024-05-08 PROCEDURE — 25010000002 OCTREOTIDE PER 25 MCG: Performed by: STUDENT IN AN ORGANIZED HEALTH CARE EDUCATION/TRAINING PROGRAM

## 2024-05-08 PROCEDURE — 99232 SBSQ HOSP IP/OBS MODERATE 35: CPT | Performed by: NURSE PRACTITIONER

## 2024-05-08 PROCEDURE — 99232 SBSQ HOSP IP/OBS MODERATE 35: CPT | Performed by: STUDENT IN AN ORGANIZED HEALTH CARE EDUCATION/TRAINING PROGRAM

## 2024-05-08 RX ADMIN — Medication 10 MG: at 21:28

## 2024-05-08 RX ADMIN — Medication 10 ML: at 08:00

## 2024-05-08 RX ADMIN — HEPARIN SODIUM 5000 UNITS: 5000 INJECTION INTRAVENOUS; SUBCUTANEOUS at 14:22

## 2024-05-08 RX ADMIN — OCTREOTIDE ACETATE 100 MCG: 100 INJECTION, SOLUTION INTRAVENOUS; SUBCUTANEOUS at 21:16

## 2024-05-08 RX ADMIN — HEPARIN SODIUM 5000 UNITS: 5000 INJECTION INTRAVENOUS; SUBCUTANEOUS at 21:16

## 2024-05-08 RX ADMIN — DOXYCYCLINE 100 MG: 100 CAPSULE ORAL at 21:16

## 2024-05-08 RX ADMIN — Medication 10 ML: at 21:17

## 2024-05-08 RX ADMIN — LOPERAMIDE HYDROCHLORIDE 4 MG: 2 CAPSULE ORAL at 10:44

## 2024-05-08 RX ADMIN — LOPERAMIDE HYDROCHLORIDE 4 MG: 2 CAPSULE ORAL at 04:24

## 2024-05-08 RX ADMIN — OCTREOTIDE ACETATE 100 MCG: 100 INJECTION, SOLUTION INTRAVENOUS; SUBCUTANEOUS at 08:00

## 2024-05-08 RX ADMIN — NYSTATIN 1 APPLICATION: 100000 POWDER TOPICAL at 21:17

## 2024-05-08 RX ADMIN — OCTREOTIDE ACETATE 100 MCG: 100 INJECTION, SOLUTION INTRAVENOUS; SUBCUTANEOUS at 00:13

## 2024-05-08 RX ADMIN — CARBIDOPA AND LEVODOPA 10 MG: 50; 200 TABLET, EXTENDED RELEASE ORAL at 16:30

## 2024-05-08 RX ADMIN — Medication 10 ML: at 21:18

## 2024-05-08 RX ADMIN — CARBIDOPA AND LEVODOPA 10 MG: 50; 200 TABLET, EXTENDED RELEASE ORAL at 07:58

## 2024-05-08 RX ADMIN — POTASSIUM CHLORIDE 40 MEQ: 1500 TABLET, EXTENDED RELEASE ORAL at 08:00

## 2024-05-08 RX ADMIN — MUPIROCIN 1 APPLICATION: 20 OINTMENT TOPICAL at 21:16

## 2024-05-08 RX ADMIN — VITAMINS A AND D OINTMENT 1 APPLICATION: 15.5; 53.4 OINTMENT TOPICAL at 21:16

## 2024-05-08 RX ADMIN — DOXYCYCLINE 100 MG: 100 CAPSULE ORAL at 08:00

## 2024-05-08 RX ADMIN — CEFEPIME 2000 MG: 2 INJECTION, POWDER, FOR SOLUTION INTRAVENOUS at 23:15

## 2024-05-08 RX ADMIN — CARBIDOPA AND LEVODOPA 10 MG: 50; 200 TABLET, EXTENDED RELEASE ORAL at 10:44

## 2024-05-08 RX ADMIN — NYSTATIN 1 APPLICATION: 100000 POWDER TOPICAL at 08:00

## 2024-05-08 RX ADMIN — CEFEPIME 2000 MG: 2 INJECTION, POWDER, FOR SOLUTION INTRAVENOUS at 11:31

## 2024-05-08 RX ADMIN — MUPIROCIN 1 APPLICATION: 20 OINTMENT TOPICAL at 08:00

## 2024-05-08 RX ADMIN — VITAMINS A AND D OINTMENT 1 APPLICATION: 15.5; 53.4 OINTMENT TOPICAL at 08:01

## 2024-05-08 RX ADMIN — BETAMETHASONE DIPROPIONATE: 0.5 OINTMENT TOPICAL at 08:00

## 2024-05-08 RX ADMIN — LIDOCAINE 4% 1 APPLICATION: 4 CREAM TOPICAL at 07:59

## 2024-05-08 RX ADMIN — OCTREOTIDE ACETATE 100 MCG: 100 INJECTION, SOLUTION INTRAVENOUS; SUBCUTANEOUS at 16:19

## 2024-05-08 RX ADMIN — HEPARIN SODIUM 5000 UNITS: 5000 INJECTION INTRAVENOUS; SUBCUTANEOUS at 06:17

## 2024-05-08 RX ADMIN — EMPAGLIFLOZIN 10 MG: 10 TABLET, FILM COATED ORAL at 08:00

## 2024-05-08 NOTE — PROGRESS NOTES
[facility]     Patient Identification:  Name:  Della Jorge  Age:  63 y.o.  Sex:  female  :  1960  MRN:  [id number]  Visit Number:  [id number]  ROOM: [address]     Primary Care Provider:  Sebastián Dougherty MD     Date of Admission: 2024    Length of stay in inpatient status:  4    Subjective     Chief Compliant:    Chief Complaint   Patient presents with    Fall       Afebrile overnight, c/o diarrhea worsening this am        Objective       Vital Signs:  Temp:  [96.3 °F (35.7 °C)-97.2 °F (36.2 °C)] 96.6 °F (35.9 °C)  Heart Rate:  [] 97  Resp:  [12-24] 16  BP: ()/(61-85) 104/67  SpO2:  [91 %-99 %] 94 %  on   ;   Device (Oxygen Therapy): room air  Body mass index is 21.76 kg/m².      ----------------------------------------------------------------------------------------------------------------------  Physical Exam  Vitals and nursing note reviewed.   Constitutional:       Appearance: She is ill-appearing.   HENT:      Head: Normocephalic and atraumatic.   Eyes:      General: Scleral icterus present.      Extraocular Movements: Extraocular movements intact.   Cardiovascular:      Rate and Rhythm: Normal rate.   Pulmonary:      Effort: Pulmonary effort is normal. No respiratory distress.   Abdominal:      General: There is distension.      Palpations: Abdomen is soft.      Tenderness: There is no abdominal tenderness.   Musculoskeletal:      Right lower leg: Edema present.      Left lower leg: Edema present.   Skin:     Comments: B/l LE erythematous and significant edematous but appear slightly improved given some wrinkling   Neurological:      Mental Status: She is alert.      Cranial Nerves: No cranial nerve deficit.      Comments: Oriented but tangential at times       ----------------------------------------------------------------------------------------------------------------------          Assessment & Plan      Septic shock  Pseudomonas  bacteremia  Decompensated cirrhosis  Congestive hepatopathy  Acute on chronic hyponatremia  Lactic acidemia, recurrent 2/2 hypertension  Elevated troponin 2/2 cirrhosis and heart failure  Acute on chronic combined systolic and diastolic heart failure  HTN hx  ROBEL    -Cont iv abx, changed to cefepime. ID following. PO levaquin at dc  -labs improving  -home hospice once discharged  -monitor diarrhea, likely drug induced from amitza, holding      Code Status and Medical Interventions:   Ordered at: 05/04/24 0826     Medical Intervention Limits:    NO intubation (DNI)     Code Status (Patient has no pulse and is not breathing):    No CPR (Do Not Attempt to Resuscitate)     Medical Interventions (Patient has pulse or is breathing):    Limited Support         Disposition: Off vasopressors >24hrs, ok downgrade. Dc home likely in am    I have reviewed any copied/forwarded text or data, verified its accuracy, and updated as necessary above.    Vu Erwin MD  [facility] Hospitalist  05/08/24  16:03 EDT

## 2024-05-08 NOTE — PROGRESS NOTES
"Palliative Care Daily Progress Note     S: Medical record reviewed, followed up with Primary RN Elizabeth and Dr Erwin regarding patient's condition.When I saw Della today she was laying down and had her gown pulled off of her. She stated that she had gotten too warm under blanket. She denied pain, nausea, anxiety or shortness of breath at this time. She said she was nauseated earlier however had gotten some medication and it was better.    O:   Palliative Performance Scale Score:     /67   Pulse 87   Temp 96.6 °F (35.9 °C) (Axillary)   Resp 16   Ht 149.9 cm (59.02\")   Wt 48.9 kg (107 lb 12.9 oz)   SpO2 94%   BMI 21.76 kg/m²     Intake/Output Summary (Last 24 hours) at 5/8/2024 1653  Last data filed at 5/8/2024 0837  Gross per 24 hour   Intake 460 ml   Output --   Net 460 ml       PE:  General Appearance:    Chronically ill appearing, alert, cooperative, NAD   HEENT:    NC/AT, without obvious abnormality, EOMI, scleral icterus    Neck:   supple, trachea midline, no JVD   Lungs:     Unlabored respirations, no wheezing, rhonchi or rales noted.    Heart:    Tachycardia , normal S1 and S2, no M/R/G   Abdomen:     soft, NT, round/distension, ascites, NABS    Extremities:   Moves all extremities, BLE edema with ace wraps on both   Pulses:   Pulses palpable and equal bilaterally   Skin:   Warm, dry, extensive wounds to BLE with recent I&D , dressing intact, 3-4+ pitting edema BLE , weeping extensively , jaundiced    Neurologic:   A/Ox3, cooperative   Psych:   Calm and appropriate           Meds: Reviewed and changes noted    Labs:   Results from last 7 days   Lab Units 05/08/24  0041   WBC 10*3/mm3 12.32*   HEMOGLOBIN g/dL 10.8*   HEMATOCRIT % 32.2*   PLATELETS 10*3/mm3 101*     Results from last 7 days   Lab Units 05/08/24  0041   SODIUM mmol/L 129*   POTASSIUM mmol/L 3.5   CHLORIDE mmol/L 96*   CO2 mmol/L 20.9*   BUN mg/dL 36*   CREATININE mg/dL 1.47*   GLUCOSE mg/dL 102*   CALCIUM mg/dL 8.4*     Results from " last 7 days   Lab Units 05/08/24  0041 05/07/24  0038   SODIUM mmol/L 129* 127*   POTASSIUM mmol/L 3.5 3.2*   CHLORIDE mmol/L 96* 95*   CO2 mmol/L 20.9* 22.7   BUN mg/dL 36* 36*   CREATININE mg/dL 1.47* 1.77*   CALCIUM mg/dL 8.4* 8.4*   BILIRUBIN mg/dL  --  5.0*   ALK PHOS U/L  --  62   ALT (SGPT) U/L  --  5   AST (SGOT) U/L  --  16   GLUCOSE mg/dL 102* 102*     Imaging Results (Last 72 Hours)       Procedure Component Value Units Date/Time    US Liver [[id number]] Collected: 05/05/24 2325     Updated: 05/05/24 2329    Narrative:      PROCEDURE: Hepatic ultrasound evaluation performed on May 5, 2024. Color  flow imaging performed.     HISTORY: Hepatic cirrhosis. Ascites. Paracentesis 2 days prior. Evaluate  liver.     COMPARISON: None.     FINDINGS:     Patent IVC.  Small to moderate volume of ascites noted to surround the liver.  Small liver size with nodular surface contour consistent with underlying  hepatocellular disease and cirrhosis.  Patent hepatic veins with appropriate direction of flow.  The common bile duct measures 3.9 mm in diameter and appears  unremarkable.  Patent main portal vein with appropriate direction of flow.       Impression:      Impression:     1.  Small liver size with nodular surface contour consistent with  hepatocellular disease.  2.  Small to moderate volume of free fluid in the right upper quadrant  consistent with ascites.  3.  Normal common bile duct.  4.  Patent main portal vein with appropriate direction of flow.  5.  Patent IVC.  6.  Patent hepatic veins.     This report was finalized on 5/5/2024 11:27 PM by Marcellus Patel MD.                 Diagnostics: Reviewed    A: Della Jorge is a 63 y.o.  female admitted on 5/4/2024 with decompensated hepatic cirrhosis. She has a past medical history of cirrhosis, HTN and CHF. She also has severe tricuspid valve regurgication, chronic hypoxemica respiratory failure , on 02@2lpm n/c, anxiety disorder, and IBS. She presented to the ED  "for chief complaints of falls. She reports that she was in her bathroom and her legs \"gave out\". She has extensive bilaterally lower extremity leg swelling, ascites, jaundiced and increased weakness and fatigue. Her lower extremities are 4+ pitting edema with blistered/weeping skin that required a recent evacuation of hematoma to the left shin region. She requires frequent paracentesis due to cirrhosis, her last paracentesis was yesterday and they were able to remove 7.5 liters pt reports.   Today 5/8/2024  Della is awake and alert with no complaints T 96.6, HR 87, RR 16, BP of 104/67 and was saturating 94% on room air and was not in distress.    P:  Plan is most likely to return home with hospice, spoke with hospice and equipment has been delivered. Discussed pt with Dr Erwin.    We will continue to follow along. Please do not hesitate to contact us regarding further sx mgmt or GOC needs, including after hours or on weekends via our on call provider at [phone].     Sangeeta De Leon, APRN    5/8/2024  "

## 2024-05-08 NOTE — CASE MANAGEMENT/SOCIAL WORK
Discharge Planning Assessment   [facility]     Patient Name: Della Jorge  MRN: [id number]  Today's Date: 5/8/2024    Admit Date: 5/4/2024     Discharge Plan       Row Name 05/08/24 1447       Plan    Plan Hospice referral made on 5/6/24. Pt's DME with hospice was delivered and set up at home per Donal. [facility] will need to be contacted at discharge [phone]. Pt lives with spouse Triston and plans to return home with hospice services. Pt does not utilize  services at this time. Pt utilizes (w) walker, hospital bed, wheelchair and home oxygen PRN via [employer]. Pt does not have POA/living will. Pt states to being ambulatory until friday when she hurt her leg. Pt states she will need ambulance arranged at discharge.                  Continued Care and Services - Admitted Since 5/4/2024       Destination       Service Provider Request Status Selected Services Address Phone Fax Patient Preferred    [facility] Accepted N/A [address] [facility] [address] [phone] --               ASIF Gordillo

## 2024-05-08 NOTE — PLAN OF CARE
Problem: Adult Inpatient Plan of Care  Goal: Plan of Care Review  Outcome: Ongoing, Progressing  Flowsheets (Taken 5/8/2024 0308)  Outcome Evaluation: pt &O x4 bed alarm set call light in reach A&O X4.  Goal: Patient-Specific Goal (Individualized)  Outcome: Ongoing, Progressing  Goal: Absence of Hospital-Acquired Illness or Injury  Outcome: Ongoing, Progressing  Intervention: Identify and Manage Fall Risk  Recent Flowsheet Documentation  Taken 5/8/2024 0220 by Lester Black, RN  Safety Promotion/Fall Prevention:   safety round/check completed   room organization consistent   nonskid shoes/slippers when out of bed   lighting adjusted   fall prevention program maintained   clutter free environment maintained   assistive device/personal items within reach   activity supervised  Taken 5/8/2024 0100 by Lester Black, RN  Safety Promotion/Fall Prevention:   safety round/check completed   room organization consistent   nonskid shoes/slippers when out of bed   lighting adjusted   fall prevention program maintained   clutter free environment maintained   assistive device/personal items within reach   activity supervised  Taken 5/7/2024 2300 by Lester Black, RN  Safety Promotion/Fall Prevention:   safety round/check completed   room organization consistent   nonskid shoes/slippers when out of bed   lighting adjusted   fall prevention program maintained   clutter free environment maintained   assistive device/personal items within reach   activity supervised  Taken 5/7/2024 2100 by Lester Black, RN  Safety Promotion/Fall Prevention:   safety round/check completed   room organization consistent   nonskid shoes/slippers when out of bed   lighting adjusted   fall prevention program maintained   clutter free environment maintained   assistive device/personal items within reach   activity supervised  Taken 5/7/2024 1900 by Lester Black, RN  Safety Promotion/Fall Prevention:   safety round/check completed   room organization  consistent   nonskid shoes/slippers when out of bed   lighting adjusted   fall prevention program maintained   clutter free environment maintained   assistive device/personal items within reach   activity supervised  Intervention: Prevent Skin Injury  Recent Flowsheet Documentation  Taken 5/7/2024 2000 by Lester Black RN  Skin Protection: adhesive use limited  Intervention: Prevent and Manage VTE (Venous Thromboembolism) Risk  Recent Flowsheet Documentation  Taken 5/8/2024 0220 by Lester Black RN  Activity Management: patient refuses activity  VTE Prevention/Management: (see mar) other (see comments)  Range of Motion: active ROM (range of motion) encouraged  Taken 5/7/2024 2000 by Lester Black RN  VTE Prevention/Management: (see mar) other (see comments)  Range of Motion: active ROM (range of motion) encouraged  Intervention: Prevent Infection  Recent Flowsheet Documentation  Taken 5/8/2024 0220 by Lester Black RN  Infection Prevention:   single patient room provided   rest/sleep promoted   hand hygiene promoted   environmental surveillance performed  Taken 5/8/2024 0100 by Lester Black RN  Infection Prevention:   single patient room provided   rest/sleep promoted   hand hygiene promoted   environmental surveillance performed  Taken 5/7/2024 2300 by Lester Black RN  Infection Prevention:   single patient room provided   rest/sleep promoted   hand hygiene promoted   environmental surveillance performed  Taken 5/7/2024 2100 by Lester Black RN  Infection Prevention:   single patient room provided   rest/sleep promoted   hand hygiene promoted   environmental surveillance performed  Taken 5/7/2024 2000 by Lester Black RN  Infection Prevention:   single patient room provided   rest/sleep promoted   hand hygiene promoted   environmental surveillance performed  Taken 5/7/2024 1900 by Lester Black RN  Infection Prevention:   single patient room provided   rest/sleep promoted   hand hygiene promoted   environmental  surveillance performed  Goal: Optimal Comfort and Wellbeing  Outcome: Ongoing, Progressing  Intervention: Provide Person-Centered Care  Recent Flowsheet Documentation  Taken 5/8/2024 0220 by Lester Black RN  Trust Relationship/Rapport:   care explained   choices provided   thoughts/feelings acknowledged  Taken 5/7/2024 2000 by Lester Black RN  Trust Relationship/Rapport:   care explained   choices provided   thoughts/feelings acknowledged  Goal: Readiness for Transition of Care  Outcome: Ongoing, Progressing     Problem: Asthma Comorbidity  Goal: Maintenance of Asthma Control  Outcome: Ongoing, Progressing     Problem: Behavioral Health Comorbidity  Goal: Maintenance of Behavioral Health Symptom Control  Outcome: Ongoing, Progressing     Problem: COPD (Chronic Obstructive Pulmonary Disease) Comorbidity  Goal: Maintenance of COPD Symptom Control  Outcome: Ongoing, Progressing  Intervention: Maintain COPD-Symptom Control  Recent Flowsheet Documentation  Taken 5/8/2024 0220 by Lester Black RN  Supportive Measures: active listening utilized  Taken 5/7/2024 2000 by Lester Black RN  Supportive Measures: active listening utilized     Problem: Diabetes Comorbidity  Goal: Blood Glucose Level Within Targeted Range  Outcome: Ongoing, Progressing     Problem: Heart Failure Comorbidity  Goal: Maintenance of Heart Failure Symptom Control  Outcome: Ongoing, Progressing     Problem: Hypertension Comorbidity  Goal: Blood Pressure in Desired Range  Outcome: Ongoing, Progressing     Problem: Obstructive Sleep Apnea Risk or Actual Comorbidity Management  Goal: Unobstructed Breathing During Sleep  Outcome: Ongoing, Progressing     Problem: Osteoarthritis Comorbidity  Goal: Maintenance of Osteoarthritis Symptom Control  Outcome: Ongoing, Progressing  Intervention: Maintain Osteoarthritis Symptom Control  Recent Flowsheet Documentation  Taken 5/8/2024 0220 by Lester Black RN  Activity Management: patient refuses activity     Problem:  Pain Chronic (Persistent) (Comorbidity Management)  Goal: Acceptable Pain Control and Functional Ability  Outcome: Ongoing, Progressing  Intervention: Optimize Psychosocial Wellbeing  Recent Flowsheet Documentation  Taken 5/8/2024 0220 by Lester Black RN  Supportive Measures: active listening utilized  Diversional Activities: television  Family/Support System Care: support provided  Taken 5/7/2024 2000 by Lester Black RN  Supportive Measures: active listening utilized  Diversional Activities: television  Family/Support System Care: support provided     Problem: Seizure Disorder Comorbidity  Goal: Maintenance of Seizure Control  Outcome: Ongoing, Progressing     Problem: Skin Injury Risk Increased  Goal: Skin Health and Integrity  Outcome: Ongoing, Progressing  Intervention: Optimize Skin Protection  Recent Flowsheet Documentation  Taken 5/7/2024 2000 by Lester Black RN  Pressure Reduction Techniques: frequent weight shift encouraged  Skin Protection: adhesive use limited     Problem: Adjustment to Illness (Sepsis/Septic Shock)  Goal: Optimal Coping  Outcome: Ongoing, Progressing  Intervention: Optimize Psychosocial Adjustment to Illness  Recent Flowsheet Documentation  Taken 5/8/2024 0220 by Lester Black RN  Supportive Measures: active listening utilized  Family/Support System Care: support provided  Taken 5/7/2024 2000 by Lester Black RN  Supportive Measures: active listening utilized  Family/Support System Care: support provided     Problem: Bleeding (Sepsis/Septic Shock)  Goal: Absence of Bleeding  Outcome: Ongoing, Progressing     Problem: Glycemic Control Impaired (Sepsis/Septic Shock)  Goal: Blood Glucose Level Within Desired Range  Outcome: Ongoing, Progressing     Problem: Infection Progression (Sepsis/Septic Shock)  Goal: Absence of Infection Signs and Symptoms  Outcome: Ongoing, Progressing  Intervention: Initiate Sepsis Management  Recent Flowsheet Documentation  Taken 5/8/2024 0220 by Lester Black  RN  Infection Prevention:   single patient room provided   rest/sleep promoted   hand hygiene promoted   environmental surveillance performed  Taken 5/8/2024 0100 by Lester Black RN  Infection Prevention:   single patient room provided   rest/sleep promoted   hand hygiene promoted   environmental surveillance performed  Taken 5/7/2024 2300 by Lester Black RN  Infection Prevention:   single patient room provided   rest/sleep promoted   hand hygiene promoted   environmental surveillance performed  Taken 5/7/2024 2100 by Lester Black RN  Infection Prevention:   single patient room provided   rest/sleep promoted   hand hygiene promoted   environmental surveillance performed  Taken 5/7/2024 2000 by Lester Black RN  Infection Prevention:   single patient room provided   rest/sleep promoted   hand hygiene promoted   environmental surveillance performed  Taken 5/7/2024 1900 by Lester Black RN  Infection Prevention:   single patient room provided   rest/sleep promoted   hand hygiene promoted   environmental surveillance performed  Intervention: Promote Recovery  Recent Flowsheet Documentation  Taken 5/8/2024 0220 by Lester Black RN  Activity Management: patient refuses activity     Problem: Nutrition Impaired (Sepsis/Septic Shock)  Goal: Optimal Nutrition Intake  Outcome: Ongoing, Progressing     Problem: Fall Injury Risk  Goal: Absence of Fall and Fall-Related Injury  Outcome: Ongoing, Progressing  Intervention: Promote Injury-Free Environment  Recent Flowsheet Documentation  Taken 5/8/2024 0220 by Lester Black RN  Safety Promotion/Fall Prevention:   safety round/check completed   room organization consistent   nonskid shoes/slippers when out of bed   lighting adjusted   fall prevention program maintained   clutter free environment maintained   assistive device/personal items within reach   activity supervised  Taken 5/8/2024 0100 by Lester Black RN  Safety Promotion/Fall Prevention:   safety round/check completed    room organization consistent   nonskid shoes/slippers when out of bed   lighting adjusted   fall prevention program maintained   clutter free environment maintained   assistive device/personal items within reach   activity supervised  Taken 5/7/2024 2300 by Lester Black, RN  Safety Promotion/Fall Prevention:   safety round/check completed   room organization consistent   nonskid shoes/slippers when out of bed   lighting adjusted   fall prevention program maintained   clutter free environment maintained   assistive device/personal items within reach   activity supervised  Taken 5/7/2024 2100 by Lester Black, RN  Safety Promotion/Fall Prevention:   safety round/check completed   room organization consistent   nonskid shoes/slippers when out of bed   lighting adjusted   fall prevention program maintained   clutter free environment maintained   assistive device/personal items within reach   activity supervised  Taken 5/7/2024 1900 by Lester Black, RN  Safety Promotion/Fall Prevention:   safety round/check completed   room organization consistent   nonskid shoes/slippers when out of bed   lighting adjusted   fall prevention program maintained   clutter free environment maintained   assistive device/personal items within reach   activity supervised   Goal Outcome Evaluation:              Outcome Evaluation: pt &O x4 bed alarm set call light in reach A&O X4.

## 2024-05-08 NOTE — PROGRESS NOTES
"           PROGRESS NOTE         Patient Identification:  Name:  Della Jorge  Age:  63 y.o.  Sex:  female  :  1960  MRN:  [id number]  Visit Number:  [id number]  Primary Care Provider:  Sebastián Dougherty MD         LOS: 4 days       ----------------------------------------------------------------------------------------------------------------------  Subjective       Chief Complaints:    Fall        Interval History:      The patient is awake and alert, resting comfortably in bed on room air with no apparent distress.  Afebrile.  Reports some loose stools, which are not uncommon.   at the bedside.  She does complain of some nausea without vomiting as well as \"tingling and twitching\" to her bilateral lower extremities.  Lung exam is diminished to auscultation bilaterally.  Abdomen is soft and rounded, with normoactive bowel sounds.  Bilateral lower extremity edema continues but is improving.  Erythema is improving.  Leukocytosis continues to improve at 12.32.  Platelet count 101.  Blood cultures from  preliminarily report no growth at 2 days.      Review of Systems:    Constitutional: no fever, chills and night sweats.  Generalized fatigue.  Eyes: no eye drainage, itching or redness.  HEENT: no mouth sores, dysphagia or nose bleed.  Respiratory: no for shortness of breath, cough or production of sputum.  Cardiovascular: no chest pain, no palpitations, no orthopnea.  Gastrointestinal: + nausea, no vomiting or diarrhea. No abdominal pain, hematemesis or rectal bleeding.  Genitourinary: no dysuria or polyuria.  Hematologic/lymphatic: no lymph node abnormalities, no easy bruising or easy bleeding.  Musculoskeletal: no muscle or joint pain.  Skin: No rash and no itching.  Bilateral lower extremity edema.  Left lower extremity wound  Neurological: no loss of consciousness, no seizure, no headache.  Behavioral/Psych: no depression or suicidal ideation.  Endocrine: no hot flashes.  Immunologic: " negative.    ----------------------------------------------------------------------------------------------------------------------      Objective       Current [facility] Meds:  betamethasone dipropionate, , Topical, Daily  cefepime, 2,000 mg, Intravenous, Q12H  doxycycline, 100 mg, Oral, BID  empagliflozin, 10 mg, Oral, Daily  heparin (porcine), 5,000 Units, Subcutaneous, Q8H  magic barrier cream, 1 Application, Topical, BID  midodrine, 10 mg, Oral, TID AC  mupirocin, 1 application , Each Nare, BID  nystatin, 1 Application, Topical, BID  octreotide, 100 mcg, Intravenous, TID  potassium chloride, 40 mEq, Oral, Daily  sodium chloride, 10 mL, Intravenous, Q12H  sodium chloride, 10 mL, Intravenous, Q12H  sodium chloride, 10 mL, Intravenous, Q12H  sodium chloride, 10 mL, Intravenous, Q12H         ----------------------------------------------------------------------------------------------------------------------    Vital Signs:  Temp:  [96.3 °F (35.7 °C)-97.2 °F (36.2 °C)] 96.6 °F (35.9 °C)  Heart Rate:  [] 94  Resp:  [12-24] 20  BP: ()/(59-85) 104/73  Mean Arterial Pressure (Non-Invasive) for the past 24 hrs (Last 3 readings):   Noninvasive MAP (mmHg)   05/08/24 1300 90   05/08/24 1200 92   05/08/24 1100 91     SpO2 Percentage    05/08/24 1100 05/08/24 1200 05/08/24 1300   SpO2: 95% 99% 94%     SpO2:  [91 %-99 %] 94 %  on   ;   Device (Oxygen Therapy): room air    Body mass index is 21.76 kg/m².  Wt Readings from Last 3 Encounters:   05/08/24 48.9 kg (107 lb 12.9 oz)   04/23/24 81.6 kg (180 lb)   04/09/24 80.5 kg (177 lb 6.4 oz)        Intake/Output Summary (Last 24 hours) at 5/8/2024 1356  Last data filed at 5/8/2024 0809  Gross per 24 hour   Intake 460 ml   Output 400 ml   Net 60 ml     Diet: Cardiac; Low Sodium (2g); Fluid Consistency: Thin (IDDSI 0)  ----------------------------------------------------------------------------------------------------------------------      Physical  Exam:    Constitutional: Chronically ill-appearing elderly female.  Resting comfortably in bed on room air with no apparent distress.  Reports nausea without vomiting and tingling and twitching to her bilateral lower extremities.  HENT:  Head: Normocephalic and atraumatic.  Mouth:  Moist mucous membranes.    Eyes:  Conjunctivae and EOM are normal.  No scleral icterus.  Neck:  Neck supple.  No JVD present.    Cardiovascular:  Normal rate, regular rhythm and normal heart sounds with no murmur. No edema.  Pulmonary/Chest: Diminished to auscultation bilaterally   abdominal:  Soft.  Bowel sounds are normal.  No distension and no tenderness.   Musculoskeletal:  No edema, no tenderness, and no deformity.  No swelling or redness of joints.  Neurological:  Alert and oriented to person, place, and time.  No facial droop.  No slurred speech.   Skin:  Skin is warm and dry.  No rash noted.  No pallor.  Bilateral lower extremity edema improving.  Bilateral lower extremity erythema improving.  Wrapped with Ace wrap, CDI  Psychiatric:  Normal mood and affect.  Behavior is normal.        ----------------------------------------------------------------------------------------------------------------------  Results from last 7 days   Lab Units 05/04/24  0324 05/04/24  0133   HSTROP T ng/L 74* 76*     Results from last 7 days   Lab Units 05/04/24  0133   PROBNP pg/mL 14,440.0*         Results from last 7 days   Lab Units 05/08/24  0041 05/07/24  0038 05/06/24  0037 05/05/24  0909 05/05/24  0158 05/04/24  1643 05/04/24  0631 05/04/24  0333 05/04/24  0133   CRP mg/dL  --   --   --   --   --   --   --   --  16.99*   LACTATE mmol/L  --   --   --  1.9  --  3.1* 1.8   < >  --    WBC 10*3/mm3 12.32* 13.18* 23.32*  --  16.49*  16.49* 13.57*  --   --  10.57   HEMOGLOBIN g/dL 10.8* 9.8* 11.0*  --  10.2*  10.2* 11.0*  --   --  11.6*   HEMATOCRIT % 32.2* 28.6* 32.9*  --  30.6*  30.6* 34.2  --   --  33.9*   MCV fL 89.2 88.3 88.2  --  90.0  90.0  "94.2  --   --  89.4   MCHC g/dL 33.5 34.3 33.4  --  33.3  33.3 32.2  --   --  34.2   PLATELETS 10*3/mm3 101* 84* 152  --  126*  126* 101*  --   --  141   INR   --  1.72*  --   --  1.92*  --   --   --  1.87*    < > = values in this interval not displayed.     Results from last 7 days   Lab Units 05/08/24  0041 05/07/24  0038 05/06/24  0037 05/05/24  0158   SODIUM mmol/L 129* 127* 124* 124*   POTASSIUM mmol/L 3.5 3.2* 3.9 4.0   CHLORIDE mmol/L 96* 95* 92* 91*   CO2 mmol/L 20.9* 22.7 20.3* 21.2*   BUN mg/dL 36* 36* 37* 35*   CREATININE mg/dL 1.47* 1.77* 1.97* 2.17*   CALCIUM mg/dL 8.4* 8.4* 8.4* 8.6   GLUCOSE mg/dL 102* 102* 101* 93   ALBUMIN g/dL  --  2.9* 2.7* 2.9*   BILIRUBIN mg/dL  --  5.0* 4.8* 5.0*   ALK PHOS U/L  --  62 86 75   AST (SGOT) U/L  --  16 17 16   ALT (SGPT) U/L  --  5 7 6   Estimated Creatinine Clearance: 30.2 mL/min (A) (by C-G formula based on SCr of 1.47 mg/dL (H)).  No results found for: \"AMMONIA\"    No results found for: \"HGBA1C\", \"POCGLU\"    Lab Results   Component Value Date    HGBA1C 4.80 08/04/2022     Lab Results   Component Value Date    TSH 6.250 (H) 08/05/2022    FREET4 1.29 08/05/2022       Blood Culture   Date Value Ref Range Status   05/05/2024 No growth at 24 hours  Preliminary   05/05/2024 No growth at 24 hours  Preliminary   05/04/2024 Pseudomonas aeruginosa (C)  Final   05/04/2024 No growth at 3 days  Preliminary     No results found for: \"URINECX\"  No results found for: \"WOUNDCX\"  No results found for: \"STOOLCX\"  No results found for: \"RESPCX\"  Pain Management Panel           No data to display                  ----------------------------------------------------------------------------------------------------------------------  Imaging Results (Last 24 Hours)       ** No results found for the last 24 hours. **            ----------------------------------------------------------------------------------------------------------------------    Pertinent Infectious Disease " "Results                Assessment/Plan       Assessment     Pseudomonas bacteremia  Left lower extremity wound        Plan      The patient is awake and alert, resting comfortably in bed on room air with no apparent distress.  Afebrile.  Reports some loose stools, which are not uncommon.   at the bedside.  She does complain of some nausea without vomiting as well as \"tingling and twitching\" to her bilateral lower extremities.  Lung exam is diminished to auscultation bilaterally.  Abdomen is soft and rounded, with normoactive bowel sounds.  Bilateral lower extremity edema continues but is improving.  Erythema is improving.  Leukocytosis continues to improve at 12.32.  Platelet count 101.  Blood cultures from 5/5 preliminarily report no growth at 2 days.    The patient was de-escalated to cefepime, pharmacy dosing to continue during hospitalization.  Has tolerated cefepime with no shortness of breath, new rashes or other complaints.  Would plan to de-escalate to high-dose oral Levaquin at discharge to complete a 14-day course from the first set of negative blood cultures, tentatively through 5/19/2024.  We will continue to monitor closely.      ANTIMICROBIAL THERAPY    cefepime 2000 mg IVPB in 100 mL NS (VTB)  doxycycline - 100 MG, 100 MG     Code Status:   Code Status and Medical Interventions:   Ordered at: 05/04/24 0826     Medical Intervention Limits:    NO intubation (DNI)     Code Status (Patient has no pulse and is not breathing):    No CPR (Do Not Attempt to Resuscitate)     Medical Interventions (Patient has pulse or is breathing):    Limited Support       CHARY Chun  05/08/24  13:56 EDT    "

## 2024-05-08 NOTE — PLAN OF CARE
Problem: Adult Inpatient Plan of Care  Goal: Plan of Care Review  Outcome: Ongoing, Progressing   Pt resting comfortably. All needs met at this time. VSS on RA. CL removed this shift. Hopeful to d/c home in AM. No questions/concerns at this time.

## 2024-05-08 NOTE — NURSING NOTE
Report given to ABBIE Johnson on 3N.   Transferred to rm [id number].  Spouse at bedside, as well as daughter in law and both updated on plan of care. No questions/concerns at this time.   [place] taken home with DIL. Spouse states she has no other belongings with her.

## 2024-05-08 NOTE — PROGRESS NOTES
Patient Identification:  Name:  Della Jorge  Age:  63 y.o.  Sex:  female  :  1960  MRN:  [id number]   Visit Number:  [id number]  Primary Care Physician:  Sebastián Dougherty MD     Subjective     Chief complaint:     Lower leg wonds    History of presenting illness:    Patient is a 63 y.o. female with past medical history significant for  that presented to the [facility] Emergency Department for complaints of fall. in consultation for LLE ad RLE wound. Patient fell onto her hardwood floor resulting in large subcutaneous hematoma s/p  LLE evacuation in the ED. Steri-strips in place. Pitting edema present, which is chronic. I have advised she may require further removal of devitalized tissue. She wishes to hold off at this time. Will monitor for significant changes. Denies any fever or chills. Does report pain to lower legs.     Interval History:   2024: Seen resting in bed. Family at bedside. No significant changes, swelling remains present, Denies any fever or chills. Tolerating current treatment without complications.   ---------------------------------------------------------------------------------------------------------------------   Review of Systems:  Review of Systems   Constitutional:  Negative for chills and fever.   HENT:  Negative for congestion and rhinorrhea.    Eyes:  Negative for pain and redness.   Respiratory:  Negative for cough and shortness of breath.    Cardiovascular:  Positive for leg swelling. Negative for chest pain.   Gastrointestinal:  Negative for nausea and vomiting.   Genitourinary:  Negative for difficulty urinating and frequency.   Musculoskeletal:  Positive for back pain and gait problem.   Skin:  Positive for wound.   Neurological:  Negative for dizziness and weakness.   Hematological:  Does not bruise/bleed easily.   Psychiatric/Behavioral:  Negative for confusion. The patient is not nervous/anxious.     ---------------------------------------------------------------------------------------------------------------------   Past Medical History:   Diagnosis Date    Cirrhosis     Congestive heart failure (CHF)     Hypertension      Past Surgical History:   Procedure Laterality Date    CHOLECYSTECTOMY      HYSTERECTOMY      partial, still has ovaries     Family History   Problem Relation Age of Onset    Heart disease Mother     Diabetes Sister      Social History     Socioeconomic History    Marital status:    Tobacco Use    Smoking status: Never    Smokeless tobacco: Never   Vaping Use    Vaping status: Never Used   Substance and Sexual Activity    Alcohol use: Never    Drug use: Never    Sexual activity: Defer     ---------------------------------------------------------------------------------------------------------------------   Allergies:  Codeine and Keflex [cephalexin]  ---------------------------------------------------------------------------------------------------------------------   Medications below are reported home medications pulling from within the system; at this time, these medications have not been reconciled unless otherwise specified and are in the verification process for further verifcation as current home medications.    Prior to Admission Medications       Prescriptions Last Dose Informant Patient Reported? Taking?    betamethasone valerate (VALISONE) 0.1 % cream 5/3/2024 Pharmacy Yes Yes    Apply 1 Application topically to the appropriate area as directed Daily.    bumetanide (BUMEX) 1 MG tablet 5/3/2024 Pharmacy Yes Yes    Take 2 tablets by mouth 2 (Two) Times a Day.    carvedilol (COREG) 3.125 MG tablet 5/3/2024 Pharmacy Yes Yes    Take 1 tablet by mouth 2 (Two) Times a Day With Meals.    doxycycline (VIBRAMYCIN) 100 MG capsule 5/3/2024 Pharmacy Yes Yes    Take 1 capsule by mouth 2 (Two) Times a Day.    empagliflozin (JARDIANCE) 10 MG tablet tablet 5/3/2024 Pharmacy Yes Yes    Take  1 tablet by mouth Daily.    linaclotide (Linzess) 72 MCG capsule capsule Past Week Pharmacy Yes Yes    Take 1 capsule by mouth Daily As Needed (constipation).    nystatin (MYCOSTATIN) 358962 UNIT/GM powder 5/3/2024 Pharmacy Yes Yes    Apply 1 Application topically to the appropriate area as directed 2 (Two) Times a Day.    spironolactone (ALDACTONE) 25 MG tablet 5/3/2024 Pharmacy Yes Yes    Take 1 tablet by mouth Daily.    traMADol (ULTRAM) 50 MG tablet 5/3/2024 Pharmacy Yes Yes    Take 1 tablet by mouth Every 8 (Eight) Hours As Needed for Moderate Pain.          ---------------------------------------------------------------------------------------------------------------------  Objective     Hospital Scheduled Meds:  betamethasone dipropionate, , Topical, Daily  cefepime, 2,000 mg, Intravenous, Q24H  doxycycline, 100 mg, Oral, BID  empagliflozin, 10 mg, Oral, Daily  heparin (porcine), 5,000 Units, Subcutaneous, Q8H  magic barrier cream, 1 Application, Topical, BID  midodrine, 10 mg, Oral, TID AC  mupirocin, 1 application , Each Nare, BID  nystatin, 1 Application, Topical, BID  octreotide, 100 mcg, Intravenous, TID  potassium chloride, 40 mEq, Oral, Daily  sodium chloride, 10 mL, Intravenous, Q12H  sodium chloride, 10 mL, Intravenous, Q12H  sodium chloride, 10 mL, Intravenous, Q12H  sodium chloride, 10 mL, Intravenous, Q12H           Current listed hospital scheduled medications may not yet reflect those currently placed in orders that are signed and held, awaiting patient's arrival to floor/unit.    ---------------------------------------------------------------------------------------------------------------------   Vital Signs:  Temp:  [95.9 °F (35.5 °C)-97.2 °F (36.2 °C)] 96.3 °F (35.7 °C)  Heart Rate:  [] 90  Resp:  [12-24] 20  BP: ()/(53-82) 112/73  Mean Arterial Pressure (Non-Invasive) for the past 24 hrs (Last 3 readings):   Noninvasive MAP (mmHg)   05/08/24 1000 85   05/08/24 0900 84   05/08/24  0800 85     SpO2 Percentage    05/08/24 0800 05/08/24 0900 05/08/24 1000   SpO2: 96% 98% 96%     SpO2:  [91 %-98 %] 96 %  on   ;   Device (Oxygen Therapy): room air    Body mass index is 21.76 kg/m².  Wt Readings from Last 3 Encounters:   05/08/24 48.9 kg (107 lb 12.9 oz)   04/23/24 81.6 kg (180 lb)   04/09/24 80.5 kg (177 lb 6.4 oz)     ---------------------------------------------------------------------------------------------------------------------   Physical Exam  HENT:      Mouth/Throat:      Mouth: Mucous membranes are moist.   Cardiovascular:      Rate and Rhythm: Normal rate.   Pulmonary:      Effort: Pulmonary effort is normal.   Abdominal:      General: Abdomen is flat.   Skin:     General: Skin is warm.      Capillary Refill: Capillary refill takes less than 2 seconds.   Neurological:      General: No focal deficit present.      Mental Status: She is alert.   Psychiatric:         Mood and Affect: Mood normal.       LLE- hematome- base dark, steri-strips in place. Edema preent. Erythema is present  RLE- base dark,. Edema preent. Erythema is present  ---------------------------------------------------------------------------------------------------------------------   Results from last 7 days   Lab Units 05/04/24  0324 05/04/24  0133   HSTROP T ng/L 74* 76*     Results from last 7 days   Lab Units 05/04/24  0133   PROBNP pg/mL 14,440.0*         Results from last 7 days   Lab Units 05/08/24  0041 05/07/24  0038 05/06/24  0037 05/05/24  0909 05/05/24  0158 05/04/24  1643 05/04/24  0631 05/04/24  0333 05/04/24 0133   CRP mg/dL  --   --   --   --   --   --   --   --  16.99*   LACTATE mmol/L  --   --   --  1.9  --  3.1* 1.8   < >  --    WBC 10*3/mm3 12.32* 13.18* 23.32*  --  16.49*  16.49* 13.57*  --   --  10.57   HEMOGLOBIN g/dL 10.8* 9.8* 11.0*  --  10.2*  10.2* 11.0*  --   --  11.6*   HEMATOCRIT % 32.2* 28.6* 32.9*  --  30.6*  30.6* 34.2  --   --  33.9*   MCV fL 89.2 88.3 88.2  --  90.0  90.0 94.2  --   " --  89.4   MCHC g/dL 33.5 34.3 33.4  --  33.3  33.3 32.2  --   --  34.2   PLATELETS 10*3/mm3 101* 84* 152  --  126*  126* 101*  --   --  141   INR   --  1.72*  --   --  1.92*  --   --   --  1.87*    < > = values in this interval not displayed.     Results from last 7 days   Lab Units 05/08/24  0041 05/07/24  0038 05/06/24  0037 05/05/24  0158   SODIUM mmol/L 129* 127* 124* 124*   POTASSIUM mmol/L 3.5 3.2* 3.9 4.0   CHLORIDE mmol/L 96* 95* 92* 91*   CO2 mmol/L 20.9* 22.7 20.3* 21.2*   BUN mg/dL 36* 36* 37* 35*   CREATININE mg/dL 1.47* 1.77* 1.97* 2.17*   CALCIUM mg/dL 8.4* 8.4* 8.4* 8.6   GLUCOSE mg/dL 102* 102* 101* 93   ALBUMIN g/dL  --  2.9* 2.7* 2.9*   BILIRUBIN mg/dL  --  5.0* 4.8* 5.0*   ALK PHOS U/L  --  62 86 75   AST (SGOT) U/L  --  16 17 16   ALT (SGPT) U/L  --  5 7 6   Estimated Creatinine Clearance: 30.2 mL/min (A) (by C-G formula based on SCr of 1.47 mg/dL (H)).  No results found for: \"AMMONIA\"    No results found for: \"HGBA1C\", \"POCGLU\"  Lab Results   Component Value Date    HGBA1C 4.80 08/04/2022     Lab Results   Component Value Date    TSH 6.250 (H) 08/05/2022    FREET4 1.29 08/05/2022       Blood Culture   Date Value Ref Range Status   05/05/2024 No growth at 2 days  Preliminary   05/05/2024 No growth at 2 days  Preliminary   05/04/2024 Pseudomonas aeruginosa (C)  Final   05/04/2024 No growth at 4 days  Preliminary     No results found for: \"URINECX\"  No results found for: \"WOUNDCX\"  No results found for: \"STOOLCX\"  No results found for: \"RESPCX\"  No results found for: \"MRSACX\"  Pain Management Panel           No data to display              I have personally reviewed the above laboratory results.   ---------------------------------------------------------------------------------------------------------------------    Assessment & Plan      Hemaatoma of left and right lower extremity  -Continue with adaptic over open areas, cover with ABD pad for drainage control and secure with kerlix and ACE to " be changed daily   -Let lower site may require removal of devitalized tissue, she wishes to hold off at this time. Will monitor for need for more urgent removal     Recommend follow-up in outpatient clinic once stable for discharge    CHARY Byrnes, KELLE  [facility]  05/08/24  10:38 EDT

## 2024-05-09 VITALS
HEIGHT: 59 IN | HEART RATE: 99 BPM | BODY MASS INDEX: 21.73 KG/M2 | DIASTOLIC BLOOD PRESSURE: 73 MMHG | RESPIRATION RATE: 18 BRPM | OXYGEN SATURATION: 97 % | WEIGHT: 107.81 LBS | SYSTOLIC BLOOD PRESSURE: 113 MMHG | TEMPERATURE: 97.8 F

## 2024-05-09 LAB
ANION GAP SERPL CALCULATED.3IONS-SCNC: 8.1 MMOL/L (ref 5–15)
BACTERIA FLD CULT: NORMAL
BACTERIA SPEC AEROBE CULT: NORMAL
BUN SERPL-MCNC: 37 MG/DL (ref 8–23)
BUN/CREAT SERPL: 28.5 (ref 7–25)
CALCIUM SPEC-SCNC: 8.6 MG/DL (ref 8.6–10.5)
CHLORIDE SERPL-SCNC: 101 MMOL/L (ref 98–107)
CO2 SERPL-SCNC: 22.9 MMOL/L (ref 22–29)
CREAT SERPL-MCNC: 1.3 MG/DL (ref 0.57–1)
DEPRECATED RDW RBC AUTO: 56.2 FL (ref 37–54)
EGFRCR SERPLBLD CKD-EPI 2021: 46.3 ML/MIN/1.73
ERYTHROCYTE [DISTWIDTH] IN BLOOD BY AUTOMATED COUNT: 17 % (ref 12.3–15.4)
GLUCOSE SERPL-MCNC: 90 MG/DL (ref 65–99)
GRAM STN SPEC: NORMAL
GRAM STN SPEC: NORMAL
HCT VFR BLD AUTO: 36 % (ref 34–46.6)
HGB BLD-MCNC: 11.9 G/DL (ref 12–15.9)
MCH RBC QN AUTO: 29.8 PG (ref 26.6–33)
MCHC RBC AUTO-ENTMCNC: 33.1 G/DL (ref 31.5–35.7)
MCV RBC AUTO: 90.2 FL (ref 79–97)
PLATELET # BLD AUTO: 111 10*3/MM3 (ref 140–450)
PMV BLD AUTO: 9.7 FL (ref 6–12)
POTASSIUM SERPL-SCNC: 4 MMOL/L (ref 3.5–5.2)
RBC # BLD AUTO: 3.99 10*6/MM3 (ref 3.77–5.28)
SODIUM SERPL-SCNC: 132 MMOL/L (ref 136–145)
WBC NRBC COR # BLD AUTO: 11.7 10*3/MM3 (ref 3.4–10.8)

## 2024-05-09 PROCEDURE — 25010000002 OCTREOTIDE PER 25 MCG: Performed by: STUDENT IN AN ORGANIZED HEALTH CARE EDUCATION/TRAINING PROGRAM

## 2024-05-09 PROCEDURE — 99232 SBSQ HOSP IP/OBS MODERATE 35: CPT | Performed by: NURSE PRACTITIONER

## 2024-05-09 PROCEDURE — 25010000002 CEFEPIME PER 500 MG

## 2024-05-09 PROCEDURE — 80048 BASIC METABOLIC PNL TOTAL CA: CPT | Performed by: STUDENT IN AN ORGANIZED HEALTH CARE EDUCATION/TRAINING PROGRAM

## 2024-05-09 PROCEDURE — 97166 OT EVAL MOD COMPLEX 45 MIN: CPT

## 2024-05-09 PROCEDURE — 85027 COMPLETE CBC AUTOMATED: CPT | Performed by: STUDENT IN AN ORGANIZED HEALTH CARE EDUCATION/TRAINING PROGRAM

## 2024-05-09 PROCEDURE — 99239 HOSP IP/OBS DSCHRG MGMT >30: CPT | Performed by: STUDENT IN AN ORGANIZED HEALTH CARE EDUCATION/TRAINING PROGRAM

## 2024-05-09 PROCEDURE — 25010000002 HEPARIN (PORCINE) PER 1000 UNITS: Performed by: STUDENT IN AN ORGANIZED HEALTH CARE EDUCATION/TRAINING PROGRAM

## 2024-05-09 PROCEDURE — 97162 PT EVAL MOD COMPLEX 30 MIN: CPT

## 2024-05-09 RX ORDER — LEVOFLOXACIN 750 MG/1
750 TABLET, FILM COATED ORAL DAILY
Qty: 10 TABLET | Refills: 0 | Status: SHIPPED | OUTPATIENT
Start: 2024-05-09 | End: 2024-05-09

## 2024-05-09 RX ORDER — LEVOFLOXACIN 750 MG/1
750 TABLET, FILM COATED ORAL
Qty: 5 TABLET | Refills: 0 | Status: SHIPPED | OUTPATIENT
Start: 2024-05-09 | End: 2024-05-19

## 2024-05-09 RX ORDER — MIDODRINE HYDROCHLORIDE 10 MG/1
10 TABLET ORAL
Qty: 90 TABLET | Refills: 0 | Status: SHIPPED | OUTPATIENT
Start: 2024-05-09

## 2024-05-09 RX ADMIN — VITAMINS A AND D OINTMENT 1 APPLICATION: 15.5; 53.4 OINTMENT TOPICAL at 08:40

## 2024-05-09 RX ADMIN — NYSTATIN 1 APPLICATION: 100000 POWDER TOPICAL at 08:40

## 2024-05-09 RX ADMIN — MUPIROCIN 1 APPLICATION: 20 OINTMENT TOPICAL at 08:40

## 2024-05-09 RX ADMIN — EMPAGLIFLOZIN 10 MG: 10 TABLET, FILM COATED ORAL at 08:40

## 2024-05-09 RX ADMIN — POTASSIUM CHLORIDE 40 MEQ: 1500 TABLET, EXTENDED RELEASE ORAL at 08:41

## 2024-05-09 RX ADMIN — OCTREOTIDE ACETATE 100 MCG: 100 INJECTION, SOLUTION INTRAVENOUS; SUBCUTANEOUS at 08:40

## 2024-05-09 RX ADMIN — BETAMETHASONE DIPROPIONATE: 0.5 OINTMENT TOPICAL at 08:40

## 2024-05-09 RX ADMIN — CARBIDOPA AND LEVODOPA 10 MG: 50; 200 TABLET, EXTENDED RELEASE ORAL at 06:31

## 2024-05-09 RX ADMIN — LIDOCAINE 4% 1 APPLICATION: 4 CREAM TOPICAL at 02:34

## 2024-05-09 RX ADMIN — HEPARIN SODIUM 5000 UNITS: 5000 INJECTION INTRAVENOUS; SUBCUTANEOUS at 05:02

## 2024-05-09 RX ADMIN — Medication 10 ML: at 08:41

## 2024-05-09 RX ADMIN — CEFEPIME 2000 MG: 2 INJECTION, POWDER, FOR SOLUTION INTRAVENOUS at 12:35

## 2024-05-09 NOTE — THERAPY EVALUATION
Acute Care - Physical Therapy Initial Evaluation  [facility]     Patient Name: Della Jorge  : 1960  MRN: [id number]  Today's Date: 2024   Onset of Illness/Injury or Date of Surgery: 24 (admission date)  Visit Dx:     ICD-10-CM ICD-9-CM   1. Sepsis with acute renal failure without septic shock, due to unspecified organism, unspecified acute renal failure type  A41.9 038.9    R65.20 995.92    N17.9 584.9   2. Wound of left lower extremity, initial encounter  S81.802A 894.0   3. Decompensated hepatic cirrhosis  K72.90 571.5    K74.60 572.8     Patient Active Problem List   Diagnosis    Acute on chronic congestive heart failure, unspecified heart failure type    Hepatic cirrhosis    Chronic combined systolic and diastolic heart failure    Stage 3a chronic kidney disease    Chronic hyponatremia    Severe tricuspid valve regurgitation    Decompensated hepatic cirrhosis     Past Medical History:   Diagnosis Date    Cirrhosis     Congestive heart failure (CHF)     Hypertension      Past Surgical History:   Procedure Laterality Date    CHOLECYSTECTOMY      HYSTERECTOMY      partial, still has ovaries     PT Assessment (Last 12 Hours)       PT Evaluation and Treatment       Row Name 24 1045          Physical Therapy Time and Intention    Document Type evaluation  -KH     Mode of Treatment physical therapy  -     Patient Effort fair  -     Comment Pt seen for evaluation this date iwth  at bedside. Per , pt was ambulatory at home, per staff he had to walk with pt to prevent fall. Consult to see if pt could get out of bed this date. Pt unwilling to get out of bed despite multiple attempts to encourage participation.  -       Row Name 24 1045          General Information    Patient Profile Reviewed yes  -KH     Onset of Illness/Injury or Date of Surgery 24  admission date  -KH     General Observations of Patient Pt seen supine in hospital bed, pleasantly confused,  unwilling to get out of bed despite multiple attempts  -       Row Name 05/09/24 1045          Living Environment    People in Home spouse  -[facility] Name 05/09/24 1045          Range of Motion Comprehensive    Comment, General Range of Motion Not fully tested to end range, some AROM demonstrated  -[facility] Name 05/09/24 1045          Strength Comprehensive (MMT)    Comment, General Manual Muscle Testing (MMT) Assessment Demonstrates grossly 2/5 MMT with some movement.  -[facility] Name 05/09/24 1045          Bed Mobility    Bed Mobility supine-sit  -     Supine-Sit [place] (Bed Mobility) minimum assist (75% patient effort);moderate assist (50% patient effort);1 person assist  partial to sit upright in bed, grossly min/modA to sit mostly upright however would not attempt to get out of bed with therapy.  -[facility] Name             Wound 05/04/24 0105 Left lower leg Traumatic    Wound - Properties Group Placement Date: 05/04/24  -DJ Placement Time: 0105 -DJ Present on Original Admission: Y  -DJ Side: Left  -DJ Orientation: lower  -DJ Location: leg  -DJ Primary Wound Type: Traumatic  -DJ Additional Comments: Incision and Drainage to evacuate blood and clots from wound; wound closed with steri strips and topical adhesive  -DJ    Retired Wound - Properties Group Placement Date: 05/04/24  -DJ Placement Time: 0105 -DJ Present on Original Admission: Y  -DJ Side: Left  -DJ Orientation: lower  -DJ Location: leg  -DJ Primary Wound Type: Traumatic  -DJ Additional Comments: Incision and Drainage to evacuate blood and clots from wound; wound closed with steri strips and topical adhesive  -DJ    Retired Wound - Properties Group Date first assessed: 05/04/24  -DJ Time first assessed: 0105  -DJ Present on Original Admission: Y  -DJ Side: Left  -DJ Location: leg  -DJ Primary Wound Type: Traumatic  -DJ Additional Comments: Incision and Drainage to evacuate blood and clots from wound; wound closed with steri  strips and topical adhesive  -DJ      Row Name             Wound 05/06/24 1308 Right leg Traumatic    Wound - Properties Group Placement Date: 05/06/24  -TW Placement Time: 1308  -TW Side: Right  -TW Location: leg  -TW Primary Wound Type: Traumatic  -TW    Retired Wound - Properties Group Placement Date: 05/06/24  -TW Placement Time: 1308  -TW Side: Right  -TW Location: leg  -TW Primary Wound Type: Traumatic  -TW    Retired Wound - Properties Group Date first assessed: 05/06/24  -TW Time first assessed: 1308  -TW Side: Right  -TW Location: leg  -TW Primary Wound Type: Traumatic  -TW      Row Name             Wound 05/06/24 1309 medial back Other (comment)    Wound - Properties Group Placement Date: 05/06/24  -TW Placement Time: 1309 -TW Orientation: medial  -TW Location: back  -TW Primary Wound Type: Other  -TW, Bruising     Retired Wound - Properties Group Placement Date: 05/06/24  -TW Placement Time: 1309 -TW Orientation: medial  -TW Location: back  -TW Primary Wound Type: Other  -TW, Bruising     Retired Wound - Properties Group Date first assessed: 05/06/24  -TW Time first assessed: 1309 -TW Location: back  -TW Primary Wound Type: Other  -TW, Bruising       Row Name             Wound 05/07/24 0549 Right midline gluteal    Wound - Properties Group Placement Date: 05/07/24  -SC Placement Time: 0549  -SC Present on Original Admission: N  -SC Side: Right  -SC Orientation: midline  -SC Location: gluteal  -SC    Retired Wound - Properties Group Placement Date: 05/07/24  -SC Placement Time: 0549  -SC Present on Original Admission: N  -SC Side: Right  -SC Orientation: midline  -SC Location: gluteal  -SC    Retired Wound - Properties Group Date first assessed: 05/07/24  -SC Time first assessed: 0549  -SC Present on Original Admission: N  -SC Side: Right  -SC Location: gluteal  -SC      Row Name 05/09/24 1045          Plan of Care Review    Outcome Evaluation Pt seen for therapy evaluation this AM, pt very pleasantly  confused and not willing to get out of bed this date. Pt's  reports they are being D/C'd home this date. Pt may benefit from therapeutic intervention at home to work on mobility.  -       Row Name 05/09/24 1045          Positioning and Restraints    Pre-Treatment Position in bed  -     Post Treatment Position bed  -     In Bed supine;with family/caregiver  -[facility] Name 05/09/24 1045          Therapy Assessment/Plan (PT)    Therapy Frequency (PT) evaluation only  -[facility] Name 05/09/24 1045          Discharge Summary (PT)    Additional Documentation Discharge Summary (PT) (Group)  -       Row Name 05/09/24 1045          Discharge Summary (PT)    Outcomes Achieved/Progress Made Upon Discharge (PT) evaluation only;discharge from facility occurred on same date as evaluation  -               User Key  (r) = Recorded By, (t) = Taken By, (c) = Cosigned By      Initials Name Provider Type    Abimbola Gates, RN Registered Nurse    Violet Arreola RN Registered Nurse    Maritza Holguin PT Physical Therapist    Huong Jonas RN Registered Nurse                      PT Recommendation and Plan  Anticipated Discharge Disposition (PT): home, home with assist, home with 24/7 care, home with home health, home with supervision  Therapy Frequency (PT): evaluation only  Outcome Evaluation: Pt seen for therapy evaluation this AM, pt very pleasantly confused and not willing to get out of bed this date. Pt's  reports they are being D/C'd home this date. Pt may benefit from therapeutic intervention at home to work on mobility.       Time Calculation:    PT Charges       [facility] Name 05/09/24 7729             Time Calculation    Start Time 1045  -      PT Received On 05/09/24  -                User Key  (r) = Recorded By, (t) = Taken By, (c) = Cosigned By      Initials Name Provider Type    Maritza Holguin PT Physical Therapist                  Therapy Charges for [facility]       Code  Description Service Date Service Provider Modifiers Qty    32228412463 HC PT EVAL MOD COMPLEXITY 4 5/9/2024 Maritza Mc, PT GP 1            PT G-Codes  AM-PAC 6 Clicks Score (PT): 8    Maritza Mc, PT  5/9/2024

## 2024-05-09 NOTE — CASE MANAGEMENT/SOCIAL WORK
Discharge Planning Assessment   [place]     Patient Name: Della Jorge  MRN: [id number]  Today's Date: 5/9/2024    Admit Date: 5/4/2024            Discharge Plan       Row Name 05/09/24 1534       Plan    Final Discharge Disposition Code 50 - home with hospice    Final Note Pt is being discharged home with [facility] on this date. Pt and Pts spouse are aware and agreeable. SS notified spouse at bedside that PT visits have been added to hospice service at home. SS faxed AVS summary to [facility] fax [phone]. Spouse states Pt will need EMS for transport.  provided Lead RN with report number for [facility] [phone].    16:33pm: SS contacted [facility] and scheduled  EMS for transport.       Row Name 05/09/24 2119       Plan    Plan Pt discussed with Palliative care APRN who states Pt is intersted in PT services with hospice. SS contacted [facility] per [place] [phone] who states PT has been added to Pt's referral. Per [facility] the following DME: bed/bedside table/02@2lpm n/c, BSC, wound care supplies has been delivered to the home. SS notified Dr. Erwin and [facility] care. SS to follow.                  Continued Care and Services - Admitted Since 5/4/2024       Destination       Service Provider Request Status Selected Services Address Phone Fax Patient Preferred    [facility] Accepted N/A [address] [address] [phone] --               ASIF Harvey

## 2024-05-09 NOTE — PLAN OF CARE
Goal Outcome Evaluation:              Outcome Evaluation: Pt seen for therapy evaluation this AM, pt very pleasantly confused and not willing to get out of bed this date. Pt's  reports they are being D/C'd home this date. Pt may benefit from therapeutic intervention at home to work on mobility.      Anticipated Discharge Disposition (PT): home, home with assist, home with 24/7 care, home with home health, home with supervision

## 2024-05-09 NOTE — THERAPY EVALUATION
Patient Name: Della Jorge  : 1960    MRN: [id number]                              Today's Date: 2024       Admit Date: 2024    Visit Dx:     ICD-10-CM ICD-9-CM   1. Sepsis with acute renal failure without septic shock, due to unspecified organism, unspecified acute renal failure type  A41.9 038.9    R65.20 995.92    N17.9 584.9   2. Wound of left lower extremity, initial encounter  S81.802A 894.0   3. Decompensated hepatic cirrhosis  K72.90 571.5    K74.60 572.8     Patient Active Problem List   Diagnosis    Acute on chronic congestive heart failure, unspecified heart failure type    Hepatic cirrhosis    Chronic combined systolic and diastolic heart failure    Stage 3a chronic kidney disease    Chronic hyponatremia    Severe tricuspid valve regurgitation    Decompensated hepatic cirrhosis     Past Medical History:   Diagnosis Date    Cirrhosis     Congestive heart failure (CHF)     Hypertension      Past Surgical History:   Procedure Laterality Date    CHOLECYSTECTOMY      HYSTERECTOMY      partial, still has ovaries      General Information       Row Name 24 1318          OT Time and Intention    Document Type evaluation  -LA     Mode of Treatment occupational therapy  -LA       Row Name 24 1318          General Information    Patient Profile Reviewed yes  -LA     Prior Level of Function mod assist:;max assist:  Spouse reported that patient required significant assist prior to hospitalization but she was able to walk on a walker with assistance.  -LA     Existing Precautions/Restrictions fall  -LA     Barriers to Rehab medically complex;previous functional deficit;cognitive status  -LA       Row Name 24 1318          Occupational Profile    Reason for Services/Referral (Occupational Profile) OT to assess for changes in level of independence/safeyt with ADLS  -LA     Patient Goals (Occupational Profile) Unable to state; patient spouse reports that patient is going home today  with hospice care  -[facility] Name 05/09/24 1318          Living Environment    People in Home spouse  -[facility] Name 05/09/24 1318          Cognition    Orientation Status (Cognition) oriented to;person  -[facility] Name 05/09/24 1318          Safety Issues, Functional Mobility    Safety Issues Affecting Function (Mobility) ability to follow commands;insight into deficits/self-awareness;safety precautions follow-through/compliance  -LA     Impairments Affecting Function (Mobility) cognition  -LA               User Key  (r) = Recorded By, (t) = Taken By, (c) = Cosigned By      Initials Name Provider Type    Violet Turcios OT Occupational Therapist                     Mobility/ADL's       Row Name 05/09/24 1322          Bed Mobility    Bed Mobility bed mobility (all) activities  -LA     All Activities, [place] (Bed Mobility) minimum assist (75% patient effort);moderate assist (50% patient effort)  -LA     Assistive Device (Bed Mobility) head of bed elevated  -LA     Comment, (Bed Mobility) Patient difficulty to get to follow directions.  -LA       Row Name 05/09/24 1322          Transfers    Transfers other (see comments)  unable due to cognition  -LA       Row Name 05/09/24 1322          Functional Mobility    Functional Mobility- Ind. Level unable to perform;not tested  -LA       Row Name 05/09/24 1322          Activities of Daily Living    BADL Assessment/Intervention bathing;upper body dressing;lower body dressing;grooming;feeding;toileting  -LA       Row Name 05/09/24 1322          Bathing Assessment/Intervention    [place] Level (Bathing) dependent (less than 25% patient effort)  -LA       Row Name 05/09/24 1322          Upper Body Dressing Assessment/Training    [place] Level (Upper Body Dressing) dependent (less than 25% patient effort)  -LA       Row Name 05/09/24 1322          Lower Body Dressing Assessment/Training    [place] Level (Lower Body Dressing) dependent (less than  25% patient effort)  -LA       Row Name 05/09/24 1322          Grooming Assessment/Training    [place] Level (Grooming) dependent (less than 25% patient effort)  -LA       Row Name 05/09/24 1322          Self-Feeding Assessment/Training    [place] Level (Feeding) dependent (less than 25% patient effort)  -LA       Row Name 05/09/24 1322          Toileting Assessment/Training    [place] Level (Toileting) dependent (less than 25% patient effort)  -LA               User Key  (r) = Recorded By, (t) = Taken By, (c) = Cosigned By      Initials Name Provider Type    Violet Turcios OT Occupational Therapist                   Obj/Interventions       [facility] Name 05/09/24 1324          Sensory Assessment (Somatosensory)    Sensory Assessment (Somatosensory) sensation intact  -LA       Row Name 05/09/24 1324          Vision Assessment/Intervention    Visual Impairment/Limitations WFL  -LA       Row Name 05/09/24 1324          Range of Motion Comprehensive    General Range of Motion bilateral upper extremity ROM WFL  -LA     Comment, General Range of Motion BUE ROM WFL  -LA       Row Name 05/09/24 1324          Strength Comprehensive (MMT)    General Manual Muscle Testing (MMT) Assessment upper extremity strength deficits identified  -LA     Comment, General Manual Muscle Testing (MMT) Assessment BUE strength demonstrated at 3/5  -LA       Row Name 05/09/24 1324          Motor Skills    Motor Skills coordination;functional endurance  -LA     Coordination fine motor deficit;gross motor deficit;bilateral;upper extremity  -LA     Functional Endurance fair-  -LA               User Key  (r) = Recorded By, (t) = Taken By, (c) = Cosigned By      Initials Name Provider Type    Violet Turcios OT Occupational Therapist                   Goals/Plan    No documentation.                  Clinical Impression       Row Name 05/09/24 1325          Plan of Care Review    Plan of Care Reviewed With patient;spouse  -LA      Outcome Evaluation OT evaluation completed this date. Patient with limited cooperation due cognition. Multiple approaches made to increase compliance unsuccessfully. Patient remains pleasatly confused. Discussed current level with spouse. Spouse reports they will be going home today with hospice care. No further OT indicated at this time.  -LA       Row Name 05/09/24 1325          Therapy Assessment/Plan (OT)    Criteria for Skilled Therapeutic Interventions Met (OT) no;does not meet criteria for skilled intervention  -LA     Therapy Frequency (OT) evaluation only  -LA       Row Name 05/09/24 1325          Positioning and Restraints    Pre-Treatment Position in bed  -LA     Post Treatment Position bed  -LA     In Bed supine;call light within reach;encouraged to call for assist;exit alarm on  -LA               User Key  (r) = Recorded By, (t) = Taken By, (c) = Cosigned By      Initials Name Provider Type    Violet Turcios OT Occupational Therapist                   Outcome Measures       Row Name 05/09/24 0881          How much help from another person do you currently need...    Turning from your back to your side while in flat bed without using bedrails? 3  -DM     Moving from lying on back to sitting on the side of a flat bed without bedrails? 1  -DM     Moving to and from a bed to a chair (including a wheelchair)? 1  -DM     Standing up from a chair using your arms (e.g., wheelchair, bedside chair)? 1  -DM     Climbing 3-5 steps with a railing? 1  -DM     To walk in hospital room? 1  -DM     AM-PAC 6 Clicks Score (PT) 8  -DM     Highest Level of Mobility Goal 3 --> Sit at edge of bed  -DM               User Key  (r) = Recorded By, (t) = Taken By, (c) = Cosigned By      Initials Name Provider Type    DM Moniz, Deborah, RN Registered Nurse                      OT Recommendation and Plan  Therapy Frequency (OT): evaluation only  Plan of Care Review  Plan of Care Reviewed With: patient, spouse  Outcome  Evaluation: OT evaluation completed this date. Patient with limited cooperation due cognition. Multiple approaches made to increase compliance unsuccessfully. Patient remains pleasatly confused. Discussed current level with spouse. Spouse reports they will be going home today with hospice care. No further OT indicated at this time.     Time Calculation:          Therapy Charges for Today       Code Description Service Date Service Provider Modifiers Qty    [id number] HC OT EVAL MOD COMPLEXITY 4 5/9/2024 Violet Bennett OT GO 1                 Violet Bennett OT  5/9/2024

## 2024-05-09 NOTE — PLAN OF CARE
Goal Outcome Evaluation:  Plan of Care Reviewed With: patient, spouse        Progress: improving          No acute changes at this time pt is resting. Pt was transferred from [facility] today. Will continue to monitor an follow current plan of care.

## 2024-05-09 NOTE — PROGRESS NOTES
PROGRESS NOTE         Patient Identification:  Name:  Della Jorge  Age:  63 y.o.  Sex:  female  :  1960  MRN:  [id number]  Visit Number:  [id number]  Primary Care Provider:  Sebastián Dougherty MD         LOS: 5 days       ----------------------------------------------------------------------------------------------------------------------  Subjective       Chief Complaints:    Fall        Interval History:      Patient resting in bed this morning.   at bedside.  Patient remains confused this morning.  Patient refused morning medications today.  Lungs clear to auscultation bilaterally.  Abdomen soft, nontender.  WBC improving 11.70.      Review of Systems:    Unable to obtain.  Confused.    ----------------------------------------------------------------------------------------------------------------------      Objective       Current [facility] Meds:  betamethasone dipropionate, , Topical, Daily  cefepime, 2,000 mg, Intravenous, Q12H  empagliflozin, 10 mg, Oral, Daily  heparin (porcine), 5,000 Units, Subcutaneous, Q8H  magic barrier cream, 1 Application, Topical, BID  midodrine, 10 mg, Oral, TID AC  mupirocin, 1 application , Each Nare, BID  nystatin, 1 Application, Topical, BID  octreotide, 100 mcg, Intravenous, TID  potassium chloride, 40 mEq, Oral, Daily  sodium chloride, 10 mL, Intravenous, Q12H  sodium chloride, 10 mL, Intravenous, Q12H  sodium chloride, 10 mL, Intravenous, Q12H  sodium chloride, 10 mL, Intravenous, Q12H         ----------------------------------------------------------------------------------------------------------------------    Vital Signs:  Temp:  [96.3 °F (35.7 °C)-97.9 °F (36.6 °C)] 97.4 °F (36.3 °C)  Heart Rate:  [] 104  Resp:  [14-20] 18  BP: ()/(56-77) 105/69  Mean Arterial Pressure (Non-Invasive) for the past 24 hrs (Last 3 readings):   Noninvasive MAP (mmHg)   24 1022 85   24 0637 87   24 1600 80     SpO2 Percentage     05/09/24 0300 05/09/24 0637 05/09/24 1022   SpO2: 96% 96% 97%     SpO2:  [93 %-98 %] 97 %  on   ;   Device (Oxygen Therapy): room air    Body mass index is 21.76 kg/m².  Wt Readings from Last 3 Encounters:   05/08/24 48.9 kg (107 lb 12.9 oz)   04/23/24 81.6 kg (180 lb)   04/09/24 80.5 kg (177 lb 6.4 oz)        Intake/Output Summary (Last 24 hours) at 5/9/2024 1250  Last data filed at 5/9/2024 0849  Gross per 24 hour   Intake 820 ml   Output 1300 ml   Net -480 ml     Diet: Cardiac; Low Sodium (2g); Fluid Consistency: Thin (IDDSI 0)  ----------------------------------------------------------------------------------------------------------------------      Physical Exam:    Constitutional: Chronically ill-appearing elderly female.  Resting comfortably in bed on room air with no apparent distress.  Confused.   at bedside.  HENT:  Head: Normocephalic and atraumatic.  Mouth:  Moist mucous membranes.    Eyes:  Conjunctivae and EOM are normal.  No scleral icterus.  Neck:  Neck supple.  No JVD present.    Cardiovascular:  Normal rate, regular rhythm and normal heart sounds with no murmur. No edema.  Pulmonary/Chest: Clear to auscultation bilaterally.  abdominal:  Soft.  Bowel sounds are normal.  No distension and no tenderness.   Musculoskeletal:  No edema, no tenderness, and no deformity.  No swelling or redness of joints.  Neurological: Confused.  Skin:  Skin is warm and dry.  No rash noted.  No pallor.  Bilateral lower extremity edema improving.  Bilateral lower extremity erythema improving.  Wrapped with Ace wrap, CDI  Psychiatric:  Normal mood and affect.  Behavior is normal.        ----------------------------------------------------------------------------------------------------------------------  Results from last 7 days   Lab Units 05/04/24  0324 05/04/24 0133   HSTROP T ng/L 74* 76*     Results from last 7 days   Lab Units 05/04/24 0133   PROBNP pg/mL 14,440.0*         Results from last 7 days   Lab Units  "05/09/24  0457 05/08/24  0041 05/07/24  0038 05/06/24  0037 05/05/24  0909 05/05/24  0158 05/04/24  1643 05/04/24  0631 05/04/24  0333 05/04/24  0133   CRP mg/dL  --   --   --   --   --   --   --   --   --  16.99*   LACTATE mmol/L  --   --   --   --  1.9  --  3.1* 1.8   < >  --    WBC 10*3/mm3 11.70* 12.32* 13.18*   < >  --  16.49*  16.49* 13.57*  --   --  10.57   HEMOGLOBIN g/dL 11.9* 10.8* 9.8*   < >  --  10.2*  10.2* 11.0*  --   --  11.6*   HEMATOCRIT % 36.0 32.2* 28.6*   < >  --  30.6*  30.6* 34.2  --   --  33.9*   MCV fL 90.2 89.2 88.3   < >  --  90.0  90.0 94.2  --   --  89.4   MCHC g/dL 33.1 33.5 34.3   < >  --  33.3  33.3 32.2  --   --  34.2   PLATELETS 10*3/mm3 111* 101* 84*   < >  --  126*  126* 101*  --   --  141   INR   --   --  1.72*  --   --  1.92*  --   --   --  1.87*    < > = values in this interval not displayed.     Results from last 7 days   Lab Units 05/09/24  0457 05/08/24  0041 05/07/24  0038 05/06/24  0037 05/05/24  0158   SODIUM mmol/L 132* 129* 127* 124* 124*   POTASSIUM mmol/L 4.0 3.5 3.2* 3.9 4.0   CHLORIDE mmol/L 101 96* 95* 92* 91*   CO2 mmol/L 22.9 20.9* 22.7 20.3* 21.2*   BUN mg/dL 37* 36* 36* 37* 35*   CREATININE mg/dL 1.30* 1.47* 1.77* 1.97* 2.17*   CALCIUM mg/dL 8.6 8.4* 8.4* 8.4* 8.6   GLUCOSE mg/dL 90 102* 102* 101* 93   ALBUMIN g/dL  --   --  2.9* 2.7* 2.9*   BILIRUBIN mg/dL  --   --  5.0* 4.8* 5.0*   ALK PHOS U/L  --   --  62 86 75   AST (SGOT) U/L  --   --  16 17 16   ALT (SGPT) U/L  --   --  5 7 6   Estimated Creatinine Clearance: 34.2 mL/min (A) (by C-G formula based on SCr of 1.3 mg/dL (H)).  No results found for: \"AMMONIA\"    No results found for: \"HGBA1C\", \"POCGLU\"    Lab Results   Component Value Date    HGBA1C 4.80 08/04/2022     Lab Results   Component Value Date    TSH 6.250 (H) 08/05/2022    FREET4 1.29 08/05/2022       Blood Culture   Date Value Ref Range Status   05/05/2024 No growth at 24 hours  Preliminary   05/05/2024 No growth at 24 hours  Preliminary " "  05/04/2024 Pseudomonas aeruginosa (C)  Final   05/04/2024 No growth at 3 days  Preliminary     No results found for: \"URINECX\"  No results found for: \"WOUNDCX\"  No results found for: \"STOOLCX\"  No results found for: \"RESPCX\"  Pain Management Panel           No data to display                  ----------------------------------------------------------------------------------------------------------------------  Imaging Results (Last 24 Hours)       ** No results found for the last 24 hours. **            ----------------------------------------------------------------------------------------------------------------------    Pertinent Infectious Disease Results                Assessment/Plan       Assessment     Pseudomonas bacteremia  Left lower extremity wound        Plan      Patient resting in bed this morning.   at bedside.  Patient remains confused this morning.  Patient refused morning medications today.  Lungs clear to auscultation bilaterally.  Abdomen soft, nontender.  WBC improving 11.70.    Patient continues on cefepime pharmacy to dose for treatment of Pseudomonas bacteremia, upon discharge patient can be de-escalated to high-dose oral Levaquin at discharge to complete a 14-day course from the first set of negative blood cultures, tentatively through 5/19/2024.  We will continue to monitor closely.  Recommend outpatient infectious disease follow-up.      ANTIMICROBIAL THERAPY    cefepime 2000 mg IVPB in 100 mL NS (VTB)  doxycycline - 100 MG     Code Status:   Code Status and Medical Interventions:   Ordered at: 05/04/24 0826     Medical Intervention Limits:    NO intubation (DNI)     Code Status (Patient has no pulse and is not breathing):    No CPR (Do Not Attempt to Resuscitate)     Medical Interventions (Patient has pulse or is breathing):    Limited Support       CHARY Gilliland  05/09/24  12:50 EDT    "

## 2024-05-09 NOTE — DISCHARGE INSTR - ACTIVITY
Activity as Tolerated    BLE- Kerlix and Ace bandage should be from the beginning of the toes up to the knee   Magic Barrier cream to coccyx bid and PRN

## 2024-05-09 NOTE — PLAN OF CARE
Goal Outcome Evaluation:              Outcome Evaluation: Pt being discharged home with hospice.

## 2024-05-09 NOTE — CASE MANAGEMENT/SOCIAL WORK
Discharge Planning Assessment   [facility]     Patient Name: Della Jorge  MRN: [id number]  Today's Date: 5/9/2024    Admit Date: 5/4/2024         Discharge Plan       Row Name 05/09/24 1428       Plan    Plan Pt discussed with Palliative care APRN who states Pt is intersted in PT services with hospice. SS contacted [facility] per [place] [phone] who states PT has been added to Pt's referral. Per [facility] the following DME: bed/bedside table/02@2lpm n/c, BSC, wound care supplies has been delivered to the home. SS notified Dr. Erwin and [facility]alla[facility] care. SS to follow.                  Continued Care and Services - Admitted Since 5/4/2024       Destination       Service Provider Request Status Selected Services Address Phone Fax Patient Preferred    [facility] BAR Accepted N/A [address] [facility] [address] [phone] --                    ASIF Harvey

## 2024-05-10 LAB
BACTERIA SPEC AEROBE CULT: NORMAL
BACTERIA SPEC AEROBE CULT: NORMAL
BACTERIA SPEC ANAEROBE CULT: NORMAL

## 2024-05-10 NOTE — PAYOR COMM NOTE
"DISCHARGE NOTIFICATION    REF # [id number]     Della Chavira (63 y.o. Female)       Date of Birth   1960    Social Security Number       Address   [address] [address]    Home Phone   [phone]    MRN   [id number]       Worship   T[employer]    Marital Status                               Admission Date   5/4/24    Admission Type   Emergency    Admitting Provider   Adán Medina DO    Attending Provider       Department, Room/Bed   [address], 3342/1S       Discharge Date   5/9/2024    Discharge Disposition   Hospice/Home    Discharge Destination                                 Attending Provider: (none)   Allergies: Codeine, Keflex [Cephalexin]    Isolation: None   Infection: None   Code Status: Prior    Ht: 149.9 cm (59.02\")   Wt: 48.9 kg (107 lb 12.9 oz)    Admission Cmt: None   Principal Problem: Decompensated hepatic cirrhosis [K72.90,K74.60]                   Active Insurance as of 5/4/2024       Primary Coverage       Payor Plan Insurance Group Employer/Plan Group    [facility] COMMUNITY PLAN [facility] COMMUNITY PLAN [facility]       Payor Plan Address Payor Plan Phone Number Payor Plan Fax Number Effective Dates    [address]   7/1/2022 - None Entered    [facility] [id number]         Subscriber Name Subscriber Birth Date Member ID       DELLA CHAVIRA 1960 [id number]                     Emergency Contacts        (Rel.) Home Phone Work Phone Mobile Phone    LuisitoTriston (Spouse) [phone] -- --    Junior Chavira (Son) -- -- [phone]    FRANCHESKA LYNN (Sister) [phone] -- --              Discharge Summary    No notes of this type exist for this encounter.       "

## 2024-05-15 NOTE — DISCHARGE SUMMARY
[facility]    Patient Identification:  Name:  Della Jorge  Age:  63 y.o.  Sex:  female  :  1960  MRN:  [id number]  Visit Number:  [id number]    Date of Admission: 2024  Date of Discharge: 2024     PCP: Sebastián Dougherty MD    DISCHARGE DIAGNOSES  Septic shock  Pseudomonas bacteremia  Decompensated cirrhosis  Congestive hepatopathy  Acute on chronic hyponatremia  Lactic acidemia, recurrent 2/2 hypertension  Elevated troponin 2/2 cirrhosis and heart failure  Acute on chronic combined systolic and diastolic heart failure  HTN hx  ROBEL    CONSULTS   Consults       Date and Time Order Name Status Description    2024  8:34 AM Inpatient Palliative Care MD Consult Completed     2024  7:32 PM Inpatient Infectious Diseases Consult Completed             PROCEDURES PERFORMED  -    HOSPITAL COURSE  In brief, Della Jorge is a 63 y.o. female with above noted PMH that presented initially with Septic shock, jana on CKD in setting of known HRS hx, congestive hepatopathy c/b decompensated end stage cirrhosis and encephalopathy secondary to sepsis. She was treated with empiric abx narrowed to cefepime for pseudomonas bacteremia with ID consulted with improvement in encephalopathy and MELD once acute inf treated. She did have recent paracentesis but no significant ascites on admission her for paracentesis. Her diuretics were held and she was continued on midodrine with Cr improved to baseline by time of discharge. Given her significant MELD elevation and patient voiced desire to avoid further invasive procedures or w/u for liver TRX, palliatie was consulted and patient elected to change code status to DNR/DNI and discharge home with home hospice for further care. She is discharging with PO levaquin to complete course as outpatient and hospice notified of discharge.        VITAL SIGNS:        on   ;        Body mass index is 21.76 kg/m².  Wt Readings from  Last 3 Encounters:   05/08/24 48.9 kg (107 lb 12.9 oz)   04/23/24 81.6 kg (180 lb)   04/09/24 80.5 kg (177 lb 6.4 oz)       PHYSICAL EXAM:  Physical Exam  Vitals and nursing note reviewed.   Constitutional:       Appearance: She is ill-appearing.   HENT:      Head: Normocephalic and atraumatic.   Eyes:      General: Scleral icterus present.      Extraocular Movements: Extraocular movements intact.   Cardiovascular:      Rate and Rhythm: Normal rate.   Pulmonary:      Effort: Pulmonary effort is normal. No respiratory distress.   Abdominal:      General: There is distension.      Palpations: Abdomen is soft.      Tenderness: There is no abdominal tenderness.   Musculoskeletal:      Right lower leg: Edema present.      Left lower leg: Edema present.   Skin:     Comments: B/l LE erythematous and significant edematous but appear slightly improved given some wrinkling   Neurological:      Mental Status: She is alert.      Cranial Nerves: No cranial nerve deficit.      Comments: Oriented but tangential at times          DISCHARGE DISPOSITION   Stable       Discharge Medications        New Medications        Instructions Start Date   levoFLOXacin 750 MG tablet  Commonly known as: Levaquin   750 mg, Oral, Every 48 Hours      midodrine 10 MG tablet  Commonly known as: PROAMATINE   10 mg, Oral, 3 Times Daily Before Meals             Continue These Medications        Instructions Start Date   betamethasone valerate 0.1 % cream  Commonly known as: VALISONE   1 Application, Topical, Daily      bumetanide 1 MG tablet  Commonly known as: BUMEX   2 mg, Oral, 2 Times Daily      empagliflozin 10 MG tablet tablet  Commonly known as: JARDIANCE   10 mg, Oral, Daily      nystatin 569078 UNIT/GM powder  Commonly known as: MYCOSTATIN   1 Application, Topical, 2 Times Daily      traMADol 50 MG tablet  Commonly known as: ULTRAM   50 mg, Oral, Every 8 Hours PRN             Stop These Medications      carvedilol 3.125 MG tablet  Commonly known  as: COREG     doxycycline 100 MG capsule  Commonly known as: VIBRAMYCIN     Linzess 72 MCG capsule capsule  Generic drug: linaclotide     spironolactone 25 MG tablet  Commonly known as: ALDACTONE              Diet Instructions    Cardiac, low sodium         Activity Instructions    Activity as Tolerated    BLE- Kerlix and Ace bandage should be from the beginning of the toes up to the knee   Magic Barrier cream to coccyx bid and PRN          Contact information for follow-up providers       Sebastián Dougherty MD .    Specialty: Internal Medicine  Contact information:  [address] [facility] [address] [phone]                       Contact information for after-discharge care       Home Medical Care       [facility] .    Service: Home Hospice  Contact information:  [address] [facility] [place] [address] [phone]                                    TEST  RESULTS PENDING AT DISCHARGE      I will notify patient via phone-call should above lab work result with any significant abnormal findings     CODE STATUS  Code Status and Medical Interventions:   Ordered at: 05/04/24 0826     Medical Intervention Limits:    NO intubation (DNI)     Code Status (Patient has no pulse and is not breathing):    No CPR (Do Not Attempt to Resuscitate)     Medical Interventions (Patient has pulse or is breathing):    Limited Support             Vu Erwin MD  [facility]  05/15/24  18:01 EDT    Please note that this discharge summary required more than 30 minutes to complete.

## 2024-07-10 ENCOUNTER — LAB REQUISITION (OUTPATIENT)
Dept: LAB | Facility: HOSPITAL | Age: 64
End: 2024-07-10
Payer: MEDICAID

## 2024-07-10 DIAGNOSIS — K74.60 UNSPECIFIED CIRRHOSIS OF LIVER: ICD-10-CM

## 2024-07-10 LAB — AMMONIA BLD-SCNC: 110 UMOL/L (ref 11–51)

## 2024-07-10 PROCEDURE — 82140 ASSAY OF AMMONIA: CPT

## 2024-07-10 PROCEDURE — 85025 COMPLETE CBC W/AUTO DIFF WBC: CPT

## 2024-07-10 PROCEDURE — 80053 COMPREHEN METABOLIC PANEL: CPT

## 2024-07-10 PROCEDURE — 83880 ASSAY OF NATRIURETIC PEPTIDE: CPT

## 2024-07-11 LAB
ALBUMIN SERPL-MCNC: 2.7 G/DL (ref 3.5–5.2)
ALBUMIN/GLOB SERPL: 0.8 G/DL
ALP SERPL-CCNC: 132 U/L (ref 39–117)
ALT SERPL W P-5'-P-CCNC: 10 U/L (ref 1–33)
ANION GAP SERPL CALCULATED.3IONS-SCNC: 14.6 MMOL/L (ref 5–15)
AST SERPL-CCNC: 30 U/L (ref 1–32)
BASOPHILS # BLD AUTO: 0.05 10*3/MM3 (ref 0–0.2)
BASOPHILS NFR BLD AUTO: 0.5 % (ref 0–1.5)
BILIRUB SERPL-MCNC: 5.3 MG/DL (ref 0–1.2)
BUN SERPL-MCNC: 38 MG/DL (ref 8–23)
BUN/CREAT SERPL: 22.9 (ref 7–25)
CALCIUM SPEC-SCNC: 9.1 MG/DL (ref 8.6–10.5)
CHLORIDE SERPL-SCNC: 87 MMOL/L (ref 98–107)
CO2 SERPL-SCNC: 24.4 MMOL/L (ref 22–29)
CREAT SERPL-MCNC: 1.66 MG/DL (ref 0.57–1)
DEPRECATED RDW RBC AUTO: 51.2 FL (ref 37–54)
EGFRCR SERPLBLD CKD-EPI 2021: 34.5 ML/MIN/1.73
EOSINOPHIL # BLD AUTO: 0.08 10*3/MM3 (ref 0–0.4)
EOSINOPHIL NFR BLD AUTO: 0.8 % (ref 0.3–6.2)
ERYTHROCYTE [DISTWIDTH] IN BLOOD BY AUTOMATED COUNT: 15.2 % (ref 12.3–15.4)
GLOBULIN UR ELPH-MCNC: 3.6 GM/DL
GLUCOSE SERPL-MCNC: 95 MG/DL (ref 65–99)
HCT VFR BLD AUTO: 29.8 % (ref 34–46.6)
HGB BLD-MCNC: 10.8 G/DL (ref 12–15.9)
IMM GRANULOCYTES # BLD AUTO: 0.08 10*3/MM3 (ref 0–0.05)
IMM GRANULOCYTES NFR BLD AUTO: 0.8 % (ref 0–0.5)
LYMPHOCYTES # BLD AUTO: 0.33 10*3/MM3 (ref 0.7–3.1)
LYMPHOCYTES NFR BLD AUTO: 3.3 % (ref 19.6–45.3)
MCH RBC QN AUTO: 33.5 PG (ref 26.6–33)
MCHC RBC AUTO-ENTMCNC: 36.2 G/DL (ref 31.5–35.7)
MCV RBC AUTO: 92.5 FL (ref 79–97)
MONOCYTES # BLD AUTO: 0.54 10*3/MM3 (ref 0.1–0.9)
MONOCYTES NFR BLD AUTO: 5.4 % (ref 5–12)
NEUTROPHILS NFR BLD AUTO: 8.87 10*3/MM3 (ref 1.7–7)
NEUTROPHILS NFR BLD AUTO: 89.2 % (ref 42.7–76)
NRBC BLD AUTO-RTO: 0 /100 WBC (ref 0–0.2)
NT-PROBNP SERPL-MCNC: ABNORMAL PG/ML (ref 0–900)
PLATELET # BLD AUTO: 170 10*3/MM3 (ref 140–450)
PMV BLD AUTO: 10.7 FL (ref 6–12)
POTASSIUM SERPL-SCNC: 3.9 MMOL/L (ref 3.5–5.2)
PROT SERPL-MCNC: 6.3 G/DL (ref 6–8.5)
RBC # BLD AUTO: 3.22 10*6/MM3 (ref 3.77–5.28)
SODIUM SERPL-SCNC: 126 MMOL/L (ref 136–145)
WBC NRBC COR # BLD AUTO: 9.95 10*3/MM3 (ref 3.4–10.8)

## 2025-04-18 ENCOUNTER — DOCUMENTATION (OUTPATIENT)
Dept: CARDIOLOGY | Facility: HOSPITAL | Age: 65
End: 2025-04-18
Payer: MEDICAID